# Patient Record
Sex: FEMALE | Race: WHITE | NOT HISPANIC OR LATINO | Employment: FULL TIME | URBAN - METROPOLITAN AREA
[De-identification: names, ages, dates, MRNs, and addresses within clinical notes are randomized per-mention and may not be internally consistent; named-entity substitution may affect disease eponyms.]

---

## 2020-01-28 ENCOUNTER — APPOINTMENT (OUTPATIENT)
Dept: GENERAL RADIOLOGY | Facility: CLINIC | Age: 35
DRG: 003 | End: 2020-01-28
Attending: STUDENT IN AN ORGANIZED HEALTH CARE EDUCATION/TRAINING PROGRAM
Payer: COMMERCIAL

## 2020-01-28 ENCOUNTER — HOSPITAL ENCOUNTER (INPATIENT)
Facility: CLINIC | Age: 35
LOS: 14 days | Discharge: HOME HEALTH | DRG: 003 | End: 2020-02-11
Attending: INTERNAL MEDICINE | Admitting: INTERNAL MEDICINE
Payer: COMMERCIAL

## 2020-01-28 DIAGNOSIS — D62 ANEMIA DUE TO BLOOD LOSS, ACUTE: ICD-10-CM

## 2020-01-28 DIAGNOSIS — T81.49XA INCISIONAL INFECTION: ICD-10-CM

## 2020-01-28 DIAGNOSIS — I50.41 ACUTE COMBINED SYSTOLIC AND DIASTOLIC HEART FAILURE (H): Primary | ICD-10-CM

## 2020-01-28 DIAGNOSIS — Z30.41 ENCOUNTER FOR SURVEILLANCE OF CONTRACEPTIVE PILLS: ICD-10-CM

## 2020-01-28 DIAGNOSIS — I50.41 ACUTE COMBINED SYSTOLIC AND DIASTOLIC HEART FAILURE (H): ICD-10-CM

## 2020-01-28 PROBLEM — I50.9 HEART FAILURE (H): Status: ACTIVE | Noted: 2020-01-28

## 2020-01-28 LAB
ALBUMIN SERPL-MCNC: 3.1 G/DL (ref 3.4–5)
ALP SERPL-CCNC: 58 U/L (ref 40–150)
ALT SERPL W P-5'-P-CCNC: 17 U/L (ref 0–50)
ANION GAP SERPL CALCULATED.3IONS-SCNC: 9 MMOL/L (ref 3–14)
AST SERPL W P-5'-P-CCNC: 38 U/L (ref 0–45)
BASE DEFICIT BLDV-SCNC: 4.1 MMOL/L
BILIRUB SERPL-MCNC: 0.3 MG/DL (ref 0.2–1.3)
BUN SERPL-MCNC: 34 MG/DL (ref 7–30)
CALCIUM SERPL-MCNC: 8.1 MG/DL (ref 8.5–10.1)
CHLORIDE SERPL-SCNC: 106 MMOL/L (ref 94–109)
CO2 SERPL-SCNC: 23 MMOL/L (ref 20–32)
CREAT SERPL-MCNC: 1.14 MG/DL (ref 0.52–1.04)
CRP SERPL-MCNC: 10 MG/L (ref 0–8)
ERYTHROCYTE [DISTWIDTH] IN BLOOD BY AUTOMATED COUNT: 14.6 % (ref 10–15)
ERYTHROCYTE [SEDIMENTATION RATE] IN BLOOD BY WESTERGREN METHOD: 7 MM/H (ref 0–20)
GFR SERPL CREATININE-BSD FRML MDRD: 63 ML/MIN/{1.73_M2}
GLUCOSE BLDC GLUCOMTR-MCNC: 128 MG/DL (ref 70–99)
GLUCOSE SERPL-MCNC: 129 MG/DL (ref 70–99)
HCO3 BLDV-SCNC: 21 MMOL/L (ref 21–28)
HCT VFR BLD AUTO: 42.8 % (ref 35–47)
HGB BLD-MCNC: 13.1 G/DL (ref 11.7–15.7)
INR PPP: 1.13 (ref 0.86–1.14)
LACTATE BLD-SCNC: 1.8 MMOL/L (ref 0.7–2)
MCH RBC QN AUTO: 27 PG (ref 26.5–33)
MCHC RBC AUTO-ENTMCNC: 30.6 G/DL (ref 31.5–36.5)
MCV RBC AUTO: 88 FL (ref 78–100)
O2/TOTAL GAS SETTING VFR VENT: NORMAL %
OXYHGB MFR BLDV: 64 %
PCO2 BLDV: 40 MM HG (ref 40–50)
PH BLDV: 7.34 PH (ref 7.32–7.43)
PLATELET # BLD AUTO: 91 10E9/L (ref 150–450)
PO2 BLDV: 40 MM HG (ref 25–47)
POTASSIUM SERPL-SCNC: 4.3 MMOL/L (ref 3.4–5.3)
PROT SERPL-MCNC: 6.2 G/DL (ref 6.8–8.8)
RBC # BLD AUTO: 4.85 10E12/L (ref 3.8–5.2)
SODIUM SERPL-SCNC: 138 MMOL/L (ref 133–144)
WBC # BLD AUTO: 8.9 10E9/L (ref 4–11)

## 2020-01-28 PROCEDURE — 25000125 ZZHC RX 250: Performed by: STUDENT IN AN ORGANIZED HEALTH CARE EDUCATION/TRAINING PROGRAM

## 2020-01-28 PROCEDURE — 80053 COMPREHEN METABOLIC PANEL: CPT | Performed by: STUDENT IN AN ORGANIZED HEALTH CARE EDUCATION/TRAINING PROGRAM

## 2020-01-28 PROCEDURE — 20000004 ZZH R&B ICU UMMC

## 2020-01-28 PROCEDURE — 40000986 XR CHEST PORT 1 VW

## 2020-01-28 PROCEDURE — 40000048 ZZH STATISTIC DAILY SWAN MONITORING

## 2020-01-28 PROCEDURE — 82805 BLOOD GASES W/O2 SATURATION: CPT | Performed by: STUDENT IN AN ORGANIZED HEALTH CARE EDUCATION/TRAINING PROGRAM

## 2020-01-28 PROCEDURE — 93010 ELECTROCARDIOGRAM REPORT: CPT | Performed by: INTERNAL MEDICINE

## 2020-01-28 PROCEDURE — 86901 BLOOD TYPING SEROLOGIC RH(D): CPT | Performed by: STUDENT IN AN ORGANIZED HEALTH CARE EDUCATION/TRAINING PROGRAM

## 2020-01-28 PROCEDURE — 40000275 ZZH STATISTIC RCP TIME EA 10 MIN

## 2020-01-28 PROCEDURE — 86923 COMPATIBILITY TEST ELECTRIC: CPT | Performed by: STUDENT IN AN ORGANIZED HEALTH CARE EDUCATION/TRAINING PROGRAM

## 2020-01-28 PROCEDURE — 87640 STAPH A DNA AMP PROBE: CPT | Performed by: STUDENT IN AN ORGANIZED HEALTH CARE EDUCATION/TRAINING PROGRAM

## 2020-01-28 PROCEDURE — 85652 RBC SED RATE AUTOMATED: CPT | Performed by: STUDENT IN AN ORGANIZED HEALTH CARE EDUCATION/TRAINING PROGRAM

## 2020-01-28 PROCEDURE — 40000196 ZZH STATISTIC RAPCV CVP MONITORING

## 2020-01-28 PROCEDURE — 86850 RBC ANTIBODY SCREEN: CPT | Performed by: STUDENT IN AN ORGANIZED HEALTH CARE EDUCATION/TRAINING PROGRAM

## 2020-01-28 PROCEDURE — 85027 COMPLETE CBC AUTOMATED: CPT | Performed by: STUDENT IN AN ORGANIZED HEALTH CARE EDUCATION/TRAINING PROGRAM

## 2020-01-28 PROCEDURE — 25000128 H RX IP 250 OP 636: Performed by: STUDENT IN AN ORGANIZED HEALTH CARE EDUCATION/TRAINING PROGRAM

## 2020-01-28 PROCEDURE — 85610 PROTHROMBIN TIME: CPT | Performed by: STUDENT IN AN ORGANIZED HEALTH CARE EDUCATION/TRAINING PROGRAM

## 2020-01-28 PROCEDURE — 83605 ASSAY OF LACTIC ACID: CPT | Performed by: STUDENT IN AN ORGANIZED HEALTH CARE EDUCATION/TRAINING PROGRAM

## 2020-01-28 PROCEDURE — 86140 C-REACTIVE PROTEIN: CPT | Performed by: STUDENT IN AN ORGANIZED HEALTH CARE EDUCATION/TRAINING PROGRAM

## 2020-01-28 PROCEDURE — 86900 BLOOD TYPING SEROLOGIC ABO: CPT | Performed by: STUDENT IN AN ORGANIZED HEALTH CARE EDUCATION/TRAINING PROGRAM

## 2020-01-28 PROCEDURE — 00000146 ZZHCL STATISTIC GLUCOSE BY METER IP

## 2020-01-28 PROCEDURE — 87641 MR-STAPH DNA AMP PROBE: CPT | Performed by: STUDENT IN AN ORGANIZED HEALTH CARE EDUCATION/TRAINING PROGRAM

## 2020-01-28 PROCEDURE — 40000076 ZZH STATISTIC IABP MONITORING

## 2020-01-28 RX ORDER — ACETAMINOPHEN 650 MG/1
650 SUPPOSITORY RECTAL EVERY 4 HOURS PRN
Status: DISCONTINUED | OUTPATIENT
Start: 2020-01-28 | End: 2020-02-11 | Stop reason: HOSPADM

## 2020-01-28 RX ORDER — POTASSIUM CL/LIDO/0.9 % NACL 10MEQ/0.1L
10 INTRAVENOUS SOLUTION, PIGGYBACK (ML) INTRAVENOUS
Status: DISCONTINUED | OUTPATIENT
Start: 2020-01-28 | End: 2020-02-11 | Stop reason: HOSPADM

## 2020-01-28 RX ORDER — DOBUTAMINE HYDROCHLORIDE 200 MG/100ML
5 INJECTION INTRAVENOUS CONTINUOUS
Status: DISCONTINUED | OUTPATIENT
Start: 2020-01-28 | End: 2020-01-29

## 2020-01-28 RX ORDER — MAGNESIUM SULFATE HEPTAHYDRATE 40 MG/ML
4 INJECTION, SOLUTION INTRAVENOUS EVERY 4 HOURS PRN
Status: DISCONTINUED | OUTPATIENT
Start: 2020-01-28 | End: 2020-01-29

## 2020-01-28 RX ORDER — OSELTAMIVIR PHOSPHATE 75 MG/1
75 CAPSULE ORAL 2 TIMES DAILY
Status: DISCONTINUED | OUTPATIENT
Start: 2020-01-29 | End: 2020-01-29

## 2020-01-28 RX ORDER — HEPARIN SODIUM 10000 [USP'U]/100ML
0-3500 INJECTION, SOLUTION INTRAVENOUS CONTINUOUS
Status: DISCONTINUED | OUTPATIENT
Start: 2020-01-29 | End: 2020-01-29

## 2020-01-28 RX ORDER — LIDOCAINE 40 MG/G
CREAM TOPICAL
Status: DISCONTINUED | OUTPATIENT
Start: 2020-01-28 | End: 2020-02-01

## 2020-01-28 RX ORDER — ACETAMINOPHEN 325 MG/1
650 TABLET ORAL EVERY 4 HOURS PRN
Status: DISCONTINUED | OUTPATIENT
Start: 2020-01-28 | End: 2020-02-11 | Stop reason: HOSPADM

## 2020-01-28 RX ORDER — POTASSIUM CHLORIDE 1.5 G/1.58G
20-40 POWDER, FOR SOLUTION ORAL
Status: DISCONTINUED | OUTPATIENT
Start: 2020-01-28 | End: 2020-02-11 | Stop reason: HOSPADM

## 2020-01-28 RX ORDER — POTASSIUM CHLORIDE 29.8 MG/ML
20 INJECTION INTRAVENOUS
Status: DISCONTINUED | OUTPATIENT
Start: 2020-01-28 | End: 2020-02-11 | Stop reason: HOSPADM

## 2020-01-28 RX ORDER — POTASSIUM CHLORIDE 750 MG/1
20-40 TABLET, EXTENDED RELEASE ORAL
Status: DISCONTINUED | OUTPATIENT
Start: 2020-01-28 | End: 2020-02-11 | Stop reason: HOSPADM

## 2020-01-28 RX ORDER — ALUMINA, MAGNESIA, AND SIMETHICONE 2400; 2400; 240 MG/30ML; MG/30ML; MG/30ML
30 SUSPENSION ORAL EVERY 4 HOURS PRN
Status: DISCONTINUED | OUTPATIENT
Start: 2020-01-28 | End: 2020-01-30

## 2020-01-28 RX ORDER — NOREPINEPHRINE BITARTRATE 0.06 MG/ML
0.03-0.4 INJECTION, SOLUTION INTRAVENOUS CONTINUOUS
Status: DISCONTINUED | OUTPATIENT
Start: 2020-01-28 | End: 2020-01-29

## 2020-01-28 RX ORDER — FUROSEMIDE 10 MG/ML
40 INJECTION INTRAMUSCULAR; INTRAVENOUS EVERY 12 HOURS
Status: DISCONTINUED | OUTPATIENT
Start: 2020-01-28 | End: 2020-01-28

## 2020-01-28 RX ORDER — POTASSIUM CHLORIDE 7.45 MG/ML
10 INJECTION INTRAVENOUS
Status: DISCONTINUED | OUTPATIENT
Start: 2020-01-28 | End: 2020-02-11 | Stop reason: HOSPADM

## 2020-01-28 RX ORDER — HEPARIN SODIUM 10000 [USP'U]/100ML
0-3500 INJECTION, SOLUTION INTRAVENOUS CONTINUOUS
Status: DISCONTINUED | OUTPATIENT
Start: 2020-01-28 | End: 2020-01-28

## 2020-01-28 RX ORDER — ONDANSETRON 2 MG/ML
4 INJECTION INTRAMUSCULAR; INTRAVENOUS EVERY 6 HOURS PRN
Status: DISCONTINUED | OUTPATIENT
Start: 2020-01-28 | End: 2020-02-11 | Stop reason: HOSPADM

## 2020-01-28 RX ADMIN — Medication 0.1 MCG/KG/MIN: at 23:20

## 2020-01-28 RX ADMIN — HEPARIN SODIUM 1010 UNITS/HR: 10000 INJECTION, SOLUTION INTRAVENOUS at 23:21

## 2020-01-28 RX ADMIN — DOBUTAMINE IN DEXTROSE 2.5 MCG/KG/MIN: 200 INJECTION, SOLUTION INTRAVENOUS at 23:20

## 2020-01-28 ASSESSMENT — MIFFLIN-ST. JEOR: SCORE: 1567.13

## 2020-01-28 ASSESSMENT — PAIN DESCRIPTION - DESCRIPTORS: DESCRIPTORS: BURNING

## 2020-01-28 NOTE — Clinical Note
Potential access sites were evaluated for patency using ultrasound.   The left femoral artery and left femoral vein were selected. Access was obtained under with Sonosite guidance using a18 guage needle with direct visualization of needle entry.

## 2020-01-29 ENCOUNTER — APPOINTMENT (OUTPATIENT)
Dept: GENERAL RADIOLOGY | Facility: CLINIC | Age: 35
DRG: 003 | End: 2020-01-29
Attending: STUDENT IN AN ORGANIZED HEALTH CARE EDUCATION/TRAINING PROGRAM
Payer: COMMERCIAL

## 2020-01-29 ENCOUNTER — ANESTHESIA EVENT (OUTPATIENT)
Dept: CARDIOLOGY | Facility: CLINIC | Age: 35
DRG: 003 | End: 2020-01-29
Payer: COMMERCIAL

## 2020-01-29 ENCOUNTER — APPOINTMENT (OUTPATIENT)
Dept: GENERAL RADIOLOGY | Facility: CLINIC | Age: 35
DRG: 003 | End: 2020-01-29
Attending: INTERNAL MEDICINE
Payer: COMMERCIAL

## 2020-01-29 ENCOUNTER — APPOINTMENT (OUTPATIENT)
Dept: ULTRASOUND IMAGING | Facility: CLINIC | Age: 35
DRG: 003 | End: 2020-01-29
Attending: STUDENT IN AN ORGANIZED HEALTH CARE EDUCATION/TRAINING PROGRAM
Payer: COMMERCIAL

## 2020-01-29 ENCOUNTER — APPOINTMENT (OUTPATIENT)
Dept: CARDIOLOGY | Facility: CLINIC | Age: 35
DRG: 003 | End: 2020-01-29
Attending: STUDENT IN AN ORGANIZED HEALTH CARE EDUCATION/TRAINING PROGRAM
Payer: COMMERCIAL

## 2020-01-29 ENCOUNTER — ALLIED HEALTH/NURSE VISIT (OUTPATIENT)
Dept: NEUROLOGY | Facility: CLINIC | Age: 35
DRG: 003 | End: 2020-01-29
Attending: PSYCHIATRY & NEUROLOGY
Payer: COMMERCIAL

## 2020-01-29 ENCOUNTER — ANESTHESIA (OUTPATIENT)
Dept: CARDIOLOGY | Facility: CLINIC | Age: 35
DRG: 003 | End: 2020-01-29
Payer: COMMERCIAL

## 2020-01-29 ENCOUNTER — APPOINTMENT (OUTPATIENT)
Dept: CARDIOLOGY | Facility: CLINIC | Age: 35
DRG: 003 | End: 2020-01-29
Attending: INTERNAL MEDICINE
Payer: COMMERCIAL

## 2020-01-29 DIAGNOSIS — I46.9 CARDIAC ARREST (H): Primary | ICD-10-CM

## 2020-01-29 PROBLEM — R57.0 CARDIOGENIC SHOCK (H): Status: ACTIVE | Noted: 2020-01-29

## 2020-01-29 PROBLEM — I50.41 ACUTE COMBINED SYSTOLIC AND DIASTOLIC HEART FAILURE (H): Status: ACTIVE | Noted: 2020-01-28

## 2020-01-29 LAB
ALBUMIN SERPL-MCNC: 2.6 G/DL (ref 3.4–5)
ALBUMIN SERPL-MCNC: 2.6 G/DL (ref 3.4–5)
ALBUMIN UR-MCNC: 10 MG/DL
ALP SERPL-CCNC: 48 U/L (ref 40–150)
ALP SERPL-CCNC: 50 U/L (ref 40–150)
ALT SERPL W P-5'-P-CCNC: 14 U/L (ref 0–50)
ALT SERPL W P-5'-P-CCNC: 16 U/L (ref 0–50)
AMPHETAMINES UR QL SCN: NEGATIVE
ANION GAP SERPL CALCULATED.3IONS-SCNC: 6 MMOL/L (ref 3–14)
ANION GAP SERPL CALCULATED.3IONS-SCNC: 7 MMOL/L (ref 3–14)
ANION GAP SERPL CALCULATED.3IONS-SCNC: 7 MMOL/L (ref 3–14)
ANISOCYTOSIS BLD QL SMEAR: SLIGHT
APPEARANCE UR: CLEAR
APTT PPP: 47 SEC (ref 22–37)
APTT PPP: >240 SEC (ref 22–37)
AST SERPL W P-5'-P-CCNC: 35 U/L (ref 0–45)
AST SERPL W P-5'-P-CCNC: 45 U/L (ref 0–45)
B-HCG SERPL-ACNC: <1 IU/L (ref 0–5)
BARBITURATES UR QL: NEGATIVE
BASE DEFICIT BLDA-SCNC: 0.6 MMOL/L
BASE DEFICIT BLDA-SCNC: 0.9 MMOL/L
BASE DEFICIT BLDA-SCNC: 2.6 MMOL/L
BASE DEFICIT BLDA-SCNC: 2.7 MMOL/L
BASE DEFICIT BLDV-SCNC: 0.7 MMOL/L
BASE DEFICIT BLDV-SCNC: 0.9 MMOL/L
BASE DEFICIT BLDV-SCNC: 1.3 MMOL/L
BASE DEFICIT BLDV-SCNC: 1.5 MMOL/L
BASE DEFICIT BLDV-SCNC: 1.6 MMOL/L
BASE DEFICIT BLDV-SCNC: 1.9 MMOL/L
BASE DEFICIT BLDV-SCNC: 2.4 MMOL/L
BASE EXCESS BLDV CALC-SCNC: 0.2 MMOL/L
BASOPHILS # BLD AUTO: 0 10E9/L (ref 0–0.2)
BASOPHILS NFR BLD AUTO: 0 %
BENZODIAZ UR QL: POSITIVE
BILIRUB SERPL-MCNC: 0.3 MG/DL (ref 0.2–1.3)
BILIRUB SERPL-MCNC: 0.3 MG/DL (ref 0.2–1.3)
BILIRUB UR QL STRIP: NEGATIVE
BLD PROD TYP BPU: NORMAL
BLD UNIT ID BPU: 0
BLD UNIT ID BPU: 0
BLOOD PRODUCT CODE: NORMAL
BLOOD PRODUCT CODE: NORMAL
BPU ID: NORMAL
BPU ID: NORMAL
BUN SERPL-MCNC: 26 MG/DL (ref 7–30)
BUN SERPL-MCNC: 30 MG/DL (ref 7–30)
BUN SERPL-MCNC: 32 MG/DL (ref 7–30)
BURR CELLS BLD QL SMEAR: SLIGHT
CA-I BLD-MCNC: 4.2 MG/DL (ref 4.4–5.2)
CA-I BLD-MCNC: 4.2 MG/DL (ref 4.4–5.2)
CA-I BLD-MCNC: NORMAL MG/DL (ref 4.4–5.2)
CALCIUM SERPL-MCNC: 7.2 MG/DL (ref 8.5–10.1)
CALCIUM SERPL-MCNC: 7.2 MG/DL (ref 8.5–10.1)
CALCIUM SERPL-MCNC: 8 MG/DL (ref 8.5–10.1)
CANNABINOIDS UR QL SCN: POSITIVE
CHLORIDE SERPL-SCNC: 104 MMOL/L (ref 94–109)
CHLORIDE SERPL-SCNC: 105 MMOL/L (ref 94–109)
CHLORIDE SERPL-SCNC: 108 MMOL/L (ref 94–109)
CO2 SERPL-SCNC: 23 MMOL/L (ref 20–32)
CO2 SERPL-SCNC: 24 MMOL/L (ref 20–32)
CO2 SERPL-SCNC: 24 MMOL/L (ref 20–32)
COCAINE UR QL: NEGATIVE
COLOR UR AUTO: YELLOW
CORTIS SERPL-MCNC: 12.9 UG/DL (ref 4–22)
CREAT SERPL-MCNC: 0.79 MG/DL (ref 0.52–1.04)
CREAT SERPL-MCNC: 0.82 MG/DL (ref 0.52–1.04)
CREAT SERPL-MCNC: 0.93 MG/DL (ref 0.52–1.04)
CRP SERPL-MCNC: 17 MG/L (ref 0–8)
D DIMER PPP FEU-MCNC: 1.3 UG/ML FEU (ref 0–0.5)
DIFFERENTIAL METHOD BLD: ABNORMAL
EOSINOPHIL # BLD AUTO: 0 10E9/L (ref 0–0.7)
EOSINOPHIL NFR BLD AUTO: 0 %
ERYTHROCYTE [DISTWIDTH] IN BLOOD BY AUTOMATED COUNT: 14.5 % (ref 10–15)
ERYTHROCYTE [DISTWIDTH] IN BLOOD BY AUTOMATED COUNT: 14.6 % (ref 10–15)
ERYTHROCYTE [DISTWIDTH] IN BLOOD BY AUTOMATED COUNT: 14.6 % (ref 10–15)
ERYTHROCYTE [SEDIMENTATION RATE] IN BLOOD BY WESTERGREN METHOD: 9 MM/H (ref 0–20)
ETHANOL UR QL SCN: NEGATIVE
FIBRINOGEN PPP-MCNC: 280 MG/DL (ref 200–420)
GFR SERPL CREATININE-BSD FRML MDRD: 81 ML/MIN/{1.73_M2}
GFR SERPL CREATININE-BSD FRML MDRD: >90 ML/MIN/{1.73_M2}
GFR SERPL CREATININE-BSD FRML MDRD: >90 ML/MIN/{1.73_M2}
GLUCOSE BLD-MCNC: 103 MG/DL (ref 70–99)
GLUCOSE BLD-MCNC: 118 MG/DL (ref 70–99)
GLUCOSE BLD-MCNC: NORMAL MG/DL (ref 70–99)
GLUCOSE BLDC GLUCOMTR-MCNC: 101 MG/DL (ref 70–99)
GLUCOSE BLDC GLUCOMTR-MCNC: 106 MG/DL (ref 70–99)
GLUCOSE BLDC GLUCOMTR-MCNC: 107 MG/DL (ref 70–99)
GLUCOSE BLDC GLUCOMTR-MCNC: 120 MG/DL (ref 70–99)
GLUCOSE BLDC GLUCOMTR-MCNC: 94 MG/DL (ref 70–99)
GLUCOSE SERPL-MCNC: 119 MG/DL (ref 70–99)
GLUCOSE SERPL-MCNC: 130 MG/DL (ref 70–99)
GLUCOSE SERPL-MCNC: 98 MG/DL (ref 70–99)
GLUCOSE UR STRIP-MCNC: NEGATIVE MG/DL
GRAM STN SPEC: NORMAL
GRAM STN SPEC: NORMAL
HBA1C MFR BLD: 5.1 % (ref 0–5.6)
HCG UR QL: NEGATIVE
HCO3 BLD-SCNC: 22 MMOL/L (ref 21–28)
HCO3 BLD-SCNC: 25 MMOL/L (ref 21–28)
HCO3 BLD-SCNC: 25 MMOL/L (ref 21–28)
HCO3 BLDA-SCNC: 23 MMOL/L (ref 21–28)
HCO3 BLDV-SCNC: 22 MMOL/L (ref 21–28)
HCO3 BLDV-SCNC: 22 MMOL/L (ref 21–28)
HCO3 BLDV-SCNC: 23 MMOL/L (ref 21–28)
HCO3 BLDV-SCNC: 24 MMOL/L (ref 21–28)
HCO3 BLDV-SCNC: 25 MMOL/L (ref 21–28)
HCO3 BLDV-SCNC: 25 MMOL/L (ref 21–28)
HCT VFR BLD AUTO: 31.4 % (ref 35–47)
HCT VFR BLD AUTO: 33.7 % (ref 35–47)
HCT VFR BLD AUTO: 40.8 % (ref 35–47)
HGB BLD-MCNC: 10.9 G/DL (ref 11.7–15.7)
HGB BLD-MCNC: 12.9 G/DL (ref 11.7–15.7)
HGB BLD-MCNC: 9.7 G/DL (ref 11.7–15.7)
HGB FREE PLAS-MCNC: <30 MG/DL
HGB UR QL STRIP: NEGATIVE
HIV 1+2 AB+HIV1 P24 AG SERPL QL IA: NONREACTIVE
INR PPP: 1.2 (ref 0.86–1.14)
INR PPP: 1.45 (ref 0.86–1.14)
INTERPRETATION ECG - MUSE: NORMAL
IRON SATN MFR SERPL: 14 % (ref 15–46)
IRON SERPL-MCNC: 39 UG/DL (ref 35–180)
KAPPA LC UR-MCNC: 1.37 MG/DL (ref 0.33–1.94)
KAPPA LC/LAMBDA SER: 1.05 {RATIO} (ref 0.26–1.65)
KCT BLD-ACNC: 126 SEC (ref 75–150)
KCT BLD-ACNC: 158 SEC (ref 75–150)
KCT BLD-ACNC: 166 SEC (ref 75–150)
KCT BLD-ACNC: 308 SEC (ref 75–150)
KCT BLD-ACNC: 308 SEC (ref 75–150)
KETONES UR STRIP-MCNC: NEGATIVE MG/DL
LACTATE BLD-SCNC: 1.5 MMOL/L (ref 0.7–2)
LACTATE BLD-SCNC: 1.6 MMOL/L (ref 0.7–2)
LACTATE BLD-SCNC: 1.9 MMOL/L (ref 0.7–2)
LACTATE BLD-SCNC: 2.1 MMOL/L (ref 0.7–2)
LACTATE BLD-SCNC: 2.3 MMOL/L (ref 0.7–2)
LACTATE BLD-SCNC: 2.4 MMOL/L (ref 0.7–2)
LACTATE BLD-SCNC: 2.5 MMOL/L (ref 0.7–2)
LACTATE BLD-SCNC: 2.5 MMOL/L (ref 0.7–2)
LACTATE BLD-SCNC: 3.1 MMOL/L (ref 0.7–2)
LACTATE BLD-SCNC: NORMAL MMOL/L (ref 0.7–2)
LAMBDA LC SERPL-MCNC: 1.31 MG/DL (ref 0.57–2.63)
LDH SERPL L TO P-CCNC: 280 U/L (ref 81–234)
LEUKOCYTE ESTERASE UR QL STRIP: NEGATIVE
LMWH PPP CHRO-ACNC: 0.28 IU/ML
LMWH PPP CHRO-ACNC: 0.38 IU/ML
LMWH PPP CHRO-ACNC: <0.1 IU/ML
LMWH PPP CHRO-ACNC: >2 IU/ML
LYMPHOCYTES # BLD AUTO: 1.1 10E9/L (ref 0.8–5.3)
LYMPHOCYTES NFR BLD AUTO: 10.7 %
MAGNESIUM SERPL-MCNC: 2.3 MG/DL (ref 1.6–2.3)
MAGNESIUM SERPL-MCNC: 2.3 MG/DL (ref 1.6–2.3)
MAGNESIUM SERPL-MCNC: 2.4 MG/DL (ref 1.6–2.3)
MCH RBC QN AUTO: 26.8 PG (ref 26.5–33)
MCH RBC QN AUTO: 27.1 PG (ref 26.5–33)
MCH RBC QN AUTO: 27.4 PG (ref 26.5–33)
MCHC RBC AUTO-ENTMCNC: 30.9 G/DL (ref 31.5–36.5)
MCHC RBC AUTO-ENTMCNC: 31.6 G/DL (ref 31.5–36.5)
MCHC RBC AUTO-ENTMCNC: 32.3 G/DL (ref 31.5–36.5)
MCV RBC AUTO: 85 FL (ref 78–100)
MCV RBC AUTO: 86 FL (ref 78–100)
MCV RBC AUTO: 87 FL (ref 78–100)
MONOCYTES # BLD AUTO: 0.4 10E9/L (ref 0–1.3)
MONOCYTES NFR BLD AUTO: 3.6 %
MRSA DNA SPEC QL NAA+PROBE: NEGATIVE
NEUTROPHILS # BLD AUTO: 8.8 10E9/L (ref 1.6–8.3)
NEUTROPHILS NFR BLD AUTO: 85.7 %
NITRATE UR QL: NEGATIVE
NUM BPU REQUESTED: 1
O2/TOTAL GAS SETTING VFR VENT: 21 %
O2/TOTAL GAS SETTING VFR VENT: 40 %
O2/TOTAL GAS SETTING VFR VENT: ABNORMAL %
O2/TOTAL GAS SETTING VFR VENT: NORMAL %
O2/TOTAL GAS SETTING VFR VENT: NORMAL %
OPIATES UR QL SCN: NEGATIVE
OXYHGB MFR BLD: 98 % (ref 92–100)
OXYHGB MFR BLD: 99 % (ref 92–100)
OXYHGB MFR BLD: 99 % (ref 92–100)
OXYHGB MFR BLDA: 99 % (ref 75–100)
OXYHGB MFR BLDV: 45 %
OXYHGB MFR BLDV: 49 %
OXYHGB MFR BLDV: 50 %
OXYHGB MFR BLDV: 50 %
OXYHGB MFR BLDV: 52 %
OXYHGB MFR BLDV: 72 %
PCO2 BLD: 39 MM HG (ref 35–45)
PCO2 BLD: 44 MM HG (ref 35–45)
PCO2 BLD: 46 MM HG (ref 35–45)
PCO2 BLDA: 42 MM HG (ref 35–45)
PCO2 BLDV: 34 MM HG (ref 40–50)
PCO2 BLDV: 36 MM HG (ref 40–50)
PCO2 BLDV: 37 MM HG (ref 40–50)
PCO2 BLDV: 38 MM HG (ref 40–50)
PCO2 BLDV: 42 MM HG (ref 40–50)
PCO2 BLDV: 43 MM HG (ref 40–50)
PCO2 BLDV: 44 MM HG (ref 40–50)
PCO2 BLDV: 47 MM HG (ref 40–50)
PH BLD: 7.35 PH (ref 7.35–7.45)
PH BLD: 7.36 PH (ref 7.35–7.45)
PH BLD: 7.37 PH (ref 7.35–7.45)
PH BLDA: 7.35 PH (ref 7.35–7.45)
PH BLDV: 7.33 PH (ref 7.32–7.43)
PH BLDV: 7.36 PH (ref 7.32–7.43)
PH BLDV: 7.39 PH (ref 7.32–7.43)
PH BLDV: 7.42 PH (ref 7.32–7.43)
PH BLDV: 7.42 PH (ref 7.32–7.43)
PH BLDV: 7.43 PH (ref 7.32–7.43)
PH UR STRIP: 6 PH (ref 5–7)
PLATELET # BLD AUTO: 118 10E9/L (ref 150–450)
PLATELET # BLD AUTO: 119 10E9/L (ref 150–450)
PLATELET # BLD AUTO: 74 10E9/L (ref 150–450)
PLATELET # BLD EST: ABNORMAL 10*3/UL
PO2 BLD: 217 MM HG (ref 80–105)
PO2 BLD: 226 MM HG (ref 80–105)
PO2 BLD: 299 MM HG (ref 80–105)
PO2 BLDA: 320 MM HG (ref 80–105)
PO2 BLDV: 29 MM HG (ref 25–47)
PO2 BLDV: 30 MM HG (ref 25–47)
PO2 BLDV: 32 MM HG (ref 25–47)
PO2 BLDV: 49 MM HG (ref 25–47)
POIKILOCYTOSIS BLD QL SMEAR: SLIGHT
POTASSIUM SERPL-SCNC: 4 MMOL/L (ref 3.4–5.3)
POTASSIUM SERPL-SCNC: 4.1 MMOL/L (ref 3.4–5.3)
POTASSIUM SERPL-SCNC: 4.4 MMOL/L (ref 3.4–5.3)
PROCALCITONIN SERPL-MCNC: <0.05 NG/ML
PROT SERPL-MCNC: 5.2 G/DL (ref 6.8–8.8)
PROT SERPL-MCNC: 5.2 G/DL (ref 6.8–8.8)
RBC # BLD AUTO: 3.62 10E12/L (ref 3.8–5.2)
RBC # BLD AUTO: 3.98 10E12/L (ref 3.8–5.2)
RBC # BLD AUTO: 4.76 10E12/L (ref 3.8–5.2)
RBC #/AREA URNS AUTO: 1 /HPF (ref 0–2)
SODIUM SERPL-SCNC: 134 MMOL/L (ref 133–144)
SODIUM SERPL-SCNC: 136 MMOL/L (ref 133–144)
SODIUM SERPL-SCNC: 139 MMOL/L (ref 133–144)
SOURCE: ABNORMAL
SP GR UR STRIP: 1.02 (ref 1–1.03)
SPECIMEN SOURCE: NORMAL
SPECIMEN SOURCE: NORMAL
TIBC SERPL-MCNC: 272 UG/DL (ref 240–430)
TRANS CELLS #/AREA URNS HPF: <1 /HPF (ref 0–1)
TRANSFERRIN SERPL-MCNC: 225 MG/DL (ref 210–360)
TRANSFUSION STATUS PATIENT QL: NORMAL
TROPONIN I SERPL-MCNC: 5.18 UG/L (ref 0–0.04)
TROPONIN I SERPL-MCNC: 5.54 UG/L (ref 0–0.04)
TROPONIN I SERPL-MCNC: 6.7 UG/L (ref 0–0.04)
TROPONIN I SERPL-MCNC: 6.87 UG/L (ref 0–0.04)
TSH SERPL DL<=0.005 MIU/L-ACNC: 2.11 MU/L (ref 0.4–4)
UROBILINOGEN UR STRIP-MCNC: NORMAL MG/DL (ref 0–2)
WBC # BLD AUTO: 10.3 10E9/L (ref 4–11)
WBC # BLD AUTO: 11.7 10E9/L (ref 4–11)
WBC # BLD AUTO: 15.1 10E9/L (ref 4–11)
WBC #/AREA URNS AUTO: 1 /HPF (ref 0–5)

## 2020-01-29 PROCEDURE — 85730 THROMBOPLASTIN TIME PARTIAL: CPT | Performed by: STUDENT IN AN ORGANIZED HEALTH CARE EDUCATION/TRAINING PROGRAM

## 2020-01-29 PROCEDURE — 84443 ASSAY THYROID STIM HORMONE: CPT | Performed by: STUDENT IN AN ORGANIZED HEALTH CARE EDUCATION/TRAINING PROGRAM

## 2020-01-29 PROCEDURE — 85347 COAGULATION TIME ACTIVATED: CPT

## 2020-01-29 PROCEDURE — 88342 IMHCHEM/IMCYTCHM 1ST ANTB: CPT | Performed by: INTERNAL MEDICINE

## 2020-01-29 PROCEDURE — 85384 FIBRINOGEN ACTIVITY: CPT | Performed by: STUDENT IN AN ORGANIZED HEALTH CARE EDUCATION/TRAINING PROGRAM

## 2020-01-29 PROCEDURE — 25000125 ZZHC RX 250: Performed by: NURSE ANESTHETIST, CERTIFIED REGISTERED

## 2020-01-29 PROCEDURE — 25000125 ZZHC RX 250: Performed by: INTERNAL MEDICINE

## 2020-01-29 PROCEDURE — 41000018 ZZH PER-PERFUSION 1ST 30 MIN

## 2020-01-29 PROCEDURE — 87389 HIV-1 AG W/HIV-1&-2 AB AG IA: CPT | Performed by: STUDENT IN AN ORGANIZED HEALTH CARE EDUCATION/TRAINING PROGRAM

## 2020-01-29 PROCEDURE — 84466 ASSAY OF TRANSFERRIN: CPT | Performed by: STUDENT IN AN ORGANIZED HEALTH CARE EDUCATION/TRAINING PROGRAM

## 2020-01-29 PROCEDURE — 95700 EEG CONT REC W/VID EEG TECH: CPT | Mod: ZF

## 2020-01-29 PROCEDURE — 93308 TTE F-UP OR LMTD: CPT

## 2020-01-29 PROCEDURE — 40000275 ZZH STATISTIC RCP TIME EA 10 MIN

## 2020-01-29 PROCEDURE — 27211402 ZZH SENSOR NIRS OXIMETER, ADULT

## 2020-01-29 PROCEDURE — 71045 X-RAY EXAM CHEST 1 VIEW: CPT

## 2020-01-29 PROCEDURE — 83540 ASSAY OF IRON: CPT | Performed by: STUDENT IN AN ORGANIZED HEALTH CARE EDUCATION/TRAINING PROGRAM

## 2020-01-29 PROCEDURE — 87040 BLOOD CULTURE FOR BACTERIA: CPT | Performed by: STUDENT IN AN ORGANIZED HEALTH CARE EDUCATION/TRAINING PROGRAM

## 2020-01-29 PROCEDURE — 25000125 ZZHC RX 250: Performed by: STUDENT IN AN ORGANIZED HEALTH CARE EDUCATION/TRAINING PROGRAM

## 2020-01-29 PROCEDURE — 33947 ECMO/ECLS INITIATION ARTERY: CPT | Mod: GC | Performed by: INTERNAL MEDICINE

## 2020-01-29 PROCEDURE — 40000986 XR ABDOMEN PORT 1 VW

## 2020-01-29 PROCEDURE — 80307 DRUG TEST PRSMV CHEM ANLYZR: CPT | Performed by: STUDENT IN AN ORGANIZED HEALTH CARE EDUCATION/TRAINING PROGRAM

## 2020-01-29 PROCEDURE — 81001 URINALYSIS AUTO W/SCOPE: CPT | Performed by: STUDENT IN AN ORGANIZED HEALTH CARE EDUCATION/TRAINING PROGRAM

## 2020-01-29 PROCEDURE — 5A1522G EXTRACORPOREAL OXYGENATION, MEMBRANE, PERIPHERAL VENO-ARTERIAL: ICD-10-PCS | Performed by: INTERNAL MEDICINE

## 2020-01-29 PROCEDURE — 40000048 ZZH STATISTIC DAILY SWAN MONITORING

## 2020-01-29 PROCEDURE — 40000014 ZZH STATISTIC ARTERIAL MONITORING DAILY

## 2020-01-29 PROCEDURE — 85520 HEPARIN ASSAY: CPT | Performed by: INTERNAL MEDICINE

## 2020-01-29 PROCEDURE — 82533 TOTAL CORTISOL: CPT | Performed by: STUDENT IN AN ORGANIZED HEALTH CARE EDUCATION/TRAINING PROGRAM

## 2020-01-29 PROCEDURE — 83883 ASSAY NEPHELOMETRY NOT SPEC: CPT | Performed by: INTERNAL MEDICINE

## 2020-01-29 PROCEDURE — 25800030 ZZH RX IP 258 OP 636: Performed by: INTERNAL MEDICINE

## 2020-01-29 PROCEDURE — 82330 ASSAY OF CALCIUM: CPT | Performed by: STUDENT IN AN ORGANIZED HEALTH CARE EDUCATION/TRAINING PROGRAM

## 2020-01-29 PROCEDURE — 86140 C-REACTIVE PROTEIN: CPT | Performed by: STUDENT IN AN ORGANIZED HEALTH CARE EDUCATION/TRAINING PROGRAM

## 2020-01-29 PROCEDURE — 84702 CHORIONIC GONADOTROPIN TEST: CPT | Performed by: STUDENT IN AN ORGANIZED HEALTH CARE EDUCATION/TRAINING PROGRAM

## 2020-01-29 PROCEDURE — 5A1935Z RESPIRATORY VENTILATION, LESS THAN 24 CONSECUTIVE HOURS: ICD-10-PCS | Performed by: INTERNAL MEDICINE

## 2020-01-29 PROCEDURE — 93308 TTE F-UP OR LMTD: CPT | Mod: 26 | Performed by: INTERNAL MEDICINE

## 2020-01-29 PROCEDURE — 80349 CANNABINOIDS NATURAL: CPT | Performed by: STUDENT IN AN ORGANIZED HEALTH CARE EDUCATION/TRAINING PROGRAM

## 2020-01-29 PROCEDURE — 27210794 ZZH OR GENERAL SUPPLY STERILE: Performed by: INTERNAL MEDICINE

## 2020-01-29 PROCEDURE — 25000128 H RX IP 250 OP 636: Performed by: INTERNAL MEDICINE

## 2020-01-29 PROCEDURE — 99152 MOD SED SAME PHYS/QHP 5/>YRS: CPT | Performed by: INTERNAL MEDICINE

## 2020-01-29 PROCEDURE — 25000128 H RX IP 250 OP 636: Performed by: STUDENT IN AN ORGANIZED HEALTH CARE EDUCATION/TRAINING PROGRAM

## 2020-01-29 PROCEDURE — 94002 VENT MGMT INPAT INIT DAY: CPT

## 2020-01-29 PROCEDURE — 83735 ASSAY OF MAGNESIUM: CPT | Performed by: STUDENT IN AN ORGANIZED HEALTH CARE EDUCATION/TRAINING PROGRAM

## 2020-01-29 PROCEDURE — 93321 DOPPLER ECHO F-UP/LMTD STD: CPT | Mod: 26 | Performed by: INTERNAL MEDICINE

## 2020-01-29 PROCEDURE — 25000132 ZZH RX MED GY IP 250 OP 250 PS 637: Performed by: STUDENT IN AN ORGANIZED HEALTH CARE EDUCATION/TRAINING PROGRAM

## 2020-01-29 PROCEDURE — 82784 ASSAY IGA/IGD/IGG/IGM EACH: CPT | Performed by: STUDENT IN AN ORGANIZED HEALTH CARE EDUCATION/TRAINING PROGRAM

## 2020-01-29 PROCEDURE — 82805 BLOOD GASES W/O2 SATURATION: CPT | Performed by: STUDENT IN AN ORGANIZED HEALTH CARE EDUCATION/TRAINING PROGRAM

## 2020-01-29 PROCEDURE — 84145 PROCALCITONIN (PCT): CPT | Performed by: STUDENT IN AN ORGANIZED HEALTH CARE EDUCATION/TRAINING PROGRAM

## 2020-01-29 PROCEDURE — 99291 CRITICAL CARE FIRST HOUR: CPT | Mod: 25 | Performed by: INTERNAL MEDICINE

## 2020-01-29 PROCEDURE — 40000196 ZZH STATISTIC RAPCV CVP MONITORING

## 2020-01-29 PROCEDURE — 83605 ASSAY OF LACTIC ACID: CPT | Performed by: STUDENT IN AN ORGANIZED HEALTH CARE EDUCATION/TRAINING PROGRAM

## 2020-01-29 PROCEDURE — 85520 HEPARIN ASSAY: CPT | Performed by: STUDENT IN AN ORGANIZED HEALTH CARE EDUCATION/TRAINING PROGRAM

## 2020-01-29 PROCEDURE — 93505 ENDOMYOCARDIAL BIOPSY: CPT | Performed by: INTERNAL MEDICINE

## 2020-01-29 PROCEDURE — 88307 TISSUE EXAM BY PATHOLOGIST: CPT | Performed by: INTERNAL MEDICINE

## 2020-01-29 PROCEDURE — 84484 ASSAY OF TROPONIN QUANT: CPT | Performed by: STUDENT IN AN ORGANIZED HEALTH CARE EDUCATION/TRAINING PROGRAM

## 2020-01-29 PROCEDURE — 81025 URINE PREGNANCY TEST: CPT | Performed by: INTERNAL MEDICINE

## 2020-01-29 PROCEDURE — 33952 ECMO/ECLS INSJ PRPH CANNULA: CPT | Mod: 59 | Performed by: INTERNAL MEDICINE

## 2020-01-29 PROCEDURE — 40000076 ZZH STATISTIC IABP MONITORING

## 2020-01-29 PROCEDURE — 83550 IRON BINDING TEST: CPT | Performed by: STUDENT IN AN ORGANIZED HEALTH CARE EDUCATION/TRAINING PROGRAM

## 2020-01-29 PROCEDURE — 36620 INSERTION CATHETER ARTERY: CPT | Performed by: INTERNAL MEDICINE

## 2020-01-29 PROCEDURE — 33947 ECMO/ECLS INITIATION ARTERY: CPT | Mod: 59 | Performed by: INTERNAL MEDICINE

## 2020-01-29 PROCEDURE — 85652 RBC SED RATE AUTOMATED: CPT | Performed by: STUDENT IN AN ORGANIZED HEALTH CARE EDUCATION/TRAINING PROGRAM

## 2020-01-29 PROCEDURE — 87205 SMEAR GRAM STAIN: CPT | Performed by: STUDENT IN AN ORGANIZED HEALTH CARE EDUCATION/TRAINING PROGRAM

## 2020-01-29 PROCEDURE — 80320 DRUG SCREEN QUANTALCOHOLS: CPT | Performed by: STUDENT IN AN ORGANIZED HEALTH CARE EDUCATION/TRAINING PROGRAM

## 2020-01-29 PROCEDURE — 85025 COMPLETE CBC W/AUTO DIFF WBC: CPT | Performed by: STUDENT IN AN ORGANIZED HEALTH CARE EDUCATION/TRAINING PROGRAM

## 2020-01-29 PROCEDURE — 93005 ELECTROCARDIOGRAM TRACING: CPT

## 2020-01-29 PROCEDURE — 20000004 ZZH R&B ICU UMMC

## 2020-01-29 PROCEDURE — 83615 LACTATE (LD) (LDH) ENZYME: CPT | Performed by: STUDENT IN AN ORGANIZED HEALTH CARE EDUCATION/TRAINING PROGRAM

## 2020-01-29 PROCEDURE — 93505 ENDOMYOCARDIAL BIOPSY: CPT | Mod: 26 | Performed by: INTERNAL MEDICINE

## 2020-01-29 PROCEDURE — 40000671 ZZH STATISTIC ANESTHESIA CASE

## 2020-01-29 PROCEDURE — 36415 COLL VENOUS BLD VENIPUNCTURE: CPT | Performed by: STUDENT IN AN ORGANIZED HEALTH CARE EDUCATION/TRAINING PROGRAM

## 2020-01-29 PROCEDURE — 95711 VEEG 2-12 HR UNMONITORED: CPT | Mod: ZF

## 2020-01-29 PROCEDURE — 00000146 ZZHCL STATISTIC GLUCOSE BY METER IP

## 2020-01-29 PROCEDURE — 85027 COMPLETE CBC AUTOMATED: CPT | Performed by: STUDENT IN AN ORGANIZED HEALTH CARE EDUCATION/TRAINING PROGRAM

## 2020-01-29 PROCEDURE — C1894 INTRO/SHEATH, NON-LASER: HCPCS | Performed by: INTERNAL MEDICINE

## 2020-01-29 PROCEDURE — 83051 HEMOGLOBIN PLASMA: CPT | Performed by: STUDENT IN AN ORGANIZED HEALTH CARE EDUCATION/TRAINING PROGRAM

## 2020-01-29 PROCEDURE — 40000986 XR CHEST PORT 1 VW

## 2020-01-29 PROCEDURE — 80048 BASIC METABOLIC PNL TOTAL CA: CPT | Performed by: STUDENT IN AN ORGANIZED HEALTH CARE EDUCATION/TRAINING PROGRAM

## 2020-01-29 PROCEDURE — 80053 COMPREHEN METABOLIC PANEL: CPT | Performed by: STUDENT IN AN ORGANIZED HEALTH CARE EDUCATION/TRAINING PROGRAM

## 2020-01-29 PROCEDURE — 88341 IMHCHEM/IMCYTCHM EA ADD ANTB: CPT | Performed by: INTERNAL MEDICINE

## 2020-01-29 PROCEDURE — 3E033XZ INTRODUCTION OF VASOPRESSOR INTO PERIPHERAL VEIN, PERCUTANEOUS APPROACH: ICD-10-PCS | Performed by: INTERNAL MEDICINE

## 2020-01-29 PROCEDURE — 85379 FIBRIN DEGRADATION QUANT: CPT | Performed by: STUDENT IN AN ORGANIZED HEALTH CARE EDUCATION/TRAINING PROGRAM

## 2020-01-29 PROCEDURE — 5A02210 ASSISTANCE WITH CARDIAC OUTPUT USING BALLOON PUMP, CONTINUOUS: ICD-10-PCS | Performed by: INTERNAL MEDICINE

## 2020-01-29 PROCEDURE — 25800030 ZZH RX IP 258 OP 636: Performed by: STUDENT IN AN ORGANIZED HEALTH CARE EDUCATION/TRAINING PROGRAM

## 2020-01-29 PROCEDURE — 83036 HEMOGLOBIN GLYCOSYLATED A1C: CPT | Performed by: STUDENT IN AN ORGANIZED HEALTH CARE EDUCATION/TRAINING PROGRAM

## 2020-01-29 PROCEDURE — P9037 PLATE PHERES LEUKOREDU IRRAD: HCPCS | Performed by: STUDENT IN AN ORGANIZED HEALTH CARE EDUCATION/TRAINING PROGRAM

## 2020-01-29 PROCEDURE — 93010 ELECTROCARDIOGRAM REPORT: CPT | Mod: 76 | Performed by: INTERNAL MEDICINE

## 2020-01-29 PROCEDURE — 84165 PROTEIN E-PHORESIS SERUM: CPT | Performed by: STUDENT IN AN ORGANIZED HEALTH CARE EDUCATION/TRAINING PROGRAM

## 2020-01-29 PROCEDURE — 86334 IMMUNOFIX E-PHORESIS SERUM: CPT | Performed by: STUDENT IN AN ORGANIZED HEALTH CARE EDUCATION/TRAINING PROGRAM

## 2020-01-29 PROCEDURE — 88313 SPECIAL STAINS GROUP 2: CPT | Performed by: INTERNAL MEDICINE

## 2020-01-29 PROCEDURE — 93325 DOPPLER ECHO COLOR FLOW MAPG: CPT | Mod: 26 | Performed by: INTERNAL MEDICINE

## 2020-01-29 PROCEDURE — 40000281 ZZH STATISTIC TRANSPORT TIME EA 15 MIN

## 2020-01-29 PROCEDURE — P9016 RBC LEUKOCYTES REDUCED: HCPCS | Performed by: STUDENT IN AN ORGANIZED HEALTH CARE EDUCATION/TRAINING PROGRAM

## 2020-01-29 PROCEDURE — 76932 ECHO GUIDE FOR HEART BIOPSY: CPT | Performed by: INTERNAL MEDICINE

## 2020-01-29 PROCEDURE — 93925 LOWER EXTREMITY STUDY: CPT

## 2020-01-29 PROCEDURE — 93325 DOPPLER ECHO COLOR FLOW MAPG: CPT

## 2020-01-29 PROCEDURE — 27211184 ZZH CARDIOHELP CIRCUIT

## 2020-01-29 PROCEDURE — 25000128 H RX IP 250 OP 636: Performed by: NURSE ANESTHETIST, CERTIFIED REGISTERED

## 2020-01-29 PROCEDURE — C9113 INJ PANTOPRAZOLE SODIUM, VIA: HCPCS | Performed by: STUDENT IN AN ORGANIZED HEALTH CARE EDUCATION/TRAINING PROGRAM

## 2020-01-29 PROCEDURE — 85610 PROTHROMBIN TIME: CPT | Performed by: STUDENT IN AN ORGANIZED HEALTH CARE EDUCATION/TRAINING PROGRAM

## 2020-01-29 PROCEDURE — 00000402 ZZHCL STATISTIC TOTAL PROTEIN: Performed by: STUDENT IN AN ORGANIZED HEALTH CARE EDUCATION/TRAINING PROGRAM

## 2020-01-29 PROCEDURE — 82947 ASSAY GLUCOSE BLOOD QUANT: CPT | Performed by: STUDENT IN AN ORGANIZED HEALTH CARE EDUCATION/TRAINING PROGRAM

## 2020-01-29 PROCEDURE — 99153 MOD SED SAME PHYS/QHP EA: CPT | Performed by: INTERNAL MEDICINE

## 2020-01-29 PROCEDURE — 80347 BENZODIAZEPINES 13 OR MORE: CPT | Performed by: STUDENT IN AN ORGANIZED HEALTH CARE EDUCATION/TRAINING PROGRAM

## 2020-01-29 PROCEDURE — C1769 GUIDE WIRE: HCPCS | Performed by: INTERNAL MEDICINE

## 2020-01-29 PROCEDURE — 33947 ECMO/ECLS INITIATION ARTERY: CPT

## 2020-01-29 PROCEDURE — 02BK3ZX EXCISION OF RIGHT VENTRICLE, PERCUTANEOUS APPROACH, DIAGNOSTIC: ICD-10-PCS | Performed by: INTERNAL MEDICINE

## 2020-01-29 PROCEDURE — 99292 CRITICAL CARE ADDL 30 MIN: CPT | Mod: 25 | Performed by: INTERNAL MEDICINE

## 2020-01-29 PROCEDURE — 87070 CULTURE OTHR SPECIMN AEROBIC: CPT | Performed by: STUDENT IN AN ORGANIZED HEALTH CARE EDUCATION/TRAINING PROGRAM

## 2020-01-29 PROCEDURE — 33952 ECMO/ECLS INSJ PRPH CANNULA: CPT | Performed by: INTERNAL MEDICINE

## 2020-01-29 RX ORDER — SODIUM CHLORIDE 9 MG/ML
INJECTION, SOLUTION INTRAVENOUS CONTINUOUS
Status: CANCELLED | OUTPATIENT
Start: 2020-01-29

## 2020-01-29 RX ORDER — DEXTROSE MONOHYDRATE 25 G/50ML
25-50 INJECTION, SOLUTION INTRAVENOUS
Status: DISCONTINUED | OUTPATIENT
Start: 2020-01-29 | End: 2020-02-11 | Stop reason: HOSPADM

## 2020-01-29 RX ORDER — HEPARIN SODIUM 10000 [USP'U]/100ML
10-50 INJECTION, SOLUTION INTRAVENOUS CONTINUOUS
Status: DISCONTINUED | OUTPATIENT
Start: 2020-01-29 | End: 2020-01-30 | Stop reason: DRUGHIGH

## 2020-01-29 RX ORDER — MIDAZOLAM (PF) 1 MG/ML IN 0.9 % SODIUM CHLORIDE INTRAVENOUS SOLUTION
1-8 CONTINUOUS
Status: DISCONTINUED | OUTPATIENT
Start: 2020-01-29 | End: 2020-01-31 | Stop reason: CLARIF

## 2020-01-29 RX ORDER — HYDROMORPHONE HCL/0.9% NACL/PF 0.2MG/0.2
0.2 SYRINGE (ML) INTRAVENOUS
Status: DISCONTINUED | OUTPATIENT
Start: 2020-01-29 | End: 2020-02-11 | Stop reason: HOSPADM

## 2020-01-29 RX ORDER — MAGNESIUM SULFATE HEPTAHYDRATE 40 MG/ML
4 INJECTION, SOLUTION INTRAVENOUS EVERY 4 HOURS PRN
Status: DISCONTINUED | OUTPATIENT
Start: 2020-01-29 | End: 2020-02-04

## 2020-01-29 RX ORDER — POTASSIUM CHLORIDE 750 MG/1
40 TABLET, EXTENDED RELEASE ORAL
Status: CANCELLED | OUTPATIENT
Start: 2020-01-29

## 2020-01-29 RX ORDER — DEXTROSE MONOHYDRATE 100 MG/ML
INJECTION, SOLUTION INTRAVENOUS CONTINUOUS PRN
Status: DISCONTINUED | OUTPATIENT
Start: 2020-01-29 | End: 2020-02-02 | Stop reason: CLARIF

## 2020-01-29 RX ORDER — NOREPINEPHRINE BITARTRATE 0.06 MG/ML
0.03-0.4 INJECTION, SOLUTION INTRAVENOUS CONTINUOUS
Status: DISCONTINUED | OUTPATIENT
Start: 2020-01-29 | End: 2020-02-02 | Stop reason: CLARIF

## 2020-01-29 RX ORDER — NALOXONE HYDROCHLORIDE 0.4 MG/ML
.1-.4 INJECTION, SOLUTION INTRAMUSCULAR; INTRAVENOUS; SUBCUTANEOUS
Status: DISCONTINUED | OUTPATIENT
Start: 2020-01-29 | End: 2020-02-04

## 2020-01-29 RX ORDER — POLYETHYLENE GLYCOL 3350 17 G/17G
17 POWDER, FOR SOLUTION ORAL DAILY
Status: DISCONTINUED | OUTPATIENT
Start: 2020-01-29 | End: 2020-01-30

## 2020-01-29 RX ORDER — NALOXONE HYDROCHLORIDE 0.4 MG/ML
.1-.4 INJECTION, SOLUTION INTRAMUSCULAR; INTRAVENOUS; SUBCUTANEOUS
Status: DISCONTINUED | OUTPATIENT
Start: 2020-01-29 | End: 2020-01-29

## 2020-01-29 RX ORDER — HEPARIN SODIUM (PORCINE) LOCK FLUSH IV SOLN 100 UNIT/ML 100 UNIT/ML
5-10 SOLUTION INTRAVENOUS EVERY 30 MIN PRN
Status: DISCONTINUED | OUTPATIENT
Start: 2020-01-29 | End: 2020-01-30 | Stop reason: DRUGHIGH

## 2020-01-29 RX ORDER — POTASSIUM CHLORIDE 29.8 MG/ML
20 INJECTION INTRAVENOUS
Status: DISCONTINUED | OUTPATIENT
Start: 2020-01-29 | End: 2020-02-03 | Stop reason: CLARIF

## 2020-01-29 RX ORDER — PROPOFOL 10 MG/ML
5-75 INJECTION, EMULSION INTRAVENOUS CONTINUOUS
Status: DISCONTINUED | OUTPATIENT
Start: 2020-01-29 | End: 2020-02-04

## 2020-01-29 RX ORDER — FENTANYL CITRATE 50 UG/ML
50 INJECTION, SOLUTION INTRAMUSCULAR; INTRAVENOUS EVERY 30 MIN PRN
Status: DISCONTINUED | OUTPATIENT
Start: 2020-01-29 | End: 2020-02-01

## 2020-01-29 RX ORDER — POTASSIUM CHLORIDE 750 MG/1
20 TABLET, EXTENDED RELEASE ORAL
Status: CANCELLED | OUTPATIENT
Start: 2020-01-29

## 2020-01-29 RX ORDER — PREDNISONE 20 MG/1
TABLET ORAL
Status: ON HOLD | COMMUNITY
End: 2020-02-11

## 2020-01-29 RX ORDER — SENNOSIDES 8.6 MG
8.6 TABLET ORAL 2 TIMES DAILY PRN
Status: DISCONTINUED | OUTPATIENT
Start: 2020-01-29 | End: 2020-02-11 | Stop reason: HOSPADM

## 2020-01-29 RX ORDER — COLCHICINE 0.6 MG/1
1.2 TABLET ORAL DAILY
Status: COMPLETED | OUTPATIENT
Start: 2020-01-29 | End: 2020-01-29

## 2020-01-29 RX ORDER — PIPERACILLIN SODIUM, TAZOBACTAM SODIUM 3; .375 G/15ML; G/15ML
3.38 INJECTION, POWDER, LYOPHILIZED, FOR SOLUTION INTRAVENOUS EVERY 6 HOURS
Status: DISCONTINUED | OUTPATIENT
Start: 2020-01-29 | End: 2020-01-31

## 2020-01-29 RX ORDER — NICOTINE POLACRILEX 4 MG
15-30 LOZENGE BUCCAL
Status: DISCONTINUED | OUTPATIENT
Start: 2020-01-29 | End: 2020-02-01

## 2020-01-29 RX ORDER — OSELTAMIVIR PHOSPHATE 6 MG/ML
75 FOR SUSPENSION ORAL 2 TIMES DAILY
Status: DISCONTINUED | OUTPATIENT
Start: 2020-01-30 | End: 2020-01-30

## 2020-01-29 RX ORDER — LIDOCAINE 40 MG/G
CREAM TOPICAL
Status: DISCONTINUED | OUTPATIENT
Start: 2020-01-29 | End: 2020-02-01

## 2020-01-29 RX ORDER — ALBUTEROL SULFATE 90 UG/1
2 AEROSOL, METERED RESPIRATORY (INHALATION) EVERY 4 HOURS PRN
COMMUNITY

## 2020-01-29 RX ORDER — HEPARIN SODIUM 1000 [USP'U]/ML
INJECTION, SOLUTION INTRAVENOUS; SUBCUTANEOUS
Status: DISCONTINUED | OUTPATIENT
Start: 2020-01-29 | End: 2020-01-29 | Stop reason: HOSPADM

## 2020-01-29 RX ORDER — FENTANYL CITRATE 50 UG/ML
INJECTION, SOLUTION INTRAMUSCULAR; INTRAVENOUS
Status: DISCONTINUED | OUTPATIENT
Start: 2020-01-29 | End: 2020-01-29 | Stop reason: HOSPADM

## 2020-01-29 RX ORDER — LIDOCAINE 40 MG/G
CREAM TOPICAL
Status: CANCELLED | OUTPATIENT
Start: 2020-01-29

## 2020-01-29 RX ORDER — COLCHICINE 0.6 MG/1
0.6 TABLET ORAL DAILY
Status: DISCONTINUED | OUTPATIENT
Start: 2020-01-29 | End: 2020-01-29

## 2020-01-29 RX ORDER — LIDOCAINE HYDROCHLORIDE 20 MG/ML
5 SOLUTION OROPHARYNGEAL ONCE
Status: DISCONTINUED | OUTPATIENT
Start: 2020-01-29 | End: 2020-01-30

## 2020-01-29 RX ORDER — ETOMIDATE 2 MG/ML
INJECTION INTRAVENOUS
Status: COMPLETED | OUTPATIENT
Start: 2020-01-29 | End: 2020-01-29

## 2020-01-29 RX ORDER — OSELTAMIVIR PHOSPHATE 6 MG/ML
75 FOR SUSPENSION ORAL 2 TIMES DAILY
Status: DISCONTINUED | OUTPATIENT
Start: 2020-01-30 | End: 2020-01-29

## 2020-01-29 RX ADMIN — OSELTAMIVIR PHOSPHATE 75 MG: 75 CAPSULE ORAL at 02:04

## 2020-01-29 RX ADMIN — Medication 0.2 MG: at 05:11

## 2020-01-29 RX ADMIN — HEPARIN SODIUM 3 ML/HR: 1000 INJECTION, SOLUTION INTRAVENOUS; SUBCUTANEOUS at 18:23

## 2020-01-29 RX ADMIN — SODIUM CHLORIDE, PRESERVATIVE FREE 520 UNITS: 5 INJECTION INTRAVENOUS at 23:42

## 2020-01-29 RX ADMIN — ONDANSETRON 4 MG: 2 INJECTION INTRAMUSCULAR; INTRAVENOUS at 00:09

## 2020-01-29 RX ADMIN — Medication 6 MG/HR: at 15:53

## 2020-01-29 RX ADMIN — ETOMIDATE 20 MG: 2 INJECTION INTRAVENOUS at 15:42

## 2020-01-29 RX ADMIN — Medication 60 MG: at 15:42

## 2020-01-29 RX ADMIN — SODIUM THIOSULFATE 0.25 MCG/KG/MIN: 250 INJECTION, SOLUTION INTRAVENOUS at 10:23

## 2020-01-29 RX ADMIN — SODIUM THIOSULFATE 0.25 MCG/KG/MIN: 250 INJECTION, SOLUTION INTRAVENOUS at 08:41

## 2020-01-29 RX ADMIN — COLCHICINE 1.2 MG: 0.6 TABLET, FILM COATED ORAL at 12:02

## 2020-01-29 RX ADMIN — PANTOPRAZOLE SODIUM 40 MG: 40 INJECTION, POWDER, FOR SOLUTION INTRAVENOUS at 18:23

## 2020-01-29 RX ADMIN — PROPOFOL 20 MCG/KG/MIN: 10 INJECTION, EMULSION INTRAVENOUS at 21:09

## 2020-01-29 RX ADMIN — ACETAMINOPHEN 650 MG: 325 TABLET, FILM COATED ORAL at 22:49

## 2020-01-29 RX ADMIN — PIPERACILLIN AND TAZOBACTAM 3.38 G: 3; .375 INJECTION, POWDER, FOR SOLUTION INTRAVENOUS at 18:23

## 2020-01-29 RX ADMIN — HEPARIN SODIUM 520 UNITS/HR: 10000 INJECTION, SOLUTION INTRAVENOUS at 20:58

## 2020-01-29 RX ADMIN — Medication 0.2 MG: at 01:10

## 2020-01-29 RX ADMIN — Medication 0.2 MG: at 08:24

## 2020-01-29 RX ADMIN — OSELTAMIVIR PHOSPHATE 75 MG: 75 CAPSULE ORAL at 10:39

## 2020-01-29 RX ADMIN — PIPERACILLIN AND TAZOBACTAM 3.38 G: 3; .375 INJECTION, POWDER, FOR SOLUTION INTRAVENOUS at 22:49

## 2020-01-29 RX ADMIN — VANCOMYCIN HYDROCHLORIDE 2000 MG: 10 INJECTION, POWDER, LYOPHILIZED, FOR SOLUTION INTRAVENOUS at 18:52

## 2020-01-29 RX ADMIN — Medication 50 MCG/HR: at 18:22

## 2020-01-29 RX ADMIN — HEPARIN SODIUM 1050 UNITS/HR: 10000 INJECTION, SOLUTION INTRAVENOUS at 00:09

## 2020-01-29 RX ADMIN — EPINEPHRINE 20 MCG: 1 INJECTION PARENTERAL at 15:41

## 2020-01-29 ASSESSMENT — ACTIVITIES OF DAILY LIVING (ADL)
TRANSFERRING: 0-->INDEPENDENT
ADLS_ACUITY_SCORE: 11
FALL_HISTORY_WITHIN_LAST_SIX_MONTHS: NO
RETIRED_EATING: 0-->INDEPENDENT
COGNITION: 0 - NO COGNITION ISSUES REPORTED
ADLS_ACUITY_SCORE: 11
SWALLOWING: 0-->SWALLOWS FOODS/LIQUIDS WITHOUT DIFFICULTY
PRIOR_FUNCTIONAL_LEVEL_COMMENT: INDEPENDENT
ADLS_ACUITY_SCORE: 12
TOILETING: 0-->INDEPENDENT
AMBULATION: 0-->INDEPENDENT
DRESS: 0-->INDEPENDENT
ADLS_ACUITY_SCORE: 11
BATHING: 0-->INDEPENDENT
ADLS_ACUITY_SCORE: 19
RETIRED_COMMUNICATION: 0-->UNDERSTANDS/COMMUNICATES WITHOUT DIFFICULTY

## 2020-01-29 ASSESSMENT — PAIN DESCRIPTION - DESCRIPTORS
DESCRIPTORS: ACHING
DESCRIPTORS: BURNING
DESCRIPTORS: PRESSURE;DISCOMFORT
DESCRIPTORS: ACHING

## 2020-01-29 NOTE — H&P
"  History and Physical    Cards 2     Date of Service: 20  Date of Admission: 20  Patient Name: Azra Ta  : 1985  MRN: [id number]       Chief complaint:   Cough, shortness of breath      History of Present Illness:   Azra Ta is a 34 year old female with no significant PMH who presents with cough and shortness of breath.     Patient initially presented to [facility] on  for chest pain and shortness of breath for the past 3 days. She initially went to urgent care on  and was prescribed prednisone and albuterol with no noted relief. While at [facility], she was noted to have new acute systolic HF with LVEF 25-30%. She underwent RHC on  and noted to have significant drop in BP during the procedure. She was placed on IABP and was started on levophed. Cardiac MRI done which showed anterolateral subepicardial enhancement concerning for possible myocarditis. RHC numbers showed CO 2.2-2.4, RA 18, PCWP 18, CI 1.26. She was also noted to be flu positive and was started on tamiflu. She was transferred to [facility] for advanced HF management.     On arrival, noted to have augmented BP at high 60s while on levophed 0.1 and dobutamine 5. HR noted to be in 130s so dobutamine was discontinued. She reported fatigue but denied any chest pain.       Review of Symptoms:     Comprehensive 10 point review of systems was negative unless otherwise noted in the HPI.     Past Medical History:     Asthma   Bipolar 1 disorder        Allergies:   NKA     Outpatient Medications:     No current facility-administered medications on file prior to encounter.   No current outpatient medications on file prior to encounter.       Family History:   Aunt has hypertensive heart disease     Social History:   Current every day smoker     Physical Exam:   Blood pressure (!) 67/57, pulse 122, temperature 98.2  F (36.8  C), temperature source Tympanic, resp. rate 17, height 1.6 m (5' 3\"), weight 88.8 kg " (195 lb 12.3 oz), SpO2 100 %.  No data recorded.    Gen: no acute distress  HEENT: no scleral icterus, pupils equal and reactive to light, moist mucous membranes, no nasal discharge.  NECK: no adenopathy, no asymmetry, masses, or scars, thyroid normal to palpation and no bruits, difficult to assess JVD  CARDIOVASCULAR: normal rate, regular rhythm. S1/S2 normal, no murmurs, rubs or gallops   RESPIRATORY: clear breath sounds   ABDOMEN: soft, non-tender abdomen without rebound or guarding, no hepatosplenomegaly, no palpable masses, bowel sounds present  EXTREMITIES: peripheral pulses normal, no peripheral edema, warm, capillary refill < 2 seconds, +IABP catheter   Skin: no ecchymoses, no rashes  NEURO: oriented to person, place, year, and situation; CN II-XII grossly intact; 5/5 strength throughout; sensation to light touch intact throughout; coordination intact; negative Romberg; gait normal  PSYCH: affect appropriate      Data:   CMP  Recent Labs   Lab 01/29/20 0338 01/28/20 2248    138   POTASSIUM 4.4 4.3   CHLORIDE 104 106   CO2 24 23   ANIONGAP 6 9   * 129*   BUN 32* 34*   CR 0.93 1.14*   GFRESTIMATED 81 63   GFRESTBLACK >90 73   YIN 8.0* 8.1*   MAG 2.3  --    PROTTOTAL  --  6.2*   ALBUMIN  --  3.1*   BILITOTAL  --  0.3   ALKPHOS  --  58   AST  --  38   ALT  --  17     CBC  Recent Labs   Lab 01/29/20  0338 01/28/20  2248   WBC 15.1* 8.9   RBC 4.76 4.85   HGB 12.9 13.1   HCT 40.8 42.8   MCV 86 88   MCH 27.1 27.0   MCHC 31.6 30.6*   RDW 14.5 14.6   * 91*     INR  Recent Labs   Lab 01/28/20 2248   INR 1.13     Arterial Blood Gas  Recent Labs   Lab 01/29/20  0439 01/29/20  0208 01/28/20  2248   O2PER 4L 21 6L        EKG:  Low voltage, sinus tachycardia, anteroseptal infarct age undetermined      Imaging:    CXR pending   TTE 1/27: severely reduced LVEF 25-30%, global hypokinesis, no significant valvular disease  MRI 1/28: severely reduced LV systolic function with severe global hypokinesis with  evidence of anterolateral subepicardial enhancement which may be seen inmyocarditis. Gadolinium enhancement was noted in the inferoseptum as well as basal segements which may represent infiltrative CM vs artifact. LVEF 29%. Moderate circumferential pericardial effusion.      Assessment/Plan:   Azra Ta is a 34 year old female with no significant PMH  who presents with cough and shortness of breath.     #Cardiogenic shock   #Acute systolic heart failure   #Possible viral myocarditis   Patient has acute systolic heart failure likely from viral myocarditis in the setting of being influenza positive. Cardiac MRI also showed findings consistent with myocarditis. Her initial RHC numbers CO 2.2, CI 1.3 and low MAP were indicative of cardiogenic shock. She is currently on IABP at 1:1 and levophed.   - Continue heparin gtt while on IABP    - Wean levophed to keep augmented MAP >65, once off levophed will consider initiating nitroprusside   - Lactic acid Q4, Hemodynamic assessment Q4   - Strict I and O     #Moderate Pericardial Effusion  #Possible myopericarditis   Noted on MRI done on 1/28 but not seen on TTE. She was also endorsing diffuse chest pain but no audible rub on exam. On bedside TTE, pericardial effusion appeared small-moderate with no evidence of chamber collapse.   - Formal TTE in AM to reassess   - Check ESR, CRP     #Influenza positive   - Continue tamiflu to complete 7 days. Started on 1/27    Fluids:   Electrolytes: K>4, Mg>2  Nutrition: Cardiac, 2L fluid restriction  Analgesia: None  Sedation: None  DVT ppx: heparin gtt   Stress Ulcer ppx:  Protonix  Glycemic Control: SSI  Catheters: None  Lines: None  Consults: none    To be staffed in AM     Almaz Sauceda   Cardiology fellow     I have reviewed today's vital signs, notes, medications, labs and imaging.  I have also seen and examined the patient and agree with the findings and plan as outlined above.  Pt accepted on transfer from OSH.  Pt is 33 yo  female with 15 pack tobacco use hx (1ppd) and cannabias use (last used 4 days ago) presented with flu like symptoms X3 wks and progressive SOB and unresponsive to levophed/Dbx therapy and placed IABP with CO 2.0 and PCW 15 with lactic acid 2.5.  On admission pt with MAP 75 with IABP 1:1 with HR 140s on levophed and Dbx.  Lungs clear and tachy S1 and S2 with summation gallop. Benign abd. Labs with Cr 0.9, lactic acid 2.3 and WBC 15.1.  Assessment: Pt with probable myocarditis with ventricular edema but will need bx.  Plan to wean off pressors and give trial of nipride.  If not successful in improving CO and decreased cardiogenic shock will proceed to VA-ECMO.  Discussed plan with pt and sister.  Pt is full code.  Total critical care time 80 min.     Abilio Mulligan MD, PhD  Professor, Heart Failure and Cardiac Transplantation  [facility]

## 2020-01-29 NOTE — PROGRESS NOTES
[facility] Cardiology Progress Note           Assessment and Plan:     Azra Ta is a 34 year old female with no significant PMH  who presents with cardiogenic shock in the setting of fulminant viral myocarditis     #Cardiogenic shock   #Acute systolic heart failure   #Possible viral myopericarditis  Presenting with SOB and chest pain for one week on 1/27. TTE on 1/27 revealed an EF:25-30%, subsequent RHC revealed mild Rv overload with significant low flow state. cMRI revealed moderate LVH with LGE in the basal and inferolateral regions in the setting of viral myocarditis vs amyloid. Repeat TTE here did show increased LV thickness, initially suspicious for amyloid. However, based on the evolution of her LVH over serial echoes, it's more likely that this is a viral myopericarditis that was complicated by myocardial edema. Despite aggressive treatment with nipride, patient's most recent CO/CI was 1.6-1.7, which is concerning for a restrictive process. Additionally, she continues to remain tachycardic which is significant for impending cardiovascular compromise. Based on her overall pump failure and restrictive physiology she would benefit from placement of VA ECMO as a bridge to sustained mechanical support vs heart transplant.  - Coordinate VA ECMO   - Volume: CVP:6, hold diuritic  - Contractile: IABP in place  - Afterload: S/p nipride   - Continue heparin gtt while on IABP    - Lactic acid Q4, Hemodynamic assessment Q4   - Strict I and O        #Influenza positive   - Continue tamiflu to complete 7 days. Started on 1/27     Fluids:   Electrolytes: K>4, Mg>2  Nutrition: Cardiac, 2L fluid restriction  Analgesia: None  Sedation: None  DVT ppx: heparin gtt   Stress Ulcer ppx:  Protonix  Glycemic Control: SSI  Catheters: None  Lines: None  Consults: none     Staffed with Dr. Isaak Yoo MD  Cardiology Fellow  PGY4        Subjective:     Azra Ta is a 34 year old female with no significant  PMH  who presents with myocarditis c/b cardiogenic shock s/p IABP    S/IE  - Weaned off dobutamine overnight after becoming tachycardia  - Endorses SOB and chest pain     Patient denies current chest pain, dyspnea on exertion, orthopnea, PND, lightheadedness, or palpitations.           Review of Systems:   A comprehensive review of systems was performed and found to be negative except as described in this note          Medications:     Current Facility-Administered Medications   Medication     acetaminophen (TYLENOL) Suppository 650 mg     acetaminophen (TYLENOL) tablet 650 mg     alum & mag hydroxide-simethicone (MYLANTA ES/MAALOX  ES) suspension 30 mL     heparin  drip 25,000 units in 0.45% NaCl 250 mL  ANTICOAGULANT  (see additional administration details for dose)     heparin bolus from infusion pump     HOLD: nitroGLYcerin IF     HYDROmorphone (DILAUDID) injection 0.2 mg     lidocaine (LMX4) cream     lidocaine 1 % 0.1-1 mL     magnesium sulfate 2 g in NS intermittent infusion (PharMEDium or [facility] Cmpd)     magnesium sulfate 4 g in 100 mL sterile water (premade)     medication instruction     naloxone (NARCAN) injection 0.1-0.4 mg     nitroPRUsside (NIPRIDE) 50 mg, sodium thiosulfate 500 mg in D5W 125 mL IV infusion (conc: 0.4mg/mL)     ondansetron (ZOFRAN) injection 4 mg     oseltamivir (TAMIFLU) capsule 75 mg     Patient is already receiving anticoagulation with heparin, enoxaparin (LOVENOX), warfarin (COUMADIN)  or other anticoagulant medication     potassium chloride (KLOR-CON) Packet 20-40 mEq     potassium chloride 10 mEq in 100 mL intermittent infusion with 10 mg lidocaine     potassium chloride 10 mEq in 100 mL sterile water intermittent infusion (premix)     potassium chloride 20 mEq in 50 mL intermittent infusion     potassium chloride ER (K-DUR/KLOR-CON M) CR tablet 20-40 mEq     Reason ACE/ARB/ARNI order not selected     Reason beta blocker not prescribed     sodium chloride (PF) 0.9% PF flush 3 mL      sodium chloride (PF) 0.9% PF flush 3 mL               Objective:   Temp: 98.5  F (36.9  C) Temp src: Axillary BP: 94/50 Pulse: 122 Heart Rate: 140 Resp: 22 SpO2: 100 % O2 Device: Nasal cannula Oxygen Delivery: 4 LPM         Physical Exam:   Vitals were reviewed  Temp: 98.5  F (36.9  C) Temp src: Axillary BP: (!) 83/59 Pulse: 122 Heart Rate: 143 Resp: 22 SpO2: 100 % O2 Device: Nasal cannula Oxygen Delivery: 4 LPM  Constitutional:   awake, alert, cooperative, no apparent distress, and appears stated age     Eyes:   Lids and lashes normal, pupils equal, round and reactive to light, extra ocular muscles intact, sclera clear, conjunctiva normal     Lungs:   No increased work of breathing, good air exchange, clear to auscultation bilaterally, no crackles or wheezing     Cardiovascular:   Normal apical impulse, regular rate and rhythm, normal S1 and S2, no S3 or S4, and no murmur noted     Chest / Breast:   Breasts symmetrical, skin without lesion(s), no nipple retraction or dimpling, no nipple discharge, no masses palpated, no axillary or supraclavicular adenopathy     Abdomen:   No scars, normal bowel sounds, soft, non-distended, non-tender, no masses palpated, no hepatosplenomegally     Skin:   no bruising or bleeding             Data:     Lab Results   Component Value Date    WBC 15.1 (H) 01/29/2020    HGB 12.9 01/29/2020    HCT 40.8 01/29/2020     (L) 01/29/2020     01/29/2020    POTASSIUM 4.4 01/29/2020    CHLORIDE 104 01/29/2020    CO2 24 01/29/2020    BUN 32 (H) 01/29/2020    CR 0.93 01/29/2020     (H) 01/29/2020    SED 7 01/28/2020    TROPI 5.541 (HH) 01/29/2020    AST 38 01/28/2020    ALT 17 01/28/2020    ALKPHOS 58 01/28/2020    BILITOTAL 0.3 01/28/2020    INR 1.13 01/28/2020     All laboratory data reviewed     I have reviewed today's vital signs, notes, medications, labs and imaging.  I have also seen and examined the patient and agree with the findings and plan as outlined above.  Pt  accepted on transfer with cardiogenic shock unresponsive to levophed, Dbx and nipride therapy with HR 130s to 150s and narrow pulse pressure 10 mmHg on IABP with lactic acid of 2.5 and proceeded to VA ECMO.  Currently intubated and sedated.  HR 120s afebrile.  Lungs clear and tachy S1 and S2 with summation gallop.  SG catheter and IABP in right FA/FV.  Assessment: pt with cardiogenic shock now with VA-ECMO with TTE revealing ventricular edema (probable) awaiting Bx results.  Pt seen X5 for total critical care time 90 min.     Abilio Mulligan MD, PhD  Professor, Heart Failure and Cardiac Transplantation  [facility]

## 2020-01-29 NOTE — PHARMACY-ADMISSION MEDICATION HISTORY
Admission medication history interview status for the 1/28/2020 admission is complete. See Epic admission navigator for allergy information, pharmacy, prior to admission medications and immunization status.     Medication history interview sources:  Patient, care everywhere    Changes made to PTA medication list (reason)  Added: prednisone and albuterol    Additional medication history information (including reliability of information, actions taken by pharmacist):None      Prior to Admission medications    Medication Sig Last Dose Taking? Auth Provider   predniSONE (DELTASONE) 20 MG tablet Taper: Starting 01/23/2020 take 60 mg by mouth daily for 3 days followed by 40 mg daily for 3 days and then 20 mg daily for 3 days 1/28/2020 at Unknown time Yes Unknown, Entered By History   albuterol (PROAIR HFA/PROVENTIL HFA/VENTOLIN HFA) 108 (90 Base) MCG/ACT inhaler Inhale 2 puffs into the lungs every 4 hours as needed Unknown at Unknown time  Unknown, Entered By History         Medication history completed by:   Danna Carvajal, PharmD  CVICU and Advanced Heart Failure Pharmacist  Pager [phone]

## 2020-01-29 NOTE — PROGRESS NOTES
Pt arrived from the cath lab intubated with a 7.0 ETT secured 24cm@lip. Pt on IABP, and ECMO. Placed on the following vent settings:    Ventilation Mode: CMV/AC  (Continuous Mandatory Ventilation/ Assist Control)  Rate Set (breaths/minute): 16 breaths/min  Tidal Volume Set (mL): 400 mL  PEEP (cm H2O): 5 cmH2O  Oxygen Concentration (%): 50 %  Resp: 15    Erin Durbin, RT  1/29/2020

## 2020-01-29 NOTE — CONSULTS
Social Work Brief Note:  SW consult received d/t request for advanced directives.  SW notified that pt is being intubated/ starting ECMO and is requesting to complete a HCD identifying her sister (Que) as her healthcare agent.  SW met with pt/ Que and provided a HCD and educated them on how to fill it out.  SW also coordinated a notary to notarize pt's HCD.  SW will f/u and complete SW assessment as time allows.    IGOR Carter, [facility]  ICU   [facility]  Phone:  [phone]  Pager:  [phone]

## 2020-01-29 NOTE — PLAN OF CARE
Admitted/transferred from:[facility] ICU  Reason for admission/transfer: Advanced HF therapies  2 RN skin assessment: completed by Eliza CARMONA RN/Bruce LOERA RN  Result of skin assessment and interventions/actions: Skin Intact. Protective mepilex to coccyx  Height, weight, drug calc weight: Done  Patient belongings (see Flowsheet)  MDRO education added to care plan N/A  ?

## 2020-01-29 NOTE — PLAN OF CARE
Major shift events:  At start of shift, levo turned off, nipride added.  Pt HR increased, BP decreased, overall poor tolerance.  Using BP from IABP. Frequent reassessment by Cards 2 fellow Rodlofo.  Pt with increasing lactic acids. Pt c/o small amount of chest pain and pressure, worsened when head of bed flat. No changes to IABP settings.  Remains on 4L NC. Dry, infrequent, non-productive cough. Per pt, has not had very much to eat over the course of the last several days r/t illness.  Denies nausea presently. Valerio cath placed.  Bilateral upper extremities cool, weak pulses, denies numbness/tingling.  Good pulse in bilateral LE. Pt overall appears tired, presented to ER at OSH approx 2100 yesterday.     Pt with worsening heart failure while on IABP, to cath lab at 1420, sister Alfredo at bedside and supportive. Heparin off at 1300, nipride off at 1345 after signs of intolerance. Health care directive paperwork signed and notarized for Alfredo to be her decision maker. Pt to have R heart cath, biopsy and ECMO started.     Pt to return to  after cath lab. See flowsheets for details. Continue plan of care.

## 2020-01-29 NOTE — PROGRESS NOTES
CLINICAL NUTRITION SERVICES - BRIEF NOTE    Received provider consult for nutrition education with comments dietitian to instruct patient on 2 gm sodium diet. Pt currently NPO post-procedure and not appropriate for education today. Will provide as appropriate. RD will follow per LOS protocol or if re-consulted.     Ludmila Sanchez RD, LD  [phone]  Pgr: [phone]

## 2020-01-29 NOTE — ANESTHESIA PROCEDURE NOTES
ANESTHESIOLOGY RESIDENT/CRNA INTUBATION NOTE  Indication for intubation: airway protection.  Provider Ordering Intubation: Richard Montana MD  History regarding the most recent potassium obtained: Yes  History regarding renal failure obtained: Yes  History of presence or absence of CVA/stroke was obtained: Yes  History of presence or absence of NM disorder obtained: Yes  Post Intubation:  No apparent complications, Sedation to be ordered by primary/ICU team, Primary/ICU team to review CXR, Vent settings by primary/ICU team, ETT secured and Report given to primary nurse and/or team  Anesthesia Out of OR Intubation     Patient:Azra Ta ([id number])  Site: [facility]  The Patient received 20 mg of etomidate for induction and 60 mg of succinylcholine for neuromuscular blockade.  The patient will be paralyzed for approximately 45min-1hour.  The primary team has ordered a versed infusion for sedation during this period of paralysis and will be started by the cath lab RN.    JATIN Weber CRNA 1/29/2020 4:19 PM    Medications Administered  Etomidate (AMIDATE) injection, 20 mg  succinylcholine (ANECTINE) 20 mg/mL injection, 60 mg    Medication administration at: 1/29/2020 3:42 PM

## 2020-01-29 NOTE — PROGRESS NOTES
ECLS Cannulation Note:    Date on: 1/29/2020  Time on: 1550  Surgeon: Rubén    Arterial Cannula: 17 Fr. In the Left Femoral Artery      Venous Cannula: 25 Fr. In the Left Femoral Vein      ECMO components include [employer]Help Oxy Lot # [id number]  Circuit Lot Number: [id number]  Console Serial Number:[id number]    Cannulation was performed in the Cath Lab, placement was verified by fluoroscopy, cannulas are in good position.  ISTAT and blood cooler are at pending.     Terrence Villa, RT, RRT  1/29/2020 5:44 PM

## 2020-01-29 NOTE — PROGRESS NOTES
Pt arrived to unit 4E from OSH on 6L nasal cannula and IABP (Fiberoptic).     IABP was switched from OSH pump to [facility]'s IABP.     IABP setting  1:1  EKG trigger  100% augmented    RT will continue to follow    Ana Rosa Edgard RT

## 2020-01-29 NOTE — PROGRESS NOTES
ECMO Shift Summary:       ECMO Equipment:  Console serial number: [id number]  Circuit Lot number: [id number]  Oxygenator Lot number: [id number]        Patient remains on VA ECMO, all equipment is functioning and alarms are appropriately set. RPM's 3400 with flow range 4.3 L/min. Sweep gas is at 2 LPM and FiO2 70%. Circuit remains free of air, clot and fibrin. Cannulas are secure with no bleeding from site. Extremities are warm.    Significant Shift Events: none    Vent settings:  Ventilation Mode: CMV/AC  (Continuous Mandatory Ventilation/ Assist Control)  Rate Set (breaths/minute): 16 breaths/min  Tidal Volume Set (mL): 400 mL  PEEP (cm H2O): 5 cmH2O  Oxygen Concentration (%): 50 %  Resp: 15  .    Heparin is off, ACT range 300s.    Urine output is per RN charting, blood loss was minimal. Product given included none.      Intake/Output Summary (Last 24 hours) at 2020 1753  Last data filed at 2020 1700  Gross per 24 hour   Intake 1092.75 ml   Output 600 ml   Net 492.75 ml       ECHO:  No results found for this or any previous visit.No results found for this or any previous visit.    CXR:  Recent Results (from the past 24 hour(s))   XR Chest Port 1 View    Narrative    XR CHEST PORT 1 VW  2020 10:56 PM      HISTORY: s/p IABP,PA catheter placement    COMPARISON: None    FINDINGS: IABP marker is 1.1 cm above the elena. No pleural effusion  or pneumothorax. No focal airspace opacity.      Impression    IMPRESSION: IABP marker is 1.1 cm above the elena.     I have personally reviewed the examination and initial interpretation  and I agree with the findings.    GET CSATRO MD   Echo Limited    Narrative    [id number]  [id number]  [id number]  [id number]^AGMARIAHAG^NICOLE^[facility],[place]  Echocardiography Laboratory  [address] [place] [id number]     Name: LUZ ELENA REDMOND  MRN: [id number]  : 1985  Study Date: 2020 08:29 AM  Age: 34  yrs  Gender: Female  Patient Location: [facility]  Reason For Study: CHF  Ordering Physician: NICOLE GOODEN  Performed By: Neli Edward RDCS     BSA: 1.9 m2  Height: 63 in  Weight: 195 lb  HR: 126  BP: 95/59 mmHg  _____________________________________________________________________________  __        Procedure  Limited Portable Echo Adult.  _____________________________________________________________________________  __        Interpretation Summary  Left ventricular size is normal.  Severe concentric wall thickening consistent with left ventricular hypertrophy  is present.  The Ejection Fraction is estimated at 25-30%.  Small RV cavity size with mild to moderately reduced RV function.  The inferior vena cava is normal.  Small circumferential pericardial effusion is present without any hemodynamic  significance.  Previous study not available for comparison.  _____________________________________________________________________________  __        Left Ventricle  Left ventricular size is normal. Severe concentric wall thickening consistent  with left ventricular hypertrophy is present. Left ventricular diastolic  function is not assessable. The Ejection Fraction is estimated at 25-30%.  Severe diffuse hypokinesis is present.     Right Ventricle  Small RV cavity size with mild to moderately reduced RV function.     Atria  Both atria appear normal.     Mitral Valve  The mitral valve is normal.        Aortic Valve  Aortic valve is normal in structure and function.     Tricuspid Valve  The tricuspid valve is normal.     Pulmonic Valve  The pulmonic valve cannot be assessed.     Vessels  The aorta root cannot be assessed. The inferior vena cava is normal.     Pericardium  Small circumferential pericardial effusion is present without any hemodynamic  significance.        Compared to Previous Study  Previous study not available for  comparison.  _____________________________________________________________________________  __  MMode/2D Measurements & Calculations     IVSd: 1.9 cm  LVIDd: 3.5 cm  LVIDs: 2.9 cm  LVPWd: 1.6 cm  FS: 16.2 %  LV mass(C)d: 246.8 grams  LV mass(C)dI: 129.0 grams/m2  LVOT diam: 1.9 cm  LVOT area: 3.0 cm2  RWT: 0.89           _____________________________________________________________________________  __           Report approved by: Kristin Lee 2020 09:31 AM      Echocardiogram Limited    Narrative    [id number]  [id number]  [id number]  [id number]^DELON^LADI^A           [facility],[place]  Echocardiography Laboratory  [address] [place] [id number]     Name: LUZ ELENA REDMOND  MRN: [id number]  : 1985  Study Date: 2020 03:05 PM  Age: 34 yrs  Gender: Female  [facility]  Reason For Study: Endomyocardial Biopsy  Ordering Physician: LADI JERNIGAN  Performed By: Dirk Ingram RDCS     _____________________________________________________________________________  __        Procedure  Limited Portable Echo Adult.  _____________________________________________________________________________  __        Interpretation Summary  TTE guided RV endomyocardial biopsy. No immediate complications noted.  _____________________________________________________________________________  __        Right Ventricle  TTE guided RV endomyocardial biopsy. No immediate complications noted.  _____________________________________________________________________________  __                          Report approved by: Kristin Lee 2020 03:39 PM              _____________________________________________________________________________  __      XR Chest Port 1 View    Narrative    XR CHEST PORT 1 VW  2020 5:39 PM      HISTORY: ET placement    COMPARISON: Same day    FINDINGS: Single AP chest radiograph. Endotracheal tube tip as the  projects approximately 1.8 cm above  the elena. Enteric tube extends  beyond the field-of-view. Right central line tip projects at the  innominate confluence. Inferior approach ECMO cannula. Intra-aortic  balloon pump projects marker approximately at the level of the elena.  Pulmonary arterial catheter projects at the proximal right main  pulmonary artery.    Trachea is midline, heart size is stable. No focal pulmonary opacity,  pleural effusion, or pneumothorax. No acute osseous or abdominal  abnormality.      Impression    IMPRESSION:  1. Endotracheal tube tip projects approximately 1.8 cm above the  elena.  2. IABP projects marker approximately at the level of the elena.       Labs:  Recent Labs   Lab 01/29/20  1720 01/29/20  1234 01/29/20  1139 01/29/20  1108   PH 7.37  --   --   --    PCO2 39  --   --   --    PO2 299*  --   --   --    HCO3 22  --   --   --    O2PER 40 4L 4L 4L       Lab Results   Component Value Date    HGB 10.9 (L) 01/29/2020    PHGB <30 01/29/2020    PLT 74 (L) 01/29/2020    INR 1.13 01/28/2020    AXA 0.28 01/29/2020         Plan is to remain stable on VA ECMO.      Terrence Villa, RT  1/29/2020 5:53 PM

## 2020-01-29 NOTE — PLAN OF CARE
Major Shift Events:  Pt transferred to unit from [facility] for advanced HF interventions. Team attempted to place  Art line upon arrival and was unable. Pt refused Valerio after writer made 2nd attempt. Purewick placed and Pt. refused it.  it. Pt voided per bedpan and 2 assist. Dobutamine discontinue'd d/t tachycardia. Hemodynamics poor. Team aware and requested  Writer attempt to wean levophed. Bedside US of heart per team showed improved kenetics and per CXR pericardial effusions small and stable.   Plan: Update team with changes/concerns. 2D ECHO today.  For vital signs and complete assessments, please see documentation flowsheets.

## 2020-01-30 ENCOUNTER — APPOINTMENT (OUTPATIENT)
Dept: GENERAL RADIOLOGY | Facility: CLINIC | Age: 35
DRG: 003 | End: 2020-01-30
Attending: INTERNAL MEDICINE
Payer: COMMERCIAL

## 2020-01-30 ENCOUNTER — APPOINTMENT (OUTPATIENT)
Dept: CARDIOLOGY | Facility: CLINIC | Age: 35
DRG: 003 | End: 2020-01-30
Attending: STUDENT IN AN ORGANIZED HEALTH CARE EDUCATION/TRAINING PROGRAM
Payer: COMMERCIAL

## 2020-01-30 ENCOUNTER — APPOINTMENT (OUTPATIENT)
Dept: GENERAL RADIOLOGY | Facility: CLINIC | Age: 35
DRG: 003 | End: 2020-01-30
Attending: STUDENT IN AN ORGANIZED HEALTH CARE EDUCATION/TRAINING PROGRAM
Payer: COMMERCIAL

## 2020-01-30 ENCOUNTER — ALLIED HEALTH/NURSE VISIT (OUTPATIENT)
Dept: NEUROLOGY | Facility: CLINIC | Age: 35
DRG: 003 | End: 2020-01-30
Attending: PSYCHIATRY & NEUROLOGY
Payer: COMMERCIAL

## 2020-01-30 DIAGNOSIS — R57.0 CARDIOGENIC SHOCK (H): Primary | ICD-10-CM

## 2020-01-30 LAB
ABO + RH BLD: NORMAL
ABO + RH BLD: NORMAL
ALBUMIN SERPL ELPH-MCNC: 3.1 G/DL (ref 3.7–5.1)
ALBUMIN SERPL-MCNC: 2.6 G/DL (ref 3.4–5)
ALBUMIN SERPL-MCNC: 2.7 G/DL (ref 3.4–5)
ALBUMIN SERPL-MCNC: 3 G/DL (ref 3.4–5)
ALBUMIN SERPL-MCNC: 3.2 G/DL (ref 3.4–5)
ALP SERPL-CCNC: 37 U/L (ref 40–150)
ALP SERPL-CCNC: 39 U/L (ref 40–150)
ALP SERPL-CCNC: 44 U/L (ref 40–150)
ALP SERPL-CCNC: 47 U/L (ref 40–150)
ALPHA1 GLOB SERPL ELPH-MCNC: 0.4 G/DL (ref 0.2–0.4)
ALPHA2 GLOB SERPL ELPH-MCNC: 0.8 G/DL (ref 0.5–0.9)
ALT SERPL W P-5'-P-CCNC: 15 U/L (ref 0–50)
ALT SERPL W P-5'-P-CCNC: 16 U/L (ref 0–50)
ALT SERPL W P-5'-P-CCNC: 16 U/L (ref 0–50)
ALT SERPL W P-5'-P-CCNC: 17 U/L (ref 0–50)
ANGLE RATE OF CLOT STRENGTH: 59.5 DEGREES (ref 53–72)
ANION GAP SERPL CALCULATED.3IONS-SCNC: 3 MMOL/L (ref 3–14)
ANION GAP SERPL CALCULATED.3IONS-SCNC: 4 MMOL/L (ref 3–14)
ANION GAP SERPL CALCULATED.3IONS-SCNC: 6 MMOL/L (ref 3–14)
ANION GAP SERPL CALCULATED.3IONS-SCNC: 6 MMOL/L (ref 3–14)
APTT PPP: 108 SEC (ref 22–37)
APTT PPP: 112 SEC (ref 22–37)
APTT PPP: 41 SEC (ref 22–37)
APTT PPP: 88 SEC (ref 22–37)
AST SERPL W P-5'-P-CCNC: 30 U/L (ref 0–45)
AST SERPL W P-5'-P-CCNC: 34 U/L (ref 0–45)
AST SERPL W P-5'-P-CCNC: 41 U/L (ref 0–45)
AST SERPL W P-5'-P-CCNC: 43 U/L (ref 0–45)
AT III ACT/NOR PPP CHRO: 83 % (ref 85–135)
B-GLOBULIN SERPL ELPH-MCNC: 0.5 G/DL (ref 0.6–1)
BASE DEFICIT BLDA-SCNC: 0.3 MMOL/L
BASE DEFICIT BLDA-SCNC: 0.4 MMOL/L
BASE DEFICIT BLDA-SCNC: 0.5 MMOL/L
BASE DEFICIT BLDA-SCNC: 0.5 MMOL/L
BASE DEFICIT BLDA-SCNC: 1 MMOL/L
BASE DEFICIT BLDA-SCNC: 1.2 MMOL/L
BASE EXCESS BLDA CALC-SCNC: 0 MMOL/L
BASE EXCESS BLDA CALC-SCNC: 0.2 MMOL/L
BASE EXCESS BLDA CALC-SCNC: 0.2 MMOL/L
BASE EXCESS BLDA CALC-SCNC: 0.4 MMOL/L
BASE EXCESS BLDA CALC-SCNC: 0.5 MMOL/L
BASE EXCESS BLDA CALC-SCNC: 0.5 MMOL/L
BASE EXCESS BLDA CALC-SCNC: 0.6 MMOL/L
BASE EXCESS BLDA CALC-SCNC: 0.6 MMOL/L
BASE EXCESS BLDA CALC-SCNC: 0.8 MMOL/L
BASE EXCESS BLDV CALC-SCNC: 0.2 MMOL/L
BASE EXCESS BLDV CALC-SCNC: 1.1 MMOL/L
BILIRUB SERPL-MCNC: 0.5 MG/DL (ref 0.2–1.3)
BILIRUB SERPL-MCNC: 0.7 MG/DL (ref 0.2–1.3)
BILIRUB SERPL-MCNC: 0.9 MG/DL (ref 0.2–1.3)
BILIRUB SERPL-MCNC: 1.2 MG/DL (ref 0.2–1.3)
BLD GP AB SCN SERPL QL: NORMAL
BLD PROD TYP BPU: NORMAL
BLD UNIT ID BPU: 0
BLOOD BANK CMNT PATIENT-IMP: NORMAL
BLOOD PRODUCT CODE: NORMAL
BPU ID: NORMAL
BUN SERPL-MCNC: 25 MG/DL (ref 7–30)
BUN SERPL-MCNC: 26 MG/DL (ref 7–30)
BUN SERPL-MCNC: 26 MG/DL (ref 7–30)
BUN SERPL-MCNC: 27 MG/DL (ref 7–30)
CA-I BLD-MCNC: 4.3 MG/DL (ref 4.4–5.2)
CA-I BLD-MCNC: 4.3 MG/DL (ref 4.4–5.2)
CA-I BLD-MCNC: 4.4 MG/DL (ref 4.4–5.2)
CA-I BLD-MCNC: 4.4 MG/DL (ref 4.4–5.2)
CALCIUM SERPL-MCNC: 7.1 MG/DL (ref 8.5–10.1)
CALCIUM SERPL-MCNC: 7.3 MG/DL (ref 8.5–10.1)
CALCIUM SERPL-MCNC: 7.6 MG/DL (ref 8.5–10.1)
CALCIUM SERPL-MCNC: 7.8 MG/DL (ref 8.5–10.1)
CHLORIDE SERPL-SCNC: 108 MMOL/L (ref 94–109)
CHLORIDE SERPL-SCNC: 109 MMOL/L (ref 94–109)
CI HYPOCOAGULATION INDEX: 1.5 RATIO (ref 0–3)
CO2 SERPL-SCNC: 24 MMOL/L (ref 20–32)
CO2 SERPL-SCNC: 25 MMOL/L (ref 20–32)
CO2 SERPL-SCNC: 27 MMOL/L (ref 20–32)
CO2 SERPL-SCNC: 28 MMOL/L (ref 20–32)
COPATH REPORT: NORMAL
CREAT SERPL-MCNC: 0.8 MG/DL (ref 0.52–1.04)
CREAT SERPL-MCNC: 0.81 MG/DL (ref 0.52–1.04)
CREAT SERPL-MCNC: 0.86 MG/DL (ref 0.52–1.04)
CREAT SERPL-MCNC: 0.87 MG/DL (ref 0.52–1.04)
CRP SERPL-MCNC: 30 MG/L (ref 0–8)
D DIMER PPP FEU-MCNC: 0.8 UG/ML FEU (ref 0–0.5)
D DIMER PPP FEU-MCNC: 1.4 UG/ML FEU (ref 0–0.5)
ERYTHROCYTE [DISTWIDTH] IN BLOOD BY AUTOMATED COUNT: 14.6 % (ref 10–15)
ERYTHROCYTE [DISTWIDTH] IN BLOOD BY AUTOMATED COUNT: 14.6 % (ref 10–15)
ERYTHROCYTE [DISTWIDTH] IN BLOOD BY AUTOMATED COUNT: 14.7 % (ref 10–15)
ERYTHROCYTE [DISTWIDTH] IN BLOOD BY AUTOMATED COUNT: 14.8 % (ref 10–15)
ERYTHROCYTE [SEDIMENTATION RATE] IN BLOOD BY WESTERGREN METHOD: 12 MM/H (ref 0–20)
FIBRINOGEN PPP-MCNC: 314 MG/DL (ref 200–420)
FIBRINOGEN PPP-MCNC: 314 MG/DL (ref 200–420)
G ACTUAL CLOT STRENGTH: 7.1 KD/SC (ref 4.5–11)
GAMMA GLOB SERPL ELPH-MCNC: 0.7 G/DL (ref 0.7–1.6)
GFR SERPL CREATININE-BSD FRML MDRD: 88 ML/MIN/{1.73_M2}
GFR SERPL CREATININE-BSD FRML MDRD: 90 ML/MIN/{1.73_M2}
GFR SERPL CREATININE-BSD FRML MDRD: >90 ML/MIN/{1.73_M2}
GFR SERPL CREATININE-BSD FRML MDRD: >90 ML/MIN/{1.73_M2}
GLUCOSE BLD-MCNC: 119 MG/DL (ref 70–99)
GLUCOSE BLD-MCNC: 121 MG/DL (ref 70–99)
GLUCOSE BLD-MCNC: 125 MG/DL (ref 70–99)
GLUCOSE BLD-MCNC: 98 MG/DL (ref 70–99)
GLUCOSE BLDC GLUCOMTR-MCNC: 105 MG/DL (ref 70–99)
GLUCOSE BLDC GLUCOMTR-MCNC: 110 MG/DL (ref 70–99)
GLUCOSE BLDC GLUCOMTR-MCNC: 113 MG/DL (ref 70–99)
GLUCOSE BLDC GLUCOMTR-MCNC: 114 MG/DL (ref 70–99)
GLUCOSE BLDC GLUCOMTR-MCNC: 115 MG/DL (ref 70–99)
GLUCOSE BLDC GLUCOMTR-MCNC: 118 MG/DL (ref 70–99)
GLUCOSE BLDC GLUCOMTR-MCNC: 124 MG/DL (ref 70–99)
GLUCOSE BLDC GLUCOMTR-MCNC: 142 MG/DL (ref 70–99)
GLUCOSE BLDC GLUCOMTR-MCNC: 89 MG/DL (ref 70–99)
GLUCOSE BLDC GLUCOMTR-MCNC: 94 MG/DL (ref 70–99)
GLUCOSE BLDC GLUCOMTR-MCNC: 96 MG/DL (ref 70–99)
GLUCOSE BLDC GLUCOMTR-MCNC: 97 MG/DL (ref 70–99)
GLUCOSE SERPL-MCNC: 101 MG/DL (ref 70–99)
GLUCOSE SERPL-MCNC: 118 MG/DL (ref 70–99)
GLUCOSE SERPL-MCNC: 118 MG/DL (ref 70–99)
GLUCOSE SERPL-MCNC: 121 MG/DL (ref 70–99)
HCO3 BLD-SCNC: 24 MMOL/L (ref 21–28)
HCO3 BLD-SCNC: 24 MMOL/L (ref 21–28)
HCO3 BLD-SCNC: 25 MMOL/L (ref 21–28)
HCO3 BLD-SCNC: 26 MMOL/L (ref 21–28)
HCO3 BLDA-SCNC: 24 MMOL/L (ref 21–28)
HCO3 BLDA-SCNC: 25 MMOL/L (ref 21–28)
HCO3 BLDA-SCNC: 26 MMOL/L (ref 21–28)
HCO3 BLDV-SCNC: 27 MMOL/L (ref 21–28)
HCO3 BLDV-SCNC: 27 MMOL/L (ref 21–28)
HCT VFR BLD AUTO: 26.6 % (ref 35–47)
HCT VFR BLD AUTO: 27.7 % (ref 35–47)
HCT VFR BLD AUTO: 28.1 % (ref 35–47)
HCT VFR BLD AUTO: 29.5 % (ref 35–47)
HGB BLD-MCNC: 8.4 G/DL (ref 11.7–15.7)
HGB BLD-MCNC: 8.9 G/DL (ref 11.7–15.7)
HGB BLD-MCNC: 9 G/DL (ref 11.7–15.7)
HGB BLD-MCNC: 9.2 G/DL (ref 11.7–15.7)
HGB FREE PLAS-MCNC: 110 MG/DL
IGA SERPL-MCNC: 37 MG/DL (ref 84–499)
IGG SERPL-MCNC: 750 MG/DL (ref 610–1616)
IGM SERPL-MCNC: 122 MG/DL (ref 35–242)
INR PPP: 1.21 (ref 0.86–1.14)
INR PPP: 1.23 (ref 0.86–1.14)
INR PPP: 1.23 (ref 0.86–1.14)
INR PPP: 1.24 (ref 0.86–1.14)
INTERPRETATION ECG - MUSE: NORMAL
INTERPRETATION ECG - MUSE: NORMAL
K TIME TO SPEC CLOT STRENGTH: 2 MINUTE (ref 1–3)
KCT BLD-ACNC: 142 SEC (ref 75–150)
KCT BLD-ACNC: 155 SEC (ref 75–150)
KCT BLD-ACNC: 171 SEC (ref 75–150)
KCT BLD-ACNC: 175 SEC (ref 75–150)
LACTATE BLD-SCNC: 1 MMOL/L (ref 0.7–2)
LACTATE BLD-SCNC: 1 MMOL/L (ref 0.7–2)
LACTATE BLD-SCNC: 1.1 MMOL/L (ref 0.7–2)
LACTATE BLD-SCNC: 1.2 MMOL/L (ref 0.7–2)
LACTATE BLD-SCNC: 1.4 MMOL/L (ref 0.7–2)
LDH SERPL L TO P-CCNC: 355 U/L (ref 81–234)
LMWH PPP CHRO-ACNC: 0.25 IU/ML
LMWH PPP CHRO-ACNC: 0.35 IU/ML
LMWH PPP CHRO-ACNC: 0.43 IU/ML
LMWH PPP CHRO-ACNC: <0.1 IU/ML
LY30 LYSIS AT 30 MINUTES: 0.4 % (ref 0–8)
LY60 LYSIS AT 60 MINUTES: 2.9 % (ref 0–15)
M PROTEIN SERPL ELPH-MCNC: 0 G/DL
MA MAXIMUM CLOT STRENGTH: 58.5 MM (ref 50–70)
MAGNESIUM SERPL-MCNC: 2.4 MG/DL (ref 1.6–2.3)
MAGNESIUM SERPL-MCNC: 2.5 MG/DL (ref 1.6–2.3)
MCH RBC QN AUTO: 27.5 PG (ref 26.5–33)
MCH RBC QN AUTO: 27.9 PG (ref 26.5–33)
MCH RBC QN AUTO: 28.2 PG (ref 26.5–33)
MCH RBC QN AUTO: 28.3 PG (ref 26.5–33)
MCHC RBC AUTO-ENTMCNC: 31.2 G/DL (ref 31.5–36.5)
MCHC RBC AUTO-ENTMCNC: 31.6 G/DL (ref 31.5–36.5)
MCHC RBC AUTO-ENTMCNC: 32 G/DL (ref 31.5–36.5)
MCHC RBC AUTO-ENTMCNC: 32.1 G/DL (ref 31.5–36.5)
MCV RBC AUTO: 88 FL (ref 78–100)
NUM BPU REQUESTED: 1
NUM BPU REQUESTED: 1
NUM BPU REQUESTED: 6
O2/TOTAL GAS SETTING VFR VENT: 40 %
OXYHGB MFR BLD: 89 % (ref 92–100)
OXYHGB MFR BLD: 97 % (ref 92–100)
OXYHGB MFR BLD: 97 % (ref 92–100)
OXYHGB MFR BLD: 98 % (ref 92–100)
OXYHGB MFR BLD: 99 % (ref 92–100)
OXYHGB MFR BLD: 99 % (ref 92–100)
OXYHGB MFR BLDA: 98 % (ref 75–100)
OXYHGB MFR BLDA: 98 % (ref 75–100)
OXYHGB MFR BLDA: 99 % (ref 75–100)
OXYHGB MFR BLDV: 68 %
OXYHGB MFR BLDV: 72 %
PCO2 BLD: 38 MM HG (ref 35–45)
PCO2 BLD: 38 MM HG (ref 35–45)
PCO2 BLD: 39 MM HG (ref 35–45)
PCO2 BLD: 40 MM HG (ref 35–45)
PCO2 BLD: 40 MM HG (ref 35–45)
PCO2 BLD: 43 MM HG (ref 35–45)
PCO2 BLD: 45 MM HG (ref 35–45)
PCO2 BLD: 45 MM HG (ref 35–45)
PCO2 BLD: 46 MM HG (ref 35–45)
PCO2 BLD: 52 MM HG (ref 35–45)
PCO2 BLDA: 37 MM HG (ref 35–45)
PCO2 BLDA: 38 MM HG (ref 35–45)
PCO2 BLDA: 50 MM HG (ref 35–45)
PCO2 BLDV: 45 MM HG (ref 40–50)
PCO2 BLDV: 55 MM HG (ref 40–50)
PH BLD: 7.31 PH (ref 7.35–7.45)
PH BLD: 7.35 PH (ref 7.35–7.45)
PH BLD: 7.36 PH (ref 7.35–7.45)
PH BLD: 7.37 PH (ref 7.35–7.45)
PH BLD: 7.38 PH (ref 7.35–7.45)
PH BLD: 7.39 PH (ref 7.35–7.45)
PH BLD: 7.41 PH (ref 7.35–7.45)
PH BLD: 7.41 PH (ref 7.35–7.45)
PH BLD: 7.42 PH (ref 7.35–7.45)
PH BLD: 7.42 PH (ref 7.35–7.45)
PH BLDA: 7.32 PH (ref 7.35–7.45)
PH BLDA: 7.41 PH (ref 7.35–7.45)
PH BLDA: 7.42 PH (ref 7.35–7.45)
PH BLDV: 7.3 PH (ref 7.32–7.43)
PH BLDV: 7.38 PH (ref 7.32–7.43)
PHOSPHATE SERPL-MCNC: 2.6 MG/DL (ref 2.5–4.5)
PHOSPHATE SERPL-MCNC: 3.6 MG/DL (ref 2.5–4.5)
PLATELET # BLD AUTO: 103 10E9/L (ref 150–450)
PLATELET # BLD AUTO: 82 10E9/L (ref 150–450)
PLATELET # BLD AUTO: 85 10E9/L (ref 150–450)
PLATELET # BLD AUTO: 85 10E9/L (ref 150–450)
PLATELET INHIBITION WITH AA: NORMAL %
PLATELET INHIBITION WITH ADP: NORMAL %
PO2 BLD: 115 MM HG (ref 80–105)
PO2 BLD: 118 MM HG (ref 80–105)
PO2 BLD: 151 MM HG (ref 80–105)
PO2 BLD: 167 MM HG (ref 80–105)
PO2 BLD: 180 MM HG (ref 80–105)
PO2 BLD: 192 MM HG (ref 80–105)
PO2 BLD: 203 MM HG (ref 80–105)
PO2 BLD: 209 MM HG (ref 80–105)
PO2 BLD: 225 MM HG (ref 80–105)
PO2 BLD: 235 MM HG (ref 80–105)
PO2 BLD: 382 MM HG (ref 80–105)
PO2 BLD: 65 MM HG (ref 80–105)
PO2 BLDA: 170 MM HG (ref 80–105)
PO2 BLDA: 218 MM HG (ref 80–105)
PO2 BLDA: 392 MM HG (ref 80–105)
PO2 BLDV: 39 MM HG (ref 25–47)
PO2 BLDV: 47 MM HG (ref 25–47)
POTASSIUM SERPL-SCNC: 3.9 MMOL/L (ref 3.4–5.3)
POTASSIUM SERPL-SCNC: 3.9 MMOL/L (ref 3.4–5.3)
POTASSIUM SERPL-SCNC: 4 MMOL/L (ref 3.4–5.3)
POTASSIUM SERPL-SCNC: 4.1 MMOL/L (ref 3.4–5.3)
PROT PATTERN SERPL ELPH-IMP: ABNORMAL
PROT PATTERN SERPL IFE-IMP: ABNORMAL
PROT SERPL-MCNC: 5 G/DL (ref 6.8–8.8)
PROT SERPL-MCNC: 5 G/DL (ref 6.8–8.8)
PROT SERPL-MCNC: 5.1 G/DL (ref 6.8–8.8)
PROT SERPL-MCNC: 5.5 G/DL (ref 6.8–8.8)
R TIME UNTIL CLOT FORMS: 7.1 MINUTE (ref 5–10)
RADIOLOGIST FLAGS: ABNORMAL
RBC # BLD AUTO: 3.01 10E12/L (ref 3.8–5.2)
RBC # BLD AUTO: 3.14 10E12/L (ref 3.8–5.2)
RBC # BLD AUTO: 3.19 10E12/L (ref 3.8–5.2)
RBC # BLD AUTO: 3.35 10E12/L (ref 3.8–5.2)
SODIUM SERPL-SCNC: 139 MMOL/L (ref 133–144)
SODIUM SERPL-SCNC: 140 MMOL/L (ref 133–144)
SPECIMEN EXP DATE BLD: NORMAL
TRANSFUSION STATUS PATIENT QL: NORMAL
TROPONIN I SERPL-MCNC: 4.07 UG/L (ref 0–0.04)
TROPONIN I SERPL-MCNC: 5.29 UG/L (ref 0–0.04)
TROPONIN I SERPL-MCNC: 7.37 UG/L (ref 0–0.04)
WBC # BLD AUTO: 6.4 10E9/L (ref 4–11)
WBC # BLD AUTO: 7.9 10E9/L (ref 4–11)
WBC # BLD AUTO: 9.3 10E9/L (ref 4–11)
WBC # BLD AUTO: 9.5 10E9/L (ref 4–11)

## 2020-01-30 PROCEDURE — 40000014 ZZH STATISTIC ARTERIAL MONITORING DAILY

## 2020-01-30 PROCEDURE — 25000128 H RX IP 250 OP 636: Performed by: STUDENT IN AN ORGANIZED HEALTH CARE EDUCATION/TRAINING PROGRAM

## 2020-01-30 PROCEDURE — 85396 CLOTTING ASSAY WHOLE BLOOD: CPT | Performed by: STUDENT IN AN ORGANIZED HEALTH CARE EDUCATION/TRAINING PROGRAM

## 2020-01-30 PROCEDURE — 25800030 ZZH RX IP 258 OP 636: Performed by: STUDENT IN AN ORGANIZED HEALTH CARE EDUCATION/TRAINING PROGRAM

## 2020-01-30 PROCEDURE — 27211383 ZZ H DEVICE 5FR SECURACATH

## 2020-01-30 PROCEDURE — 83051 HEMOGLOBIN PLASMA: CPT | Performed by: STUDENT IN AN ORGANIZED HEALTH CARE EDUCATION/TRAINING PROGRAM

## 2020-01-30 PROCEDURE — 94003 VENT MGMT INPAT SUBQ DAY: CPT

## 2020-01-30 PROCEDURE — 93325 DOPPLER ECHO COLOR FLOW MAPG: CPT | Mod: 26 | Performed by: INTERNAL MEDICINE

## 2020-01-30 PROCEDURE — 83735 ASSAY OF MAGNESIUM: CPT | Performed by: STUDENT IN AN ORGANIZED HEALTH CARE EDUCATION/TRAINING PROGRAM

## 2020-01-30 PROCEDURE — 93308 TTE F-UP OR LMTD: CPT | Mod: 26 | Performed by: INTERNAL MEDICINE

## 2020-01-30 PROCEDURE — 27210437 ZZH NUTRITION PRODUCT SEMIELEM INTERMED LITER

## 2020-01-30 PROCEDURE — 20000004 ZZH R&B ICU UMMC

## 2020-01-30 PROCEDURE — 25000128 H RX IP 250 OP 636: Performed by: INTERNAL MEDICINE

## 2020-01-30 PROCEDURE — 25000132 ZZH RX MED GY IP 250 OP 250 PS 637: Performed by: INTERNAL MEDICINE

## 2020-01-30 PROCEDURE — 25000132 ZZH RX MED GY IP 250 OP 250 PS 637: Performed by: STUDENT IN AN ORGANIZED HEALTH CARE EDUCATION/TRAINING PROGRAM

## 2020-01-30 PROCEDURE — 27211391 ZZ H KIT, 6 FR TL BIOFLO OPEN ENDED PICC

## 2020-01-30 PROCEDURE — 00000146 ZZHCL STATISTIC GLUCOSE BY METER IP

## 2020-01-30 PROCEDURE — 33949 ECMO/ECLS DAILY MGMT ARTERY: CPT

## 2020-01-30 PROCEDURE — 87040 BLOOD CULTURE FOR BACTERIA: CPT | Performed by: STUDENT IN AN ORGANIZED HEALTH CARE EDUCATION/TRAINING PROGRAM

## 2020-01-30 PROCEDURE — P9059 PLASMA, FRZ BETWEEN 8-24HOUR: HCPCS | Performed by: STUDENT IN AN ORGANIZED HEALTH CARE EDUCATION/TRAINING PROGRAM

## 2020-01-30 PROCEDURE — 85520 HEPARIN ASSAY: CPT | Performed by: STUDENT IN AN ORGANIZED HEALTH CARE EDUCATION/TRAINING PROGRAM

## 2020-01-30 PROCEDURE — 93308 TTE F-UP OR LMTD: CPT

## 2020-01-30 PROCEDURE — 82330 ASSAY OF CALCIUM: CPT | Performed by: STUDENT IN AN ORGANIZED HEALTH CARE EDUCATION/TRAINING PROGRAM

## 2020-01-30 PROCEDURE — 95714 VEEG EA 12-26 HR UNMNTR: CPT | Mod: ZF

## 2020-01-30 PROCEDURE — 33949 ECMO/ECLS DAILY MGMT ARTERY: CPT | Mod: GC | Performed by: INTERNAL MEDICINE

## 2020-01-30 PROCEDURE — P9037 PLATE PHERES LEUKOREDU IRRAD: HCPCS | Performed by: STUDENT IN AN ORGANIZED HEALTH CARE EDUCATION/TRAINING PROGRAM

## 2020-01-30 PROCEDURE — 40000986 XR CHEST PORT 1 VW

## 2020-01-30 PROCEDURE — 85730 THROMBOPLASTIN TIME PARTIAL: CPT | Performed by: STUDENT IN AN ORGANIZED HEALTH CARE EDUCATION/TRAINING PROGRAM

## 2020-01-30 PROCEDURE — 82805 BLOOD GASES W/O2 SATURATION: CPT | Performed by: INTERNAL MEDICINE

## 2020-01-30 PROCEDURE — 83615 LACTATE (LD) (LDH) ENZYME: CPT | Performed by: STUDENT IN AN ORGANIZED HEALTH CARE EDUCATION/TRAINING PROGRAM

## 2020-01-30 PROCEDURE — 25000128 H RX IP 250 OP 636

## 2020-01-30 PROCEDURE — 27211414 ZZ H ADHESIVE SKIN CLOSURE, DERMABOND

## 2020-01-30 PROCEDURE — 85300 ANTITHROMBIN III ACTIVITY: CPT | Performed by: STUDENT IN AN ORGANIZED HEALTH CARE EDUCATION/TRAINING PROGRAM

## 2020-01-30 PROCEDURE — 87070 CULTURE OTHR SPECIMN AEROBIC: CPT | Performed by: STUDENT IN AN ORGANIZED HEALTH CARE EDUCATION/TRAINING PROGRAM

## 2020-01-30 PROCEDURE — 36569 INSJ PICC 5 YR+ W/O IMAGING: CPT

## 2020-01-30 PROCEDURE — 40000344 ZZHCL STATISTIC THAWING COMPONENT: Performed by: STUDENT IN AN ORGANIZED HEALTH CARE EDUCATION/TRAINING PROGRAM

## 2020-01-30 PROCEDURE — 83605 ASSAY OF LACTIC ACID: CPT | Performed by: STUDENT IN AN ORGANIZED HEALTH CARE EDUCATION/TRAINING PROGRAM

## 2020-01-30 PROCEDURE — 25000125 ZZHC RX 250: Performed by: STUDENT IN AN ORGANIZED HEALTH CARE EDUCATION/TRAINING PROGRAM

## 2020-01-30 PROCEDURE — 71045 X-RAY EXAM CHEST 1 VIEW: CPT

## 2020-01-30 PROCEDURE — 93010 ELECTROCARDIOGRAM REPORT: CPT | Mod: 59 | Performed by: INTERNAL MEDICINE

## 2020-01-30 PROCEDURE — 85384 FIBRINOGEN ACTIVITY: CPT | Performed by: STUDENT IN AN ORGANIZED HEALTH CARE EDUCATION/TRAINING PROGRAM

## 2020-01-30 PROCEDURE — 40000196 ZZH STATISTIC RAPCV CVP MONITORING

## 2020-01-30 PROCEDURE — 40000076 ZZH STATISTIC IABP MONITORING

## 2020-01-30 PROCEDURE — 27210577 ZZ H INTRODUCER MICRO SET

## 2020-01-30 PROCEDURE — 85347 COAGULATION TIME ACTIVATED: CPT

## 2020-01-30 PROCEDURE — 36415 COLL VENOUS BLD VENIPUNCTURE: CPT | Performed by: STUDENT IN AN ORGANIZED HEALTH CARE EDUCATION/TRAINING PROGRAM

## 2020-01-30 PROCEDURE — 27211417 ZZ H KIT, VPS RHYTHM STYLET

## 2020-01-30 PROCEDURE — P9016 RBC LEUKOCYTES REDUCED: HCPCS | Performed by: STUDENT IN AN ORGANIZED HEALTH CARE EDUCATION/TRAINING PROGRAM

## 2020-01-30 PROCEDURE — 87186 SC STD MICRODIL/AGAR DIL: CPT | Performed by: STUDENT IN AN ORGANIZED HEALTH CARE EDUCATION/TRAINING PROGRAM

## 2020-01-30 PROCEDURE — 85379 FIBRIN DEGRADATION QUANT: CPT | Performed by: STUDENT IN AN ORGANIZED HEALTH CARE EDUCATION/TRAINING PROGRAM

## 2020-01-30 PROCEDURE — 93321 DOPPLER ECHO F-UP/LMTD STD: CPT | Mod: 26 | Performed by: INTERNAL MEDICINE

## 2020-01-30 PROCEDURE — C9113 INJ PANTOPRAZOLE SODIUM, VIA: HCPCS | Performed by: STUDENT IN AN ORGANIZED HEALTH CARE EDUCATION/TRAINING PROGRAM

## 2020-01-30 PROCEDURE — 82947 ASSAY GLUCOSE BLOOD QUANT: CPT | Performed by: STUDENT IN AN ORGANIZED HEALTH CARE EDUCATION/TRAINING PROGRAM

## 2020-01-30 PROCEDURE — 40000275 ZZH STATISTIC RCP TIME EA 10 MIN

## 2020-01-30 PROCEDURE — 85027 COMPLETE CBC AUTOMATED: CPT | Performed by: STUDENT IN AN ORGANIZED HEALTH CARE EDUCATION/TRAINING PROGRAM

## 2020-01-30 PROCEDURE — 84100 ASSAY OF PHOSPHORUS: CPT | Performed by: STUDENT IN AN ORGANIZED HEALTH CARE EDUCATION/TRAINING PROGRAM

## 2020-01-30 PROCEDURE — 85652 RBC SED RATE AUTOMATED: CPT | Performed by: STUDENT IN AN ORGANIZED HEALTH CARE EDUCATION/TRAINING PROGRAM

## 2020-01-30 PROCEDURE — 85610 PROTHROMBIN TIME: CPT | Performed by: STUDENT IN AN ORGANIZED HEALTH CARE EDUCATION/TRAINING PROGRAM

## 2020-01-30 PROCEDURE — 44500 INTRO GASTROINTESTINAL TUBE: CPT

## 2020-01-30 PROCEDURE — 82805 BLOOD GASES W/O2 SATURATION: CPT | Performed by: STUDENT IN AN ORGANIZED HEALTH CARE EDUCATION/TRAINING PROGRAM

## 2020-01-30 PROCEDURE — P9041 ALBUMIN (HUMAN),5%, 50ML: HCPCS

## 2020-01-30 PROCEDURE — 87077 CULTURE AEROBIC IDENTIFY: CPT | Performed by: STUDENT IN AN ORGANIZED HEALTH CARE EDUCATION/TRAINING PROGRAM

## 2020-01-30 PROCEDURE — 84484 ASSAY OF TROPONIN QUANT: CPT | Performed by: STUDENT IN AN ORGANIZED HEALTH CARE EDUCATION/TRAINING PROGRAM

## 2020-01-30 PROCEDURE — 25800030 ZZH RX IP 258 OP 636: Performed by: INTERNAL MEDICINE

## 2020-01-30 PROCEDURE — 86140 C-REACTIVE PROTEIN: CPT | Performed by: STUDENT IN AN ORGANIZED HEALTH CARE EDUCATION/TRAINING PROGRAM

## 2020-01-30 PROCEDURE — 99291 CRITICAL CARE FIRST HOUR: CPT | Mod: 25 | Performed by: INTERNAL MEDICINE

## 2020-01-30 PROCEDURE — 80053 COMPREHEN METABOLIC PANEL: CPT | Performed by: STUDENT IN AN ORGANIZED HEALTH CARE EDUCATION/TRAINING PROGRAM

## 2020-01-30 RX ORDER — DEXTROSE MONOHYDRATE 100 MG/ML
INJECTION, SOLUTION INTRAVENOUS CONTINUOUS PRN
Status: DISCONTINUED | OUTPATIENT
Start: 2020-01-30 | End: 2020-02-11 | Stop reason: HOSPADM

## 2020-01-30 RX ORDER — DEXMEDETOMIDINE HYDROCHLORIDE 4 UG/ML
.3-1.2 INJECTION, SOLUTION INTRAVENOUS CONTINUOUS
Status: DISCONTINUED | OUTPATIENT
Start: 2020-01-30 | End: 2020-02-05

## 2020-01-30 RX ORDER — POLYETHYLENE GLYCOL 3350 17 G/17G
17 POWDER, FOR SOLUTION ORAL DAILY
Status: DISCONTINUED | OUTPATIENT
Start: 2020-01-31 | End: 2020-02-11 | Stop reason: HOSPADM

## 2020-01-30 RX ORDER — ALBUMIN, HUMAN INJ 5% 5 %
SOLUTION INTRAVENOUS
Status: COMPLETED
Start: 2020-01-30 | End: 2020-01-30

## 2020-01-30 RX ORDER — HEPARIN SODIUM 10000 [USP'U]/100ML
10-50 INJECTION, SOLUTION INTRAVENOUS CONTINUOUS
Status: DISCONTINUED | OUTPATIENT
Start: 2020-01-30 | End: 2020-02-02

## 2020-01-30 RX ORDER — METOCLOPRAMIDE HYDROCHLORIDE 5 MG/ML
10 INJECTION INTRAMUSCULAR; INTRAVENOUS EVERY 4 HOURS PRN
Status: DISCONTINUED | OUTPATIENT
Start: 2020-01-30 | End: 2020-02-05

## 2020-01-30 RX ORDER — LIDOCAINE 40 MG/G
CREAM TOPICAL
Status: DISCONTINUED | OUTPATIENT
Start: 2020-01-30 | End: 2020-02-11 | Stop reason: HOSPADM

## 2020-01-30 RX ORDER — ALUMINA, MAGNESIA, AND SIMETHICONE 2400; 2400; 240 MG/30ML; MG/30ML; MG/30ML
30 SUSPENSION ORAL EVERY 4 HOURS PRN
Status: DISCONTINUED | OUTPATIENT
Start: 2020-01-30 | End: 2020-02-11 | Stop reason: HOSPADM

## 2020-01-30 RX ORDER — HEPARIN SODIUM,PORCINE 10 UNIT/ML
2-5 VIAL (ML) INTRAVENOUS
Status: DISCONTINUED | OUTPATIENT
Start: 2020-01-30 | End: 2020-02-03

## 2020-01-30 RX ORDER — OSELTAMIVIR PHOSPHATE 6 MG/ML
75 FOR SUSPENSION ORAL 2 TIMES DAILY
Status: COMPLETED | OUTPATIENT
Start: 2020-01-30 | End: 2020-02-02

## 2020-01-30 RX ADMIN — OSELTAMIVIR PHOSPHATE 75 MG: 6 POWDER, FOR SUSPENSION ORAL at 08:55

## 2020-01-30 RX ADMIN — POLYETHYLENE GLYCOL 3350 17 G: 17 POWDER, FOR SOLUTION ORAL at 08:55

## 2020-01-30 RX ADMIN — PROPOFOL 20 MCG/KG/MIN: 10 INJECTION, EMULSION INTRAVENOUS at 14:06

## 2020-01-30 RX ADMIN — PIPERACILLIN AND TAZOBACTAM 3.38 G: 3; .375 INJECTION, POWDER, FOR SOLUTION INTRAVENOUS at 17:33

## 2020-01-30 RX ADMIN — PIPERACILLIN AND TAZOBACTAM 3.38 G: 3; .375 INJECTION, POWDER, FOR SOLUTION INTRAVENOUS at 04:25

## 2020-01-30 RX ADMIN — OSELTAMIVIR PHOSPHATE 75 MG: 6 POWDER, FOR SUSPENSION ORAL at 21:53

## 2020-01-30 RX ADMIN — VASOPRESSIN 4 UNITS/HR: 20 INJECTION INTRAVENOUS at 13:17

## 2020-01-30 RX ADMIN — SODIUM CHLORIDE, PRESERVATIVE FREE 520 UNITS: 5 INJECTION INTRAVENOUS at 02:32

## 2020-01-30 RX ADMIN — PIPERACILLIN AND TAZOBACTAM 3.38 G: 3; .375 INJECTION, POWDER, FOR SOLUTION INTRAVENOUS at 23:11

## 2020-01-30 RX ADMIN — VANCOMYCIN HYDROCHLORIDE 2000 MG: 10 INJECTION, POWDER, LYOPHILIZED, FOR SOLUTION INTRAVENOUS at 18:15

## 2020-01-30 RX ADMIN — Medication 100 MCG/HR: at 07:35

## 2020-01-30 RX ADMIN — VASOPRESSIN 2.4 UNITS/HR: 20 INJECTION INTRAVENOUS at 07:35

## 2020-01-30 RX ADMIN — MULTIVITAMIN 15 ML: LIQUID ORAL at 17:33

## 2020-01-30 RX ADMIN — PROPOFOL 20 MCG/KG/MIN: 10 INJECTION, EMULSION INTRAVENOUS at 03:43

## 2020-01-30 RX ADMIN — Medication: at 00:51

## 2020-01-30 RX ADMIN — PIPERACILLIN AND TAZOBACTAM 3.38 G: 3; .375 INJECTION, POWDER, FOR SOLUTION INTRAVENOUS at 11:24

## 2020-01-30 RX ADMIN — PROPOFOL 20 MCG/KG/MIN: 10 INJECTION, EMULSION INTRAVENOUS at 07:36

## 2020-01-30 RX ADMIN — HEPARIN SODIUM 1400 UNITS/HR: 10000 INJECTION, SOLUTION INTRAVENOUS at 19:51

## 2020-01-30 RX ADMIN — ALBUMIN HUMAN 25 G: 0.05 INJECTION, SOLUTION INTRAVENOUS at 17:33

## 2020-01-30 RX ADMIN — POTASSIUM CHLORIDE 20 MEQ: 1.5 POWDER, FOR SOLUTION ORAL at 18:47

## 2020-01-30 RX ADMIN — DEXMEDETOMIDINE 0.4 MCG/KG/HR: 100 INJECTION, SOLUTION, CONCENTRATE INTRAVENOUS at 03:35

## 2020-01-30 RX ADMIN — ALBUMIN HUMAN 12.5 G: 0.05 INJECTION, SOLUTION INTRAVENOUS at 07:37

## 2020-01-30 RX ADMIN — OSELTAMIVIR PHOSPHATE 75 MG: 6 POWDER, FOR SUSPENSION ORAL at 03:31

## 2020-01-30 RX ADMIN — HYALURONIDASE, OVINE 1 ML: 200 INJECTION, SOLUTION SUBCUTANEOUS at 17:54

## 2020-01-30 RX ADMIN — VASOPRESSIN 4 UNITS/HR: 20 INJECTION INTRAVENOUS at 22:05

## 2020-01-30 RX ADMIN — SODIUM CHLORIDE, PRESERVATIVE FREE 520 UNITS: 5 INJECTION INTRAVENOUS at 01:07

## 2020-01-30 RX ADMIN — PROPOFOL 20 MCG/KG/MIN: 10 INJECTION, EMULSION INTRAVENOUS at 20:56

## 2020-01-30 RX ADMIN — DEXMEDETOMIDINE 0.4 MCG/KG/HR: 100 INJECTION, SOLUTION, CONCENTRATE INTRAVENOUS at 14:06

## 2020-01-30 RX ADMIN — SODIUM PHOSPHATE, MONOBASIC, MONOHYDRATE AND SODIUM PHOSPHATE, DIBASIC, ANHYDROUS 10 MMOL: 276; 142 INJECTION, SOLUTION INTRAVENOUS at 15:42

## 2020-01-30 RX ADMIN — CALCIUM GLUCONATE 2 G: 98 INJECTION, SOLUTION INTRAVENOUS at 12:04

## 2020-01-30 RX ADMIN — Medication 100 MCG/HR: at 19:02

## 2020-01-30 RX ADMIN — PANTOPRAZOLE SODIUM 40 MG: 40 INJECTION, POWDER, FOR SOLUTION INTRAVENOUS at 08:56

## 2020-01-30 ASSESSMENT — ACTIVITIES OF DAILY LIVING (ADL)
ADLS_ACUITY_SCORE: 12

## 2020-01-30 ASSESSMENT — MIFFLIN-ST. JEOR
SCORE: 1581.13
SCORE: 1581.13

## 2020-01-30 NOTE — PROCEDURES
Small Bowel Feeding Tube Placement Assessment  Reason for Feeding Tube Placement: Team request for post pyloric FT   Cortrak Start Time: 1410   Cortrak End Time: 1430  Medicine Delivered During Procedure: Lubricating gel   Placement Successful: Presume gastric (pending AXR verification)     Procedure Complications: Unable to advance to small bowel, repeatedly coiling within stomach   Final Placement Tyrell at exit of nare: 88 cm  Face to Face time with patient: 20 min       Bridle Placement:   Reason for bridle placement: Securement of FT    Medicine delivered during procedure: lubricating jelly    Procedure: Successful  Location of top of clip on FT: @ 89 cm marker   Condition of nose/skin at time of bridle placement: Unremarkable   Face to Face time with patient: <5 minutes.    Ludmila Sanchez RD, LD  [id number]  Pgr: [phone]

## 2020-01-30 NOTE — PLAN OF CARE
Major Shift Events: cannulated for ECMO, settled to unit  Plan: continue to monitor  For vital signs and complete assessments, please see documentation flowsheets.

## 2020-01-30 NOTE — PROCEDURES
[facility], [place]    Triple Lumen PICC Placement  Date/Time: 1/30/2020 4:04 PM  Performed by: Lauren Payan RN  Authorized by: Bolivar Newsome MD   Indications: vascular access    UNIVERSAL PROTOCOL   Site Marked: Yes  Prior Images Obtained and Reviewed:  Yes  Required items: Required blood products, implants, devices and special equipment available    Patient identity confirmed:  Arm band, provided demographic data, hospital-assigned identification number and anonymous protocol, patient vented/unresponsive  NA - No sedation, light sedation, or local anesthesia  Confirmation Checklist:  Patient's identity using two indicators, relevant allergies, procedure was appropriate and matched the consent or emergent situation and correct equipment/implants were available  Time out: Immediately prior to the procedure a time out was called    Universal Protocol: the Joint Commission Universal Protocol was followed    Preparation: Patient was prepped and draped in usual sterile fashion           ANESTHESIA    Anesthesia: Local infiltration  Local Anesthetic:  Lidocaine 1% without epinephrine  Anesthetic Total (mL):  5      SEDATION    Patient Sedated: Yes (not for PICC purposes)    Sedation:  See MAR for details  Vital signs: Vital signs monitored during sedation        Preparation: skin prepped with ChloraPrep  Skin prep agent: skin prep agent completely dried prior to procedure  Sterile barriers: maximum sterile barriers were used: cap, mask, sterile gown, sterile gloves, and large sterile sheet  Hand hygiene: hand hygiene performed prior to central venous catheter insertion  Type of line used: PICC and Power PICC  Catheter type: triple lumen  Lumen type: non-valved  Catheter size: 6 Fr  Brand: other (see comment) (Voxy)  Lot number: [id number]  Placement method: venipuncture, MST, ultrasound and tip confirmation system  Number of attempts: 1  Successful placement:  yes  Orientation: right  Location: basilic vein (vd 0.53 cm)  Arm circumference: adults 10 cm  Extremity circumference: 32  Visible catheter length: 8  Total catheter length: 41  Dressing and securement: chlorhexidine disc applied, dressing applied, glue, line secured, securement device, site cleaned and sterile dressing applied  Post procedure assessment: blood return through all ports and placement verified by x-ray  PROCEDURE   Patient Tolerance:  Patient tolerated the procedure well with no immediate complications  Describe Procedure: PICC ok to use, tip in high SVC

## 2020-01-30 NOTE — PROGRESS NOTES
Admitted/transferred from: Cath Lab  Reason for admission/transfer: ECMO cannulation  Patient status upon admission/transfer: stable  Interventions: ECMO  Plan: monitor  2 RN skin assessment: completed by Alex Chaparro and Vin Jeronimo  Result of skin assessment and interventions/actions: WDL  Height, weight, drug calc weight: done  Patient belongings: NA  MDRO education (if applicable): NA

## 2020-01-30 NOTE — PROGRESS NOTES
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 Currently on VA ECMO    Neuro     Sedation  - Currently on fent, precidex and propofol  - Will maintain sedation presently    Cardiology:     #Cardiogenic shock on VA ECMO   #Acute systolic heart failure   #Possible viral myopericarditis  Presenting with SOB and chest pain for one week on 1/27. TTE on 1/27 revealed an EF:25-30%, subsequent RHC revealed mild Rv overload with significant low flow state. cMRI revealed moderate LVH with LGE in the basal and inferolateral regions in the setting of viral myocarditis vs amyloid. Repeat TTE here did show increased LV thickness, initially suspicious for amyloid. However, based on the evolution of her LVH over serial echoes, it's more likely that this is a viral myopericarditis that was complicated by myocardial edema. Despite aggressive treatment with nipride, patient's most recent CO/CI was 1.6-1.7, which is concerning for a restrictive process. As a result, patient was placed on VA ECMO  - Maintain MAPS@65mmHg with NE and vasopressin, would ideally add phenyephrine if pressor requirements going up  - Can consider nipride if MAPs>80, would hold for now due to hypotension  - VA ECMO flows  - IABP 1:1  - Will follow up biopsy and consider steroids if concern for GCM   - Act goal @160-180 due to oozing from distal reperfusion site.  - No indication for DAPT due to low clinical suspicion for ischemia  - Hold BB and ACEI due to cardiogenic shock    #Pericardial effusion  Effusion yesterday was 1.5cm, now increasing to 2.5cm. Difficult to determine if tamponade.  - If worsening hemodynamics, will undergo pericardiocenteisis       ANI:    Risk for shock liver  - LFTs normal  - Will continue to trend    Tube feeds:  Consult for [place] tube placed  - Nutrition to  initiate tube feeds   - On IV protonix     ID      #Risk for aspiration  - On IV Vanco(1/29-)   - On IV Zosyn(1/20-)  - Will continue for five days  - On Tamiflu 1/28-)    Heme Onc:  - Supplement when HGB<8, Plt<100  - Trend WBC    Nephro    Risk for ATN  - Cr: WNL  - UOP is reasonable, will CTM    Endocrine  Insulin ggt           Staffed with Dr. Isaak Yoo MD  Cardiology Fellow  [id number]        Subjective:     Azra Ta is a 34 year old female with no significant PMH  who presents with myocarditis c/b cardiogenic shock s/p IABP    S/IE  - S/p VA ECMO placement  - Escalating pressor requirements overnight.  - Increase bleeding from distal reperfusion site, now improving this morning  - TTE revealed mildly increase pericardial effusion              Review of Systems:   A comprehensive review of systems was performed and found to be negative except as described in this note          Medications:     Current Facility-Administered Medications   Medication     acetaminophen (TYLENOL) Suppository 650 mg     acetaminophen (TYLENOL) tablet 650 mg     alum & mag hydroxide-simethicone (MYLANTA ES/MAALOX  ES) suspension 30 mL     artificial tears ophthalmic ointment     calcium gluconate in D5W 100 mL intermittent infusion 2 g     dexmedetomidine (PRECEDEX) 400 mcg in 0.9% sodium chloride 100 mL     dextrose 10% infusion     glucose gel 15-30 g    Or     dextrose 50 % injection 25-50 mL    Or     glucagon injection 1 mg     EPINEPHrine (ADRENALIN) 5 mg in sodium chloride 0.9 % 250 mL infusion     fentaNYL (PF) (SUBLIMAZE) injection 50 mcg     fentaNYL (SUBLIMAZE) infusion     heparin 2 unit/mL in 0.9% NaCl (for REPERFUSION CATHETER)     heparin 25,000 units in 0.45% NaCl 250 mL ANTICOAGULANT  infusion     heparin lock flush 10 UNIT/ML injection 2-5 mL     HOLD: nitroGLYcerin IF     HYDROmorphone (DILAUDID) injection 0.2 mg     insulin 1 unit/mL in saline (NovoLIN, HumuLIN Regular) drip - ADULT IV Infusion      lidocaine (LMX4) cream     lidocaine (LMX4) cream     lidocaine (LMX4) cream     lidocaine 1 % 0.1-1 mL     lidocaine 1 % 0.1-1 mL     lidocaine 1 % 0.1-1 mL     magnesium sulfate 2 g in NS intermittent infusion (PharMEDium or FV Cmpd)     magnesium sulfate 4 g in 100 mL sterile water (premade)     medication instruction     midazolam (VERSED) drip - ADULT 100 mg/100 mL in NS PRE-MIX     naloxone (NARCAN) injection 0.1-0.4 mg     nitroPRUsside (NIPRIDE) 50 mg, sodium thiosulfate 500 mg in D5W 125 mL IV infusion (conc: 0.4mg/mL)     norepinephrine (LEVOPHED) 16 mg in  mL infusion     ondansetron (ZOFRAN) injection 4 mg     oseltamivir (TAMIFLU) suspension 75 mg     pantoprazole (PROTONIX) 40 mg IV push injection     Patient is already receiving anticoagulation with heparin, enoxaparin (LOVENOX), warfarin (COUMADIN)  or other anticoagulant medication     piperacillin-tazobactam (ZOSYN) 3.375 g vial to attach to  mL bag     polyethylene glycol (MIRALAX/GLYCOLAX) Packet 17 g     potassium chloride (KLOR-CON) Packet 20-40 mEq     potassium chloride 10 mEq in 100 mL intermittent infusion with 10 mg lidocaine     potassium chloride 10 mEq in 100 mL sterile water intermittent infusion (premix)     potassium chloride 20 mEq in 50 mL intermittent infusion     potassium chloride 20 mEq in 50 mL intermittent infusion     potassium chloride ER (K-DUR/KLOR-CON M) CR tablet 20-40 mEq     propofol (DIPRIVAN) infusion     Reason ACE/ARB/ARNI order not selected     Reason beta blocker not prescribed     sennosides (SENOKOT) tablet 8.6 mg     sodium chloride (PF) 0.9% PF flush 3 mL     sodium chloride (PF) 0.9% PF flush 3 mL     sodium chloride (PF) 0.9% PF flush 3 mL     sodium chloride (PF) 0.9% PF flush 3 mL     sodium chloride (PF) 0.9% PF flush 5-50 mL     sodium phosphate 10 mmol in D5W intermittent infusion     sodium phosphate 15 mmol in D5W intermittent infusion     sodium phosphate 20 mmol in D5W intermittent  infusion     sodium phosphate 25 mmol in D5W intermittent infusion     vancomycin (VANCOCIN) 2,000 mg in sodium chloride 0.9 % 500 mL intermittent infusion     vasopressin (VASOSTRICT) 40 Units in D5W 40 mL infusion               Objective:   Temp: 98.4  F (36.9  C) Temp src: Axillary BP: (!) 81/60   Heart Rate: 102 Resp: 18 SpO2: 92 % O2 Device: Mechanical Ventilator Oxygen Delivery: 4 LPM         Physical Exam:   Vitals were reviewed  Temp: 98.4  F (36.9  C) Temp src: Axillary BP: (!) 81/60   Heart Rate: 102 Resp: 18 SpO2: 92 % O2 Device: Mechanical Ventilator Oxygen Delivery: 4 LPM  Constitutional:   Intubated and sedated      Eyes:   Lids and lashes normal, pupils equal, round and reactive to light, extra ocular muscles intact, sclera clear, conjunctiva normal     Lungs:   Mechanical breath sounds      Cardiovascular:   Tachycardia      Chest / Breast:   Breasts symmetrical, skin without lesion(s), no nipple retraction or dimpling, no nipple discharge, no masses palpated, no axillary or supraclavicular adenopathy     Abdomen:   No scars, normal bowel sounds, soft, non-distended, non-tender, no masses palpated, no hepatosplenomegally     Skin:   no bruising or bleeding. Cannula site clean and intact              Data:     Lab Results   Component Value Date    WBC 9.3 01/30/2020    HGB 8.9 (L) 01/30/2020    HCT 27.7 (L) 01/30/2020    PLT 85 (L) 01/30/2020     01/30/2020    POTASSIUM 4.1 01/30/2020    CHLORIDE 109 01/30/2020    CO2 27 01/30/2020    BUN 27 01/30/2020    CR 0.81 01/30/2020     (H) 01/30/2020     (H) 01/30/2020    SED 12 01/30/2020    DD 1.4 (H) 01/30/2020    TROPI 5.294 (HH) 01/30/2020    AST 41 01/30/2020    ALT 16 01/30/2020    ALKPHOS 44 01/30/2020    BILITOTAL 0.7 01/30/2020    INR 1.23 (H) 01/30/2020     I have reviewed today's vital signs, notes, medications, labs and imaging.  I have also seen and examined the patient and agree with the findings and plan as outlined above.   Sister at bedside.  Pt sedated, intubated and on VA-ECMO with flow 4LPM with  and MAP 70s on low dose HE 0.03 and Vasopressin with 40% FIO2. 1L UO. Lungs clear and S1 and S2 with S3.  Labs with alctic acid 1.1, Cr 0.86, WBC 9.5 and TnI 7.3 with CRP 30.  Assessment: Pt with cardiogenic shock supported by ECMO with repeat TTE revealing most likely myocarditis with pericardial effusion awaiting bx results.  Plan is to continue anticoag, place FT and begin TF and repeat TTE tomorrow.  Pt will need support and consider turn down this weekend.  Pt seen X4 for total critical care time 50 min.     Abilio Mulligan MD, PhD  Professor, Heart Failure and Cardiac Transplantation  [facility]

## 2020-01-30 NOTE — PROGRESS NOTES
"CLINICAL NUTRITION SERVICES - ASSESSMENT NOTE     Nutrition Prescription    Malnutrition Status:    Unable to determine due to lack of nutrition and weight history     Interventions by Registered Dietitian (RD):  - Initiate Impact Peptide @ 10 ml/hr via NGT. Adv by 10 ml q8h to goal, @ 50 ml/hr   - FWF of 30 ml q4h for tube patency   - Certavite daily     Impact Peptide @ 50 ml/hr to provide 1800 kcals (30 kcal/kg/day), 113 g PRO (1.9 gm/kg/day), 924 ml free H2O, 77 g Fat (50% from MCTs), 168 g CHO and no Fiber daily.    Future Recommendations:  If renal formula indicated, recommend Nepro @ 40 ml/hr + Prosource (1 pkt TID) to provide 1848 kcals (30 kcal/kg/day), 111 g PRO (1.8 g/kg/day), 701 ml free H2O, 155 g CHO and 12 g Fiber daily.       REASON FOR ASSESSMENT  Azra Ta is a 34 year old female assessed for Provider Order - RD to Assess and Order TF per Medical Nutrition Therapy Protocol    NUTRITION HISTORY  Unable to assess, pt intubated/sedated.     CURRENT NUTRITION ORDERS  Diet: NPO >24 hours    LABS  Labs reviewed    MEDICATIONS  Medications reviewed  Norepinephrine  Propofol   Miralax     ANTHROPOMETRICS  Height: 160 cm (5' 3\")  Most Recent Weight: 90.2 kg (198 lb 13.7 oz)    IBW: 52.3 kg  BMI: Obesity Grade II BMI 35-39.9  Weight History: Admitted at 195 lbs (88.8 kg) on 1/28.   Wt Readings from Last 10 Encounters:   01/30/20 90.2 kg (198 lb 13.7 oz)     Dosing Weight: 61 kg (adjusted, based on IBW of 52.3 kg and admit weight of 88.8 kg on 1/28)     ASSESSED NUTRITION NEEDS  Estimated Energy Needs: 1500 - 1800 kcals/day (25 - 30 kcals/kg)  Justification: Maintenance  Estimated Protein Needs: 90 - 120 grams protein/day (1.5 - 2 grams of pro/kg)  Justification: Increased needs  Estimated Fluid Needs: 1500 - 1800 mL/day (1 mL/kcal)   Justification: Maintenance    PHYSICAL FINDINGS  See malnutrition section below.    MALNUTRITION  % Intake: Unable to assess  % Weight Loss: Unable to assess  Subcutaneous " Fat Loss: None observed  Muscle Loss: None observed  Fluid Accumulation/Edema: Mild  Malnutrition Diagnosis: Unable to determine due to lack of nutrition and weight history     NUTRITION DIAGNOSIS  Inadequate protein-energy intake related to NPO 2/2 intubation without enteral nutrition support as evidenced by no significant nutrition intake for at least the past 24hrs      INTERVENTIONS  Implementation  Collaboration with other providers - Nutrition plan discussed with team.    Enteral Nutrition - Initiate     Goals  Total avg nutritional intake to meet a minimum of 25 kcal/kg and 1.5 g PRO/kg daily (per dosing wt 61 kg).     Monitoring/Evaluation  Progress toward goals will be monitored and evaluated per protocol.    Ludmila Sanchez, LIBERTAD, LD  [id number]  Pgr: [phone]

## 2020-01-30 NOTE — PLAN OF CARE
Major Shift Events:  1 unit PLT given at beginning of shift to keep PLT in goal range. Propofol, and Precedex started and Fentanyl increased to maintain appropriate RASS goal. Versed titrated to off. IABP failed fiber optic callibration and would not display pressures. Balloon tech called to bedside. Fiber optic cable swapped out to  pressure system. Display resumed and alarm cleared. Support ratio changed to 1:2 d/t Pt tachy into the 140's. BP and augmentation improved. Sweep increased to 3 to lower PaCo2 and  Improve Ph with good results. BP's started drifting lower towards end of shift. ECMO flow lowered to 4.2. At 0600 while preparing to turn Pt. Writer found a large amount of blood around L. Leg. ECMO specialist called to bedside. HGB was stable and flows were in range. After inspection, bleed appeared to come from around reperfusion cath. Ioban dressing reinforced. While turning Pt to L to change linens flows dropped to 2 and both IABP and art line MAPS dropped into the 40's. Levo restarted and titrated up to 1.5. Vaso started and titrated up to 2.4. 1 unit PRBC, 1 unit FFP and 250mL albumin given. HR improved decreasing from 120's to 90's. MAPS improved. Team updated and at bedside throughout night.   Plan: Continue plan of care. UPdate team with changes/concerns.  For vital signs and complete assessments, please see documentation flowsheets.

## 2020-01-30 NOTE — PLAN OF CARE
Major Shift Events:  stable on ECMO, PPFT placed, PICC placed, 1 unit PRBC, 1 unit platelet, 500 albumin  Plan: Continue to monitor  For vital signs and complete assessments, please see documentation flowsheets.

## 2020-01-30 NOTE — PLAN OF CARE
OT 4E: Cancel - OT consult received. Pt not medically appropriate to initiate therapies, pt intubated/sedated on [facility] ECMO. Pt with no immediate ROM concerns given pt's age and PLOF. Will continue to follow peripherally and initiate when appropriate.

## 2020-01-30 NOTE — PROGRESS NOTES
ECMO Shift Summary:      ECMO Equipment:  Console serial number:  [id number]  Circuit Lot number:  [id number]  Oxygenator Lot number:  [id number]        Patient remains on [facility] ECMO, all equipment is functioning and alarms are appropriately set. RPM's 3200 with flow range 4.21-4.67 L/min. Sweep gas is at 3 LPM and FiO2 70%. Circuit remains free of air, clot and fibrin. Cannulas are secure with bleeding from site. Extremities are warm. Suctioned ETT for small amount of white secretions.    Significant Shift Events: Increased sweep to keep blood gases in range.  Increased heparin multiple times due to low ACTs.  At 6am, when RN was getting ready to reposition patient, she noticed a large amount of blood from under her left leg.  RN called ECMO specialist into room.  Flows remained stable and MAPS were high 50s.  When team turned patient to her right side, her ECMO flows dropped to 2L and MAPS dropped to the 40s.  Turned heparin off and administered 1 unit of RBCs via the circuit.  Waiting on FFP and Albumin to arrived.  Turned patient onto her back and MAPS slowly came up.  Flows also came up to ~3.5L.  The blood loss looks like it is coming from reperfusion catheter site.      Vent settings:  Ventilation Mode: CMV/AC  (Continuous Mandatory Ventilation/ Assist Control)  FiO2 (%): 40 %  Rate Set (breaths/minute): 16 breaths/min  Tidal Volume Set (mL): 400 mL  PEEP (cm H2O): 5 cmH2O  Oxygen Concentration (%): 40 %  Resp: 21  .    Heparin is running is 1320 u/kg/hr, ACT range 126-175.    Urine output is adequate, blood loss was large from 6am-7am. Product given included 1 unit of RBCs.      Intake/Output Summary (Last 24 hours) at 1/30/2020 0653  Last data filed at 1/30/2020 0600  Gross per 24 hour   Intake 1812.05 ml   Output 1150 ml   Net 662.05 ml       ECHO:  No results found for this or any previous visit.No results found for this or any previous visit.    CXR:  Recent Results (from the past 24 hour(s))   Echo Limited     [facility]    [id number]  [id number]  [id number]  [id number]^BERKLEY^NICOLE^UYEN VINCENT           [facility],[place]  Echocardiography Laboratory  [address] [place] [id number]     Name: LUZ ELENA REDMOND  MRN: [id number]  : 1985  Study Date: 2020 08:29 AM  Age: 34 yrs  Gender: Female  Patient Location: [facility]  Reason For Study: CHF  Ordering Physician: NICOLE GOODEN  Performed By: Neli Edward RDCS     BSA: 1.9 m2  Height: 63 in  Weight: 195 lb  HR: 126  BP: 95/59 mmHg  _____________________________________________________________________________  __        Procedure  Limited Portable Echo Adult.  _____________________________________________________________________________  __        Interpretation Summary  Left ventricular size is normal.  Severe concentric wall thickening consistent with left ventricular hypertrophy  is present.  The Ejection Fraction is estimated at 25-30%.  Small RV cavity size with mild to moderately reduced RV function.  The inferior vena cava is normal.  Small circumferential pericardial effusion is present without any hemodynamic  significance.  Previous study not available for comparison.  _____________________________________________________________________________  __        Left Ventricle  Left ventricular size is normal. Severe concentric wall thickening consistent  with left ventricular hypertrophy is present. Left ventricular diastolic  function is not assessable. The Ejection Fraction is estimated at 25-30%.  Severe diffuse hypokinesis is present.     Right Ventricle  Small RV cavity size with mild to moderately reduced RV function.     Atria  Both atria appear normal.     Mitral Valve  The mitral valve is normal.        Aortic Valve  Aortic valve is normal in structure and function.     Tricuspid Valve  The tricuspid valve is normal.     Pulmonic Valve  The pulmonic valve cannot be assessed.     Vessels  The aorta root cannot be  assessed. The inferior vena cava is normal.     Pericardium  Small circumferential pericardial effusion is present without any hemodynamic  significance.        Compared to Previous Study  Previous study not available for comparison.  _____________________________________________________________________________  __  MMode/2D Measurements & Calculations     IVSd: 1.9 cm  LVIDd: 3.5 cm  LVIDs: 2.9 cm  LVPWd: 1.6 cm  FS: 16.2 %  LV mass(C)d: 246.8 grams  LV mass(C)dI: 129.0 grams/m2  LVOT diam: 1.9 cm  LVOT area: 3.0 cm2  RWT: 0.89           _____________________________________________________________________________  __           Report approved by: Kristin Lee 2020 09:31 AM      Echocardiogram Limited    Narrative    [id number]  [id number]  [id number]  [id number]^DELON^LADI^A           [facility],[place]  Echocardiography Laboratory  [address] [place] [id number]     Name: LUZ ELENA REDMOND  MRN: [id number]  : 1985  Study Date: 2020 03:05 PM  Age: 34 yrs  Gender: Female  Patient Location: [facility]  Reason For Study: Endomyocardial Biopsy  Ordering Physician: LADI JERNIGAN  Performed By: Dirk Ingram RDCS     _____________________________________________________________________________  __        Procedure  Limited Portable Echo Adult.  _____________________________________________________________________________  __        Interpretation Summary  TTE guided RV endomyocardial biopsy. No immediate complications noted.  _____________________________________________________________________________  __        Right Ventricle  TTE guided RV endomyocardial biopsy. No immediate complications noted.  _____________________________________________________________________________  __                          Report approved by: Kristin Lee 2020 03:39 PM              _____________________________________________________________________________  __      XR Chest  Port 1 View    Narrative    XR CHEST PORT 1   1/29/2020 5:39 PM      HISTORY: ET placement    COMPARISON: Same day    FINDINGS: Single AP chest radiograph. Endotracheal tube tip as the  projects approximately 1.8 cm above the elena. Enteric tube extends  beyond the field-of-view. Right central line tip projects at the  innominate confluence. Inferior approach ECMO cannula with the upper  tip projecting in the mid SVC. Intra-aortic balloon pump marker  projects approximately at the level of the elena. Pulmonary arterial  catheter projects at the proximal right main pulmonary artery.    Trachea is midline, heart size is stable. No focal pulmonary opacity,  pleural effusion, or pneumothorax. No acute osseous or abdominal  abnormality.      Impression    IMPRESSION:  1. Endotracheal tube tip projects approximately 1.8 cm above the  elena.  2. IABP marker  projects approximately at the level of the elena.    I have personally reviewed the examination and initial interpretation  and I agree with the findings.    CAMILLE KOHLER MD   XR Abdomen Port 1 View    Narrative    EXAMINATION:  XR ABDOMEN PORT 1  1/29/2020 5:40 PM     INDICATION: gastric tube placement    COMPARISON: None.    FINDINGS: Single AP supine view of the abdomen.     Endotracheal tube projects approximately 2 cm above the elena.  Presumed central venous catheter with tip projecting over the low SVC.  ECMO cannula tip projects over the low SVC. Presumed right lower  extremity pulmonary arterial catheter projecting over the right main  pulmonary artery. Enteric tube with tip and side port projecting over  the stomach. The small intestine and colon are nondistended. No  evidence of pneumatosis or portal venous gas. Limited evaluation of  intra-abdominal free air due to supine position. The lung bases are  unremarkable.      Impression    IMPRESSION:    1. Enteric tube with tip and side-port projecting over the stomach.  2. Mild interstitial  edema.    I have personally reviewed the examination and initial interpretation  and I agree with the findings.    CAMILLE KOHLER MD   XR Chest Port 1 View    Narrative    Exam: XR CHEST PORT 1 VW, 1/29/2020 7:41 PM    Indication: [place] repositioned    Comparison: Same day    Findings:   Endotracheal tube tip projects at the lower thoracic trachea.  Pulmonary arterial catheter tip projects over a segmental right  pulmonary artery.     Stable IABP, ECMO cannula, and right jugular central line. Trachea is  midline, normal heart size. No focal pulmonary opacity, pleural  effusion, pneumothorax. No acute osseous or upper abdominal  abnormality.      Impression    IMPRESSION:    1. Pulmonary arterial catheter tip projects over a segmental right  pulmonary artery.   2. Slight interval increase in pulmonary edema.    I have personally reviewed the examination and initial interpretation  and I agree with the findings.    CAMILLE KOHLER MD   US Lower Extremity Arterial Duplex Bilateral    Narrative    Exam: Duplex ultrasound of bilateral lower extremity arteries dated  1/29/2020 7:49 PM     Clinical information: Baseline to assess blood flow to Lower  Extremities ([facility] ECMO)     Comparison: None    Technique: Includes Gray Scale images, color Doppler, spectral Doppler  waveforms and velocities with appropriate angles of 60 degrees or  less.    Findings:     Right lower extremity:     Mid SFA: 83 cm/sec.  Distal SFA: 77 cm/sec.  Popliteal artery: 62 cm/sec.  PTA ankle: 43 cm/sec.  ROWDY ankle: 13 cm/sec.    Waveforms: Biphasic and monophasic waveforms consistent with ECMO.    Left lower extremity:    Mid SFA: 25 cm/sec.  Distal SFA: 24 cm/sec.  Popliteal artery: 21 cm/sec.  PTA ankle: 13 cm/sec.  ROWDY ankle: 21 cm/sec.    Waveforms: Monophasic waveforms consistent with ECMO.      Impression    Impression: Patent bilateral lower extremity arteries with waveforms  consistent with ECMO.    Guidelines:  [facility]  duplex criteria for lower limb arterial  occlusive disease  -Percent stenosis- Normal (1-19%): Peak systolic velocity (cm/s):  <150, End-diastolic velocity (cm/s): <40, Velocity ration (Vr): <1.5,  Distal arterial waveform: Triphasic  -Percent stenosis- 20-49%: Peak systolic velocity (cm/s): 150-200,  End-diastolic velocity (cm/s): <40, Velocity ration (Vr): 1.5-2.0,  Distal arterial waveform: Triphasic  -Percent stenosis- 50-75%: Peak systolic velocity (cm/s): 200-300,  End-diastolic velocity (cm/s): <90, Velocity ration (Vr): 2.0-3.9,  Distal arterial waveform: Poststenotic turbulence distal to stenosis,  monophasic distal waveform  -Percent stenosis- >75%: Peak systolic velocity (cm/s): >300,  End-diastolic velocity (cm/s): <90, Velocity ration (Vr): >4.0, Distal  arterial waveform: Dampened distal waveform and low PSV/EDV* in the  stenosis  -Percent stenosis- Occlusion: Absent flow by color Doppler/pulsed  Doppler spectral analysis; length of occlusion estimated from distance  between exit and reentry collateral arteries  *PSV = peak systolic velocity, EDV = end-diastolic velocity  http://link.vent[employer].com/chapter/10.1007/[email or web address]_23/fulltext  html    I have personally reviewed the examination and initial interpretation  and I agree with the findings.    CAMILLE KOHLER MD   XR Chest Port 1 View    Narrative    Exam: TEMPORARY, 1/29/2020 6:33 PM    Indication: [place] placement    Comparison: Same day    Findings:   Single AP chest radiograph. Endotracheal tube tip projects at the  lower thoracic trachea. Pulmonary arterial catheter tip projects at  the proximal right main pulmonary artery. Stable IABP, ECMO cannula,  and right jugular central line. Trachea is midline, normal heart size.  No focal pulmonary opacity, pleural effusion, pneumothorax. No acute  osseous or upper abdominal abnormality.      Impression    IMPRESSION:  1. Inferior approach pulmonary arterial catheter tip projects at  the  proximal right main pulmonary artery.  2. Otherwise stable chest.    I have personally reviewed the examination and initial interpretation  and I agree with the findings.    CAMILLE KOHLER MD       Labs:  Recent Labs   Lab 01/30/20  0553 01/30/20  0352 01/30/20  0154 01/30/20  0023   PH 7.37 7.31* 7.39 7.41   PCO2 45 52* 43 40   PO2 151* 167* 180* 192*   HCO3 26 26 26 25   O2PER 40.0 40.0 40.0 40.0       Lab Results   Component Value Date    HGB 9.2 (L) 01/30/2020    PHGB 110 (H) 01/30/2020     (L) 01/30/2020    FIBR 314 01/30/2020    INR 1.23 (H) 01/30/2020     (HH) 01/30/2020    DD 1.4 (H) 01/30/2020    AXA 0.43 01/30/2020         Plan is remain on [facility] ECMO.      Daja Rowan, RRT-NPS  1/30/2020 6:53 AM

## 2020-01-30 NOTE — PROGRESS NOTES
ECMO Shift Summary:      ECMO Equipment:  Console serial number:[id number]  Circuit Lot number:[id number]  Oxygenator Lot number:[id number]        Patient remains on VA ECMO, all equipment is functioning and alarms are appropriately set. RPM's 3525 with flow range 4.3-4.8 L/min. Sweep gas is at 3 LPM and FiO2 70%. Circuit remains free of air, clot and fibrin. Cannulas are secure with no bleeding from site. Extremities are warm. Suctioned ETT for small amount of secretions.    Significant Shift Events: changed mattress without incident    Vent settings:  Ventilation Mode: CMV/AC  (Continuous Mandatory Ventilation/ Assist Control)  FiO2 (%): 40 %  Rate Set (breaths/minute): 16 breaths/min  Tidal Volume Set (mL): 400 mL  PEEP (cm H2O): 5 cmH2O  Oxygen Concentration (%): 40 %  Resp: 20  .    Heparin is running at 120 u/hr, ACT range 142-118.    Urine output is as charted per RN, blood loss was oozing from DRP site. Product given included 1 unit(s) PRBC and 1 unit(s) PLTs.      Intake/Output Summary (Last 24 hours) at 2020 1427  Last data filed at 2020 1400  Gross per 24 hour   Intake 4386.07 ml   Output 1384 ml   Net 3002.07 ml       ECHO:  No results found for this or any previous visit.No results found for this or any previous visit.    CXR:  Recent Results (from the past 24 hour(s))   Echocardiogram Limited    Narrative    [id number]  [id number]  [id number]  [id number]^DELON^LADI^A           [facility],[place]  Echocardiography Laboratory  [address] [place] [id number]     Name: LUZ ELENA REDMOND  MRN: [id number]  : 1985  Study Date: 2020 03:05 PM  Age: 34 yrs  Gender: Female  Patient Location: [facility]  Reason For Study: Endomyocardial Biopsy  Ordering Physician: LADI JERNIGAN  Performed By: Dirk Ingram RDCS     _____________________________________________________________________________  __        Procedure  Limited Portable Echo  Adult.  _____________________________________________________________________________  __        Interpretation Summary  TTE guided RV endomyocardial biopsy. No immediate complications noted.  _____________________________________________________________________________  __        Right Ventricle  TTE guided RV endomyocardial biopsy. No immediate complications noted.  _____________________________________________________________________________  __                          Report approved by: Kristin Lee 01/29/2020 03:39 PM              _____________________________________________________________________________  __      XR Chest Port 1 View    Narrative    XR CHEST PORT 1 VW  1/29/2020 5:39 PM      HISTORY: ET placement    COMPARISON: Same day    FINDINGS: Single AP chest radiograph. Endotracheal tube tip as the  projects approximately 1.8 cm above the elena. Enteric tube extends  beyond the field-of-view. Right central line tip projects at the  innominate confluence. Inferior approach ECMO cannula with the upper  tip projecting in the mid SVC. Intra-aortic balloon pump marker  projects approximately at the level of the elena. Pulmonary arterial  catheter projects at the proximal right main pulmonary artery.    Trachea is midline, heart size is stable. No focal pulmonary opacity,  pleural effusion, or pneumothorax. No acute osseous or abdominal  abnormality.      Impression    IMPRESSION:  1. Endotracheal tube tip projects approximately 1.8 cm above the  elena.  2. IABP marker  projects approximately at the level of the elena.    I have personally reviewed the examination and initial interpretation  and I agree with the findings.    CAMILLE KOHLER MD   XR Abdomen Port 1 View    Narrative    EXAMINATION:  XR ABDOMEN PORT 1 VW 1/29/2020 5:40 PM     INDICATION: gastric tube placement    COMPARISON: None.    FINDINGS: Single AP supine view of the abdomen.     Endotracheal tube projects approximately 2 cm  above the elena.  Presumed central venous catheter with tip projecting over the low SVC.  ECMO cannula tip projects over the low SVC. Presumed right lower  extremity pulmonary arterial catheter projecting over the right main  pulmonary artery. Enteric tube with tip and side port projecting over  the stomach. The small intestine and colon are nondistended. No  evidence of pneumatosis or portal venous gas. Limited evaluation of  intra-abdominal free air due to supine position. The lung bases are  unremarkable.      Impression    IMPRESSION:    1. Enteric tube with tip and side-port projecting over the stomach.  2. Mild interstitial edema.    I have personally reviewed the examination and initial interpretation  and I agree with the findings.    CAMILLE KOHLER MD   XR Chest Port 1 View    Narrative    Exam: XR CHEST PORT 1 VW, 1/29/2020 7:41 PM    Indication: [place] repositioned    Comparison: Same day    Findings:   Endotracheal tube tip projects at the lower thoracic trachea.  Pulmonary arterial catheter tip projects over a segmental right  pulmonary artery.     Stable IABP, ECMO cannula, and right jugular central line. Trachea is  midline, normal heart size. No focal pulmonary opacity, pleural  effusion, pneumothorax. No acute osseous or upper abdominal  abnormality.      Impression    IMPRESSION:    1. Pulmonary arterial catheter tip projects over a segmental right  pulmonary artery.   2. Slight interval increase in pulmonary edema.    I have personally reviewed the examination and initial interpretation  and I agree with the findings.    CAMILLE KOHLER MD   US Lower Extremity Arterial Duplex Bilateral    Narrative    Exam: Duplex ultrasound of bilateral lower extremity arteries dated  1/29/2020 7:49 PM     Clinical information: Baseline to assess blood flow to Lower  Extremities (VA ECMO)     Comparison: None    Technique: Includes Gray Scale images, color Doppler, spectral Doppler  waveforms and velocities with  appropriate angles of 60 degrees or  less.    Findings:     Right lower extremity:     Mid SFA: 83 cm/sec.  Distal SFA: 77 cm/sec.  Popliteal artery: 62 cm/sec.  PTA ankle: 43 cm/sec.  ROWDY ankle: 13 cm/sec.    Waveforms: Biphasic and monophasic waveforms consistent with ECMO.    Left lower extremity:    Mid SFA: 25 cm/sec.  Distal SFA: 24 cm/sec.  Popliteal artery: 21 cm/sec.  PTA ankle: 13 cm/sec.  ROWDY ankle: 21 cm/sec.    Waveforms: Monophasic waveforms consistent with ECMO.      Impression    Impression: Patent bilateral lower extremity arteries with waveforms  consistent with ECMO.    Guidelines:  [facility] duplex criteria for lower limb arterial  occlusive disease  -Percent stenosis- Normal (1-19%): Peak systolic velocity (cm/s):  <150, End-diastolic velocity (cm/s): <40, Velocity ration (Vr): <1.5,  Distal arterial waveform: Triphasic  -Percent stenosis- 20-49%: Peak systolic velocity (cm/s): 150-200,  End-diastolic velocity (cm/s): <40, Velocity ration (Vr): 1.5-2.0,  Distal arterial waveform: Triphasic  -Percent stenosis- 50-75%: Peak systolic velocity (cm/s): 200-300,  End-diastolic velocity (cm/s): <90, Velocity ration (Vr): 2.0-3.9,  Distal arterial waveform: Poststenotic turbulence distal to stenosis,  monophasic distal waveform  -Percent stenosis- >75%: Peak systolic velocity (cm/s): >300,  End-diastolic velocity (cm/s): <90, Velocity ration (Vr): >4.0, Distal  arterial waveform: Dampened distal waveform and low PSV/EDV* in the  stenosis  -Percent stenosis- Occlusion: Absent flow by color Doppler/pulsed  Doppler spectral analysis; length of occlusion estimated from distance  between exit and reentry collateral arteries  *PSV = peak systolic velocity, EDV = end-diastolic velocity  http://link.ventura.com/chapter/10.1007/[email or web address]_23/fulltext  html    I have personally reviewed the examination and initial interpretation  and I agree with the findings.    CAMILLE KOHLER MD   XR  Chest Port 1 View    Narrative    Exam: TEMPORARY, 2020 6:33 PM    Indication: [place] placement    Comparison: Same day    Findings:   Single AP chest radiograph. Endotracheal tube tip projects at the  lower thoracic trachea. Pulmonary arterial catheter tip projects at  the proximal right main pulmonary artery. Stable IABP, ECMO cannula,  and right jugular central line. Trachea is midline, normal heart size.  No focal pulmonary opacity, pleural effusion, pneumothorax. No acute  osseous or upper abdominal abnormality.      Impression    IMPRESSION:  1. Inferior approach pulmonary arterial catheter tip projects at the  proximal right main pulmonary artery.  2. Otherwise stable chest.    I have personally reviewed the examination and initial interpretation  and I agree with the findings.    CAMILLE KOHLER MD   XR Chest Port 1 View    Narrative    Exam: XR CHEST PORT 1 VW, 2020 1:10 AM    Indication: ETT placement, ECMO cannulation    Comparison: 2020    Findings:   Endotracheal tube tip projects at the mid thoracic trachea. Pulmonary  arterial catheter tip projects over a segmental right pulmonary  artery.     Stable IABP, ECMO cannula, and right jugular central line. Trachea is  midline, normal heart size. No focal pulmonary opacity, pleural  effusion, pneumothorax. No acute osseous or upper abdominal  abnormality.      Impression    IMPRESSION:  1. Endotracheal tube over the midthoracic trachea. ECMO cannula  projecting at the mid SVC.   2. Mild interstitial pulmonary edema.    I have personally reviewed the examination and initial interpretation  and I agree with the findings.    MISSY SYLVESTER MD   Echo Limited    Narrative    [id number]  [id number]  [id number]  [id number]^HUERTA^TONI           [facility]  Echocardiography Laboratory  [address] [place] [id number]     Name: LUZ ELENA REDMOND  MRN: [id number]  : 1985  Study Date: 2020 08:53  AM  Age: 34 yrs  Gender: Female  Patient [facility]: [facility]  Reason For Study: Shock  Ordering Physician: TONI HUERTA  Performed By: Neli Edward RDCS     BSA: 1.9 m2  Height: 63 in  Weight: 195 lb  HR: 103  BP: 87/61 mmHg  _____________________________________________________________________________  __        Procedure  Limited Portable Echo Adult.  _____________________________________________________________________________  __        Interpretation Summary  Patient is on VA ECMO support with 5LPM flow.     There is moderate to severe LV wall thickening, today it is measured at 1.7cm.  Imaging suggest myocardial edema. LV cavity is normal size. The ejection  fraction is at around 5%. There is severe diffuse hypokinesis.  RV wall is thickened and cavity is very small. This is likely due to a  combination of VA ECMO support and pericardial effusion. RV function is  severely reduced.  Aortic valve is normal in structure and function. Aortic valve opens  intermittently  IVC is normal is size (1.73cm). ECMO cannula noted. Descending thoracic aorta  appears normal, IABP is in place.  There is a moderate sized pericardial effusion, predominantly localized  posterior to the RV and LV. There is some fibrinous component to the effusion.     Compared to prior study patient is now on VA ECMO. Myocardial edema remains  and wall thickness has not changed significantly. BiV function is severely  reduced. Pericardial effusion has increased in size. Difficult to assess  hemodynamic consequences on VA ECMO (RV is small, IVC is normal in size)  _____________________________________________________________________________  __        Left Ventricle  There is moderate to severe LV wall thickening, today it is measured at 1.7cm.  Imaging suggest myocardial edema. LV cavity is normal size. The ejection  fraction is at around 5%. There is severe diffuse hypokinesis. Left  ventricular diastolic function is not assessable.     Right  Ventricle  RV wall is thickened and cavity is very small. This is likely due to a  combination of VA ECMO support and pericardial effusion. RV function is  severely reduced.     Atria  The atria cannot be assessed.     Mitral Valve  The mitral valve is normal. Trace mitral insufficiency is present.        Aortic Valve  Aortic valve is normal in structure and function. Aortic valve opens  intermittently. The aortic valve is tricuspid.     Tricuspid Valve  The tricuspid valve is normal. The peak velocity of the tricuspid regurgitant  jet is not obtainable.     Pulmonic Valve  The pulmonic valve is normal.     Vessels  IVC is normal is size (1.73cm). ECMO cannula noted. Descending thoracic aorta  appears normal, IABP is in place.     Pericardium  There is a moderate sized pericardial effusion, predominantly localized  posterior to the RV and LV. There is some fibrinous component to the effusion.        Compared to Previous Study  Compared to prior study patient is now on VA ECMO. Myocardial edema remains  and wall thickness has not changed significantly. BiV function is severely  reduced. Pericardial effusion has increased in size. Difficult to assess  hemodynamic consequences on VA ECMO (RV is small, IVC is normal in size).  _____________________________________________________________________________  __                          Report approved by: Kristin BELTRAN 01/30/2020 09:51 AM              _____________________________________________________________________________  __          Labs:  Recent Labs   Lab 01/30/20  1358 01/30/20  1208 01/30/20  0952 01/30/20  0801   PH 7.36 7.35 7.36 7.36   PCO2 46* 46* 46* 45   PO2 118* 115* 65* 382*   HCO3 26 26 26 25   O2PER 40 40 40 40       Lab Results   Component Value Date    HGB 8.9 (L) 01/30/2020    PHGB 110 (H) 01/30/2020    PLT 85 (L) 01/30/2020    FIBR 314 01/30/2020    INR 1.23 (H) 01/30/2020    PTT 41 (H) 01/30/2020    DD 1.4 (H) 01/30/2020    AXA <0.10  01/30/2020    ANT 83 (L) 01/30/2020         Plan is continue to support and wean as able.      Tanisha Vincent, RT  1/30/2020 2:27 PM

## 2020-01-30 NOTE — PROGRESS NOTES
Continuous EEG Monitoring  CPT Code: 90094  [facility]/DELMI  MD: Jean Carlos    Date Continuous EEG monitoring initiated 1/29/20  Hair braided:yes  Leads O1 and O2 changed:n/a  Optifoam around O1 and O2: yes  Skin breakdown/concerns:n/a  Asked about continuing EEG monitoring past Day 5(Day 3):n/a  Due for hygiene break (day 5): n/a  Removed electrodes: n/a

## 2020-01-30 NOTE — PROCEDURES
Procedure Date: 01/29/2020      EEG #-1       DATE OF RECORDING/SERVICE DATE:  Day 1 of 01/29/2020       SOURCE FILE DURATION:  3 hours and 25 minutes.      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  She is currently being treated with ECMO.  EEG is requested to evaluate for seizures.      MEDICATIONS:  Fentanyl  mcg per hour, Versed 1-8 mg per hour, Propofol 20-30 mcg.     TECHNICAL SUMMARY:  EEG is recorded from scalp electrodes placed according to the 10-20 international system.  Additional electrodes were utilized for referencing, artifact detection, and recording from other cerebral regions.  Video was continuously recorded.  Video was reviewed for clinical correlation and to assist with EEG interpretation.      FINDINGS:  This video EEG is abnormal due to presence of diffuse generalized delta-theta slowing superimposed with faster frequencies in alpha range concentrated in the midline and frontocentral derivations.  There are periods of intermittent attenuation lasting up to 2 seconds.  The delta activity is up to 80 microvolts in amplitude and 2-4 Hz frequency.  There is no well-formed parietooccipital rhythm or sleep architecture seen.  The background activity was nonreactive, and the patient remained unresponsive throughout the recording. No epileptiform discharges were seen.      ICTAL ABNORMALITIES:  No electrographic seizures seen.      IMPRESSION:  Video EEG day #1 is abnormal due to presence of diffuse generalized delta-theta slowing with periods of intermittent attenuation lasting up to 2 seconds.  These findings are consistent with severe diffuse encephalopathy.  EEG is discontinuous and nonreactive.  This may in part be related to sedative drips employed.  No epileptiform discharges or electrographic seizures were recorded.           This report will be reviewed and signed by Debo  MD Daija   As dictated by HUMBERTO VALDEZ MD      I personally reviewed the video EEG and agree with the reported findings.     ZENA HERNÁNDEZ MD                  D: 2020   T: 2020   MT: EFREM      Name:     LUZ ELENA REDMOND   MRN:      [id number]        Account:        [id number]   :      1985           Procedure Date: 2020      Document: [id number]

## 2020-01-31 ENCOUNTER — APPOINTMENT (OUTPATIENT)
Dept: CARDIOLOGY | Facility: CLINIC | Age: 35
DRG: 003 | End: 2020-01-31
Attending: INTERNAL MEDICINE
Payer: COMMERCIAL

## 2020-01-31 ENCOUNTER — APPOINTMENT (OUTPATIENT)
Dept: GENERAL RADIOLOGY | Facility: CLINIC | Age: 35
DRG: 003 | End: 2020-01-31
Attending: INTERNAL MEDICINE
Payer: COMMERCIAL

## 2020-01-31 ENCOUNTER — APPOINTMENT (OUTPATIENT)
Dept: GENERAL RADIOLOGY | Facility: CLINIC | Age: 35
DRG: 003 | End: 2020-01-31
Attending: STUDENT IN AN ORGANIZED HEALTH CARE EDUCATION/TRAINING PROGRAM
Payer: COMMERCIAL

## 2020-01-31 ENCOUNTER — ALLIED HEALTH/NURSE VISIT (OUTPATIENT)
Dept: NEUROLOGY | Facility: CLINIC | Age: 35
DRG: 003 | End: 2020-01-31
Attending: PSYCHIATRY & NEUROLOGY
Payer: COMMERCIAL

## 2020-01-31 DIAGNOSIS — R57.0 CARDIOGENIC SHOCK (H): Primary | ICD-10-CM

## 2020-01-31 LAB
ALBUMIN SERPL-MCNC: 2.7 G/DL (ref 3.4–5)
ALBUMIN SERPL-MCNC: 2.9 G/DL (ref 3.4–5)
ALP SERPL-CCNC: 40 U/L (ref 40–150)
ALP SERPL-CCNC: 41 U/L (ref 40–150)
ALP SERPL-CCNC: 41 U/L (ref 40–150)
ALP SERPL-CCNC: 43 U/L (ref 40–150)
ALT SERPL W P-5'-P-CCNC: 16 U/L (ref 0–50)
ALT SERPL W P-5'-P-CCNC: 16 U/L (ref 0–50)
ALT SERPL W P-5'-P-CCNC: 18 U/L (ref 0–50)
ALT SERPL W P-5'-P-CCNC: 19 U/L (ref 0–50)
ANGLE RATE OF CLOT STRENGTH: 60.8 DEGREES (ref 53–72)
ANION GAP SERPL CALCULATED.3IONS-SCNC: 2 MMOL/L (ref 3–14)
ANION GAP SERPL CALCULATED.3IONS-SCNC: 4 MMOL/L (ref 3–14)
ANION GAP SERPL CALCULATED.3IONS-SCNC: 4 MMOL/L (ref 3–14)
ANION GAP SERPL CALCULATED.3IONS-SCNC: 5 MMOL/L (ref 3–14)
APTT PPP: 114 SEC (ref 22–37)
APTT PPP: 71 SEC (ref 22–37)
APTT PPP: 78 SEC (ref 22–37)
APTT PPP: 90 SEC (ref 22–37)
AST SERPL W P-5'-P-CCNC: 30 U/L (ref 0–45)
AST SERPL W P-5'-P-CCNC: 30 U/L (ref 0–45)
AST SERPL W P-5'-P-CCNC: 31 U/L (ref 0–45)
AST SERPL W P-5'-P-CCNC: 33 U/L (ref 0–45)
AT III ACT/NOR PPP CHRO: 55 % (ref 85–135)
BACTERIA SPEC CULT: NORMAL
BASE DEFICIT BLDA-SCNC: 0.4 MMOL/L
BASE DEFICIT BLDA-SCNC: 0.7 MMOL/L
BASE DEFICIT BLDA-SCNC: 0.8 MMOL/L
BASE DEFICIT BLDA-SCNC: 0.9 MMOL/L
BASE DEFICIT BLDA-SCNC: 1 MMOL/L
BASE DEFICIT BLDA-SCNC: 1.1 MMOL/L
BASE DEFICIT BLDA-SCNC: 1.1 MMOL/L
BASE DEFICIT BLDA-SCNC: 2 MMOL/L
BASE DEFICIT BLDV-SCNC: 0.3 MMOL/L
BASE DEFICIT BLDV-SCNC: 0.3 MMOL/L
BASE EXCESS BLDA CALC-SCNC: 0.4 MMOL/L
BASE EXCESS BLDA CALC-SCNC: 0.4 MMOL/L
BASE EXCESS BLDA CALC-SCNC: 0.5 MMOL/L
BASE EXCESS BLDA CALC-SCNC: 1.4 MMOL/L
BASE EXCESS BLDA CALC-SCNC: 1.4 MMOL/L
BASE EXCESS BLDV CALC-SCNC: 1.4 MMOL/L
BILIRUB SERPL-MCNC: 1.4 MG/DL (ref 0.2–1.3)
BILIRUB SERPL-MCNC: 1.6 MG/DL (ref 0.2–1.3)
BLD PROD TYP BPU: NORMAL
BLD UNIT ID BPU: 0
BLOOD PRODUCT CODE: NORMAL
BPU ID: NORMAL
BUN SERPL-MCNC: 28 MG/DL (ref 7–30)
BUN SERPL-MCNC: 28 MG/DL (ref 7–30)
BUN SERPL-MCNC: 29 MG/DL (ref 7–30)
BUN SERPL-MCNC: 31 MG/DL (ref 7–30)
CA-I BLD-MCNC: 4.2 MG/DL (ref 4.4–5.2)
CA-I BLD-MCNC: 4.2 MG/DL (ref 4.4–5.2)
CA-I BLD-MCNC: 4.4 MG/DL (ref 4.4–5.2)
CA-I BLD-MCNC: 4.7 MG/DL (ref 4.4–5.2)
CALCIUM SERPL-MCNC: 7.1 MG/DL (ref 8.5–10.1)
CALCIUM SERPL-MCNC: 7.8 MG/DL (ref 8.5–10.1)
CALCIUM SERPL-MCNC: 7.8 MG/DL (ref 8.5–10.1)
CALCIUM SERPL-MCNC: 8 MG/DL (ref 8.5–10.1)
CHLORIDE SERPL-SCNC: 108 MMOL/L (ref 94–109)
CHLORIDE SERPL-SCNC: 109 MMOL/L (ref 94–109)
CHLORIDE SERPL-SCNC: 110 MMOL/L (ref 94–109)
CHLORIDE SERPL-SCNC: 112 MMOL/L (ref 94–109)
CI HYPOCOAGULATION INDEX: 1.6 RATIO (ref 0–3)
CK SERPL-CCNC: 219 U/L (ref 30–225)
CO2 SERPL-SCNC: 24 MMOL/L (ref 20–32)
CO2 SERPL-SCNC: 26 MMOL/L (ref 20–32)
CO2 SERPL-SCNC: 26 MMOL/L (ref 20–32)
CO2 SERPL-SCNC: 28 MMOL/L (ref 20–32)
CREAT SERPL-MCNC: 0.84 MG/DL (ref 0.52–1.04)
CREAT SERPL-MCNC: 0.9 MG/DL (ref 0.52–1.04)
CREAT SERPL-MCNC: 0.95 MG/DL (ref 0.52–1.04)
CREAT SERPL-MCNC: 0.97 MG/DL (ref 0.52–1.04)
CRP SERPL-MCNC: 99 MG/L (ref 0–8)
D DIMER PPP FEU-MCNC: 0.5 UG/ML FEU (ref 0–0.5)
D DIMER PPP FEU-MCNC: 0.7 UG/ML FEU (ref 0–0.5)
ERYTHROCYTE [DISTWIDTH] IN BLOOD BY AUTOMATED COUNT: 14.7 % (ref 10–15)
ERYTHROCYTE [DISTWIDTH] IN BLOOD BY AUTOMATED COUNT: 15.3 % (ref 10–15)
ERYTHROCYTE [DISTWIDTH] IN BLOOD BY AUTOMATED COUNT: 15.4 % (ref 10–15)
ERYTHROCYTE [DISTWIDTH] IN BLOOD BY AUTOMATED COUNT: 15.5 % (ref 10–15)
ERYTHROCYTE [SEDIMENTATION RATE] IN BLOOD BY WESTERGREN METHOD: 15 MM/H (ref 0–20)
FIBRINOGEN PPP-MCNC: 372 MG/DL (ref 200–420)
FIBRINOGEN PPP-MCNC: 559 MG/DL (ref 200–420)
G ACTUAL CLOT STRENGTH: 8.1 KD/SC (ref 4.5–11)
GFR SERPL CREATININE-BSD FRML MDRD: 77 ML/MIN/{1.73_M2}
GFR SERPL CREATININE-BSD FRML MDRD: 79 ML/MIN/{1.73_M2}
GFR SERPL CREATININE-BSD FRML MDRD: 84 ML/MIN/{1.73_M2}
GFR SERPL CREATININE-BSD FRML MDRD: >90 ML/MIN/{1.73_M2}
GLUCOSE BLD-MCNC: 125 MG/DL (ref 70–99)
GLUCOSE BLD-MCNC: 129 MG/DL (ref 70–99)
GLUCOSE BLD-MCNC: 132 MG/DL (ref 70–99)
GLUCOSE BLD-MCNC: 132 MG/DL (ref 70–99)
GLUCOSE BLDC GLUCOMTR-MCNC: 115 MG/DL (ref 70–99)
GLUCOSE BLDC GLUCOMTR-MCNC: 119 MG/DL (ref 70–99)
GLUCOSE BLDC GLUCOMTR-MCNC: 120 MG/DL (ref 70–99)
GLUCOSE BLDC GLUCOMTR-MCNC: 130 MG/DL (ref 70–99)
GLUCOSE BLDC GLUCOMTR-MCNC: 139 MG/DL (ref 70–99)
GLUCOSE SERPL-MCNC: 129 MG/DL (ref 70–99)
GLUCOSE SERPL-MCNC: 132 MG/DL (ref 70–99)
GLUCOSE SERPL-MCNC: 136 MG/DL (ref 70–99)
GLUCOSE SERPL-MCNC: 140 MG/DL (ref 70–99)
HCO3 BLD-SCNC: 23 MMOL/L (ref 21–28)
HCO3 BLD-SCNC: 24 MMOL/L (ref 21–28)
HCO3 BLD-SCNC: 25 MMOL/L (ref 21–28)
HCO3 BLDA-SCNC: 24 MMOL/L (ref 21–28)
HCO3 BLDA-SCNC: 24 MMOL/L (ref 21–28)
HCO3 BLDV-SCNC: 25 MMOL/L (ref 21–28)
HCO3 BLDV-SCNC: 26 MMOL/L (ref 21–28)
HCO3 BLDV-SCNC: 27 MMOL/L (ref 21–28)
HCT VFR BLD AUTO: 27.7 % (ref 35–47)
HCT VFR BLD AUTO: 28.7 % (ref 35–47)
HCT VFR BLD AUTO: 29.1 % (ref 35–47)
HCT VFR BLD AUTO: 29.6 % (ref 35–47)
HGB BLD-MCNC: 9 G/DL (ref 11.7–15.7)
HGB BLD-MCNC: 9.3 G/DL (ref 11.7–15.7)
HGB BLD-MCNC: 9.7 G/DL (ref 11.7–15.7)
HGB BLD-MCNC: 9.8 G/DL (ref 11.7–15.7)
HGB FREE PLAS-MCNC: 90 MG/DL
INR PPP: 1.07 (ref 0.86–1.14)
INR PPP: 1.13 (ref 0.86–1.14)
INR PPP: 1.13 (ref 0.86–1.14)
INR PPP: 1.19 (ref 0.86–1.14)
INTERPRETATION ECG - MUSE: NORMAL
INTERPRETATION ECG - MUSE: NORMAL
K TIME TO SPEC CLOT STRENGTH: 2.1 MINUTE (ref 1–3)
KCT BLD-ACNC: 118 SEC (ref 75–150)
KCT BLD-ACNC: 134 SEC (ref 75–150)
KCT BLD-ACNC: 135 SEC (ref 75–150)
KCT BLD-ACNC: 138 SEC (ref 75–150)
KCT BLD-ACNC: 142 SEC (ref 75–150)
KCT BLD-ACNC: 146 SEC (ref 75–150)
KCT BLD-ACNC: 154 SEC (ref 75–150)
KCT BLD-ACNC: 155 SEC (ref 75–150)
KCT BLD-ACNC: 158 SEC (ref 75–150)
KCT BLD-ACNC: 158 SEC (ref 75–150)
KCT BLD-ACNC: 167 SEC (ref 75–150)
KCT BLD-ACNC: 167 SEC (ref 75–150)
KCT BLD-ACNC: 171 SEC (ref 75–150)
KCT BLD-ACNC: 175 SEC (ref 75–150)
KCT BLD-ACNC: 179 SEC (ref 75–150)
KCT BLD-ACNC: 179 SEC (ref 75–150)
KCT BLD-ACNC: 183 SEC (ref 75–150)
KCT BLD-ACNC: 183 SEC (ref 75–150)
KCT BLD-ACNC: 219 SEC (ref 75–150)
LACTATE BLD-SCNC: 0.9 MMOL/L (ref 0.7–2)
LACTATE BLD-SCNC: 1 MMOL/L (ref 0.7–2)
LACTATE BLD-SCNC: 1 MMOL/L (ref 0.7–2)
LACTATE BLD-SCNC: 1.1 MMOL/L (ref 0.7–2)
LACTATE BLD-SCNC: 1.2 MMOL/L (ref 0.7–2)
LACTATE BLD-SCNC: 1.3 MMOL/L (ref 0.7–2)
LACTATE BLD-SCNC: 1.4 MMOL/L (ref 0.7–2)
LDH SERPL L TO P-CCNC: 354 U/L (ref 81–234)
LMWH PPP CHRO-ACNC: 0.22 IU/ML
LMWH PPP CHRO-ACNC: 0.28 IU/ML
LMWH PPP CHRO-ACNC: 0.34 IU/ML
LMWH PPP CHRO-ACNC: 0.47 IU/ML
LY30 LYSIS AT 30 MINUTES: 0 % (ref 0–8)
LY60 LYSIS AT 60 MINUTES: 1.5 % (ref 0–15)
MA MAXIMUM CLOT STRENGTH: 61.8 MM (ref 50–70)
MAGNESIUM SERPL-MCNC: 2.4 MG/DL (ref 1.6–2.3)
MAGNESIUM SERPL-MCNC: 2.4 MG/DL (ref 1.6–2.3)
MAGNESIUM SERPL-MCNC: 2.6 MG/DL (ref 1.6–2.3)
MAGNESIUM SERPL-MCNC: 2.7 MG/DL (ref 1.6–2.3)
MCH RBC QN AUTO: 28.2 PG (ref 26.5–33)
MCH RBC QN AUTO: 28.4 PG (ref 26.5–33)
MCH RBC QN AUTO: 28.7 PG (ref 26.5–33)
MCH RBC QN AUTO: 29 PG (ref 26.5–33)
MCHC RBC AUTO-ENTMCNC: 32.4 G/DL (ref 31.5–36.5)
MCHC RBC AUTO-ENTMCNC: 32.5 G/DL (ref 31.5–36.5)
MCHC RBC AUTO-ENTMCNC: 33.1 G/DL (ref 31.5–36.5)
MCHC RBC AUTO-ENTMCNC: 33.3 G/DL (ref 31.5–36.5)
MCV RBC AUTO: 87 FL (ref 78–100)
O2/TOTAL GAS SETTING VFR VENT: 40 %
O2/TOTAL GAS SETTING VFR VENT: 60 %
O2/TOTAL GAS SETTING VFR VENT: 60 %
OXYHGB MFR BLD: 91 % (ref 92–100)
OXYHGB MFR BLD: 92 % (ref 92–100)
OXYHGB MFR BLD: 94 % (ref 92–100)
OXYHGB MFR BLD: 94 % (ref 92–100)
OXYHGB MFR BLD: 95 % (ref 92–100)
OXYHGB MFR BLD: 96 % (ref 92–100)
OXYHGB MFR BLD: 98 % (ref 92–100)
OXYHGB MFR BLDA: 94 % (ref 75–100)
OXYHGB MFR BLDA: 99 % (ref 75–100)
OXYHGB MFR BLDV: 64 %
OXYHGB MFR BLDV: 70 %
OXYHGB MFR BLDV: 71 %
PCO2 BLD: 31 MM HG (ref 35–45)
PCO2 BLD: 33 MM HG (ref 35–45)
PCO2 BLD: 36 MM HG (ref 35–45)
PCO2 BLD: 37 MM HG (ref 35–45)
PCO2 BLD: 38 MM HG (ref 35–45)
PCO2 BLD: 39 MM HG (ref 35–45)
PCO2 BLD: 40 MM HG (ref 35–45)
PCO2 BLD: 40 MM HG (ref 35–45)
PCO2 BLD: 41 MM HG (ref 35–45)
PCO2 BLD: 41 MM HG (ref 35–45)
PCO2 BLD: 42 MM HG (ref 35–45)
PCO2 BLDA: 40 MM HG (ref 35–45)
PCO2 BLDA: 46 MM HG (ref 35–45)
PCO2 BLDV: 44 MM HG (ref 40–50)
PCO2 BLDV: 47 MM HG (ref 40–50)
PCO2 BLDV: 50 MM HG (ref 40–50)
PH BLD: 7.38 PH (ref 7.35–7.45)
PH BLD: 7.38 PH (ref 7.35–7.45)
PH BLD: 7.39 PH (ref 7.35–7.45)
PH BLD: 7.4 PH (ref 7.35–7.45)
PH BLD: 7.41 PH (ref 7.35–7.45)
PH BLD: 7.43 PH (ref 7.35–7.45)
PH BLD: 7.45 PH (ref 7.35–7.45)
PH BLD: 7.46 PH (ref 7.35–7.45)
PH BLD: 7.5 PH (ref 7.35–7.45)
PH BLDA: 7.33 PH (ref 7.35–7.45)
PH BLDA: 7.39 PH (ref 7.35–7.45)
PH BLDV: 7.34 PH (ref 7.32–7.43)
PH BLDV: 7.35 PH (ref 7.32–7.43)
PH BLDV: 7.37 PH (ref 7.32–7.43)
PHOSPHATE SERPL-MCNC: 3.8 MG/DL (ref 2.5–4.5)
PLATELET # BLD AUTO: 76 10E9/L (ref 150–450)
PLATELET # BLD AUTO: 76 10E9/L (ref 150–450)
PLATELET # BLD AUTO: 77 10E9/L (ref 150–450)
PLATELET # BLD AUTO: 84 10E9/L (ref 150–450)
PLATELET INHIBITION WITH AA: 41 %
PLATELET INHIBITION WITH ADP: 36 %
PO2 BLD: 123 MM HG (ref 80–105)
PO2 BLD: 178 MM HG (ref 80–105)
PO2 BLD: 186 MM HG (ref 80–105)
PO2 BLD: 188 MM HG (ref 80–105)
PO2 BLD: 193 MM HG (ref 80–105)
PO2 BLD: 60 MM HG (ref 80–105)
PO2 BLD: 68 MM HG (ref 80–105)
PO2 BLD: 73 MM HG (ref 80–105)
PO2 BLD: 75 MM HG (ref 80–105)
PO2 BLD: 87 MM HG (ref 80–105)
PO2 BLD: 92 MM HG (ref 80–105)
PO2 BLDA: 192 MM HG (ref 80–105)
PO2 BLDA: 81 MM HG (ref 80–105)
PO2 BLDV: 38 MM HG (ref 25–47)
PO2 BLDV: 39 MM HG (ref 25–47)
PO2 BLDV: 40 MM HG (ref 25–47)
POTASSIUM SERPL-SCNC: 3.7 MMOL/L (ref 3.4–5.3)
POTASSIUM SERPL-SCNC: 3.8 MMOL/L (ref 3.4–5.3)
POTASSIUM SERPL-SCNC: 3.8 MMOL/L (ref 3.4–5.3)
POTASSIUM SERPL-SCNC: 3.9 MMOL/L (ref 3.4–5.3)
PROCALCITONIN SERPL-MCNC: 0.11 NG/ML
PROT SERPL-MCNC: 5.2 G/DL (ref 6.8–8.8)
PROT SERPL-MCNC: 5.4 G/DL (ref 6.8–8.8)
PROT SERPL-MCNC: 5.4 G/DL (ref 6.8–8.8)
PROT SERPL-MCNC: 5.5 G/DL (ref 6.8–8.8)
R TIME UNTIL CLOT FORMS: 7.9 MINUTE (ref 5–10)
RADIOLOGIST FLAGS: ABNORMAL
RBC # BLD AUTO: 3.17 10E12/L (ref 3.8–5.2)
RBC # BLD AUTO: 3.3 10E12/L (ref 3.8–5.2)
RBC # BLD AUTO: 3.34 10E12/L (ref 3.8–5.2)
RBC # BLD AUTO: 3.41 10E12/L (ref 3.8–5.2)
SODIUM SERPL-SCNC: 139 MMOL/L (ref 133–144)
SODIUM SERPL-SCNC: 141 MMOL/L (ref 133–144)
SPECIMEN SOURCE: NORMAL
TRANSFUSION STATUS PATIENT QL: NORMAL
WBC # BLD AUTO: 6.8 10E9/L (ref 4–11)
WBC # BLD AUTO: 8 10E9/L (ref 4–11)
WBC # BLD AUTO: 8.2 10E9/L (ref 4–11)
WBC # BLD AUTO: 8.4 10E9/L (ref 4–11)

## 2020-01-31 PROCEDURE — 86900 BLOOD TYPING SEROLOGIC ABO: CPT | Performed by: STUDENT IN AN ORGANIZED HEALTH CARE EDUCATION/TRAINING PROGRAM

## 2020-01-31 PROCEDURE — 82805 BLOOD GASES W/O2 SATURATION: CPT | Performed by: INTERNAL MEDICINE

## 2020-01-31 PROCEDURE — 27211391 ZZ H KIT, 6 FR TL BIOFLO OPEN ENDED PICC

## 2020-01-31 PROCEDURE — 83605 ASSAY OF LACTIC ACID: CPT | Performed by: INTERNAL MEDICINE

## 2020-01-31 PROCEDURE — 85396 CLOTTING ASSAY WHOLE BLOOD: CPT | Performed by: STUDENT IN AN ORGANIZED HEALTH CARE EDUCATION/TRAINING PROGRAM

## 2020-01-31 PROCEDURE — 95711 VEEG 2-12 HR UNMONITORED: CPT | Mod: ZF

## 2020-01-31 PROCEDURE — 25000132 ZZH RX MED GY IP 250 OP 250 PS 637: Performed by: STUDENT IN AN ORGANIZED HEALTH CARE EDUCATION/TRAINING PROGRAM

## 2020-01-31 PROCEDURE — 85384 FIBRINOGEN ACTIVITY: CPT | Performed by: INTERNAL MEDICINE

## 2020-01-31 PROCEDURE — 27211414 ZZ H ADHESIVE SKIN CLOSURE, DERMABOND

## 2020-01-31 PROCEDURE — 25000125 ZZHC RX 250: Performed by: STUDENT IN AN ORGANIZED HEALTH CARE EDUCATION/TRAINING PROGRAM

## 2020-01-31 PROCEDURE — 83735 ASSAY OF MAGNESIUM: CPT | Performed by: INTERNAL MEDICINE

## 2020-01-31 PROCEDURE — 40000014 ZZH STATISTIC ARTERIAL MONITORING DAILY

## 2020-01-31 PROCEDURE — 27211383 ZZ H DEVICE 5FR SECURACATH

## 2020-01-31 PROCEDURE — 25800030 ZZH RX IP 258 OP 636: Performed by: STUDENT IN AN ORGANIZED HEALTH CARE EDUCATION/TRAINING PROGRAM

## 2020-01-31 PROCEDURE — 93010 ELECTROCARDIOGRAM REPORT: CPT | Performed by: INTERNAL MEDICINE

## 2020-01-31 PROCEDURE — 86923 COMPATIBILITY TEST ELECTRIC: CPT | Performed by: STUDENT IN AN ORGANIZED HEALTH CARE EDUCATION/TRAINING PROGRAM

## 2020-01-31 PROCEDURE — 25000128 H RX IP 250 OP 636: Performed by: STUDENT IN AN ORGANIZED HEALTH CARE EDUCATION/TRAINING PROGRAM

## 2020-01-31 PROCEDURE — 86850 RBC ANTIBODY SCREEN: CPT | Performed by: STUDENT IN AN ORGANIZED HEALTH CARE EDUCATION/TRAINING PROGRAM

## 2020-01-31 PROCEDURE — P9016 RBC LEUKOCYTES REDUCED: HCPCS | Performed by: STUDENT IN AN ORGANIZED HEALTH CARE EDUCATION/TRAINING PROGRAM

## 2020-01-31 PROCEDURE — 71045 X-RAY EXAM CHEST 1 VIEW: CPT | Mod: 77

## 2020-01-31 PROCEDURE — 85384 FIBRINOGEN ACTIVITY: CPT | Performed by: STUDENT IN AN ORGANIZED HEALTH CARE EDUCATION/TRAINING PROGRAM

## 2020-01-31 PROCEDURE — 93325 DOPPLER ECHO COLOR FLOW MAPG: CPT | Mod: 26 | Performed by: INTERNAL MEDICINE

## 2020-01-31 PROCEDURE — 20000004 ZZH R&B ICU UMMC

## 2020-01-31 PROCEDURE — 40000076 ZZH STATISTIC IABP MONITORING

## 2020-01-31 PROCEDURE — 83735 ASSAY OF MAGNESIUM: CPT | Performed by: STUDENT IN AN ORGANIZED HEALTH CARE EDUCATION/TRAINING PROGRAM

## 2020-01-31 PROCEDURE — 40000048 ZZH STATISTIC DAILY SWAN MONITORING

## 2020-01-31 PROCEDURE — 85520 HEPARIN ASSAY: CPT | Performed by: INTERNAL MEDICINE

## 2020-01-31 PROCEDURE — 25000132 ZZH RX MED GY IP 250 OP 250 PS 637: Performed by: INTERNAL MEDICINE

## 2020-01-31 PROCEDURE — 85300 ANTITHROMBIN III ACTIVITY: CPT | Performed by: STUDENT IN AN ORGANIZED HEALTH CARE EDUCATION/TRAINING PROGRAM

## 2020-01-31 PROCEDURE — 84100 ASSAY OF PHOSPHORUS: CPT | Performed by: STUDENT IN AN ORGANIZED HEALTH CARE EDUCATION/TRAINING PROGRAM

## 2020-01-31 PROCEDURE — 40000986 XR CHEST PORT 1 VW

## 2020-01-31 PROCEDURE — C9113 INJ PANTOPRAZOLE SODIUM, VIA: HCPCS | Performed by: STUDENT IN AN ORGANIZED HEALTH CARE EDUCATION/TRAINING PROGRAM

## 2020-01-31 PROCEDURE — 82947 ASSAY GLUCOSE BLOOD QUANT: CPT | Performed by: INTERNAL MEDICINE

## 2020-01-31 PROCEDURE — 99291 CRITICAL CARE FIRST HOUR: CPT | Mod: 25 | Performed by: INTERNAL MEDICINE

## 2020-01-31 PROCEDURE — 83051 HEMOGLOBIN PLASMA: CPT | Performed by: STUDENT IN AN ORGANIZED HEALTH CARE EDUCATION/TRAINING PROGRAM

## 2020-01-31 PROCEDURE — 82805 BLOOD GASES W/O2 SATURATION: CPT | Performed by: STUDENT IN AN ORGANIZED HEALTH CARE EDUCATION/TRAINING PROGRAM

## 2020-01-31 PROCEDURE — 83605 ASSAY OF LACTIC ACID: CPT | Performed by: STUDENT IN AN ORGANIZED HEALTH CARE EDUCATION/TRAINING PROGRAM

## 2020-01-31 PROCEDURE — 71045 X-RAY EXAM CHEST 1 VIEW: CPT

## 2020-01-31 PROCEDURE — 83615 LACTATE (LD) (LDH) ENZYME: CPT | Performed by: STUDENT IN AN ORGANIZED HEALTH CARE EDUCATION/TRAINING PROGRAM

## 2020-01-31 PROCEDURE — 86901 BLOOD TYPING SEROLOGIC RH(D): CPT | Performed by: STUDENT IN AN ORGANIZED HEALTH CARE EDUCATION/TRAINING PROGRAM

## 2020-01-31 PROCEDURE — 94003 VENT MGMT INPAT SUBQ DAY: CPT

## 2020-01-31 PROCEDURE — 85652 RBC SED RATE AUTOMATED: CPT | Performed by: STUDENT IN AN ORGANIZED HEALTH CARE EDUCATION/TRAINING PROGRAM

## 2020-01-31 PROCEDURE — 87040 BLOOD CULTURE FOR BACTERIA: CPT | Performed by: STUDENT IN AN ORGANIZED HEALTH CARE EDUCATION/TRAINING PROGRAM

## 2020-01-31 PROCEDURE — 85347 COAGULATION TIME ACTIVATED: CPT

## 2020-01-31 PROCEDURE — 40000275 ZZH STATISTIC RCP TIME EA 10 MIN

## 2020-01-31 PROCEDURE — 93321 DOPPLER ECHO F-UP/LMTD STD: CPT | Mod: 26 | Performed by: INTERNAL MEDICINE

## 2020-01-31 PROCEDURE — 93308 TTE F-UP OR LMTD: CPT | Mod: 26 | Performed by: INTERNAL MEDICINE

## 2020-01-31 PROCEDURE — 33949 ECMO/ECLS DAILY MGMT ARTERY: CPT

## 2020-01-31 PROCEDURE — 27210577 ZZ H INTRODUCER MICRO SET

## 2020-01-31 PROCEDURE — 00000146 ZZHCL STATISTIC GLUCOSE BY METER IP

## 2020-01-31 PROCEDURE — 82550 ASSAY OF CK (CPK): CPT | Performed by: INTERNAL MEDICINE

## 2020-01-31 PROCEDURE — 36415 COLL VENOUS BLD VENIPUNCTURE: CPT | Performed by: STUDENT IN AN ORGANIZED HEALTH CARE EDUCATION/TRAINING PROGRAM

## 2020-01-31 PROCEDURE — 85379 FIBRIN DEGRADATION QUANT: CPT | Performed by: INTERNAL MEDICINE

## 2020-01-31 PROCEDURE — 80053 COMPREHEN METABOLIC PANEL: CPT | Performed by: INTERNAL MEDICINE

## 2020-01-31 PROCEDURE — 80053 COMPREHEN METABOLIC PANEL: CPT | Performed by: STUDENT IN AN ORGANIZED HEALTH CARE EDUCATION/TRAINING PROGRAM

## 2020-01-31 PROCEDURE — 82947 ASSAY GLUCOSE BLOOD QUANT: CPT | Performed by: STUDENT IN AN ORGANIZED HEALTH CARE EDUCATION/TRAINING PROGRAM

## 2020-01-31 PROCEDURE — 84145 PROCALCITONIN (PCT): CPT | Performed by: STUDENT IN AN ORGANIZED HEALTH CARE EDUCATION/TRAINING PROGRAM

## 2020-01-31 PROCEDURE — 85730 THROMBOPLASTIN TIME PARTIAL: CPT | Performed by: INTERNAL MEDICINE

## 2020-01-31 PROCEDURE — 25000128 H RX IP 250 OP 636: Performed by: INTERNAL MEDICINE

## 2020-01-31 PROCEDURE — 27210437 ZZH NUTRITION PRODUCT SEMIELEM INTERMED LITER

## 2020-01-31 PROCEDURE — 33949 ECMO/ECLS DAILY MGMT ARTERY: CPT | Mod: GC | Performed by: INTERNAL MEDICINE

## 2020-01-31 PROCEDURE — 36569 INSJ PICC 5 YR+ W/O IMAGING: CPT

## 2020-01-31 PROCEDURE — 93005 ELECTROCARDIOGRAM TRACING: CPT

## 2020-01-31 PROCEDURE — 85730 THROMBOPLASTIN TIME PARTIAL: CPT | Performed by: STUDENT IN AN ORGANIZED HEALTH CARE EDUCATION/TRAINING PROGRAM

## 2020-01-31 PROCEDURE — 85520 HEPARIN ASSAY: CPT | Performed by: STUDENT IN AN ORGANIZED HEALTH CARE EDUCATION/TRAINING PROGRAM

## 2020-01-31 PROCEDURE — 85027 COMPLETE CBC AUTOMATED: CPT | Performed by: STUDENT IN AN ORGANIZED HEALTH CARE EDUCATION/TRAINING PROGRAM

## 2020-01-31 PROCEDURE — 85610 PROTHROMBIN TIME: CPT | Performed by: STUDENT IN AN ORGANIZED HEALTH CARE EDUCATION/TRAINING PROGRAM

## 2020-01-31 PROCEDURE — 85027 COMPLETE CBC AUTOMATED: CPT | Performed by: INTERNAL MEDICINE

## 2020-01-31 PROCEDURE — 40000196 ZZH STATISTIC RAPCV CVP MONITORING

## 2020-01-31 PROCEDURE — 86140 C-REACTIVE PROTEIN: CPT | Performed by: STUDENT IN AN ORGANIZED HEALTH CARE EDUCATION/TRAINING PROGRAM

## 2020-01-31 PROCEDURE — 82330 ASSAY OF CALCIUM: CPT | Performed by: INTERNAL MEDICINE

## 2020-01-31 PROCEDURE — 85379 FIBRIN DEGRADATION QUANT: CPT | Performed by: STUDENT IN AN ORGANIZED HEALTH CARE EDUCATION/TRAINING PROGRAM

## 2020-01-31 PROCEDURE — 93325 DOPPLER ECHO COLOR FLOW MAPG: CPT

## 2020-01-31 PROCEDURE — 85610 PROTHROMBIN TIME: CPT | Performed by: INTERNAL MEDICINE

## 2020-01-31 PROCEDURE — 82330 ASSAY OF CALCIUM: CPT | Performed by: STUDENT IN AN ORGANIZED HEALTH CARE EDUCATION/TRAINING PROGRAM

## 2020-01-31 PROCEDURE — 87070 CULTURE OTHR SPECIMN AEROBIC: CPT | Performed by: STUDENT IN AN ORGANIZED HEALTH CARE EDUCATION/TRAINING PROGRAM

## 2020-01-31 PROCEDURE — 27211417 ZZ H KIT, VPS RHYTHM STYLET

## 2020-01-31 RX ORDER — PIPERACILLIN SODIUM, TAZOBACTAM SODIUM 4; .5 G/20ML; G/20ML
4.5 INJECTION, POWDER, LYOPHILIZED, FOR SOLUTION INTRAVENOUS EVERY 6 HOURS
Status: DISCONTINUED | OUTPATIENT
Start: 2020-01-31 | End: 2020-01-31

## 2020-01-31 RX ORDER — HEPARIN SODIUM (PORCINE) LOCK FLUSH IV SOLN 100 UNIT/ML 100 UNIT/ML
5-10 SOLUTION INTRAVENOUS EVERY 30 MIN PRN
Status: DISCONTINUED | OUTPATIENT
Start: 2020-01-31 | End: 2020-01-31 | Stop reason: CLARIF

## 2020-01-31 RX ORDER — FUROSEMIDE 10 MG/ML
40 INJECTION INTRAMUSCULAR; INTRAVENOUS ONCE
Status: COMPLETED | OUTPATIENT
Start: 2020-01-31 | End: 2020-01-31

## 2020-01-31 RX ADMIN — Medication 100 MCG/HR: at 20:25

## 2020-01-31 RX ADMIN — PROPOFOL 25 MCG/KG/MIN: 10 INJECTION, EMULSION INTRAVENOUS at 22:45

## 2020-01-31 RX ADMIN — DEXMEDETOMIDINE 0.5 MCG/KG/HR: 100 INJECTION, SOLUTION, CONCENTRATE INTRAVENOUS at 23:43

## 2020-01-31 RX ADMIN — Medication 0.03 MCG/KG/MIN: at 15:45

## 2020-01-31 RX ADMIN — PROPOFOL 25 MCG/KG/MIN: 10 INJECTION, EMULSION INTRAVENOUS at 12:33

## 2020-01-31 RX ADMIN — MULTIVITAMIN 15 ML: LIQUID ORAL at 08:44

## 2020-01-31 RX ADMIN — CALCIUM GLUCONATE 1 G: 98 INJECTION, SOLUTION INTRAVENOUS at 05:42

## 2020-01-31 RX ADMIN — VASOPRESSIN 3 UNITS/HR: 20 INJECTION INTRAVENOUS at 10:45

## 2020-01-31 RX ADMIN — DEXMEDETOMIDINE 0.5 MCG/KG/HR: 100 INJECTION, SOLUTION, CONCENTRATE INTRAVENOUS at 13:11

## 2020-01-31 RX ADMIN — POLYETHYLENE GLYCOL 3350 17 G: 17 POWDER, FOR SOLUTION ORAL at 08:43

## 2020-01-31 RX ADMIN — OSELTAMIVIR PHOSPHATE 75 MG: 6 POWDER, FOR SUSPENSION ORAL at 08:48

## 2020-01-31 RX ADMIN — POTASSIUM CHLORIDE 20 MEQ: 1.5 POWDER, FOR SOLUTION ORAL at 22:55

## 2020-01-31 RX ADMIN — CALCIUM GLUCONATE 2 G: 98 INJECTION, SOLUTION INTRAVENOUS at 12:29

## 2020-01-31 RX ADMIN — VASOPRESSIN 3 UNITS/HR: 20 INJECTION INTRAVENOUS at 13:10

## 2020-01-31 RX ADMIN — PROPOFOL 25 MCG/KG/MIN: 10 INJECTION, EMULSION INTRAVENOUS at 04:26

## 2020-01-31 RX ADMIN — DEXMEDETOMIDINE 0.5 MCG/KG/HR: 100 INJECTION, SOLUTION, CONCENTRATE INTRAVENOUS at 01:51

## 2020-01-31 RX ADMIN — OSELTAMIVIR PHOSPHATE 75 MG: 6 POWDER, FOR SUSPENSION ORAL at 19:32

## 2020-01-31 RX ADMIN — POTASSIUM CHLORIDE 20 MEQ: 29.8 INJECTION, SOLUTION INTRAVENOUS at 17:55

## 2020-01-31 RX ADMIN — HEPARIN SODIUM 1400 UNITS/HR: 10000 INJECTION, SOLUTION INTRAVENOUS at 18:24

## 2020-01-31 RX ADMIN — PIPERACILLIN AND TAZOBACTAM 3.38 G: 3; .375 INJECTION, POWDER, FOR SOLUTION INTRAVENOUS at 04:26

## 2020-01-31 RX ADMIN — FUROSEMIDE 40 MG: 10 INJECTION, SOLUTION INTRAVENOUS at 11:15

## 2020-01-31 RX ADMIN — POTASSIUM CHLORIDE 20 MEQ: 29.8 INJECTION, SOLUTION INTRAVENOUS at 13:13

## 2020-01-31 RX ADMIN — DEXMEDETOMIDINE 0.5 MCG/KG/HR: 100 INJECTION, SOLUTION, CONCENTRATE INTRAVENOUS at 11:53

## 2020-01-31 RX ADMIN — PANTOPRAZOLE SODIUM 40 MG: 40 INJECTION, POWDER, FOR SOLUTION INTRAVENOUS at 08:44

## 2020-01-31 RX ADMIN — POTASSIUM CHLORIDE 20 MEQ: 1.5 POWDER, FOR SOLUTION ORAL at 04:32

## 2020-01-31 ASSESSMENT — ACTIVITIES OF DAILY LIVING (ADL)
ADLS_ACUITY_SCORE: 12

## 2020-01-31 ASSESSMENT — MIFFLIN-ST. JEOR: SCORE: 1618.13

## 2020-01-31 NOTE — PROGRESS NOTES
ECMO Shift Summary:      ECMO Equipment:  Console serial number:[id number]  Circuit Lot number:[id number]  Oxygenator Lot number:[id number]          Patient remains on VA ECMO, all equipment is functioning and alarms are appropriately set. RPM's 3450 with flow range 3.8-4.3 L/min. Sweep gas is at 3 LPM and FiO2 60%. Circuit remains free of air, clot and fibrin. Cannulas are secure with no bleeding from site. Extremities are warm.     Significant Shift Events: ECMO circuit changed on 20 @ 2235 secondary to increasing Delta P from 20 to over 80 over last several hours with flows from 4.3 to 3.8 with increase in RPMs. Circuit clamped and changed in a little over a minute by Edvin from perfusion and ECMO specialist without complication. MD and RN at bedside.     Vent settings:  Ventilation Mode: CMV/AC  (Continuous Mandatory Ventilation/ Assist Control)  FiO2 (%): 40 %  Rate Set (breaths/minute): 12 breaths/min  Tidal Volume Set (mL): 400 mL  PEEP (cm H2O): 5 cmH2O  Oxygen Concentration (%): 40 %  Resp: 12  .    Heparin is running at 1100 u/hr, ACT range 167-183.    Urine output is documented byRN, blood loss was minimal. Product given included 2 Units of PRBCs abd 1 Unit of Platelets.      Intake/Output Summary (Last 24 hours) at 2020 0459  Last data filed at 2020 0421  Gross per 24 hour   Intake 6637.36 ml   Output 1036 ml   Net 5601.36 ml       ECHO:  No results found for this or any previous visit.No results found for this or any previous visit.    CXR:  Recent Results (from the past 24 hour(s))   Echo Limited    Narrative    [id number]  [id number]  [id number]  [id number]^DEANNA^TONI           [facility],[place]  Echocardiography [facility]  [address] [place] [id number]     Name: LUZ ELENA REDMOND  MRN: [id number]  : 1985  Study Date: 2020 08:53 AM  Age: 34 yrs  Gender: Female  Patient Location: [facility]  Reason For Study: Shock  Ordering Physician: DEANNA  TONI  Performed By: Neli Edward RDCS     BSA: 1.9 m2  Height: 63 in  Weight: 195 lb  HR: 103  BP: 87/61 mmHg  _____________________________________________________________________________  __        Procedure  Limited Portable Echo Adult.  _____________________________________________________________________________  __        Interpretation Summary  Patient is on VA ECMO support with 5LPM flow.     There is moderate to severe LV wall thickening, today it is measured at 1.7cm.  Imaging suggest myocardial edema. LV cavity is normal size. The ejection  fraction is at around 5%. There is severe diffuse hypokinesis.  RV wall is thickened and cavity is very small. This is likely due to a  combination of VA ECMO support and pericardial effusion. RV function is  severely reduced.  Aortic valve is normal in structure and function. Aortic valve opens  intermittently  IVC is normal is size (1.73cm). ECMO cannula noted. Descending thoracic aorta  appears normal, IABP is in place.  There is a moderate sized pericardial effusion, predominantly localized  posterior to the RV and LV. There is some fibrinous component to the effusion.     Compared to prior study patient is now on VA ECMO. Myocardial edema remains  and wall thickness has not changed significantly. BiV function is severely  reduced. Pericardial effusion has increased in size. Difficult to assess  hemodynamic consequences on VA ECMO (RV is small, IVC is normal in size)  _____________________________________________________________________________  __        Left Ventricle  There is moderate to severe LV wall thickening, today it is measured at 1.7cm.  Imaging suggest myocardial edema. LV cavity is normal size. The ejection  fraction is at around 5%. There is severe diffuse hypokinesis. Left  ventricular diastolic function is not assessable.     Right Ventricle  RV wall is thickened and cavity is very small. This is likely due to a  combination of VA ECMO support and  pericardial effusion. RV function is  severely reduced.     Atria  The atria cannot be assessed.     Mitral Valve  The mitral valve is normal. Trace mitral insufficiency is present.        Aortic Valve  Aortic valve is normal in structure and function. Aortic valve opens  intermittently. The aortic valve is tricuspid.     Tricuspid Valve  The tricuspid valve is normal. The peak velocity of the tricuspid regurgitant  jet is not obtainable.     Pulmonic Valve  The pulmonic valve is normal.     Vessels  IVC is normal is size (1.73cm). ECMO cannula noted. Descending thoracic aorta  appears normal, IABP is in place.     Pericardium  There is a moderate sized pericardial effusion, predominantly localized  posterior to the RV and LV. There is some fibrinous component to the effusion.        Compared to Previous Study  Compared to prior study patient is now on VA ECMO. Myocardial edema remains  and wall thickness has not changed significantly. BiV function is severely  reduced. Pericardial effusion has increased in size. Difficult to assess  hemodynamic consequences on VA ECMO (RV is small, IVC is normal in size).  _____________________________________________________________________________  __                          Report approved by: Kristin BELTRAN 01/30/2020 09:51 AM              _____________________________________________________________________________  __      XR Chest Port 1 View    Narrative    Exam: XR CHEST PORT 1 VW, 1/30/2020 3:45 PM    Indication: PICC placement    Comparison: 1/30/2020 at 0053    Findings:   AP view of the chest. Endotracheal tube with the tip in the mid  thoracic trachea. Enteric and gastric tubes extending beyond the  field-of-view. Right approach PICC line. The distal end is partially  obscured by ECMO cannula. The distal tip appears to be located the  level of the lower right atrium. Right IJ approach venous sheath.  Inferior approach ECMO cannula with the tip near the  superior  cavoatrial junction. Catheter could be within the cannula itself.  Inferior approach [place]-Mar[place] catheter with the tip in the right main  pulmonary artery. Intra-aortic balloon pump with the marker at the  level of elena/T5.    No new focal airspace consolidation, pleural effusion or pneumothorax.  Mild interstitial pulmonary edema. Cardiac silhouette is unchanged.  Visualized portion of the upper abdomen is unremarkable. No acute  osseous findings.      Impression    Impression:   1. Right approach PICC line with the distal end partially obscured by  ECMO cannula. Appears to be at the level of the lower right atrium. It  may also be within the cannula itself. Consider retraction.  2. Additional support as detailed above.  3. No new focal airspace consolidation. There is mild interstitial  pulmonary edema.    I have personally reviewed the examination and initial interpretation  and I agree with the findings.    LALIT STEWART MD   XR Chest Port 1 View   Result Value    Radiologist flags Malpositioned PICC line (Urgent)    Narrative    XR CHEST PORT 1 VW  1/30/2020 3:45 PM      HISTORY: PICC placement    COMPARISON: Same day, 1/29/2020, 1/28/2012    FINDINGS: Single AP chest radiograph. Endotracheal tube tip projects  approximately 1.6 cm above the elena. Right upper extremity PICC line  and central line tips project approximately at the level of the  innominate confluence. Enteric tubing extends beyond the  field-of-view, feeding tube tip projects over the right upper  abdominal quadrant. Stable inferior approach pulmonary arterial  catheter and ECMO cannulae.    Trachea is midline, cardiomegaly. Pulmonary vascular congestion.  Streaky perihilar interstitial opacities. No pneumothorax or pleural  effusion. No acute osseous or upper abdominal abnormality.      Impression    IMPRESSION:  1. Right upper approach PICC line tip projects at the innominate  confluence.  2. Cardiomegaly with mild pulmonary  edema.  3. Stable support devices as above.    [Urgent Result: Malpositioned PICC line]    Finding was identified on 1/30/2020 3:51 PM.     Dr. Newsome was contacted by Dr. Gallagher at 1/30/2020 3:54 PM and  verbalized understanding of the urgent finding.     I have personally reviewed the examination and initial interpretation  and I agree with the findings.    LALIT STEWART MD       Labs:  Recent Labs   Lab 01/31/20  0333 01/31/20  0208 01/30/20  2357 01/30/20  2141   PH 7.38 7.38 7.40 7.38   PCO2 42 41 39 40   PO2 178* 193* 188* 209*   HCO3 24 24 24 24   O2PER 40 40 40 40       Lab Results   Component Value Date    HGB 9.3 (L) 01/31/2020    PHGB 110 (H) 01/30/2020    PLT 84 (L) 01/31/2020    FIBR 372 01/31/2020    INR 1.19 (H) 01/31/2020     (HH) 01/31/2020    DD 0.5 01/31/2020    AXA 0.47 01/31/2020    ANTCH 83 (L) 01/30/2020         Plan is to remain on VA ECMO support.      Danielle Hidalgo, RT  1/31/2020 4:59 AM

## 2020-01-31 NOTE — PROCEDURES
Procedure Date: 01/31/2020      EEG #-3      DATE OF RECORDING/SERVICE DATE:  01/31/2020, day 3.      SOURCE FILE DURATION:  7 hours and 45 minutes.      HISTORY:  Azra Tom is a 34-year-old female who presented with cardiogenic shock in the setting of fulminant viral myocarditis.  She is currently being treated with ECMO.  EEG is requested to evaluate for seizures.      MEDICATIONS:  Precedex 0.5 mcg, Fentanyl 100 mcg per hour, Propofol 25 mcg/kg/min.      TECHNICAL SUMMARY:  EEG is recorded from scalp electrodes placed according to the 10-20 international system.  Additional electrodes were utilized for referencing, artifact detection, and recording from other cerebral regions.  Video was continuously recorded.  Video was reviewed for clinical correlation and to assist with EEG interpretation.      FINDINGS:  This video EEG is abnormal due to presence of diffuse generalized delta slowing with occasional superimposed faster frequency in the beta range.  Delta activity is up to 100 microvolts and 1-2 Hz frequency.  There is no well-formed parietooccipital rhythm or sleep architecture seen.  The patient remained unresponsive throughout the recording.  No epileptiform discharges were present.      ICTAL ABNORMALITIES:  No electrographic seizures seen.      IMPRESSION:  Video EEG day 3 is abnormal due to presence of diffuse generalized delta slowing with superimposed faster frequency beta activity.  These findings are consistent with severe diffuse encephalopathy.  EEG is nonreactive.  This may in part be related to sedative drips employed.  No epileptiform discharges or electrographic seizures were recorded.      SUMMARY OF 3 DAYS OF VIDEO EEG RECORDING:  During these 3 days of video EEG recording, the patient had persistent generalized delta-theta slowing followed by diffuse generalized delta slowing with superimposed faster frequency beta activity.  Well-formed parietooccipital rhythm or sleep  architecture was not seen.  EEG was discontinuous and non reactive. These findings are consistent with diffuse encephalopathy, the degree of which is not clear, since sedative drips employed, may also alter the EEG. No epileptiform discharges or seizures were recorded.        This report will be reviewed and signed by Dr. Debo Choe.    As dictated by HUMBERTO VALDEZ MD      I personally reviewed the video EEG and agree with the reported findings.    Debo Choe MD       D: 2020   T: 2020   MT: CORNELIO      Name:     LUZ ELENA SHEPHERD   MRN:      [id number]        Account:        [id number]   :      1985           Procedure Date: 2020      Document: [id number]

## 2020-01-31 NOTE — PROGRESS NOTES
Continuous EEG Monitoring  CPT Code: 71854  [facility]/VEEG-d/c'ed before Noon  MD: Jean Carlos    Date Continuous EEG monitoring initiated 1/29/20  Hair braided:yes  Leads O1 and O2 changed:n/a  Optifoam around O1 and O2: n/a  Skin breakdown/concerns: n/a  Asked about continuing EEG monitoring past Day 5(Day 3): n/a  Due for hygiene break (day 5): n/a  Removed electrodes: monitoring d/c'ed today

## 2020-01-31 NOTE — PROGRESS NOTES
ECMO specialist pointed out in report that Delta P on ECMO rising. Previously in the low teens, up to 20 at the 6 o'clock check, and 29 when I took over. Will continue to monitor closely.

## 2020-01-31 NOTE — PHARMACY
Pharmacy Tube Feeding Consult    Medication reviewed for administration by feeding tube and for potential food/drug interactions.    Recommendation: No changes are needed at this time.     Pharmacy will continue to follow as new medications are ordered.    Pina Hollingsworth, JakiD

## 2020-01-31 NOTE — PROGRESS NOTES
ECMO Shift Summary:      ECMO Equipment:  Console serial number: [id number]  Circuit Lot number: [id number]  Oxygenator Lot number: [id number]        Patient remains on [facility] ECMO, all equipment is functioning and alarms are appropriately set. RPM's 3450 with flow range 4.02-4.27 L/min. Sweep gas is at 2 LPM and FiO2 60%. Circuit remains free of air, clot and fibrin. Cannulas are secure with minimal oozing from site. Extremities are warm.    Significant Shift Events: None    Vent settings:  Ventilation Mode: CMV/AC  (Continuous Mandatory Ventilation/ Assist Control)  FiO2 (%): 40 %  Rate Set (breaths/minute): 12 breaths/min  Tidal Volume Set (mL): 400 mL  PEEP (cm H2O): 5 cmH2O  Oxygen Concentration (%): 40 %  Resp: 12  .    Heparin is running at 1200 u//hr, ACT range 146-219.    Urine output is >100 ml/hr, blood loss was minimal. No product given.      Intake/Output Summary (Last 24 hours) at 1/31/2020 1537  Last data filed at 1/31/2020 1500  Gross per 24 hour   Intake 5358.32 ml   Output 2570 ml   Net 2788.32 ml       ECHO:  No results found for this or any previous visit.No results found for this or any previous visit.    CXR:  Recent Results (from the past 24 hour(s))   XR Chest Port 1 View    Narrative    Exam: XR CHEST PORT 1 VW, 1/30/2020 3:45 PM    Indication: PICC placement    Comparison: 1/30/2020 at 0053    Findings:   AP view of the chest. Endotracheal tube with the tip in the mid  thoracic trachea. Enteric and gastric tubes extending beyond the  field-of-view. Right approach PICC line. The distal end is partially  obscured by ECMO cannula. The distal tip appears to be located the  level of the lower right atrium. Right IJ approach venous sheath.  Inferior approach ECMO cannula with the tip near the superior  cavoatrial junction. Catheter could be within the cannula itself.  Inferior approach Irvona-Marques catheter with the tip in the right main  pulmonary artery. Intra-aortic balloon pump with the marker at  the  level of elena/T5.    No new focal airspace consolidation, pleural effusion or pneumothorax.  Mild interstitial pulmonary edema. Cardiac silhouette is unchanged.  Visualized portion of the upper abdomen is unremarkable. No acute  osseous findings.      Impression    Impression:   1. Right approach PICC line with the distal end partially obscured by  ECMO cannula. Appears to be at the level of the lower right atrium. It  may also be within the cannula itself. Consider retraction.  2. Additional support as detailed above.  3. No new focal airspace consolidation. There is mild interstitial  pulmonary edema.    I have personally reviewed the examination and initial interpretation  and I agree with the findings.    LALIT STEWART MD   XR Chest Port 1 View   Result Value    Radiologist flags Malpositioned PICC line (Urgent)    Narrative    XR CHEST PORT 1 VW  1/30/2020 3:45 PM      HISTORY: PICC placement    COMPARISON: Same day, 1/29/2020, 1/28/2012    FINDINGS: Single AP chest radiograph. Endotracheal tube tip projects  approximately 1.6 cm above the elena. Right upper extremity PICC line  and central line tips project approximately at the level of the  innominate confluence. Enteric tubing extends beyond the  field-of-view, feeding tube tip projects over the right upper  abdominal quadrant. Stable inferior approach pulmonary arterial  catheter and ECMO cannulae.    Trachea is midline, cardiomegaly. Pulmonary vascular congestion.  Streaky perihilar interstitial opacities. No pneumothorax or pleural  effusion. No acute osseous or upper abdominal abnormality.      Impression    IMPRESSION:  1. Right upper approach PICC line tip projects at the innominate  confluence.  2. Cardiomegaly with mild pulmonary edema.  3. Stable support devices as above.    [Urgent Result: Malpositioned PICC line]    Finding was identified on 1/30/2020 3:51 PM.     Dr. Newsome was contacted by Dr. Gallagher at 1/30/2020 3:54 PM and  verbalized  understanding of the urgent finding.     I have personally reviewed the examination and initial interpretation  and I agree with the findings.    LALIT STEWART MD   XR Chest Port 1 View    Narrative    XR CHEST PORT 1 VW  2020 2:00 AM      HISTORY: Check endotracheal tube placement and ECLS cannula placement.  DO NOT log-roll patient.  Place film under patient using patient  safety handling process.    COMPARISON: 2020    FINDINGS: Endotracheal tube tip projects over the midthoracic trachea.  Right upper extremity PICC line tip is in the left innominate vein.  Stable right IJ central venous line. Enteric tubing extends beyond the  field-of-view, feeding tube tip projects over the right upper  abdominal quadrant. New IABP with tip at the elena. Stable inferior  approach pulmonary arterial catheter and ECMO cannulae.    Trachea is midline, cardiomegaly. Pulmonary vascular congestion.  Streaky perihilar interstitial opacities. No pneumothorax or pleural  effusion. No acute osseous or upper abdominal abnormality.      Impression    IMPRESSION:  1. New IABP with tip at the elena.  2. Right upper approach PICC line tip projects at the left innominate,  again, recommend repositioning.  3. Cardiomegaly with mild pulmonary edema.      I have personally reviewed the examination and initial interpretation  and I agree with the findings.    NATHALIE GARNER MD   Echo Limited    Narrative    [id number]  [id number]  [id number]  [id number]^ARVIND^ZANDER^[facility]  [facility]ch[facility]  [address] [place] [id number]     Name: LUZ ELENA SHEPHERD  MRN: [id number]  : 1985  Study Date: 2020 08:47 AM  Age: 34 yrs  Gender: Female  Patient Location: [facility]  Reason For Study: Heart Failure  Ordering Physician: ZANDER SAMUELS  Performed By: Swapnil Walden     BSA: 2.0 m2  Height: 63 in  Weight: 207 lb  HR: 92  BP: 68/50  mmHg  _____________________________________________________________________________  __        Procedure  Limited Portable Echo Adult.  _____________________________________________________________________________  __        Interpretation Summary  Patient on VA ECMO at 4.3 LPM.     Severely (EF 5-10%) reduced left ventricular function is present. Severe  diffuse hypokinesis is present.  Right ventricular wall thickness is increased. Global right ventricular  function is severely reduced.  The aortic valve opens with every beat.  There is a moderate size circumferential pericardial effusion without  ventricular chamber collapse.  _____________________________________________________________________________  __        Left Ventricle  Moderate to severe increased thickness of the myocardium present. Severely (EF  5-10%) reduced left ventricular function is present. Severe diffuse  hypokinesis is present.     Right Ventricle  Right ventricular wall thickness is increased. Global right ventricular  function is severely reduced. A right heart catheter is noted in the right  ventricle.     Atria  Both atria appear normal. There is diastolic collaps of the right atrium.     Mitral Valve  The mitral valve is normal.        Aortic Valve  Aortic valve is normal in structure and function. The aortic valve is  tricuspid. The aortic valve opens with every beat.     Tricuspid Valve  The tricuspid valve is normal.     Vessels  Mild dilatation of the aorta is present. Ascending aorta 4.3 cm. The inferior  vena cava was dilated at 2.3 cm without respiratory variability, consistent  with increased right atrial pressure. Unable to assess mean RA pressure given  the patient is on a ventilator. Venous ECMO cannula visualized in the IVC.     Pericardium  There is a moderate size circumferential pericardial effusion without  ventricular chamber collapse.     Compared to Previous Study  This study was compared with the study from 1/30/20 .  Compared to prior study,  there is little change. LV function remains very poor with severely increased  thickness of the myocardium. The pericardial effusion appears to be of similar  size.        Attestation  I have personally viewed the imaging and agree with the interpretation and  report as documented by the fellow, Dirk Lagunas, and/or edited by me.  _____________________________________________________________________________  __  MMode/2D Measurements & Calculations     IVSd: 1.8 cm  LVIDd: 3.6 cm  LVIDs: 3.6 cm  LVPWd: 1.6 cm  FS: -0.85 %  LV mass(C)d: 246.7 grams  LV mass(C)dI: 125.7 grams/m2  RWT: 0.92           _____________________________________________________________________________  __           Report approved by: Kristin Gonzales 01/31/2020 11:20 AM      XR Chest Port 1 View   Result Value    Radiologist flags (Urgent)     Right approach PICC line possibly within the venous    Narrative    Exam: XR CHEST PORT 1 VW, 1/31/2020 12:48 PM    Indication: PICC line placement    Comparison: 1/31/2020 and 0145    Findings:   AP view of the chest. Right approach PICC line, the tip at the level  of the venous ECMO cannula. Endotracheal tube with the tip in the mid  thoracic trachea. Intra-aortic balloon pump with the marker at the  level of the elena. Partially visualized enteric tubes. Feeding tube  is looped in the stomach. Right IJ approach venous line with the tip  in the innominate confluence.    Cardiac silhouette is unchanged. Low lung volumes with left basilar  atelectasis versus consolidation similar to prior. Unchanged  interstitial pulmonary edema Small left pleural effusion. No  pneumothorax. No acute findings in the upper abdomen.      Impression    Impression:   1. Right approach PICC line with the distal end projecting at the  level of the distal ECMO cannula. Lead may possibly be within the ECMO  line.  2. Unchanged coronary edema and left basilar atelectasis versus  consolidation.  3.  Small left pleural effusion.    [Urgent Result: Right approach PICC line possibly within the venous  ECMO cannula.    Finding was identified on 1/31/2020 12:58 PM.     Dr. Scott ([id number])  was contacted by Dr. Denis Borges DO  (Radiology R3) at 1/31/2020 1:02 PM and verbalized understanding of  the urgent finding.     I have personally reviewed the examination and initial interpretation  and I agree with the findings.    LALIT STEWART MD   XR Chest Port 1 View    Narrative    Exam: XR CHEST PORT 1 VW, 1/31/2020 2:28 PM    Indication: picc tip determination    Comparison: Earlier today    Findings:   Right arm PICC tip at the confluence of the innominate veins. The  lower extremity Wheeling-Marques catheter tip in the right pulmonary artery.  Lower extremity ECMO cannula tip in the low superior vena cava/high  right atrium. Right internal jugular vein sheath tip in the right  innominate vein. Endotracheal tube in the mid thoracic trachea 2 cm  above the elena. Feeding tube and enteric tube are seen coursing  through the mediastinum. There are tips project off the film.  Intra-aortic balloon tip just below the elena, unchanged    Heart upper limits of normal in size. Silhouetting of the left  hemidiaphragm with a retrocardiac opacity. Mild interstitial changes  in the lung.      Impression    Impression:   1. Right arm PICC tip at the confluence of the innominate veins.  Support devices are otherwise stable.  2. Unchanged left lower lobe opacity suggesting effusion and  atelectasis.  3. Stable mild pulmonary interstitial edema.    LALIT STEWART MD       Labs:  Recent Labs   Lab 01/31/20  1358 01/31/20  1206 01/31/20  1025 01/31/20  0800   PH 7.40 7.50* 7.45 7.40   PCO2 41 31* 33* 38   PO2 87 73* 60* 92   HCO3 25 24 23 24   O2PER 40 40 40 40       Lab Results   Component Value Date    HGB 9.8 (L) 01/31/2020    PHGB 90 (H) 01/31/2020    PLT 76 (L) 01/31/2020    FIBR 372 01/31/2020    INR 1.13 01/31/2020    PTT 78  (H) 01/31/2020    DD 0.5 01/31/2020    AXA 0.22 01/31/2020    ANTCH 55 (L) 01/31/2020         Plan is continue on [facility] ECMO.      Sabrina Cancino RN  1/31/2020 3:37 PM

## 2020-01-31 NOTE — PROGRESS NOTES
SBAR: WOC asked to see pt for extravasation of blood product.  Per chart review and discussion with Karie MENON no open wound at this time.  Pictures in chart show bruising at this time.  Per RN extravasation protocol followed.  WOC not appropriate at this time.  Please re-consult if wound develops.  May consider surgical or plastics consult if skin deteriorates.      Cristel Magana RN BSN CWOCN

## 2020-01-31 NOTE — PROGRESS NOTES
ECMO Shift Summary:      ECMO Equipment:  Console serial number:[id number]  Circuit Lot number: [id number]  Oxygenator Lot number:[id number]        Patient remains on VA ECMO, all equipment is functioning and alarms are appropriately set. RPM's 3650 with flow range 4.0-4.63 L/min. Sweep gas is at 3 LPM and FiO2 70%. Circuit remains free of air, clot and fibrin. Cannulas are secure with moderate oozing from site. Extremities are equally perfused.    Significant Shift Events: PICC placement at bedside during shift. Increased Flows in response to variable BP, after PICC placement.    Vent settings:  Ventilation Mode: CMV/AC  (Continuous Mandatory Ventilation/ Assist Control)  FiO2 (%): 40 %  Rate Set (breaths/minute): 16 breaths/min  Tidal Volume Set (mL): 400 mL  PEEP (cm H2O): 5 cmH2O  Oxygen Concentration (%): 40 %  Resp: 16  .    Heparin is running at 1400 u/hr, ACT range 154.    Urine output is as charted, blood loss was isolated to cannulation site oozing. Product given included 1unit PRBC, 1unit Platelets and 1L of Albumin.      Intake/Output Summary (Last 24 hours) at 1/30/2020 1824  Last data filed at 1/30/2020 1800  Gross per 24 hour   Intake 5493.74 ml   Output 1299 ml   Net 4194.74 ml       ECHO:  No results found for this or any previous visit.No results found for this or any previous visit.    CXR:  Recent Results (from the past 24 hour(s))   XR Chest Port 1 View    Narrative    Exam: XR CHEST PORT 1 VW, 1/29/2020 7:41 PM    Indication: [place] repositioned    Comparison: Same day    Findings:   Endotracheal tube tip projects at the lower thoracic trachea.  Pulmonary arterial catheter tip projects over a segmental right  pulmonary artery.     Stable IABP, ECMO cannula, and right jugular central line. Trachea is  midline, normal heart size. No focal pulmonary opacity, pleural  effusion, pneumothorax. No acute osseous or upper abdominal  abnormality.      Impression    IMPRESSION:    1. Pulmonary arterial  catheter tip projects over a segmental right  pulmonary artery.   2. Slight interval increase in pulmonary edema.    I have personally reviewed the examination and initial interpretation  and I agree with the findings.    CAMILLE KOHLER MD   US Lower Extremity Arterial Duplex Bilateral    Narrative    Exam: Duplex ultrasound of bilateral lower extremity arteries dated  1/29/2020 7:49 PM     Clinical information: Baseline to assess blood flow to Lower  Extremities (VA ECMO)     Comparison: None    Technique: Includes Gray Scale images, color Doppler, spectral Doppler  waveforms and velocities with appropriate angles of 60 degrees or  less.    Findings:     Right lower extremity:     Mid SFA: 83 cm/sec.  Distal SFA: 77 cm/sec.  Popliteal artery: 62 cm/sec.  PTA ankle: 43 cm/sec.  ROWDY ankle: 13 cm/sec.    Waveforms: Biphasic and monophasic waveforms consistent with ECMO.    Left lower extremity:    Mid SFA: 25 cm/sec.  Distal SFA: 24 cm/sec.  Popliteal artery: 21 cm/sec.  PTA ankle: 13 cm/sec.  ROWDY ankle: 21 cm/sec.    Waveforms: Monophasic waveforms consistent with ECMO.      Impression    Impression: Patent bilateral lower extremity arteries with waveforms  consistent with ECMO.    Guidelines:  [facility] duplex criteria for lower limb arterial  occlusive disease  -Percent stenosis- Normal (1-19%): Peak systolic velocity (cm/s):  <150, End-diastolic velocity (cm/s): <40, Velocity ration (Vr): <1.5,  Distal arterial waveform: Triphasic  -Percent stenosis- 20-49%: Peak systolic velocity (cm/s): 150-200,  End-diastolic velocity (cm/s): <40, Velocity ration (Vr): 1.5-2.0,  Distal arterial waveform: Triphasic  -Percent stenosis- 50-75%: Peak systolic velocity (cm/s): 200-300,  End-diastolic velocity (cm/s): <90, Velocity ration (Vr): 2.0-3.9,  Distal arterial waveform: Poststenotic turbulence distal to stenosis,  monophasic distal waveform  -Percent stenosis- >75%: Peak systolic velocity (cm/s):  >300,  End-diastolic velocity (cm/s): <90, Velocity ration (Vr): >4.0, Distal  arterial waveform: Dampened distal waveform and low PSV/EDV* in the  stenosis  -Percent stenosis- Occlusion: Absent flow by color Doppler/pulsed  Doppler spectral analysis; length of occlusion estimated from distance  between exit and reentry collateral arteries  *PSV = peak systolic velocity, EDV = end-diastolic velocity  http://link.ventura.com/chapter/10.1007/737-9-3653-4005-4_23/fulltext  html    I have personally reviewed the examination and initial interpretation  and I agree with the findings.    CAMILLE KOHLER MD   XR Chest Port 1 View    Narrative    Exam: TEMPORARY, 1/29/2020 6:33 PM    Indication: [place] placement    Comparison: Same day    Findings:   Single AP chest radiograph. Endotracheal tube tip projects at the  lower thoracic trachea. Pulmonary arterial catheter tip projects at  the proximal right main pulmonary artery. Stable IABP, ECMO cannula,  and right jugular central line. Trachea is midline, normal heart size.  No focal pulmonary opacity, pleural effusion, pneumothorax. No acute  osseous or upper abdominal abnormality.      Impression    IMPRESSION:  1. Inferior approach pulmonary arterial catheter tip projects at the  proximal right main pulmonary artery.  2. Otherwise stable chest.    I have personally reviewed the examination and initial interpretation  and I agree with the findings.    CAMILLE KOHLER MD   XR Chest Port 1 View    Narrative    Exam: XR CHEST PORT 1 VW, 1/30/2020 1:10 AM    Indication: ETT placement, ECMO cannulation    Comparison: 1/29/2020    Findings:   Endotracheal tube tip projects at the mid thoracic trachea. Pulmonary  arterial catheter tip projects over a segmental right pulmonary  artery.     Stable IABP, ECMO cannula, and right jugular central line. Trachea is  midline, normal heart size. No focal pulmonary opacity, pleural  effusion, pneumothorax. No acute osseous or upper  abdominal  abnormality.      Impression    IMPRESSION:  1. Endotracheal tube over the midthoracic trachea. ECMO cannula  projecting at the mid SVC.   2. Mild interstitial pulmonary edema.    I have personally reviewed the examination and initial interpretation  and I agree with the findings.    MISSY SYLVESTER MD   Echo Limited    Narrative    [id number]  [id number]  [id number]  [id number]^DEANNA^TONI           [facility],[place]  Echocardiography Laboratory  [address] [place] [id number]     Name: LUZ ELENA REDMOND  MRN: [id number]  : 1985  Study Date: 2020 08:53 AM  Age: 34 yrs  Gender: Female  Patient Location: [facility]  Reason For Study: Shock  Ordering Physician: TONI HUERTA  Performed By: Neli Edward RDCS     BSA: 1.9 m2  Height: 63 in  Weight: 195 lb  HR: 103  BP: 87/61 mmHg  _____________________________________________________________________________  __        Procedure  Limited Portable Echo Adult.  _____________________________________________________________________________  __        Interpretation Summary  Patient is on VA ECMO support with 5LPM flow.     There is moderate to severe LV wall thickening, today it is measured at 1.7cm.  Imaging suggest myocardial edema. LV cavity is normal size. The ejection  fraction is at around 5%. There is severe diffuse hypokinesis.  RV wall is thickened and cavity is very small. This is likely due to a  combination of VA ECMO support and pericardial effusion. RV function is  severely reduced.  Aortic valve is normal in structure and function. Aortic valve opens  intermittently  IVC is normal is size (1.73cm). ECMO cannula noted. Descending thoracic aorta  appears normal, IABP is in place.  There is a moderate sized pericardial effusion, predominantly localized  posterior to the RV and LV. There is some fibrinous component to the effusion.     Compared to prior study patient is now on VA ECMO. Myocardial edema  remains  and wall thickness has not changed significantly. BiV function is severely  reduced. Pericardial effusion has increased in size. Difficult to assess  hemodynamic consequences on VA ECMO (RV is small, IVC is normal in size)  _____________________________________________________________________________  __        Left Ventricle  There is moderate to severe LV wall thickening, today it is measured at 1.7cm.  Imaging suggest myocardial edema. LV cavity is normal size. The ejection  fraction is at around 5%. There is severe diffuse hypokinesis. Left  ventricular diastolic function is not assessable.     Right Ventricle  RV wall is thickened and cavity is very small. This is likely due to a  combination of VA ECMO support and pericardial effusion. RV function is  severely reduced.     Atria  The atria cannot be assessed.     Mitral Valve  The mitral valve is normal. Trace mitral insufficiency is present.        Aortic Valve  Aortic valve is normal in structure and function. Aortic valve opens  intermittently. The aortic valve is tricuspid.     Tricuspid Valve  The tricuspid valve is normal. The peak velocity of the tricuspid regurgitant  jet is not obtainable.     Pulmonic Valve  The pulmonic valve is normal.     Vessels  IVC is normal is size (1.73cm). ECMO cannula noted. Descending thoracic aorta  appears normal, IABP is in place.     Pericardium  There is a moderate sized pericardial effusion, predominantly localized  posterior to the RV and LV. There is some fibrinous component to the effusion.        Compared to Previous Study  Compared to prior study patient is now on VA ECMO. Myocardial edema remains  and wall thickness has not changed significantly. BiV function is severely  reduced. Pericardial effusion has increased in size. Difficult to assess  hemodynamic consequences on VA ECMO (RV is small, IVC is normal in size).  _____________________________________________________________________________  __                           Report approved by: Kristin BELTRAN 01/30/2020 09:51 AM              _____________________________________________________________________________  __      XR Chest Port 1 View    Narrative    Exam: XR CHEST PORT 1 VW, 1/30/2020 3:45 PM    Indication: PICC placement    Comparison: 1/30/2020 at 0053    Findings:   AP view of the chest. Endotracheal tube with the tip in the mid  thoracic trachea. Enteric and gastric tubes extending beyond the  field-of-view. Right approach PICC line. The distal end is partially  obscured by ECMO cannula. The distal tip appears to be located the  level of the lower right atrium. Right IJ approach venous sheath.  Inferior approach ECMO cannula with the tip near the superior  cavoatrial junction. Catheter could be within the cannula itself.  Inferior approach [place]-Marques catheter with the tip in the right main  pulmonary artery. Intra-aortic balloon pump with the marker at the  level of elena/T5.    No new focal airspace consolidation, pleural effusion or pneumothorax.  Mild interstitial pulmonary edema. Cardiac silhouette is unchanged.  Visualized portion of the upper abdomen is unremarkable. No acute  osseous findings.      Impression    Impression:   1. Right approach PICC line with the distal end partially obscured by  ECMO cannula. Appears to be at the level of the lower right atrium. It  may also be within the cannula itself. Consider retraction.  2. Additional support as detailed above.  3. No new focal airspace consolidation. There is mild interstitial  pulmonary edema.    I have personally reviewed the examination and initial interpretation  and I agree with the findings.    LALIT STEWART MD   XR Chest Port 1 View   Result Value    Radiologist flags Malpositioned PICC line (Urgent)    Narrative    XR CHEST PORT 1 VW  1/30/2020 3:45 PM      HISTORY: PICC placement    COMPARISON: Same day, 1/29/2020, 1/28/2012    FINDINGS: Single AP chest radiograph.  Endotracheal tube tip projects  approximately 1.6 cm above the elena. Right upper extremity PICC line  and central line tips project approximately at the level of the  innominate confluence. Enteric tubing extends beyond the  field-of-view, feeding tube tip projects over the right upper  abdominal quadrant. Stable inferior approach pulmonary arterial  catheter and ECMO cannulae.    Trachea is midline, cardiomegaly. Pulmonary vascular congestion.  Streaky perihilar interstitial opacities. No pneumothorax or pleural  effusion. No acute osseous or upper abdominal abnormality.      Impression    IMPRESSION:  1. Right upper approach PICC line tip projects at the innominate  confluence.  2. Cardiomegaly with mild pulmonary edema.  3. Stable support devices as above.    [Urgent Result: Malpositioned PICC line]    Finding was identified on 1/30/2020 3:51 PM.     Dr. Newsome was contacted by Dr. Gallagher at 1/30/2020 3:54 PM and  verbalized understanding of the urgent finding.     I have personally reviewed the examination and initial interpretation  and I agree with the findings.    LALIT STEWART MD       Labs:  Recent Labs   Lab 01/30/20  1807 01/30/20  1603 01/30/20  1358 01/30/20  1208   PH 7.42 7.42 7.36 7.35   PCO2 39 38 46* 46*   PO2 203* 225* 118* 115*   HCO3 25 25 26 26   O2PER 40 40 40 40       Lab Results   Component Value Date    HGB 8.4 (L) 01/30/2020    PHGB 110 (H) 01/30/2020    PLT 82 (L) 01/30/2020    FIBR 314 01/30/2020    INR 1.21 (H) 01/30/2020    PTT 88 (H) 01/30/2020    DD 0.8 (H) 01/30/2020    AXA 0.25 01/30/2020    ANTCH 83 (L) 01/30/2020         Plan is to remain stable on VA ECMO.      Mary Lamas, RT  1/30/2020 6:24 PM

## 2020-01-31 NOTE — PLAN OF CARE
Major Shift Events:  lasix this am with greater than 1L urine response. Picc repositioned x 2. R femoral swan pulled without issues. When weaning propofol patient wakes up and follows commands. Oozing from both groins & menses, no significant bleeding today. Obtained extravasation Kit/ took pictures at beginning of shift at 1630 ( 24 hours post.) woc consulted for extravasation- per woc please re-consult if skin breaks/sloughs/ becomes non-intact.  Up on fio2 on vent and circuit this evening due to low po2s on abgs.  Plan: possible turn down tomorrow.  For vital signs and complete assessments, please see documentation flowsheets.

## 2020-01-31 NOTE — PLAN OF CARE
Major Shift Events:  ECMO  circuit changed per perfusion d/t rising pressure in oxygenator. 2 units PRBC's and 1 unit platelets. K+ and Ca+ replaced. Norepi titrated off. Vaso titrated down. Sedation increased.   Plan: Continue plan of care. Update team with any changes/concerns. Turndown later this weekend.   For vital signs and complete assessments, please see documentation flowsheets.

## 2020-01-31 NOTE — PROCEDURES
Procedure Date: 01/30/2020      EEG#:  -2.      DATE OF RECORDING/SERVICE DATE:  Day 2 on 01/30/2020.      SOURCE FILE DURATION:  23 hours and 53 minutes.      HISTORY:  Azra Tom is a 34-year-old female who presented with cardiogenic shock in the setting of fulminant viral myocarditis.  She is currently being treated with ECMO.  EEG is requested to evaluate for seizures.      MEDICATIONS:  Precedex 0.4-0.5 mcg per hour, Fentanyl 100 mcg per hour, Versed 2-3 mg per hour; discontinued at 03:35 a.m., Propofol 25-30 mcg/kg/min.      TECHNICAL SUMMARY:  EEG is recorded from scalp electrodes placed according to the 10-20 international system.  Additional electrodes were utilized for referencing, artifact detection, and recording from other cerebral regions.  Video was continuously recorded.  Video was reviewed for clinical correlation and to assist with EEG interpretation.      FINDINGS:  This video EEG is abnormal due to presence of diffuse generalized delta-theta slowing superimposed with faster frequency in alpha range, concentrated in the midline and frontocentral derivations.  There are periods of intermittent attenuation lasting up to 1-2 seconds.  After 13:38 p.m., the background activity mainly consisted of diffuse generalized delta slowing superimposed with faster frequency in the beta range.  The delta activity is up to 100 microvolts in amplitude and 1-2 Hz frequency.  There is no well-formed parietooccipital rhythm or sleep architecture seen.  The patient remained unresponsive throughout the recording.      OTHER INTERICTAL ABNORMALITIES:  No epileptiform discharges seen.      ICTAL ABNORMALITIES:  No electrographic seizures seen.      ACTIVATION PROCEDURES:  Sensory and auditory stimulation is performed utilizing ICU protocol at around 9:26 a.m. There was no clinical or EEG response.      IMPRESSION:  Video EEG day #2 is abnormal due to presence of diffuse generalized delta-theta slowing with  periods of intermittent attenuation lasting up to 2 seconds.  After 13:38 p.m., the background activity mainly consisted of diffuse generalized delta slowing of 1-2 Hz frequency.  These findings are consistent with severe diffuse encephalopathy.  EEG is discontinuous and nonreactive.  This may in part be related to sedative drips employed.   No epileptiform discharges or electrographic seizures were recorded.          This report will be reviewed and signed by Dr. Debo Hernández.   As dictated by HUMBERTO VALDEZ MD      I personally reviewed the video EEG and agree with the reported findings.     DEBO HERNÁNDEZ MD                  D: 2020   T: 2020   MT: HARITHA      Name:     LUZ ELENA SHEPHERD   MRN:      [id number]        Account:        [id number]   :      1985           Procedure Date: 2020      Document: [id number]

## 2020-01-31 NOTE — PROGRESS NOTES
[facility] Cardiology Progress Note           Assessment and Plan:     Azra Ta is a 34 year old female with no significant PMH  who presents with cardiogenic shock in the setting of fulminant viral myocarditis. Currently on VA ECMO    Neuro     Sedation  - Currently on fent, precidex and propofol  - Will maintain sedation presently    Cardiology:     #Cardiogenic shock on VA ECMO   #Acute systolic heart failure   #Possible viral myopericarditis  Presenting with SOB and chest pain for one week on 1/27. TTE on 1/27 revealed an EF:25-30%, subsequent RHC revealed mild Rv overload with significant low flow state. cMRI revealed moderate LVH with LGE in the basal and inferolateral regions in the setting of viral myocarditis vs amyloid. Repeat TTE here did show increased LV thickness, suspicious for myocardial edema. Endomyocardial biopsy revealed myocarditis. Currently on VA ECMO. Ultimate plan is to allow LV recovery with hopeful decannulation of VA ECMO over the course of hospitlization.   - Maintain MAPS@65mmHg with NE and vasopressin, would ideally add phenyephrine if pressor requirements going up  - Can consider nipride if MAPs>80, would hold for now due to hypotension  - VA ECMO flows  - IABP 1:1  - Act goal @160-180 due to oozing from distal reperfusion site on 1/30  - No indication for DAPT due to low clinical suspicion for ischemia  - Hold BB and ACEI due to cardiogenic shock    #Pericardial effusion  Effusion appears stable on TTE 1/30--->1/31, no obvious signs of tamponade. However, difficult to determine while patient on VA ECMO.   - If worsening hemodynamics, will undergo pericardiocenteisis       ANI:    Risk for shock liver  - LFTs normal  - Will continue to trend    Tube feeds:  Consult for NJ tube placed  - On tube feeds   - On IV protonix     ID      #Risk for aspiration  - On IV Vanco(1/29-1/31)   - On IV Zosyn(1/29-1/31)  - Elected to hold antibiotics as patient low risk for aspiration   -  On Tamiflu 1/28-)    Heme Onc:  - Supplement when HGB<8, Plt<100  - Trend WBC    Nephro    Risk for ATN  - Cr: WNL  - UOP beginning to slow  - Will diurese with IV lasix 40mg due to patient being lasix naive     Endocrine  Insulin ggt           Staffed with Dr.Alexy Catarina Yoo MD  Cardiology Fellow  [id number]        Subjective:     Azra Ta is a 34 year old female with no significant PMH  who presents with myocarditis c/b cardiogenic shock s/p IABP    S/IE  - Currently on precidex, propofol and midazolam  - Weaning NE  - Repeat TTE showed stable pericardial effusion  - HR has improved after volume and pRBC yesterday              Review of Systems:   A comprehensive review of systems was performed and found to be negative except as described in this note          Medications:     Current Facility-Administered Medications   Medication     acetaminophen (TYLENOL) Suppository 650 mg     acetaminophen (TYLENOL) tablet 650 mg     alum & mag hydroxide-simethicone (MYLANTA ES/MAALOX  ES) suspension 30 mL     artificial tears ophthalmic ointment     dexmedetomidine (PRECEDEX) 400 mcg in 0.9% sodium chloride 100 mL     dextrose 10% infusion     dextrose 10% infusion     glucose gel 15-30 g    Or     dextrose 50 % injection 25-50 mL    Or     glucagon injection 1 mg     EPINEPHrine (ADRENALIN) 5 mg in sodium chloride 0.9 % 250 mL infusion     fentaNYL (PF) (SUBLIMAZE) injection 50 mcg     fentaNYL (SUBLIMAZE) infusion     heparin 2 unit/mL in 0.9% NaCl (for REPERFUSION CATHETER)     heparin 25,000 units in 0.45% NaCl 250 mL ANTICOAGULANT  infusion     heparin lock flush 10 UNIT/ML injection 2-5 mL     HYDROmorphone (DILAUDID) injection 0.2 mg     insulin 1 unit/mL in saline (NovoLIN, HumuLIN Regular) drip - ADULT IV Infusion     lidocaine (LMX4) cream     lidocaine (LMX4) cream     lidocaine (LMX4) cream     lidocaine 1 % 0.1-1 mL     lidocaine 1 % 0.1-1 mL     lidocaine 1 % 0.1-1 mL     magnesium sulfate 2 g in NS  intermittent infusion (PharMEDium or FV Cmpd)     magnesium sulfate 4 g in 100 mL sterile water (premade)     medication instruction     metoclopramide (REGLAN) injection 10 mg     midazolam (VERSED) drip - ADULT 100 mg/100 mL in NS PRE-MIX     multivitamins w/minerals (CERTAVITE) liquid 15 mL     naloxone (NARCAN) injection 0.1-0.4 mg     norepinephrine (LEVOPHED) 16 mg in  mL infusion     ondansetron (ZOFRAN) injection 4 mg     oseltamivir (TAMIFLU) suspension 75 mg     pantoprazole (PROTONIX) 40 mg IV push injection     Patient is already receiving anticoagulation with heparin, enoxaparin (LOVENOX), warfarin (COUMADIN)  or other anticoagulant medication     polyethylene glycol (MIRALAX/GLYCOLAX) Packet 17 g     potassium chloride (KLOR-CON) Packet 20-40 mEq     potassium chloride 10 mEq in 100 mL intermittent infusion with 10 mg lidocaine     potassium chloride 10 mEq in 100 mL sterile water intermittent infusion (premix)     potassium chloride 20 mEq in 50 mL intermittent infusion     potassium chloride 20 mEq in 50 mL intermittent infusion     potassium chloride ER (K-DUR/KLOR-CON M) CR tablet 20-40 mEq     propofol (DIPRIVAN) infusion     Reason ACE/ARB/ARNI order not selected     Reason beta blocker not prescribed     sennosides (SENOKOT) tablet 8.6 mg     sodium chloride (PF) 0.9% PF flush 3 mL     sodium chloride (PF) 0.9% PF flush 3 mL     sodium chloride (PF) 0.9% PF flush 3 mL     sodium chloride (PF) 0.9% PF flush 3 mL     sodium chloride (PF) 0.9% PF flush 5-50 mL     sodium phosphate 10 mmol in D5W intermittent infusion     sodium phosphate 15 mmol in D5W intermittent infusion     sodium phosphate 20 mmol in D5W intermittent infusion     sodium phosphate 25 mmol in D5W intermittent infusion     vasopressin (VASOSTRICT) 40 Units in D5W 40 mL infusion               Objective:   Temp: 98.2  F (36.8  C) Temp src: Axillary    Heart Rate: 92 Resp: 12 SpO2: 100 % O2 Device: Mechanical Ventilator            Physical Exam:   Vitals were reviewed  Temp: 98.2  F (36.8  C) Temp src: Axillary    Heart Rate: 92 Resp: 12 SpO2: 100 % O2 Device: Mechanical Ventilator    Constitutional:   Intubated and sedated      Eyes:   Lids and lashes normal, pupils equal, round and reactive to light, extra ocular muscles intact, sclera clear, conjunctiva normal     Lungs:   Mechanical breath sounds      Cardiovascular:   Tachycardia      Chest / Breast:   Breasts symmetrical, skin without lesion(s), no nipple retraction or dimpling, no nipple discharge, no masses palpated, no axillary or supraclavicular adenopathy     Abdomen:   No scars, normal bowel sounds, soft, non-distended, non-tender, no masses palpated, no hepatosplenomegally     Skin:   no bruising or bleeding. Cannula site clean and intact              Data:     Lab Results   Component Value Date    WBC 8.0 01/31/2020    HGB 9.8 (L) 01/31/2020    HCT 29.6 (L) 01/31/2020    PLT 76 (L) 01/31/2020     01/31/2020    POTASSIUM 3.8 01/31/2020    CHLORIDE 110 (H) 01/31/2020    CO2 24 01/31/2020    BUN 29 01/31/2020    CR 0.97 01/31/2020     (H) 01/31/2020     (H) 01/31/2020    SED 15 01/31/2020    DD 0.5 01/31/2020    TROPI 4.071 (HH) 01/30/2020    AST 30 01/31/2020    ALT 16 01/31/2020    ALKPHOS 40 01/31/2020    BILITOTAL 1.4 (H) 01/31/2020    INR 1.13 01/31/2020

## 2020-01-31 NOTE — PROCEDURES
[facility], [place]    Triple Lumen PICC Placement  Date/Time: 1/31/2020 12:45 PM  Performed by: Lauren aPyan RN  Authorized by: Abilio Mulligan MD     UNIVERSAL PROTOCOL   Site Marked: Yes  Prior Images Obtained and Reviewed:  Yes  Required items: Required blood products, implants, devices and special equipment available    Patient identity confirmed:  Arm band, provided demographic data, hospital-assigned identification number and anonymous protocol, patient vented/unresponsive  NA - No sedation, light sedation, or local anesthesia  Confirmation Checklist:  Patient's identity using two indicators, relevant allergies, procedure was appropriate and matched the consent or emergent situation and correct equipment/implants were available  Time out: Immediately prior to the procedure a time out was called    Universal Protocol: the Joint Commission Universal Protocol was followed    Preparation: Patient was prepped and draped in usual sterile fashion           ANESTHESIA    Local Anesthetic: Lidocaine 1% without epinephrine      SEDATION    Patient Sedated: Yes (not for PICC insertion)    Sedation:  See MAR for details  Vital signs: Vital signs monitored during sedation        Preparation: skin prepped with ChloraPrep  Skin prep agent: skin prep agent completely dried prior to procedure  Sterile barriers: maximum sterile barriers were used: cap, mask, sterile gown, sterile gloves, and large sterile sheet  Hand hygiene: hand hygiene performed prior to central venous catheter insertion  Type of line used: Power PICC and PICC  Catheter type: triple lumen  Lumen type: non-valved  Catheter size: 6 Fr  : Bioflo.  Lot number: [id number]  Placement method: MST and tip confirmation system  Number of attempts: 1  Successful placement: yes  Orientation: right  Location: basilic vein  Arm circumference: adults 10 cm  Extremity circumference: 32  Visible catheter length: 3  Total  catheter length: 37  Dressing and securement: chlorhexidine disc applied, dressing applied, glue, line secured, securement device, site cleaned and sterile dressing applied (secura cath)  PROCEDURE   Patient Tolerance:  Patient tolerated the procedure well with no immediate complications  Describe Procedure: PICC ok to use , tip in low SVC

## 2020-01-31 NOTE — PROGRESS NOTES
ECMO circuit changed on 1/30/20 @ 2235 secondary to increasing Delta P from 20 to over 80 over last several hours with flows from 4.3 to 3.8 with increase in RPMs. Circuit clamped and changed in a little over a minute by Edvin from perfusion and ECMO specialist without complication. MD and RN at bedside.

## 2020-02-01 ENCOUNTER — APPOINTMENT (OUTPATIENT)
Dept: GENERAL RADIOLOGY | Facility: CLINIC | Age: 35
DRG: 003 | End: 2020-02-01
Attending: STUDENT IN AN ORGANIZED HEALTH CARE EDUCATION/TRAINING PROGRAM
Payer: COMMERCIAL

## 2020-02-01 LAB
ALBUMIN SERPL-MCNC: 2.6 G/DL (ref 3.4–5)
ALBUMIN SERPL-MCNC: 2.7 G/DL (ref 3.4–5)
ALP SERPL-CCNC: 46 U/L (ref 40–150)
ALP SERPL-CCNC: 54 U/L (ref 40–150)
ALP SERPL-CCNC: 60 U/L (ref 40–150)
ALP SERPL-CCNC: 62 U/L (ref 40–150)
ALT SERPL W P-5'-P-CCNC: 15 U/L (ref 0–50)
ALT SERPL W P-5'-P-CCNC: 15 U/L (ref 0–50)
ALT SERPL W P-5'-P-CCNC: 16 U/L (ref 0–50)
ALT SERPL W P-5'-P-CCNC: 16 U/L (ref 0–50)
ANION GAP SERPL CALCULATED.3IONS-SCNC: 1 MMOL/L (ref 3–14)
ANION GAP SERPL CALCULATED.3IONS-SCNC: 2 MMOL/L (ref 3–14)
ANION GAP SERPL CALCULATED.3IONS-SCNC: 3 MMOL/L (ref 3–14)
ANION GAP SERPL CALCULATED.3IONS-SCNC: 5 MMOL/L (ref 3–14)
APTT PPP: 77 SEC (ref 22–37)
APTT PPP: 84 SEC (ref 22–37)
APTT PPP: 84 SEC (ref 22–37)
APTT PPP: 85 SEC (ref 22–37)
AST SERPL W P-5'-P-CCNC: 26 U/L (ref 0–45)
AST SERPL W P-5'-P-CCNC: 29 U/L (ref 0–45)
AST SERPL W P-5'-P-CCNC: 29 U/L (ref 0–45)
AST SERPL W P-5'-P-CCNC: 30 U/L (ref 0–45)
AT III ACT/NOR PPP CHRO: 58 % (ref 85–135)
BASE EXCESS BLDA CALC-SCNC: 1.4 MMOL/L
BASE EXCESS BLDA CALC-SCNC: 1.9 MMOL/L
BASE EXCESS BLDA CALC-SCNC: 2 MMOL/L
BASE EXCESS BLDA CALC-SCNC: 2.1 MMOL/L
BASE EXCESS BLDA CALC-SCNC: 2.7 MMOL/L
BASE EXCESS BLDA CALC-SCNC: 2.8 MMOL/L
BASE EXCESS BLDA CALC-SCNC: 3 MMOL/L
BASE EXCESS BLDA CALC-SCNC: 4.1 MMOL/L
BASE EXCESS BLDA CALC-SCNC: 4.5 MMOL/L
BASE EXCESS BLDV CALC-SCNC: 2.3 MMOL/L
BASE EXCESS BLDV CALC-SCNC: 2.8 MMOL/L
BILIRUB SERPL-MCNC: 1.3 MG/DL (ref 0.2–1.3)
BILIRUB SERPL-MCNC: 1.3 MG/DL (ref 0.2–1.3)
BILIRUB SERPL-MCNC: 1.4 MG/DL (ref 0.2–1.3)
BILIRUB SERPL-MCNC: 1.6 MG/DL (ref 0.2–1.3)
BLD PROD TYP BPU: NORMAL
BLD UNIT ID BPU: 0
BLD UNIT ID BPU: 0
BLOOD PRODUCT CODE: NORMAL
BLOOD PRODUCT CODE: NORMAL
BPU ID: NORMAL
BPU ID: NORMAL
BUN SERPL-MCNC: 31 MG/DL (ref 7–30)
BUN SERPL-MCNC: 32 MG/DL (ref 7–30)
BUN SERPL-MCNC: 34 MG/DL (ref 7–30)
BUN SERPL-MCNC: 36 MG/DL (ref 7–30)
CA-I BLD-MCNC: 4.4 MG/DL (ref 4.4–5.2)
CA-I BLD-MCNC: 4.4 MG/DL (ref 4.4–5.2)
CA-I BLD-MCNC: 4.5 MG/DL (ref 4.4–5.2)
CA-I BLD-MCNC: 4.5 MG/DL (ref 4.4–5.2)
CA-I BLD-MCNC: 4.6 MG/DL (ref 4.4–5.2)
CALCIUM SERPL-MCNC: 7.7 MG/DL (ref 8.5–10.1)
CALCIUM SERPL-MCNC: 7.8 MG/DL (ref 8.5–10.1)
CALCIUM SERPL-MCNC: 7.9 MG/DL (ref 8.5–10.1)
CALCIUM SERPL-MCNC: 8 MG/DL (ref 8.5–10.1)
CHLORIDE SERPL-SCNC: 111 MMOL/L (ref 94–109)
CHLORIDE SERPL-SCNC: 112 MMOL/L (ref 94–109)
CHLORIDE SERPL-SCNC: 113 MMOL/L (ref 94–109)
CHLORIDE SERPL-SCNC: 114 MMOL/L (ref 94–109)
CK SERPL-CCNC: 201 U/L (ref 30–225)
CO2 SERPL-SCNC: 27 MMOL/L (ref 20–32)
CO2 SERPL-SCNC: 28 MMOL/L (ref 20–32)
CO2 SERPL-SCNC: 28 MMOL/L (ref 20–32)
CO2 SERPL-SCNC: 30 MMOL/L (ref 20–32)
CREAT SERPL-MCNC: 0.77 MG/DL (ref 0.52–1.04)
CREAT SERPL-MCNC: 0.79 MG/DL (ref 0.52–1.04)
CREAT SERPL-MCNC: 0.8 MG/DL (ref 0.52–1.04)
CREAT SERPL-MCNC: 0.82 MG/DL (ref 0.52–1.04)
CRP SERPL-MCNC: 160 MG/L (ref 0–8)
D DIMER PPP FEU-MCNC: 1.2 UG/ML FEU (ref 0–0.5)
D DIMER PPP FEU-MCNC: 1.9 UG/ML FEU (ref 0–0.5)
ERYTHROCYTE [DISTWIDTH] IN BLOOD BY AUTOMATED COUNT: 15.7 % (ref 10–15)
ERYTHROCYTE [DISTWIDTH] IN BLOOD BY AUTOMATED COUNT: 15.9 % (ref 10–15)
ERYTHROCYTE [DISTWIDTH] IN BLOOD BY AUTOMATED COUNT: 16 % (ref 10–15)
ERYTHROCYTE [DISTWIDTH] IN BLOOD BY AUTOMATED COUNT: 16.2 % (ref 10–15)
ERYTHROCYTE [SEDIMENTATION RATE] IN BLOOD BY WESTERGREN METHOD: 54 MM/H (ref 0–20)
FIBRINOGEN PPP-MCNC: 638 MG/DL (ref 200–420)
FIBRINOGEN PPP-MCNC: 701 MG/DL (ref 200–420)
GFR SERPL CREATININE-BSD FRML MDRD: >90 ML/MIN/{1.73_M2}
GLUCOSE BLD-MCNC: 117 MG/DL (ref 70–99)
GLUCOSE BLD-MCNC: 132 MG/DL (ref 70–99)
GLUCOSE BLD-MCNC: 139 MG/DL (ref 70–99)
GLUCOSE BLD-MCNC: 140 MG/DL (ref 70–99)
GLUCOSE BLDC GLUCOMTR-MCNC: 114 MG/DL (ref 70–99)
GLUCOSE BLDC GLUCOMTR-MCNC: 117 MG/DL (ref 70–99)
GLUCOSE BLDC GLUCOMTR-MCNC: 122 MG/DL (ref 70–99)
GLUCOSE BLDC GLUCOMTR-MCNC: 125 MG/DL (ref 70–99)
GLUCOSE BLDC GLUCOMTR-MCNC: 125 MG/DL (ref 70–99)
GLUCOSE SERPL-MCNC: 114 MG/DL (ref 70–99)
GLUCOSE SERPL-MCNC: 131 MG/DL (ref 70–99)
GLUCOSE SERPL-MCNC: 134 MG/DL (ref 70–99)
GLUCOSE SERPL-MCNC: 141 MG/DL (ref 70–99)
HCO3 BLD-SCNC: 25 MMOL/L (ref 21–28)
HCO3 BLD-SCNC: 25 MMOL/L (ref 21–28)
HCO3 BLD-SCNC: 26 MMOL/L (ref 21–28)
HCO3 BLD-SCNC: 26 MMOL/L (ref 21–28)
HCO3 BLD-SCNC: 27 MMOL/L (ref 21–28)
HCO3 BLD-SCNC: 28 MMOL/L (ref 21–28)
HCO3 BLD-SCNC: 28 MMOL/L (ref 21–28)
HCO3 BLD-SCNC: 29 MMOL/L (ref 21–28)
HCO3 BLD-SCNC: 29 MMOL/L (ref 21–28)
HCO3 BLDA-SCNC: 28 MMOL/L (ref 21–28)
HCO3 BLDA-SCNC: 30 MMOL/L (ref 21–28)
HCO3 BLDV-SCNC: 29 MMOL/L (ref 21–28)
HCO3 BLDV-SCNC: 30 MMOL/L (ref 21–28)
HCT VFR BLD AUTO: 25.6 % (ref 35–47)
HCT VFR BLD AUTO: 27.4 % (ref 35–47)
HGB BLD-MCNC: 8.1 G/DL (ref 11.7–15.7)
HGB BLD-MCNC: 8.7 G/DL (ref 11.7–15.7)
HGB FREE PLAS-MCNC: 40 MG/DL
INR PPP: 1.02 (ref 0.86–1.14)
INR PPP: 1.05 (ref 0.86–1.14)
INR PPP: 1.1 (ref 0.86–1.14)
INR PPP: 1.1 (ref 0.86–1.14)
KCT BLD-ACNC: 167 SEC (ref 75–150)
KCT BLD-ACNC: 171 SEC (ref 75–150)
KCT BLD-ACNC: 175 SEC (ref 75–150)
KCT BLD-ACNC: 179 SEC (ref 75–150)
KCT BLD-ACNC: 179 SEC (ref 75–150)
LACTATE BLD-SCNC: 0.4 MMOL/L (ref 0.7–2)
LACTATE BLD-SCNC: 0.5 MMOL/L (ref 0.7–2)
LACTATE BLD-SCNC: 0.5 MMOL/L (ref 0.7–2)
LACTATE BLD-SCNC: 0.6 MMOL/L (ref 0.7–2)
LACTATE BLD-SCNC: 0.7 MMOL/L (ref 0.7–2)
LACTATE BLD-SCNC: 0.8 MMOL/L (ref 0.7–2)
LACTATE BLD-SCNC: 1 MMOL/L (ref 0.7–2)
LDH SERPL L TO P-CCNC: 385 U/L (ref 81–234)
LMWH PPP CHRO-ACNC: 0.28 IU/ML
LMWH PPP CHRO-ACNC: 0.3 IU/ML
LMWH PPP CHRO-ACNC: 0.37 IU/ML
LMWH PPP CHRO-ACNC: 0.38 IU/ML
MAGNESIUM SERPL-MCNC: 2.3 MG/DL (ref 1.6–2.3)
MAGNESIUM SERPL-MCNC: 2.5 MG/DL (ref 1.6–2.3)
MAGNESIUM SERPL-MCNC: 2.5 MG/DL (ref 1.6–2.3)
MAGNESIUM SERPL-MCNC: 2.6 MG/DL (ref 1.6–2.3)
MCH RBC QN AUTO: 28 PG (ref 26.5–33)
MCH RBC QN AUTO: 28.3 PG (ref 26.5–33)
MCH RBC QN AUTO: 28.3 PG (ref 26.5–33)
MCH RBC QN AUTO: 28.7 PG (ref 26.5–33)
MCHC RBC AUTO-ENTMCNC: 31.6 G/DL (ref 31.5–36.5)
MCHC RBC AUTO-ENTMCNC: 31.8 G/DL (ref 31.5–36.5)
MCV RBC AUTO: 88 FL (ref 78–100)
MCV RBC AUTO: 90 FL (ref 78–100)
MCV RBC AUTO: 90 FL (ref 78–100)
MCV RBC AUTO: 91 FL (ref 78–100)
NUM BPU REQUESTED: 1
O2/TOTAL GAS SETTING VFR VENT: 40 %
O2/TOTAL GAS SETTING VFR VENT: 50 %
O2/TOTAL GAS SETTING VFR VENT: 60 %
OXYHGB MFR BLD: 96 % (ref 92–100)
OXYHGB MFR BLD: 97 % (ref 92–100)
OXYHGB MFR BLD: 98 % (ref 92–100)
OXYHGB MFR BLDA: 98 % (ref 75–100)
OXYHGB MFR BLDA: 99 % (ref 75–100)
OXYHGB MFR BLDV: 81 %
OXYHGB MFR BLDV: 83 %
PCO2 BLD: 35 MM HG (ref 35–45)
PCO2 BLD: 36 MM HG (ref 35–45)
PCO2 BLD: 36 MM HG (ref 35–45)
PCO2 BLD: 37 MM HG (ref 35–45)
PCO2 BLD: 37 MM HG (ref 35–45)
PCO2 BLD: 38 MM HG (ref 35–45)
PCO2 BLD: 41 MM HG (ref 35–45)
PCO2 BLD: 42 MM HG (ref 35–45)
PCO2 BLD: 43 MM HG (ref 35–45)
PCO2 BLD: 44 MM HG (ref 35–45)
PCO2 BLDA: 48 MM HG (ref 35–45)
PCO2 BLDA: 64 MM HG (ref 35–45)
PCO2 BLDV: 51 MM HG (ref 40–50)
PCO2 BLDV: 66 MM HG (ref 40–50)
PH BLD: 7.41 PH (ref 7.35–7.45)
PH BLD: 7.42 PH (ref 7.35–7.45)
PH BLD: 7.42 PH (ref 7.35–7.45)
PH BLD: 7.43 PH (ref 7.35–7.45)
PH BLD: 7.43 PH (ref 7.35–7.45)
PH BLD: 7.44 PH (ref 7.35–7.45)
PH BLD: 7.45 PH (ref 7.35–7.45)
PH BLD: 7.45 PH (ref 7.35–7.45)
PH BLD: 7.46 PH (ref 7.35–7.45)
PH BLD: 7.47 PH (ref 7.35–7.45)
PH BLDA: 7.27 PH (ref 7.35–7.45)
PH BLDA: 7.37 PH (ref 7.35–7.45)
PH BLDV: 7.27 PH (ref 7.32–7.43)
PH BLDV: 7.36 PH (ref 7.32–7.43)
PHOSPHATE SERPL-MCNC: 2.4 MG/DL (ref 2.5–4.5)
PHOSPHATE SERPL-MCNC: 3.6 MG/DL (ref 2.5–4.5)
PHOSPHATE SERPL-MCNC: 4.7 MG/DL (ref 2.5–4.5)
PLATELET # BLD AUTO: 65 10E9/L (ref 150–450)
PLATELET # BLD AUTO: 68 10E9/L (ref 150–450)
PLATELET # BLD AUTO: 70 10E9/L (ref 150–450)
PLATELET # BLD AUTO: 70 10E9/L (ref 150–450)
PO2 BLD: 102 MM HG (ref 80–105)
PO2 BLD: 104 MM HG (ref 80–105)
PO2 BLD: 105 MM HG (ref 80–105)
PO2 BLD: 106 MM HG (ref 80–105)
PO2 BLD: 113 MM HG (ref 80–105)
PO2 BLD: 115 MM HG (ref 80–105)
PO2 BLD: 117 MM HG (ref 80–105)
PO2 BLD: 120 MM HG (ref 80–105)
PO2 BLD: 123 MM HG (ref 80–105)
PO2 BLD: 131 MM HG (ref 80–105)
PO2 BLD: 132 MM HG (ref 80–105)
PO2 BLD: 160 MM HG (ref 80–105)
PO2 BLDA: 271 MM HG (ref 80–105)
PO2 BLDA: 431 MM HG (ref 80–105)
PO2 BLDV: 50 MM HG (ref 25–47)
PO2 BLDV: 56 MM HG (ref 25–47)
POTASSIUM BLD-SCNC: 4.1 MMOL/L (ref 3.4–5.3)
POTASSIUM SERPL-SCNC: 3.8 MMOL/L (ref 3.4–5.3)
POTASSIUM SERPL-SCNC: 3.9 MMOL/L (ref 3.4–5.3)
POTASSIUM SERPL-SCNC: 3.9 MMOL/L (ref 3.4–5.3)
POTASSIUM SERPL-SCNC: 4.6 MMOL/L (ref 3.4–5.3)
PROT SERPL-MCNC: 5.3 G/DL (ref 6.8–8.8)
PROT SERPL-MCNC: 5.5 G/DL (ref 6.8–8.8)
PROT SERPL-MCNC: 5.6 G/DL (ref 6.8–8.8)
PROT SERPL-MCNC: 5.7 G/DL (ref 6.8–8.8)
RBC # BLD AUTO: 2.82 10E12/L (ref 3.8–5.2)
RBC # BLD AUTO: 2.86 10E12/L (ref 3.8–5.2)
RBC # BLD AUTO: 2.86 10E12/L (ref 3.8–5.2)
RBC # BLD AUTO: 3.11 10E12/L (ref 3.8–5.2)
SODIUM SERPL-SCNC: 141 MMOL/L (ref 133–144)
SODIUM SERPL-SCNC: 144 MMOL/L (ref 133–144)
SODIUM SERPL-SCNC: 144 MMOL/L (ref 133–144)
SODIUM SERPL-SCNC: 145 MMOL/L (ref 133–144)
TRANSFUSION STATUS PATIENT QL: NORMAL
WBC # BLD AUTO: 6.8 10E9/L (ref 4–11)
WBC # BLD AUTO: 7.1 10E9/L (ref 4–11)
WBC # BLD AUTO: 7.2 10E9/L (ref 4–11)
WBC # BLD AUTO: 7.5 10E9/L (ref 4–11)

## 2020-02-01 PROCEDURE — 85384 FIBRINOGEN ACTIVITY: CPT | Performed by: INTERNAL MEDICINE

## 2020-02-01 PROCEDURE — 40000014 ZZH STATISTIC ARTERIAL MONITORING DAILY

## 2020-02-01 PROCEDURE — 25000128 H RX IP 250 OP 636: Performed by: STUDENT IN AN ORGANIZED HEALTH CARE EDUCATION/TRAINING PROGRAM

## 2020-02-01 PROCEDURE — 82330 ASSAY OF CALCIUM: CPT | Performed by: STUDENT IN AN ORGANIZED HEALTH CARE EDUCATION/TRAINING PROGRAM

## 2020-02-01 PROCEDURE — 93005 ELECTROCARDIOGRAM TRACING: CPT

## 2020-02-01 PROCEDURE — 33949 ECMO/ECLS DAILY MGMT ARTERY: CPT | Mod: GC | Performed by: INTERNAL MEDICINE

## 2020-02-01 PROCEDURE — 25000132 ZZH RX MED GY IP 250 OP 250 PS 637: Performed by: STUDENT IN AN ORGANIZED HEALTH CARE EDUCATION/TRAINING PROGRAM

## 2020-02-01 PROCEDURE — 27210437 ZZH NUTRITION PRODUCT SEMIELEM INTERMED LITER

## 2020-02-01 PROCEDURE — 25000125 ZZHC RX 250: Performed by: STUDENT IN AN ORGANIZED HEALTH CARE EDUCATION/TRAINING PROGRAM

## 2020-02-01 PROCEDURE — 83051 HEMOGLOBIN PLASMA: CPT | Performed by: INTERNAL MEDICINE

## 2020-02-01 PROCEDURE — 85610 PROTHROMBIN TIME: CPT | Performed by: INTERNAL MEDICINE

## 2020-02-01 PROCEDURE — 99291 CRITICAL CARE FIRST HOUR: CPT | Mod: 25 | Performed by: INTERNAL MEDICINE

## 2020-02-01 PROCEDURE — 82947 ASSAY GLUCOSE BLOOD QUANT: CPT | Performed by: INTERNAL MEDICINE

## 2020-02-01 PROCEDURE — P9016 RBC LEUKOCYTES REDUCED: HCPCS | Performed by: STUDENT IN AN ORGANIZED HEALTH CARE EDUCATION/TRAINING PROGRAM

## 2020-02-01 PROCEDURE — 25800030 ZZH RX IP 258 OP 636: Performed by: STUDENT IN AN ORGANIZED HEALTH CARE EDUCATION/TRAINING PROGRAM

## 2020-02-01 PROCEDURE — 85347 COAGULATION TIME ACTIVATED: CPT

## 2020-02-01 PROCEDURE — 27211402 ZZH SENSOR NIRS OXIMETER, ADULT

## 2020-02-01 PROCEDURE — 85730 THROMBOPLASTIN TIME PARTIAL: CPT | Performed by: INTERNAL MEDICINE

## 2020-02-01 PROCEDURE — 85520 HEPARIN ASSAY: CPT | Performed by: INTERNAL MEDICINE

## 2020-02-01 PROCEDURE — 40000076 ZZH STATISTIC IABP MONITORING

## 2020-02-01 PROCEDURE — 83735 ASSAY OF MAGNESIUM: CPT | Performed by: INTERNAL MEDICINE

## 2020-02-01 PROCEDURE — 82330 ASSAY OF CALCIUM: CPT | Performed by: INTERNAL MEDICINE

## 2020-02-01 PROCEDURE — 33949 ECMO/ECLS DAILY MGMT ARTERY: CPT

## 2020-02-01 PROCEDURE — 83605 ASSAY OF LACTIC ACID: CPT | Performed by: INTERNAL MEDICINE

## 2020-02-01 PROCEDURE — 86140 C-REACTIVE PROTEIN: CPT | Performed by: INTERNAL MEDICINE

## 2020-02-01 PROCEDURE — 93010 ELECTROCARDIOGRAM REPORT: CPT | Performed by: INTERNAL MEDICINE

## 2020-02-01 PROCEDURE — 84132 ASSAY OF SERUM POTASSIUM: CPT | Performed by: INTERNAL MEDICINE

## 2020-02-01 PROCEDURE — 36415 COLL VENOUS BLD VENIPUNCTURE: CPT | Performed by: STUDENT IN AN ORGANIZED HEALTH CARE EDUCATION/TRAINING PROGRAM

## 2020-02-01 PROCEDURE — 25000132 ZZH RX MED GY IP 250 OP 250 PS 637: Performed by: INTERNAL MEDICINE

## 2020-02-01 PROCEDURE — 85652 RBC SED RATE AUTOMATED: CPT | Performed by: INTERNAL MEDICINE

## 2020-02-01 PROCEDURE — C9113 INJ PANTOPRAZOLE SODIUM, VIA: HCPCS | Performed by: STUDENT IN AN ORGANIZED HEALTH CARE EDUCATION/TRAINING PROGRAM

## 2020-02-01 PROCEDURE — P9037 PLATE PHERES LEUKOREDU IRRAD: HCPCS | Performed by: STUDENT IN AN ORGANIZED HEALTH CARE EDUCATION/TRAINING PROGRAM

## 2020-02-01 PROCEDURE — 25000128 H RX IP 250 OP 636: Performed by: INTERNAL MEDICINE

## 2020-02-01 PROCEDURE — 71045 X-RAY EXAM CHEST 1 VIEW: CPT

## 2020-02-01 PROCEDURE — 83605 ASSAY OF LACTIC ACID: CPT | Performed by: STUDENT IN AN ORGANIZED HEALTH CARE EDUCATION/TRAINING PROGRAM

## 2020-02-01 PROCEDURE — 94003 VENT MGMT INPAT SUBQ DAY: CPT

## 2020-02-01 PROCEDURE — 84100 ASSAY OF PHOSPHORUS: CPT | Performed by: INTERNAL MEDICINE

## 2020-02-01 PROCEDURE — 87040 BLOOD CULTURE FOR BACTERIA: CPT | Performed by: STUDENT IN AN ORGANIZED HEALTH CARE EDUCATION/TRAINING PROGRAM

## 2020-02-01 PROCEDURE — 82805 BLOOD GASES W/O2 SATURATION: CPT | Performed by: STUDENT IN AN ORGANIZED HEALTH CARE EDUCATION/TRAINING PROGRAM

## 2020-02-01 PROCEDURE — 87070 CULTURE OTHR SPECIMN AEROBIC: CPT | Performed by: INTERNAL MEDICINE

## 2020-02-01 PROCEDURE — 85027 COMPLETE CBC AUTOMATED: CPT | Performed by: INTERNAL MEDICINE

## 2020-02-01 PROCEDURE — 85379 FIBRIN DEGRADATION QUANT: CPT | Performed by: INTERNAL MEDICINE

## 2020-02-01 PROCEDURE — 00000146 ZZHCL STATISTIC GLUCOSE BY METER IP

## 2020-02-01 PROCEDURE — 82805 BLOOD GASES W/O2 SATURATION: CPT | Performed by: INTERNAL MEDICINE

## 2020-02-01 PROCEDURE — 80053 COMPREHEN METABOLIC PANEL: CPT | Performed by: INTERNAL MEDICINE

## 2020-02-01 PROCEDURE — 85300 ANTITHROMBIN III ACTIVITY: CPT | Performed by: INTERNAL MEDICINE

## 2020-02-01 PROCEDURE — 82550 ASSAY OF CK (CPK): CPT | Performed by: INTERNAL MEDICINE

## 2020-02-01 PROCEDURE — 20000004 ZZH R&B ICU UMMC

## 2020-02-01 PROCEDURE — 40000275 ZZH STATISTIC RCP TIME EA 10 MIN

## 2020-02-01 PROCEDURE — 82947 ASSAY GLUCOSE BLOOD QUANT: CPT | Performed by: STUDENT IN AN ORGANIZED HEALTH CARE EDUCATION/TRAINING PROGRAM

## 2020-02-01 PROCEDURE — 83615 LACTATE (LD) (LDH) ENZYME: CPT | Performed by: INTERNAL MEDICINE

## 2020-02-01 RX ORDER — BISACODYL 10 MG
10 SUPPOSITORY, RECTAL RECTAL DAILY PRN
Status: DISCONTINUED | OUTPATIENT
Start: 2020-02-01 | End: 2020-02-11 | Stop reason: HOSPADM

## 2020-02-01 RX ORDER — AMOXICILLIN 250 MG
1 CAPSULE ORAL AT BEDTIME
Status: DISCONTINUED | OUTPATIENT
Start: 2020-02-01 | End: 2020-02-02

## 2020-02-01 RX ORDER — ACETAMINOPHEN AND CODEINE PHOSPHATE 120; 12 MG/5ML; MG/5ML
0.35 SOLUTION ORAL DAILY
Status: DISCONTINUED | OUTPATIENT
Start: 2020-02-01 | End: 2020-02-02

## 2020-02-01 RX ORDER — FUROSEMIDE 10 MG/ML
40 INJECTION INTRAMUSCULAR; INTRAVENOUS EVERY 12 HOURS
Status: DISCONTINUED | OUTPATIENT
Start: 2020-02-01 | End: 2020-02-03

## 2020-02-01 RX ORDER — FENTANYL CITRATE 50 UG/ML
25 INJECTION, SOLUTION INTRAMUSCULAR; INTRAVENOUS EVERY 4 HOURS PRN
Status: DISCONTINUED | OUTPATIENT
Start: 2020-02-01 | End: 2020-02-05

## 2020-02-01 RX ADMIN — Medication 20 MG: at 12:18

## 2020-02-01 RX ADMIN — SENNOSIDES AND DOCUSATE SODIUM 1 TABLET: 8.6; 5 TABLET ORAL at 20:50

## 2020-02-01 RX ADMIN — POTASSIUM CHLORIDE 20 MEQ: 1.5 POWDER, FOR SOLUTION ORAL at 22:55

## 2020-02-01 RX ADMIN — POTASSIUM CHLORIDE 20 MEQ: 29.8 INJECTION, SOLUTION INTRAVENOUS at 11:55

## 2020-02-01 RX ADMIN — VASOPRESSIN 1 UNITS/HR: 20 INJECTION INTRAVENOUS at 11:36

## 2020-02-01 RX ADMIN — FENTANYL CITRATE 50 MCG: 50 INJECTION, SOLUTION INTRAMUSCULAR; INTRAVENOUS at 18:10

## 2020-02-01 RX ADMIN — Medication 100 MCG/HR: at 19:44

## 2020-02-01 RX ADMIN — FENTANYL CITRATE 25 MCG: 50 INJECTION INTRAMUSCULAR; INTRAVENOUS at 22:25

## 2020-02-01 RX ADMIN — DEXMEDETOMIDINE 0.5 MCG/KG/HR: 100 INJECTION, SOLUTION, CONCENTRATE INTRAVENOUS at 19:44

## 2020-02-01 RX ADMIN — PROPOFOL 20 MCG/KG/MIN: 10 INJECTION, EMULSION INTRAVENOUS at 04:39

## 2020-02-01 RX ADMIN — BISACODYL 10 MG: 10 SUPPOSITORY RECTAL at 17:53

## 2020-02-01 RX ADMIN — PANTOPRAZOLE SODIUM 40 MG: 40 INJECTION, POWDER, FOR SOLUTION INTRAVENOUS at 07:24

## 2020-02-01 RX ADMIN — DEXMEDETOMIDINE 0.5 MCG/KG/HR: 100 INJECTION, SOLUTION, CONCENTRATE INTRAVENOUS at 10:16

## 2020-02-01 RX ADMIN — HEPARIN SODIUM 1400 UNITS/HR: 10000 INJECTION, SOLUTION INTRAVENOUS at 14:07

## 2020-02-01 RX ADMIN — MULTIVITAMIN 15 ML: LIQUID ORAL at 07:23

## 2020-02-01 RX ADMIN — PROPOFOL 30 MCG/KG/MIN: 10 INJECTION, EMULSION INTRAVENOUS at 22:23

## 2020-02-01 RX ADMIN — PROPOFOL 10 MCG/KG/MIN: 10 INJECTION, EMULSION INTRAVENOUS at 16:10

## 2020-02-01 RX ADMIN — OSELTAMIVIR PHOSPHATE 75 MG: 6 POWDER, FOR SUSPENSION ORAL at 07:24

## 2020-02-01 RX ADMIN — OSELTAMIVIR PHOSPHATE 75 MG: 6 POWDER, FOR SUSPENSION ORAL at 20:47

## 2020-02-01 RX ADMIN — POLYETHYLENE GLYCOL 3350 17 G: 17 POWDER, FOR SOLUTION ORAL at 07:23

## 2020-02-01 RX ADMIN — Medication 20 MG: at 18:52

## 2020-02-01 RX ADMIN — Medication 20 MG: at 18:00

## 2020-02-01 RX ADMIN — Medication 20 MG: at 18:21

## 2020-02-01 RX ADMIN — FUROSEMIDE 40 MG: 10 INJECTION, SOLUTION INTRAVENOUS at 18:22

## 2020-02-01 RX ADMIN — POTASSIUM CHLORIDE 20 MEQ: 1.5 POWDER, FOR SOLUTION ORAL at 04:39

## 2020-02-01 RX ADMIN — FUROSEMIDE 40 MG: 10 INJECTION, SOLUTION INTRAVENOUS at 07:34

## 2020-02-01 RX ADMIN — FENTANYL CITRATE 50 MCG: 50 INJECTION, SOLUTION INTRAMUSCULAR; INTRAVENOUS at 18:52

## 2020-02-01 RX ADMIN — NORETHINDRONE 0.35 MG: 0.35 TABLET ORAL at 20:48

## 2020-02-01 RX ADMIN — Medication 20 MG: at 22:24

## 2020-02-01 RX ADMIN — SODIUM PHOSPHATE, MONOBASIC, MONOHYDRATE AND SODIUM PHOSPHATE, DIBASIC, ANHYDROUS 15 MMOL: 276; 142 INJECTION, SOLUTION INTRAVENOUS at 07:35

## 2020-02-01 ASSESSMENT — ACTIVITIES OF DAILY LIVING (ADL)
ADLS_ACUITY_SCORE: 16
ADLS_ACUITY_SCORE: 16
ADLS_ACUITY_SCORE: 14
ADLS_ACUITY_SCORE: 16

## 2020-02-01 ASSESSMENT — MIFFLIN-ST. JEOR: SCORE: 1659.13

## 2020-02-01 NOTE — PLAN OF CARE
Assessment:   Cardiac: SR, Levophed weaned off, Vasopressin titrated to keep MAP > 65. IABP 1:1 via EKG. A/V ECMO, Heparin gtt infusing.  Resp: CMV 50%/400/12/5.  Neuro: Precedex, Fentanyl, Propofol providing adequate sedation. Follows commands and moves all extremities.   : Valerio patent, UO 40-60 cc/hr.  GI: Tube feedings via NJ @ 50 cc/hr. OG to LIS.  Skin:  ECMO cannula site ozzing.  Endocrine: No supplemental insulin required.    Interventions: 1 unit of Platelets, given. No significant events overnight.     Plan: Will continue to monitor/assess and update MD as needed.

## 2020-02-01 NOTE — PLAN OF CARE
Major Shift Events:  Platelet recheck at 68 from 65 (after transfusion.) Dr. Raymundo notified, will wait to transfuse and see what next result shows.  Notified dr. Plummer of fist sized clot that came out of patient's vagina this evening with turn, starting patient on norethindrone. After 1800 turn patient became hypertensive & tachycardic, had a hard time settling back down, bolused with propofol & fentanyl & dr. Reynolds notified. Will continue to monitor.    Plan: turn down Monday     For vital signs and complete assessments, please see documentation flowsheets.

## 2020-02-01 NOTE — PROGRESS NOTES
[facility] Cardiology Progress Note           Assessment and Plan:     Ms. Tom is a 34 year old with a PMHx of Tobacco Abuse and Bipolar Disorder who presents with cardiogenic shock likely from viral myocarditis. She is currently on VA-ECMO support given severe bi-ventricular dysfunction.     #Cardiogenic Shock on VA-ECMO from Viral Myocarditis   -Patient presented with sinus tachycardia with low pulse pressure despite IABP placement. Therefore, VA-ECMO was placed. Continue VA-ECMO at 4 Liters/Minute.   -Continue Vasopressin at 1 Unit/Hour. Can wean down if MAP is still above 65.   -Continue IABP 1:1 to help assist with LV afterload.  -Will perform turndown on Monday with ECHO to assess for underlying heart function. She does have pulsatility even at 4 Liters of flow. The pulsatility mildly increases when turning the flow down to 1.5 Liters.   -Overall, the best case scenario is for cardiac recovery. She is not a transplant candidate given her active smoking. After 1 week on VA-ECMO support and patient is still requiring ECMO to maintain end organ perfusion, we can consider placing Subclavian Impella 5.0 to bridge towards recovery. If there is no sign of recovery, our only option would be LVAD.     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  -Start Lasix 40mg IV BID for goal net even. If she does have issues with chugging, we will hold Lasix.    #Hypoxic Respiratory Failure  -Patient was electively intubated for VA-ECMO.   -Continue to wean FiO2 on circuit and ventilator.  -Goal Oxygen Saturation >90%    #Pericardial Effusion   -Likely related to myopericarditis.  -No signs of tamponade currently; however, physiology is difficult to assess since patient is on VA-ECMO. If patient is having significant chugging episodes or becomes progressively hypotensive, will consider pericardial effusion.     #Thrombocytopenia  -Patient's Plt at OSHx was 150, currently  around 65. Patient has multiple reasons to have TCT including ECMO and IABP.  -If continues to decrease and/or patient is having more issues with clotting (has already clotted off the oxygenator x 1), will send for HIT panel.    -Goal Platelet for transfusion is around 80.     #Hyperclycemia   -Continue Insulin drip.     #Extravasation of Blood on Left Arm   -Continue to monitor. Wound care has no active recommendations at this time.    Rickey Reynolds   Cardiology Fellow   Pager: [phone]       Subjective:     No acute events              Review of Systems:   A comprehensive review of systems was performed and found to be negative except as described in this note          Medications:     Current Facility-Administered Medications   Medication     acetaminophen (TYLENOL) Suppository 650 mg     acetaminophen (TYLENOL) tablet 650 mg     alum & mag hydroxide-simethicone (MYLANTA ES/MAALOX  ES) suspension 30 mL     artificial tears ophthalmic ointment     dexmedetomidine (PRECEDEX) 400 mcg in 0.9% sodium chloride 100 mL     dextrose 10% infusion     dextrose 10% infusion     glucose gel 15-30 g    Or     dextrose 50 % injection 25-50 mL    Or     glucagon injection 1 mg     fentaNYL (PF) (SUBLIMAZE) injection 50 mcg     fentaNYL (SUBLIMAZE) infusion     furosemide (LASIX) injection 40 mg     heparin 2 unit/mL in 0.9% NaCl (for REPERFUSION CATHETER)     heparin 25,000 units in 0.45% NaCl 250 mL ANTICOAGULANT  infusion     heparin lock flush 10 UNIT/ML injection 2-5 mL     HYDROmorphone (DILAUDID) injection 0.2 mg     insulin 1 unit/mL in saline (NovoLIN, HumuLIN Regular) drip - ADULT IV Infusion     lidocaine (LMX4) cream     lidocaine (LMX4) cream     lidocaine (LMX4) cream     lidocaine 1 % 0.1-1 mL     lidocaine 1 % 0.1-1 mL     lidocaine 1 % 0.1-1 mL     magnesium sulfate 2 g in NS intermittent infusion (PharMEDium or FV Cmpd)     magnesium sulfate 4 g in 100 mL sterile water (premade)     medication instruction      metoclopramide (REGLAN) injection 10 mg     multivitamins w/minerals (CERTAVITE) liquid 15 mL     naloxone (NARCAN) injection 0.1-0.4 mg     norepinephrine (LEVOPHED) 16 mg in  mL infusion     ondansetron (ZOFRAN) injection 4 mg     oseltamivir (TAMIFLU) suspension 75 mg     pantoprazole (PROTONIX) 40 mg IV push injection     Patient is already receiving anticoagulation with heparin, enoxaparin (LOVENOX), warfarin (COUMADIN)  or other anticoagulant medication     polyethylene glycol (MIRALAX/GLYCOLAX) Packet 17 g     potassium chloride (KLOR-CON) Packet 20-40 mEq     potassium chloride 10 mEq in 100 mL intermittent infusion with 10 mg lidocaine     potassium chloride 10 mEq in 100 mL sterile water intermittent infusion (premix)     potassium chloride 20 mEq in 50 mL intermittent infusion     potassium chloride 20 mEq in 50 mL intermittent infusion     potassium chloride ER (K-DUR/KLOR-CON M) CR tablet 20-40 mEq     propofol (DIPRIVAN) infusion     Reason ACE/ARB/ARNI order not selected     Reason beta blocker not prescribed     sennosides (SENOKOT) tablet 8.6 mg     sodium chloride (PF) 0.9% PF flush 3 mL     sodium chloride (PF) 0.9% PF flush 3 mL     sodium chloride (PF) 0.9% PF flush 3 mL     sodium chloride (PF) 0.9% PF flush 3 mL     sodium chloride (PF) 0.9% PF flush 5-50 mL     sodium phosphate 10 mmol in D5W intermittent infusion     sodium phosphate 15 mmol in D5W intermittent infusion     sodium phosphate 20 mmol in D5W intermittent infusion     sodium phosphate 25 mmol in D5W intermittent infusion     vasopressin (VASOSTRICT) 40 Units in D5W 40 mL infusion               Objective:   Temp: 98  F (36.7  C) Temp src: Axillary    Heart Rate: 88 Resp: 12 SpO2: 100 % O2 Device: Mechanical Ventilator           Physical Exam:   Vitals were reviewed  Temp: 98  F (36.7  C) Temp src: Axillary    Heart Rate: 88 Resp: 12 SpO2: 100 % O2 Device: Mechanical Ventilator      Gen: Intubated and Sedated   HEENT: ET  Tube in Place  CV: RRR  Pulm: Coarse Breath Sounds   Ext: No edema   Neuro: No focal defects           Data:     Lab Results   Component Value Date    WBC PENDING 02/01/2020    HGB 8.1 (L) 02/01/2020    HCT 25.6 (L) 02/01/2020    PLT 68 (L) 02/01/2020     02/01/2020    POTASSIUM 3.9 02/01/2020    CHLORIDE 113 (H) 02/01/2020    CO2 28 02/01/2020    BUN 32 (H) 02/01/2020    CR 0.79 02/01/2020     (H) 02/01/2020    SED 54 (H) 02/01/2020    DD 1.2 (H) 02/01/2020    TROPI 4.071 (HH) 01/30/2020    AST 26 02/01/2020    ALT 15 02/01/2020    ALKPHOS 46 02/01/2020    BILITOTAL 1.4 (H) 02/01/2020    INR 1.10 02/01/2020

## 2020-02-01 NOTE — PROGRESS NOTES
ECMO Shift Summary:      ECMO Equipment:  Console serial number: [id number]  Circuit Lot number: [id number]  Oxygenator Lot number: [id number]        Patient remains on VA ECMO, all equipment is functioning and alarms are appropriately set. RPM's 3450 with flow range 4.06-4.17 L/min. Sweep gas is at 2 LPM and FiO2 70%. Circuit remains free of air and clot, but fibrin appreciated in corners. Cannulas are secure with oozing from site. Extremities are warm and well perfused. Suctioned ETT for yellow and black specks of sputum.    Significant Shift Events: none    Vent settings:  Ventilation Mode: CMV/AC  (Continuous Mandatory Ventilation/ Assist Control)  FiO2 (%): 60 %  Rate Set (breaths/minute): 12 breaths/min  Tidal Volume Set (mL): 400 mL  PEEP (cm H2O): 5 cmH2O  Oxygen Concentration (%): 60 %  Resp: 12  .    Heparin is running at 1400 units/hr, ACT goal 160-180, with range 158-179.    Urine output is as charted, blood loss was scant. Product given included 1 unit of platelets.      Intake/Output Summary (Last 24 hours) at 2020 0410  Last data filed at 2020 0400  Gross per 24 hour   Intake 3036.84 ml   Output 2924 ml   Net 112.84 ml       ECHO:  No results found for this or any previous visit.No results found for this or any previous visit.    CXR:  Recent Results (from the past 24 hour(s))   Echo Limited    Narrative    [id number]  [id number]  [id number]  [id number]^ARVIND^ZANDER^[facility]  Echocardiography Laboratory  [address] [place] [id number]     Name: LUZ ELENA SHEPHERD  MRN: [id number]  : 1985  Study Date: 2020 08:47 AM  Age: 34 yrs  Gender: Female  Patient Location: [facility]  Reason For Study: Heart Failure  Ordering Physician: ZANDER SAMUELS  Performed By: Swapnil Walden     BSA: 2.0 m2  Height: 63 in  Weight: 207 lb  HR: 92  BP: 68/50 mmHg  _____________________________________________________________________________  __         Procedure  Limited Portable Echo Adult.  _____________________________________________________________________________  __        Interpretation Summary  Patient on VA ECMO at 4.3 LPM.     Severely (EF 5-10%) reduced left ventricular function is present. Severe  diffuse hypokinesis is present.  Right ventricular wall thickness is increased. Global right ventricular  function is severely reduced.  The aortic valve opens with every beat.  There is a moderate size circumferential pericardial effusion without  ventricular chamber collapse.  _____________________________________________________________________________  __        Left Ventricle  Moderate to severe increased thickness of the myocardium present. Severely (EF  5-10%) reduced left ventricular function is present. Severe diffuse  hypokinesis is present.     Right Ventricle  Right ventricular wall thickness is increased. Global right ventricular  function is severely reduced. A right heart catheter is noted in the right  ventricle.     Atria  Both atria appear normal. There is diastolic collaps of the right atrium.     Mitral Valve  The mitral valve is normal.        Aortic Valve  Aortic valve is normal in structure and function. The aortic valve is  tricuspid. The aortic valve opens with every beat.     Tricuspid Valve  The tricuspid valve is normal.     Vessels  Mild dilatation of the aorta is present. Ascending aorta 4.3 cm. The inferior  vena cava was dilated at 2.3 cm without respiratory variability, consistent  with increased right atrial pressure. Unable to assess mean RA pressure given  the patient is on a ventilator. Venous ECMO cannula visualized in the IVC.     Pericardium  There is a moderate size circumferential pericardial effusion without  ventricular chamber collapse.     Compared to Previous Study  This study was compared with the study from 1/30/20 . Compared to prior study,  there is little change. LV function remains very poor with  severely increased  thickness of the myocardium. The pericardial effusion appears to be of similar  size.        Attestation  I have personally viewed the imaging and agree with the interpretation and  report as documented by the fellow, Dirk Lagunas, and/or edited by me.  _____________________________________________________________________________  __  MMode/2D Measurements & Calculations     IVSd: 1.8 cm  LVIDd: 3.6 cm  LVIDs: 3.6 cm  LVPWd: 1.6 cm  FS: -0.85 %  LV mass(C)d: 246.7 grams  LV mass(C)dI: 125.7 grams/m2  RWT: 0.92           _____________________________________________________________________________  __           Report approved by: Kristin Gonzales 01/31/2020 11:20 AM      XR Chest Port 1 View   Result Value    Radiologist flags (Urgent)     Right approach PICC line possibly within the venous    Narrative    Exam: XR CHEST PORT 1 VW, 1/31/2020 12:48 PM    Indication: PICC line placement    Comparison: 1/31/2020 and 0145    Findings:   AP view of the chest. Right approach PICC line, the tip at the level  of the venous ECMO cannula. Endotracheal tube with the tip in the mid  thoracic trachea. Intra-aortic balloon pump with the marker at the  level of the elena. Partially visualized enteric tubes. Feeding tube  is looped in the stomach. Right IJ approach venous line with the tip  in the innominate confluence.    Cardiac silhouette is unchanged. Low lung volumes with left basilar  atelectasis versus consolidation similar to prior. Unchanged  interstitial pulmonary edema Small left pleural effusion. No  pneumothorax. No acute findings in the upper abdomen.      Impression    Impression:   1. Right approach PICC line with the distal end projecting at the  level of the distal ECMO cannula. Lead may possibly be within the ECMO  line.  2. Unchanged coronary edema and left basilar atelectasis versus  consolidation.  3. Small left pleural effusion.    [Urgent Result: Right approach PICC line possibly  within the venous  ECMO cannula.    Finding was identified on 1/31/2020 12:58 PM.     Dr. Scott ([id number])  was contacted by Dr. Denis Borges DO  (Radiology R3) at 1/31/2020 1:02 PM and verbalized understanding of  the urgent finding.     I have personally reviewed the examination and initial interpretation  and I agree with the findings.    LALIT STEWART MD   XR Chest Port 1 View    Narrative    Exam: XR CHEST PORT 1 VW, 1/31/2020 2:28 PM    Indication: picc tip determination    Comparison: Earlier today    Findings:   Right arm PICC tip at the confluence of the innominate veins. The  lower extremity Dewey-Marques catheter tip in the right pulmonary artery.  Lower extremity ECMO cannula tip in the low superior vena cava/high  right atrium. Right internal jugular vein sheath tip in the right  innominate vein. Endotracheal tube in the mid thoracic trachea 2 cm  above the elena. Feeding tube and enteric tube are seen coursing  through the mediastinum. There are tips project off the film.  Intra-aortic balloon tip just below the elena, unchanged    Heart upper limits of normal in size. Silhouetting of the left  hemidiaphragm with a retrocardiac opacity. Mild interstitial changes  in the lung.      Impression    Impression:   1. Right arm PICC tip at the confluence of the innominate veins.  Support devices are otherwise stable.  2. Unchanged left lower lobe opacity suggesting effusion and  atelectasis.  3. Stable mild pulmonary interstitial edema.    LALIT STEWART MD   XR Chest Port 1 View    Narrative    EXAMINATION: TEMPORARY, 1/31/2020 6:15 PM    INDICATION: Intra-arterial balloon pump    COMPARISON: Chest radiograph September    FINDINGS: Single AP supine radiograph of chest.     Endotracheal tube approximately 3 cm above the elena. Right IJ  central venous catheter with tip projecting over the mid SVC. Right  inferior approach ECMO cannula with tip projecting near the superior  cavoatrial junction. Feeding  tube tip likely at the pylorus. Enteric  tube with tip collimated off the field-of-view. Interval reposition of  right upper extremity PICC line with tip now projecting in the left  innominate vein. Intra-aortic balloon pump with superior marker  adjacent to the T5 vertebral body. Stable enlarged cardiac silhouette.  Cardiac and mediastinal borders are clear. Pulmonary vascularity is  indistinct. Left lower lobe pleural effusion and probable left lower  lobe atelectasis. Stable mild pulmonary interstitial edema.      Impression    IMPRESSION:  1. Intra-arterial balloon pump with superior marker projecting  adjacent to the T5 vertebral body, possibly slightly low in position  as the arch normal position is approximately 5 cm more cephalad..  2. Stable mild pulmonary interstitial edema.  3. Unchanged left pleural effusion and possible left lower lobe  retrocardiac atelectasis.    I have personally reviewed the examination and initial interpretation  and I agree with the findings.    CAMILLE KOHLER MD       Labs:  Recent Labs   Lab 02/01/20  0349 02/01/20  0201 02/01/20  0003 01/31/20  2205   PH 7.47* 7.46* 7.45 7.46*   PCO2 37 36 36 36   PO2 123* 131* 120* 123*   HCO3 26 26 25 25   O2PER 60 60 60 60       Lab Results   Component Value Date    HGB 8.7 (L) 02/01/2020    PHGB 90 (H) 01/31/2020    PLT 65 (L) 02/01/2020    FIBR 559 (H) 01/31/2020    INR 1.13 01/31/2020    PTT 90 (H) 01/31/2020    DD 0.7 (H) 01/31/2020    AXA 0.34 01/31/2020    ANTCH 55 (L) 01/31/2020         Plan is VA ECMO. Continue to support and wean as tolerated. Possible turn down today.      Alka Borges, RN  2/1/2020 4:10 AM

## 2020-02-02 ENCOUNTER — ANESTHESIA EVENT (OUTPATIENT)
Dept: SURGERY | Facility: CLINIC | Age: 35
DRG: 003 | End: 2020-02-02
Payer: COMMERCIAL

## 2020-02-02 ENCOUNTER — APPOINTMENT (OUTPATIENT)
Dept: CARDIOLOGY | Facility: CLINIC | Age: 35
DRG: 003 | End: 2020-02-02
Attending: INTERNAL MEDICINE
Payer: COMMERCIAL

## 2020-02-02 ENCOUNTER — APPOINTMENT (OUTPATIENT)
Dept: GENERAL RADIOLOGY | Facility: CLINIC | Age: 35
DRG: 003 | End: 2020-02-02
Attending: STUDENT IN AN ORGANIZED HEALTH CARE EDUCATION/TRAINING PROGRAM
Payer: COMMERCIAL

## 2020-02-02 LAB
ABO + RH BLD: NORMAL
ABO + RH BLD: NORMAL
ALBUMIN SERPL-MCNC: 2.5 G/DL (ref 3.4–5)
ALBUMIN SERPL-MCNC: 2.5 G/DL (ref 3.4–5)
ALP SERPL-CCNC: 72 U/L (ref 40–150)
ALP SERPL-CCNC: 73 U/L (ref 40–150)
ALT SERPL W P-5'-P-CCNC: 16 U/L (ref 0–50)
ALT SERPL W P-5'-P-CCNC: 21 U/L (ref 0–50)
ANION GAP SERPL CALCULATED.3IONS-SCNC: 1 MMOL/L (ref 3–14)
ANION GAP SERPL CALCULATED.3IONS-SCNC: 1 MMOL/L (ref 3–14)
ANION GAP SERPL CALCULATED.3IONS-SCNC: 3 MMOL/L (ref 3–14)
ANION GAP SERPL CALCULATED.3IONS-SCNC: <1 MMOL/L (ref 3–14)
APTT PPP: 53 SEC (ref 22–37)
APTT PPP: 61 SEC (ref 22–37)
APTT PPP: 66 SEC (ref 22–37)
APTT PPP: 69 SEC (ref 22–37)
AST SERPL W P-5'-P-CCNC: 31 U/L (ref 0–45)
AST SERPL W P-5'-P-CCNC: 32 U/L (ref 0–45)
AT III ACT/NOR PPP CHRO: 71 % (ref 85–135)
BACTERIA SPEC CULT: ABNORMAL
BACTERIA SPEC CULT: ABNORMAL
BACTERIA SPEC CULT: NO GROWTH
BASE EXCESS BLDA CALC-SCNC: 2.2 MMOL/L
BASE EXCESS BLDA CALC-SCNC: 4 MMOL/L
BASE EXCESS BLDA CALC-SCNC: 4.2 MMOL/L
BASE EXCESS BLDA CALC-SCNC: 4.3 MMOL/L
BASE EXCESS BLDA CALC-SCNC: 4.7 MMOL/L
BASE EXCESS BLDA CALC-SCNC: 6.4 MMOL/L
BASE EXCESS BLDA CALC-SCNC: 6.6 MMOL/L
BASE EXCESS BLDA CALC-SCNC: 7.1 MMOL/L
BASE EXCESS BLDA CALC-SCNC: 7.5 MMOL/L
BASE EXCESS BLDA CALC-SCNC: 7.9 MMOL/L
BASE EXCESS BLDA CALC-SCNC: 8.2 MMOL/L
BASE EXCESS BLDA CALC-SCNC: 9 MMOL/L
BASE EXCESS BLDV CALC-SCNC: 3.9 MMOL/L
BASE EXCESS BLDV CALC-SCNC: 7.5 MMOL/L
BILIRUB SERPL-MCNC: 1.6 MG/DL (ref 0.2–1.3)
BILIRUB SERPL-MCNC: 1.6 MG/DL (ref 0.2–1.3)
BLD GP AB SCN SERPL QL: NORMAL
BLD PROD TYP BPU: NORMAL
BLD UNIT ID BPU: 0
BLOOD BANK CMNT PATIENT-IMP: NORMAL
BLOOD PRODUCT CODE: NORMAL
BPU ID: NORMAL
BUN SERPL-MCNC: 24 MG/DL (ref 7–30)
BUN SERPL-MCNC: 26 MG/DL (ref 7–30)
BUN SERPL-MCNC: 28 MG/DL (ref 7–30)
BUN SERPL-MCNC: 32 MG/DL (ref 7–30)
CA-I BLD-MCNC: 4.5 MG/DL (ref 4.4–5.2)
CALCIUM SERPL-MCNC: 7.9 MG/DL (ref 8.5–10.1)
CALCIUM SERPL-MCNC: 7.9 MG/DL (ref 8.5–10.1)
CALCIUM SERPL-MCNC: 8.3 MG/DL (ref 8.5–10.1)
CALCIUM SERPL-MCNC: 8.3 MG/DL (ref 8.5–10.1)
CHLORIDE SERPL-SCNC: 110 MMOL/L (ref 94–109)
CHLORIDE SERPL-SCNC: 112 MMOL/L (ref 94–109)
CHLORIDE SERPL-SCNC: 113 MMOL/L (ref 94–109)
CHLORIDE SERPL-SCNC: 114 MMOL/L (ref 94–109)
CO2 SERPL-SCNC: 30 MMOL/L (ref 20–32)
CO2 SERPL-SCNC: 32 MMOL/L (ref 20–32)
CO2 SERPL-SCNC: 33 MMOL/L (ref 20–32)
CO2 SERPL-SCNC: 34 MMOL/L (ref 20–32)
CREAT SERPL-MCNC: 0.57 MG/DL (ref 0.52–1.04)
CREAT SERPL-MCNC: 0.66 MG/DL (ref 0.52–1.04)
CREAT SERPL-MCNC: 0.67 MG/DL (ref 0.52–1.04)
CREAT SERPL-MCNC: 0.67 MG/DL (ref 0.52–1.04)
CRP SERPL-MCNC: 130 MG/L (ref 0–8)
D DIMER PPP FEU-MCNC: 2.8 UG/ML FEU (ref 0–0.5)
D DIMER PPP FEU-MCNC: 5.5 UG/ML FEU (ref 0–0.5)
ERYTHROCYTE [DISTWIDTH] IN BLOOD BY AUTOMATED COUNT: 15.9 % (ref 10–15)
ERYTHROCYTE [DISTWIDTH] IN BLOOD BY AUTOMATED COUNT: 15.9 % (ref 10–15)
ERYTHROCYTE [DISTWIDTH] IN BLOOD BY AUTOMATED COUNT: 16 % (ref 10–15)
ERYTHROCYTE [DISTWIDTH] IN BLOOD BY AUTOMATED COUNT: 16 % (ref 10–15)
ERYTHROCYTE [SEDIMENTATION RATE] IN BLOOD BY WESTERGREN METHOD: 82 MM/H (ref 0–20)
FIBRINOGEN PPP-MCNC: 711 MG/DL (ref 200–420)
FIBRINOGEN PPP-MCNC: 711 MG/DL (ref 200–420)
GFR SERPL CREATININE-BSD FRML MDRD: >90 ML/MIN/{1.73_M2}
GLUCOSE BLD-MCNC: 115 MG/DL (ref 70–99)
GLUCOSE BLD-MCNC: 127 MG/DL (ref 70–99)
GLUCOSE BLD-MCNC: 131 MG/DL (ref 70–99)
GLUCOSE BLD-MCNC: 134 MG/DL (ref 70–99)
GLUCOSE BLDC GLUCOMTR-MCNC: 109 MG/DL (ref 70–99)
GLUCOSE BLDC GLUCOMTR-MCNC: 118 MG/DL (ref 70–99)
GLUCOSE BLDC GLUCOMTR-MCNC: 120 MG/DL (ref 70–99)
GLUCOSE BLDC GLUCOMTR-MCNC: 126 MG/DL (ref 70–99)
GLUCOSE SERPL-MCNC: 114 MG/DL (ref 70–99)
GLUCOSE SERPL-MCNC: 127 MG/DL (ref 70–99)
GLUCOSE SERPL-MCNC: 133 MG/DL (ref 70–99)
GLUCOSE SERPL-MCNC: 136 MG/DL (ref 70–99)
HCO3 BLD-SCNC: 29 MMOL/L (ref 21–28)
HCO3 BLD-SCNC: 29 MMOL/L (ref 21–28)
HCO3 BLD-SCNC: 30 MMOL/L (ref 21–28)
HCO3 BLD-SCNC: 30 MMOL/L (ref 21–28)
HCO3 BLD-SCNC: 31 MMOL/L (ref 21–28)
HCO3 BLD-SCNC: 32 MMOL/L (ref 21–28)
HCO3 BLD-SCNC: 32 MMOL/L (ref 21–28)
HCO3 BLD-SCNC: 33 MMOL/L (ref 21–28)
HCO3 BLDA-SCNC: 28 MMOL/L (ref 21–28)
HCO3 BLDA-SCNC: 33 MMOL/L (ref 21–28)
HCO3 BLDV-SCNC: 30 MMOL/L (ref 21–28)
HCO3 BLDV-SCNC: 34 MMOL/L (ref 21–28)
HCT VFR BLD AUTO: 25.8 % (ref 35–47)
HCT VFR BLD AUTO: 27 % (ref 35–47)
HCT VFR BLD AUTO: 27.1 % (ref 35–47)
HCT VFR BLD AUTO: 27.2 % (ref 35–47)
HGB BLD-MCNC: 8.2 G/DL (ref 11.7–15.7)
HGB BLD-MCNC: 8.6 G/DL (ref 11.7–15.7)
HGB BLD-MCNC: 8.6 G/DL (ref 11.7–15.7)
HGB BLD-MCNC: 8.7 G/DL (ref 11.7–15.7)
HGB FREE PLAS-MCNC: 60 MG/DL
INR PPP: 1 (ref 0.86–1.14)
INR PPP: 1 (ref 0.86–1.14)
INR PPP: 1.01 (ref 0.86–1.14)
INR PPP: 1.04 (ref 0.86–1.14)
KCT BLD-ACNC: 150 SEC (ref 75–150)
KCT BLD-ACNC: 155 SEC (ref 75–150)
KCT BLD-ACNC: 155 SEC (ref 75–150)
KCT BLD-ACNC: 158 SEC (ref 75–150)
KCT BLD-ACNC: 158 SEC (ref 75–150)
KCT BLD-ACNC: 159 SEC (ref 75–150)
KCT BLD-ACNC: 159 SEC (ref 75–150)
KCT BLD-ACNC: 162 SEC (ref 75–150)
KCT BLD-ACNC: 163 SEC (ref 75–150)
KCT BLD-ACNC: 166 SEC (ref 75–150)
KCT BLD-ACNC: 171 SEC (ref 75–150)
LACTATE BLD-SCNC: 0.4 MMOL/L (ref 0.7–2)
LACTATE BLD-SCNC: 0.5 MMOL/L (ref 0.7–2)
LACTATE BLD-SCNC: 0.6 MMOL/L (ref 0.7–2)
LACTATE BLD-SCNC: 0.6 MMOL/L (ref 0.7–2)
LDH SERPL L TO P-CCNC: 385 U/L (ref 81–234)
LMWH PPP CHRO-ACNC: 0.2 IU/ML
LMWH PPP CHRO-ACNC: 0.27 IU/ML
LMWH PPP CHRO-ACNC: 0.28 IU/ML
LMWH PPP CHRO-ACNC: 0.3 IU/ML
Lab: ABNORMAL
Lab: NORMAL
MAGNESIUM SERPL-MCNC: 2.5 MG/DL (ref 1.6–2.3)
MAGNESIUM SERPL-MCNC: 2.5 MG/DL (ref 1.6–2.3)
MAGNESIUM SERPL-MCNC: 2.6 MG/DL (ref 1.6–2.3)
MAGNESIUM SERPL-MCNC: 2.6 MG/DL (ref 1.6–2.3)
MCH RBC QN AUTO: 28.9 PG (ref 26.5–33)
MCH RBC QN AUTO: 29 PG (ref 26.5–33)
MCH RBC QN AUTO: 29.1 PG (ref 26.5–33)
MCH RBC QN AUTO: 29.2 PG (ref 26.5–33)
MCHC RBC AUTO-ENTMCNC: 31.7 G/DL (ref 31.5–36.5)
MCHC RBC AUTO-ENTMCNC: 31.8 G/DL (ref 31.5–36.5)
MCHC RBC AUTO-ENTMCNC: 31.9 G/DL (ref 31.5–36.5)
MCHC RBC AUTO-ENTMCNC: 32 G/DL (ref 31.5–36.5)
MCV RBC AUTO: 90 FL (ref 78–100)
MCV RBC AUTO: 91 FL (ref 78–100)
MCV RBC AUTO: 92 FL (ref 78–100)
MCV RBC AUTO: 92 FL (ref 78–100)
NUM BPU REQUESTED: 1
NUM BPU REQUESTED: 6
O2/TOTAL GAS SETTING VFR VENT: 40 %
OXYHGB MFR BLD: 95 % (ref 92–100)
OXYHGB MFR BLD: 97 % (ref 92–100)
OXYHGB MFR BLD: 98 % (ref 92–100)
OXYHGB MFR BLDA: 98 % (ref 75–100)
OXYHGB MFR BLDA: 99 % (ref 75–100)
OXYHGB MFR BLDV: 86 %
OXYHGB MFR BLDV: 86 %
PCO2 BLD: 41 MM HG (ref 35–45)
PCO2 BLD: 43 MM HG (ref 35–45)
PCO2 BLD: 44 MM HG (ref 35–45)
PCO2 BLD: 44 MM HG (ref 35–45)
PCO2 BLD: 45 MM HG (ref 35–45)
PCO2 BLD: 45 MM HG (ref 35–45)
PCO2 BLD: 46 MM HG (ref 35–45)
PCO2 BLD: 46 MM HG (ref 35–45)
PCO2 BLD: 47 MM HG (ref 35–45)
PCO2 BLD: 47 MM HG (ref 35–45)
PCO2 BLDA: 45 MM HG (ref 35–45)
PCO2 BLDA: 53 MM HG (ref 35–45)
PCO2 BLDV: 53 MM HG (ref 40–50)
PCO2 BLDV: 55 MM HG (ref 40–50)
PH BLD: 7.42 PH (ref 7.35–7.45)
PH BLD: 7.43 PH (ref 7.35–7.45)
PH BLD: 7.43 PH (ref 7.35–7.45)
PH BLD: 7.44 PH (ref 7.35–7.45)
PH BLD: 7.45 PH (ref 7.35–7.45)
PH BLD: 7.46 PH (ref 7.35–7.45)
PH BLD: 7.47 PH (ref 7.35–7.45)
PH BLD: 7.52 PH (ref 7.35–7.45)
PH BLDA: 7.4 PH (ref 7.35–7.45)
PH BLDA: 7.4 PH (ref 7.35–7.45)
PH BLDV: 7.36 PH (ref 7.32–7.43)
PH BLDV: 7.39 PH (ref 7.32–7.43)
PHOSPHATE SERPL-MCNC: 2.7 MG/DL (ref 2.5–4.5)
PHOSPHATE SERPL-MCNC: 2.7 MG/DL (ref 2.5–4.5)
PHOSPHATE SERPL-MCNC: 3.3 MG/DL (ref 2.5–4.5)
PLATELET # BLD AUTO: 62 10E9/L (ref 150–450)
PLATELET # BLD AUTO: 66 10E9/L (ref 150–450)
PLATELET # BLD AUTO: 73 10E9/L (ref 150–450)
PLATELET # BLD AUTO: 86 10E9/L (ref 150–450)
PO2 BLD: 102 MM HG (ref 80–105)
PO2 BLD: 105 MM HG (ref 80–105)
PO2 BLD: 105 MM HG (ref 80–105)
PO2 BLD: 109 MM HG (ref 80–105)
PO2 BLD: 111 MM HG (ref 80–105)
PO2 BLD: 120 MM HG (ref 80–105)
PO2 BLD: 126 MM HG (ref 80–105)
PO2 BLD: 135 MM HG (ref 80–105)
PO2 BLD: 138 MM HG (ref 80–105)
PO2 BLD: 83 MM HG (ref 80–105)
PO2 BLDA: 241 MM HG (ref 80–105)
PO2 BLDA: 404 MM HG (ref 80–105)
PO2 BLDV: 54 MM HG (ref 25–47)
PO2 BLDV: 55 MM HG (ref 25–47)
POTASSIUM BLD-SCNC: 3.9 MMOL/L (ref 3.4–5.3)
POTASSIUM BLD-SCNC: 4 MMOL/L (ref 3.4–5.3)
POTASSIUM SERPL-SCNC: 3.6 MMOL/L (ref 3.4–5.3)
POTASSIUM SERPL-SCNC: 4.1 MMOL/L (ref 3.4–5.3)
POTASSIUM SERPL-SCNC: 4.1 MMOL/L (ref 3.4–5.3)
POTASSIUM SERPL-SCNC: 4.7 MMOL/L (ref 3.4–5.3)
PROT SERPL-MCNC: 5.6 G/DL (ref 6.8–8.8)
PROT SERPL-MCNC: 5.7 G/DL (ref 6.8–8.8)
RBC # BLD AUTO: 2.83 10E12/L (ref 3.8–5.2)
RBC # BLD AUTO: 2.95 10E12/L (ref 3.8–5.2)
RBC # BLD AUTO: 2.96 10E12/L (ref 3.8–5.2)
RBC # BLD AUTO: 3.01 10E12/L (ref 3.8–5.2)
SODIUM SERPL-SCNC: 144 MMOL/L (ref 133–144)
SODIUM SERPL-SCNC: 145 MMOL/L (ref 133–144)
SODIUM SERPL-SCNC: 146 MMOL/L (ref 133–144)
SODIUM SERPL-SCNC: 149 MMOL/L (ref 133–144)
SPECIMEN EXP DATE BLD: NORMAL
SPECIMEN SOURCE: ABNORMAL
SPECIMEN SOURCE: NORMAL
TRANSFUSION STATUS PATIENT QL: NORMAL
WBC # BLD AUTO: 6.4 10E9/L (ref 4–11)
WBC # BLD AUTO: 6.6 10E9/L (ref 4–11)
WBC # BLD AUTO: 6.8 10E9/L (ref 4–11)
WBC # BLD AUTO: 7.4 10E9/L (ref 4–11)

## 2020-02-02 PROCEDURE — 84132 ASSAY OF SERUM POTASSIUM: CPT | Performed by: INTERNAL MEDICINE

## 2020-02-02 PROCEDURE — 83735 ASSAY OF MAGNESIUM: CPT | Performed by: INTERNAL MEDICINE

## 2020-02-02 PROCEDURE — 82805 BLOOD GASES W/O2 SATURATION: CPT | Performed by: INTERNAL MEDICINE

## 2020-02-02 PROCEDURE — 85027 COMPLETE CBC AUTOMATED: CPT | Performed by: INTERNAL MEDICINE

## 2020-02-02 PROCEDURE — P9037 PLATE PHERES LEUKOREDU IRRAD: HCPCS | Performed by: STUDENT IN AN ORGANIZED HEALTH CARE EDUCATION/TRAINING PROGRAM

## 2020-02-02 PROCEDURE — 82330 ASSAY OF CALCIUM: CPT | Performed by: INTERNAL MEDICINE

## 2020-02-02 PROCEDURE — 40000275 ZZH STATISTIC RCP TIME EA 10 MIN

## 2020-02-02 PROCEDURE — 25800030 ZZH RX IP 258 OP 636: Performed by: STUDENT IN AN ORGANIZED HEALTH CARE EDUCATION/TRAINING PROGRAM

## 2020-02-02 PROCEDURE — 33949 ECMO/ECLS DAILY MGMT ARTERY: CPT

## 2020-02-02 PROCEDURE — 40000076 ZZH STATISTIC IABP MONITORING

## 2020-02-02 PROCEDURE — 85384 FIBRINOGEN ACTIVITY: CPT | Performed by: INTERNAL MEDICINE

## 2020-02-02 PROCEDURE — 85730 THROMBOPLASTIN TIME PARTIAL: CPT | Performed by: INTERNAL MEDICINE

## 2020-02-02 PROCEDURE — 40000014 ZZH STATISTIC ARTERIAL MONITORING DAILY

## 2020-02-02 PROCEDURE — 33949 ECMO/ECLS DAILY MGMT ARTERY: CPT | Mod: GC | Performed by: INTERNAL MEDICINE

## 2020-02-02 PROCEDURE — 80053 COMPREHEN METABOLIC PANEL: CPT | Performed by: INTERNAL MEDICINE

## 2020-02-02 PROCEDURE — 00000146 ZZHCL STATISTIC GLUCOSE BY METER IP

## 2020-02-02 PROCEDURE — 71045 X-RAY EXAM CHEST 1 VIEW: CPT

## 2020-02-02 PROCEDURE — 25000128 H RX IP 250 OP 636: Performed by: INTERNAL MEDICINE

## 2020-02-02 PROCEDURE — P9016 RBC LEUKOCYTES REDUCED: HCPCS | Performed by: STUDENT IN AN ORGANIZED HEALTH CARE EDUCATION/TRAINING PROGRAM

## 2020-02-02 PROCEDURE — 85520 HEPARIN ASSAY: CPT | Performed by: STUDENT IN AN ORGANIZED HEALTH CARE EDUCATION/TRAINING PROGRAM

## 2020-02-02 PROCEDURE — 93325 DOPPLER ECHO COLOR FLOW MAPG: CPT | Mod: 26 | Performed by: INTERNAL MEDICINE

## 2020-02-02 PROCEDURE — 83735 ASSAY OF MAGNESIUM: CPT | Performed by: STUDENT IN AN ORGANIZED HEALTH CARE EDUCATION/TRAINING PROGRAM

## 2020-02-02 PROCEDURE — 83615 LACTATE (LD) (LDH) ENZYME: CPT | Performed by: STUDENT IN AN ORGANIZED HEALTH CARE EDUCATION/TRAINING PROGRAM

## 2020-02-02 PROCEDURE — 85379 FIBRIN DEGRADATION QUANT: CPT | Performed by: STUDENT IN AN ORGANIZED HEALTH CARE EDUCATION/TRAINING PROGRAM

## 2020-02-02 PROCEDURE — 85520 HEPARIN ASSAY: CPT | Performed by: INTERNAL MEDICINE

## 2020-02-02 PROCEDURE — 85384 FIBRINOGEN ACTIVITY: CPT | Performed by: STUDENT IN AN ORGANIZED HEALTH CARE EDUCATION/TRAINING PROGRAM

## 2020-02-02 PROCEDURE — 84100 ASSAY OF PHOSPHORUS: CPT | Performed by: STUDENT IN AN ORGANIZED HEALTH CARE EDUCATION/TRAINING PROGRAM

## 2020-02-02 PROCEDURE — 93308 TTE F-UP OR LMTD: CPT

## 2020-02-02 PROCEDURE — 85027 COMPLETE CBC AUTOMATED: CPT | Performed by: STUDENT IN AN ORGANIZED HEALTH CARE EDUCATION/TRAINING PROGRAM

## 2020-02-02 PROCEDURE — 20000004 ZZH R&B ICU UMMC

## 2020-02-02 PROCEDURE — 25000125 ZZHC RX 250: Performed by: STUDENT IN AN ORGANIZED HEALTH CARE EDUCATION/TRAINING PROGRAM

## 2020-02-02 PROCEDURE — 25000128 H RX IP 250 OP 636: Performed by: STUDENT IN AN ORGANIZED HEALTH CARE EDUCATION/TRAINING PROGRAM

## 2020-02-02 PROCEDURE — 85610 PROTHROMBIN TIME: CPT | Performed by: STUDENT IN AN ORGANIZED HEALTH CARE EDUCATION/TRAINING PROGRAM

## 2020-02-02 PROCEDURE — 94003 VENT MGMT INPAT SUBQ DAY: CPT

## 2020-02-02 PROCEDURE — 85730 THROMBOPLASTIN TIME PARTIAL: CPT | Performed by: STUDENT IN AN ORGANIZED HEALTH CARE EDUCATION/TRAINING PROGRAM

## 2020-02-02 PROCEDURE — 25000132 ZZH RX MED GY IP 250 OP 250 PS 637: Performed by: STUDENT IN AN ORGANIZED HEALTH CARE EDUCATION/TRAINING PROGRAM

## 2020-02-02 PROCEDURE — 85347 COAGULATION TIME ACTIVATED: CPT

## 2020-02-02 PROCEDURE — 85652 RBC SED RATE AUTOMATED: CPT | Performed by: STUDENT IN AN ORGANIZED HEALTH CARE EDUCATION/TRAINING PROGRAM

## 2020-02-02 PROCEDURE — 84100 ASSAY OF PHOSPHORUS: CPT | Performed by: INTERNAL MEDICINE

## 2020-02-02 PROCEDURE — 82947 ASSAY GLUCOSE BLOOD QUANT: CPT | Performed by: INTERNAL MEDICINE

## 2020-02-02 PROCEDURE — 25000132 ZZH RX MED GY IP 250 OP 250 PS 637: Performed by: INTERNAL MEDICINE

## 2020-02-02 PROCEDURE — 83051 HEMOGLOBIN PLASMA: CPT | Performed by: STUDENT IN AN ORGANIZED HEALTH CARE EDUCATION/TRAINING PROGRAM

## 2020-02-02 PROCEDURE — 27210437 ZZH NUTRITION PRODUCT SEMIELEM INTERMED LITER

## 2020-02-02 PROCEDURE — 99291 CRITICAL CARE FIRST HOUR: CPT | Mod: 25 | Performed by: INTERNAL MEDICINE

## 2020-02-02 PROCEDURE — 93005 ELECTROCARDIOGRAM TRACING: CPT

## 2020-02-02 PROCEDURE — 83605 ASSAY OF LACTIC ACID: CPT | Performed by: INTERNAL MEDICINE

## 2020-02-02 PROCEDURE — 80048 BASIC METABOLIC PNL TOTAL CA: CPT | Performed by: INTERNAL MEDICINE

## 2020-02-02 PROCEDURE — 85300 ANTITHROMBIN III ACTIVITY: CPT | Performed by: STUDENT IN AN ORGANIZED HEALTH CARE EDUCATION/TRAINING PROGRAM

## 2020-02-02 PROCEDURE — 85379 FIBRIN DEGRADATION QUANT: CPT | Performed by: INTERNAL MEDICINE

## 2020-02-02 PROCEDURE — 93321 DOPPLER ECHO F-UP/LMTD STD: CPT | Mod: 26 | Performed by: INTERNAL MEDICINE

## 2020-02-02 PROCEDURE — 80053 COMPREHEN METABOLIC PANEL: CPT | Performed by: STUDENT IN AN ORGANIZED HEALTH CARE EDUCATION/TRAINING PROGRAM

## 2020-02-02 PROCEDURE — 93308 TTE F-UP OR LMTD: CPT | Mod: 26 | Performed by: INTERNAL MEDICINE

## 2020-02-02 PROCEDURE — 93010 ELECTROCARDIOGRAM REPORT: CPT | Performed by: INTERNAL MEDICINE

## 2020-02-02 PROCEDURE — 85610 PROTHROMBIN TIME: CPT | Performed by: INTERNAL MEDICINE

## 2020-02-02 PROCEDURE — 86140 C-REACTIVE PROTEIN: CPT | Performed by: STUDENT IN AN ORGANIZED HEALTH CARE EDUCATION/TRAINING PROGRAM

## 2020-02-02 RX ORDER — AMOXICILLIN 250 MG
1 CAPSULE ORAL AT BEDTIME
Status: DISCONTINUED | OUTPATIENT
Start: 2020-02-02 | End: 2020-02-11 | Stop reason: HOSPADM

## 2020-02-02 RX ORDER — HEPARIN SODIUM 10000 [USP'U]/100ML
10-50 INJECTION, SOLUTION INTRAVENOUS CONTINUOUS
Status: DISCONTINUED | OUTPATIENT
Start: 2020-02-02 | End: 2020-02-02

## 2020-02-02 RX ORDER — HEPARIN SODIUM 10000 [USP'U]/100ML
10-50 INJECTION, SOLUTION INTRAVENOUS CONTINUOUS
Status: DISCONTINUED | OUTPATIENT
Start: 2020-02-03 | End: 2020-02-03

## 2020-02-02 RX ORDER — ACETAMINOPHEN AND CODEINE PHOSPHATE 120; 12 MG/5ML; MG/5ML
0.35 SOLUTION ORAL DAILY
Status: DISCONTINUED | OUTPATIENT
Start: 2020-02-03 | End: 2020-02-11 | Stop reason: HOSPADM

## 2020-02-02 RX ADMIN — SENNOSIDES AND DOCUSATE SODIUM 1 TABLET: 8.6; 5 TABLET ORAL at 20:18

## 2020-02-02 RX ADMIN — PROPOFOL 30 MCG/KG/MIN: 10 INJECTION, EMULSION INTRAVENOUS at 08:36

## 2020-02-02 RX ADMIN — OSELTAMIVIR PHOSPHATE 75 MG: 6 POWDER, FOR SUSPENSION ORAL at 07:42

## 2020-02-02 RX ADMIN — Medication 40 MG: at 07:40

## 2020-02-02 RX ADMIN — DEXMEDETOMIDINE 0.5 MCG/KG/HR: 100 INJECTION, SOLUTION, CONCENTRATE INTRAVENOUS at 15:21

## 2020-02-02 RX ADMIN — PROPOFOL 30 MCG/KG/MIN: 10 INJECTION, EMULSION INTRAVENOUS at 21:24

## 2020-02-02 RX ADMIN — Medication 100 MCG/HR: at 16:52

## 2020-02-02 RX ADMIN — DEXMEDETOMIDINE 0.5 MCG/KG/HR: 100 INJECTION, SOLUTION, CONCENTRATE INTRAVENOUS at 05:00

## 2020-02-02 RX ADMIN — HEPARIN SODIUM 1400 UNITS/HR: 10000 INJECTION, SOLUTION INTRAVENOUS at 10:50

## 2020-02-02 RX ADMIN — POTASSIUM CHLORIDE 20 MEQ: 1.5 POWDER, FOR SOLUTION ORAL at 02:43

## 2020-02-02 RX ADMIN — SODIUM PHOSPHATE, MONOBASIC, MONOHYDRATE AND SODIUM PHOSPHATE, DIBASIC, ANHYDROUS 10 MMOL: 276; 142 INJECTION, SOLUTION INTRAVENOUS at 06:50

## 2020-02-02 RX ADMIN — POLYETHYLENE GLYCOL 3350 17 G: 17 POWDER, FOR SOLUTION ORAL at 09:17

## 2020-02-02 RX ADMIN — NORETHINDRONE 0.35 MG: 0.35 TABLET ORAL at 07:49

## 2020-02-02 RX ADMIN — SODIUM PHOSPHATE, MONOBASIC, MONOHYDRATE AND SODIUM PHOSPHATE, DIBASIC, ANHYDROUS 10 MMOL: 276; 142 INJECTION, SOLUTION INTRAVENOUS at 23:41

## 2020-02-02 RX ADMIN — PROPOFOL 30 MCG/KG/MIN: 10 INJECTION, EMULSION INTRAVENOUS at 15:21

## 2020-02-02 RX ADMIN — MULTIVITAMIN 15 ML: LIQUID ORAL at 07:48

## 2020-02-02 RX ADMIN — POTASSIUM CHLORIDE 20 MEQ: 29.8 INJECTION, SOLUTION INTRAVENOUS at 10:50

## 2020-02-02 RX ADMIN — FUROSEMIDE 40 MG: 10 INJECTION, SOLUTION INTRAVENOUS at 07:00

## 2020-02-02 RX ADMIN — POTASSIUM CHLORIDE 20 MEQ: 1.5 POWDER, FOR SOLUTION ORAL at 21:21

## 2020-02-02 RX ADMIN — OSELTAMIVIR PHOSPHATE 75 MG: 6 POWDER, FOR SUSPENSION ORAL at 20:17

## 2020-02-02 ASSESSMENT — ACTIVITIES OF DAILY LIVING (ADL)
ADLS_ACUITY_SCORE: 18
ADLS_ACUITY_SCORE: 22
ADLS_ACUITY_SCORE: 18

## 2020-02-02 ASSESSMENT — MIFFLIN-ST. JEOR: SCORE: 1651.13

## 2020-02-02 NOTE — PROGRESS NOTES
ECMO Shift Summary:      ECMO Equipment:  Console serial number: [id number]  Circuit Lot number: [id number]  Oxygenator Lot number: [id number]        Patient remains on VA ECMO, all equipment is functioning and alarms are appropriately set. RPM's 3200 with flow range 3.41-3.49 L/min. Sweep gas is at 1 LPM and FiO2 60%. Circuit remains free of air, clot and fibrin. Cannulas are secure with no bleeding from site. Extremities are warm. Suctioned ETT for moderate yellow thick.    Significant Shift Events: Turn down with ISAURA done. See MD note. EF stayed within normal limits through.     Vent settings:  Ventilation Mode: CMV/AC  (Continuous Mandatory Ventilation/ Assist Control)  FiO2 (%): 40 %  Rate Set (breaths/minute): 12 breaths/min  Tidal Volume Set (mL): 400 mL  PEEP (cm H2O): 5 cmH2O  Oxygen Concentration (%): 40 %  Resp: 12  .    Heparin is running at 1400 u/hr, ACT range 158-159.    Urine output is as charted, blood loss was large amounts dripping down cannula site. MD aware.. Product given included 1 unit of PRBC.      Intake/Output Summary (Last 24 hours) at 2/2/2020 1426  Last data filed at 2/2/2020 1400  Gross per 24 hour   Intake 4203.18 ml   Output 5300 ml   Net -1096.82 ml       ECHO:  No results found for this or any previous visit.No results found for this or any previous visit.    CXR:  Recent Results (from the past 24 hour(s))   XR Chest Port 1 View    Narrative    EXAMINATION: XR CHEST PORT 1 VW, 2/2/2020 1:38 AM    INDICATION: ET placement    COMPARISON: 2/1/2020    FINDINGS:   Stable endotracheal tube, ECMO, enteric tubes, and left IJ central  venous line. Stable IABP. Right PICC is at the innominate confluence.    Stable hazy opacities with left pleural effusion and retrocardiac  opacity. No pneumothorax.      Impression    IMPRESSION:  1. Unchanged mild pulmonary edema.  2. Endotracheal tube at the midthoracic trachea..    I have personally reviewed the examination and initial interpretation  and I  agree with the findings.    GET CASTRO MD       Labs:  Recent Labs   Lab 02/02/20  1249 02/02/20  0948 02/02/20  0822 02/02/20  0629   PH 7.45 7.47* 7.45 7.43   PCO2 46* 45 45 44   PO2 105 120* 111* 83   HCO3 32* 33* 31* 29*   O2PER 40 40 40 40       Lab Results   Component Value Date    HGB 8.2 (L) 02/02/2020    PHGB 60 (H) 02/02/2020    PLT 73 (L) 02/02/2020    FIBR 711 (H) 02/02/2020    INR 1.01 02/02/2020    PTT 66 (H) 02/02/2020    DD 2.8 (H) 02/02/2020    AXA 0.28 02/02/2020    ANTCH 71 (L) 02/02/2020         Plan is decannulate tomorrow AM.      Rosalba Palmer, RT  2/2/2020 2:26 PM

## 2020-02-02 NOTE — PROGRESS NOTES
ECMO Shift Summary:      ECMO Equipment:  Console serial number: [id number]  Circuit Lot number: [id number]  Oxygenator Lot number: [id number]      Patient remains on VA ECMO, all equipment is functioning and alarms are appropriately set. RPM's 3450 with flow range 4.17 to 4.34 L/min. Sweep gas is at 0.5 LPM and FiO2 60%. Circuit remains free of air, clot and fibrin. Cannulas are secure with no bleeding from site. Extremities are warm. Suctioned ETT for thick yellow sputum.    Significant Shift Events: Informal turndown done. The plan is to do a formal turndown on Monday.    Vent settings:  Ventilation Mode: CMV/AC  (Continuous Mandatory Ventilation/ Assist Control)  FiO2 (%): 40 %  Rate Set (breaths/minute): 12 breaths/min  Tidal Volume Set (mL): 400 mL  PEEP (cm H2O): 5 cmH2O  Oxygen Concentration (%): (S) 40 %  Resp: 23  .    Heparin is running at 1400 u/hr, ACT range 167 to 175 seconds.    Urine output is 1300 ml per shift, blood loss was small. No product was given.      Intake/Output Summary (Last 24 hours) at 2/1/2020 1825  Last data filed at 2/1/2020 1800  Gross per 24 hour   Intake 3377.82 ml   Output 2491 ml   Net 886.82 ml       ECHO:  No results found for this or any previous visit.No results found for this or any previous visit.    CXR:  Recent Results (from the past 24 hour(s))   XR Chest Port 1 View    Narrative    EXAMINATION: XR CHEST PORT 1 VW, 2/1/2020 3:31 AM    INDICATION: ET placement    COMPARISON: 1/31/2020    FINDINGS:   Stable endotracheal tube, ECMO, enteric tubes, and left IJ central  venous line. Stable IABP. Right PICC is at the innominate confluence.    Stable hazy opacities with left pleural effusion and retrocardiac  opacity. No pneumothorax.      Impression    IMPRESSION:  1. Unchanged mild pulmonary edema.  2. Right-sided PICC tip at the innominate confluence, otherwise  unchanged support devices.    I have personally reviewed the examination and initial interpretation  and I agree  with the findings.    MISSY SYLVESTER MD       Labs:  Recent Labs   Lab 02/01/20  1746 02/01/20  1559 02/01/20  1411 02/01/20  1148   PH 7.42 7.41 7.43 7.42   PCO2 43 43 41 42   PO2 102 115* 117* 104   HCO3 28 28 27 27   O2PER 40 50 50 50       Lab Results   Component Value Date    HGB 8.1 (L) 02/01/2020    PHGB 40 (H) 02/01/2020    PLT 70 (L) 02/01/2020    FIBR 701 (H) 02/01/2020    INR 1.05 02/01/2020    PTT 84 (H) 02/01/2020    DD 1.9 (H) 02/01/2020    AXA 0.38 02/01/2020    ANTCH 58 (L) 02/01/2020         Plan is to remain on V-A ECMO, having a formal turndown soon.      Rio Agrawal, ECMO Specialist, RRT  2/1/2020 6:25 PM

## 2020-02-02 NOTE — PLAN OF CARE
Assessment: VA ECMO.    Neuro: JULIO CESAR orientation as patient is intubated, does follow minimal commands. PERRL, pupils 2 mm b/l. Normothermic.   CV: SR/ST, HR 80s-130s. Pt becomes tachycardic with cares. MAPs stable, patient also becomes hypertensive with cares. Good pulses in all extremities.   ECMO: Speed 3200, Flows 3.5 LPM, Sweep gas @1 LPM. 1U PRBC, 1U PLT given. Continual oozing from cannula site.  IABP: 1:1, 100% augmentation, pressures 80s-100s.  Pulm: LS coarse, CMV 40%/RR 12//PEEP 5/PIP 19-22. Thick, tan secretions via ETT.   GI: JULIO CESAR BS d/t IABP, no BM this shift. OG to LIS, moderate output.   : UOP initially robust d/t Lasix IVP; tapered to 60 ml/hr. Large, golf ball sized clot from patients' vagina present.  Lytes: K 40 meq, NaPhos 10 mmol replaced.

## 2020-02-02 NOTE — PROGRESS NOTES
[facility] Cardiology Progress Note           Assessment and Plan:     Ms. Tom is a 34 year old with a PMHx of Tobacco Abuse and Bipolar Disorder who presents with cardiogenic shock likely from viral myocarditis. She is currently on VA-ECMO support given severe bi-ventricular dysfunction.     #Cardiogenic Shock on VA-ECMO from Viral Myocarditis   -Patient presented with sinus tachycardia with low pulse pressure despite IABP placement. Therefore, VA-ECMO was placed. Continue VA-ECMO at 4 Liters/Minute.   - Off pressors  - ECMO turndown today @1.5LPM w/o IABP revealed EF: 60-65% with trace pericardial effusion. Plan for Decannulation tomorrow.  - Continue IABP 1:1 to help assist with LV afterload. Will keep IABP on for one day after decannulation   -Overall, the best case scenario is for cardiac recovery. She is not a transplant candidate given her active smoking. After 1 week on VA-ECMO support and patient is still requiring ECMO to maintain end organ perfusion, we can consider placing Subclavian Impella 5.0 to bridge towards recovery. If there is no sign of recovery, our only option would be LVAD. However with her destination to decannulation impending, I imagine that we will not have to pursue advanced therapies presently.     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  -Start Lasix 40mg IV BID for goal net even. If she does have issues with chugging, we will hold Lasix.    #Hypoxic Respiratory Failure  -Patient was electively intubated for VA-ECMO.   -Continue to wean FiO2 on circuit and ventilator.  -Goal Oxygen Saturation >90%    #Pericardial Effusion   -Likely related to myopericarditis.  -No signs of tamponade currently; however, physiology is difficult to assess since patient is on VA-ECMO. If patient is having significant chugging episodes or becomes progressively hypotensive, will consider pericardial effusion.     #Thrombocytopenia  -Patient's Plt  at [facility] was 150, currently around 65. Patient has multiple reasons to have TCT including ECMO and IABP.  -If continues to decrease and/or patient is having more issues with clotting (has already clotted off the oxygenator x 1), will send for HIT panel.    -Goal Platelet for transfusion is around 80.   - 4T:3    #Hyperclycemia   -Continue Insulin drip.     #Extravasation of Blood on Left Arm   -Continue to monitor. Wound care has no active recommendations at this time.    Catarina Yoo MD  Cardiology Fellow  PGY4       Subjective:     Obtained ECMO turndown today              Review of Systems:   A comprehensive review of systems was performed and found to be negative except as described in this note          Medications:     Current Facility-Administered Medications   Medication     acetaminophen (TYLENOL) Suppository 650 mg     acetaminophen (TYLENOL) tablet 650 mg     alum & mag hydroxide-simethicone (MYLANTA ES/MAALOX  ES) suspension 30 mL     artificial tears ophthalmic ointment     bisacodyl (DULCOLAX) Suppository 10 mg     dexmedetomidine (PRECEDEX) 400 mcg in 0.9% sodium chloride 100 mL     dextrose 10% infusion     dextrose 10% infusion     dextrose 50 % injection 25-50 mL    Or     glucagon injection 1 mg     fentaNYL (PF) (SUBLIMAZE) injection 25 mcg     fentaNYL (SUBLIMAZE) infusion     furosemide (LASIX) injection 40 mg     heparin 2 unit/mL in 0.9% NaCl (for REPERFUSION CATHETER)     heparin 25,000 units in 0.45% NaCl 250 mL ANTICOAGULANT  infusion     heparin lock flush 10 UNIT/ML injection 2-5 mL     HYDROmorphone (DILAUDID) injection 0.2 mg     insulin 1 unit/mL in saline (NovoLIN, HumuLIN Regular) drip - ADULT IV Infusion     lidocaine (LMX4) cream     lidocaine 1 % 0.1-1 mL     magnesium sulfate 2 g in NS intermittent infusion (PharMEDium or FV Cmpd)     magnesium sulfate 4 g in 100 mL sterile water (premade)     medication instruction     metoclopramide (REGLAN) injection 10 mg     multivitamins  w/minerals (CERTAVITE) liquid 15 mL     naloxone (NARCAN) injection 0.1-0.4 mg     norepinephrine (LEVOPHED) 16 mg in  mL infusion     [START ON 2/3/2020] norethindrone (MICRONOR) 0.35 MG per tablet 0.35 mg     ondansetron (ZOFRAN) injection 4 mg     oseltamivir (TAMIFLU) suspension 75 mg     pantoprazole (PROTONIX) 2 mg/mL suspension 40 mg     Patient is already receiving anticoagulation with heparin, enoxaparin (LOVENOX), warfarin (COUMADIN)  or other anticoagulant medication     polyethylene glycol (MIRALAX/GLYCOLAX) Packet 17 g     potassium chloride (KLOR-CON) Packet 20-40 mEq     potassium chloride 10 mEq in 100 mL intermittent infusion with 10 mg lidocaine     potassium chloride 10 mEq in 100 mL sterile water intermittent infusion (premix)     potassium chloride 20 mEq in 50 mL intermittent infusion     potassium chloride 20 mEq in 50 mL intermittent infusion     potassium chloride ER (K-DUR/KLOR-CON M) CR tablet 20-40 mEq     propofol (DIPRIVAN) bolus from infusion pump 10-20 mg     propofol (DIPRIVAN) infusion     Reason ACE/ARB/ARNI order not selected     Reason beta blocker not prescribed     senna-docusate (SENOKOT-S/PERICOLACE) 8.6-50 MG per tablet 1 tablet     sennosides (SENOKOT) tablet 8.6 mg     sodium chloride (PF) 0.9% PF flush 5-50 mL     sodium phosphate 10 mmol in D5W intermittent infusion     sodium phosphate 15 mmol in D5W intermittent infusion     sodium phosphate 20 mmol in D5W intermittent infusion     sodium phosphate 25 mmol in D5W intermittent infusion     vasopressin (VASOSTRICT) 40 Units in D5W 40 mL infusion               Objective:   Temp: 98.4  F (36.9  C) Temp src: Esophageal    Heart Rate: 85 Resp: 13 SpO2: 100 % O2 Device: Mechanical Ventilator           Physical Exam:   Vitals were reviewed  Temp: 98.4  F (36.9  C) Temp src: Esophageal    Heart Rate: 85 Resp: 13 SpO2: 100 % O2 Device: Mechanical Ventilator      Gen: Intubated and Sedated   HEENT: ET Tube in Place  CV:  RRR  Pulm: Coarse Breath Sounds   Ext: No edema   Neuro: No focal defects           Data:     Lab Results   Component Value Date    WBC 7.4 02/02/2020    HGB 8.2 (L) 02/02/2020    HCT 25.8 (L) 02/02/2020    PLT 73 (L) 02/02/2020     (H) 02/02/2020    POTASSIUM 3.6 02/02/2020    CHLORIDE 110 (H) 02/02/2020    CO2 34 (H) 02/02/2020    BUN 28 02/02/2020    CR 0.67 02/02/2020     (H) 02/02/2020    SED 82 (H) 02/02/2020    DD 2.8 (H) 02/02/2020    TROPI 4.071 (HH) 01/30/2020    AST 31 02/02/2020    ALT 16 02/02/2020    ALKPHOS 73 02/02/2020    BILITOTAL 1.6 (H) 02/02/2020    INR 1.01 02/02/2020

## 2020-02-02 NOTE — PROGRESS NOTES
ECMO [place] STUDY    Inotropes/Pressors:  None    Flow  BP  MAP HR IABP  3.5 120/52  85 82 1:1     3.0 105/69  88 83 1:1  2.5 86/58  93 83 1:1  2.0 88/61  72 91 1:1  1.5 90/64  78 92 1:1      1.5 98/67  83 92 standby         Please see TTE for EF and AV     Catarina Yoo MD  Cardiology Fellow  PGY4    February 2, 2020

## 2020-02-02 NOTE — PROGRESS NOTES
ECMO Shift Summary:      ECMO Equipment:  Console serial number: [id number]  Circuit Lot number: [id number]  Oxygenator Lot number: [id number]        Patient remains on VA ECMO, all equipment is functioning and alarms are appropriately set. RPM's 3200 (turned down at start of shift by Staff Attending) with flow range 3.50-3.69 L/min. Sweep gas is at 1 LPM and FiO2 60%. Circuit remains free of air and clot, but fibrin is appreciated a tiny bit at connection sites and oxy. Cannulas are secure with large amount of bleeding traveling down cannulas and draining at end of dressing. Also large clots coming out of vagina. Extremities are warm and well perfused.    Significant Shift Events: increase in bleeding down leg with large amounts of clot in vaginal area    Vent settings:  Ventilation Mode: CMV/AC  (Continuous Mandatory Ventilation/ Assist Control)  FiO2 (%): 40 %  Rate Set (breaths/minute): 12 breaths/min  Tidal Volume Set (mL): 400 mL  PEEP (cm H2O): 5 cmH2O  Oxygen Concentration (%): 40 %  Resp: 12  .    Heparin is running at 1400 units/hr, ACT goal 160-180, range 163-171.    Urine output is as charted, blood loss was increasing. Product given included 1 unit of PRBCs and 1 unit of platelets for 62 this am.       Intake/Output Summary (Last 24 hours) at 2/2/2020 0438  Last data filed at 2/2/2020 0400  Gross per 24 hour   Intake 3746.07 ml   Output 3985 ml   Net -238.93 ml       ECHO:  No results found for this or any previous visit.No results found for this or any previous visit.    CXR:  Recent Results (from the past 24 hour(s))   XR Chest Port 1 View    Narrative    EXAMINATION: XR CHEST PORT 1 , 2/2/2020 1:38 AM    INDICATION: ET placement    COMPARISON: 2/1/2020    FINDINGS:   Stable endotracheal tube, ECMO, enteric tubes, and left IJ central  venous line. Stable IABP. Right PICC is at the innominate confluence.    Stable hazy opacities with left pleural effusion and retrocardiac  opacity. No pneumothorax.       Impression    IMPRESSION:  1. Unchanged mild pulmonary edema.  2. Endotracheal tube at the midthoracic trachea..         Labs:  Recent Labs   Lab 02/02/20  0405 02/02/20  0213 02/02/20  0002 02/01/20  2209   PH 7.43 7.44 7.42 7.44   PCO2 44 43 46* 43   PO2 109* 102 126* 132*   HCO3 30* 29* 30* 29*   O2PER 40 40 40 40       Lab Results   Component Value Date    HGB 8.1 (L) 02/01/2020    PHGB 40 (H) 02/01/2020    PLT 70 (L) 02/01/2020    FIBR 701 (H) 02/01/2020    INR 1.02 02/01/2020    PTT 77 (H) 02/01/2020    DD 1.9 (H) 02/01/2020    AXA 0.28 02/01/2020    ANTCH 58 (L) 02/01/2020         Plan is for a formal turn down possible today. Remain on VA ECMO and continue to support and wean as tolerated.       Alka Borges RN  2/2/2020 4:38 AM

## 2020-02-03 ENCOUNTER — APPOINTMENT (OUTPATIENT)
Dept: CT IMAGING | Facility: CLINIC | Age: 35
DRG: 003 | End: 2020-02-03
Attending: INTERNAL MEDICINE
Payer: COMMERCIAL

## 2020-02-03 ENCOUNTER — APPOINTMENT (OUTPATIENT)
Dept: CARDIOLOGY | Facility: CLINIC | Age: 35
DRG: 003 | End: 2020-02-03
Attending: INTERNAL MEDICINE
Payer: COMMERCIAL

## 2020-02-03 ENCOUNTER — APPOINTMENT (OUTPATIENT)
Dept: GENERAL RADIOLOGY | Facility: CLINIC | Age: 35
DRG: 003 | End: 2020-02-03
Attending: STUDENT IN AN ORGANIZED HEALTH CARE EDUCATION/TRAINING PROGRAM
Payer: COMMERCIAL

## 2020-02-03 ENCOUNTER — ANESTHESIA (OUTPATIENT)
Dept: SURGERY | Facility: CLINIC | Age: 35
DRG: 003 | End: 2020-02-03
Payer: COMMERCIAL

## 2020-02-03 LAB
ABO + RH BLD: NORMAL
ABO + RH BLD: NORMAL
ALBUMIN SERPL-MCNC: 2.2 G/DL (ref 3.4–5)
ALP SERPL-CCNC: 70 U/L (ref 40–150)
ALT SERPL W P-5'-P-CCNC: 23 U/L (ref 0–50)
ANION GAP SERPL CALCULATED.3IONS-SCNC: 2 MMOL/L (ref 3–14)
ANION GAP SERPL CALCULATED.3IONS-SCNC: 2 MMOL/L (ref 3–14)
ANION GAP SERPL CALCULATED.3IONS-SCNC: <1 MMOL/L (ref 3–14)
APTT PPP: 31 SEC (ref 22–37)
APTT PPP: 33 SEC (ref 22–37)
APTT PPP: 59 SEC (ref 22–37)
AST SERPL W P-5'-P-CCNC: 33 U/L (ref 0–45)
AT III ACT/NOR PPP CHRO: 74 % (ref 85–135)
BACTERIA SPEC CULT: NORMAL
BASE EXCESS BLDA CALC-SCNC: 3.6 MMOL/L
BASE EXCESS BLDA CALC-SCNC: 5.8 MMOL/L
BASE EXCESS BLDA CALC-SCNC: 5.9 MMOL/L
BASE EXCESS BLDA CALC-SCNC: 6 MMOL/L
BASE EXCESS BLDA CALC-SCNC: 6 MMOL/L
BASE EXCESS BLDA CALC-SCNC: 7 MMOL/L
BASE EXCESS BLDA CALC-SCNC: 7 MMOL/L
BASE EXCESS BLDA CALC-SCNC: 7.1 MMOL/L
BASE EXCESS BLDA CALC-SCNC: 7.6 MMOL/L
BASE EXCESS BLDV CALC-SCNC: 5.6 MMOL/L
BASE EXCESS BLDV CALC-SCNC: 6.5 MMOL/L
BASE EXCESS BLDV CALC-SCNC: 8.5 MMOL/L
BASOPHILS # BLD AUTO: 0 10E9/L (ref 0–0.2)
BASOPHILS NFR BLD AUTO: 0.1 %
BILIRUB SERPL-MCNC: 1.5 MG/DL (ref 0.2–1.3)
BLD GP AB SCN SERPL QL: NORMAL
BLD PROD TYP BPU: NORMAL
BLD UNIT ID BPU: 0
BLOOD BANK CMNT PATIENT-IMP: NORMAL
BLOOD PRODUCT CODE: NORMAL
BPU ID: NORMAL
BUN SERPL-MCNC: 21 MG/DL (ref 7–30)
BUN SERPL-MCNC: 25 MG/DL (ref 7–30)
BUN SERPL-MCNC: 27 MG/DL (ref 7–30)
CA-I BLD-MCNC: 4.4 MG/DL (ref 4.4–5.2)
CA-I BLD-MCNC: 4.4 MG/DL (ref 4.4–5.2)
CA-I BLD-MCNC: 4.5 MG/DL (ref 4.4–5.2)
CA-I BLD-MCNC: 4.5 MG/DL (ref 4.4–5.2)
CA-I BLD-MCNC: 4.6 MG/DL (ref 4.4–5.2)
CALCIUM SERPL-MCNC: 7.7 MG/DL (ref 8.5–10.1)
CALCIUM SERPL-MCNC: 7.9 MG/DL (ref 8.5–10.1)
CALCIUM SERPL-MCNC: 8 MG/DL (ref 8.5–10.1)
CHLORIDE SERPL-SCNC: 114 MMOL/L (ref 94–109)
CO2 SERPL-SCNC: 31 MMOL/L (ref 20–32)
CO2 SERPL-SCNC: 32 MMOL/L (ref 20–32)
CO2 SERPL-SCNC: 33 MMOL/L (ref 20–32)
CREAT SERPL-MCNC: 0.54 MG/DL (ref 0.52–1.04)
CREAT SERPL-MCNC: 0.55 MG/DL (ref 0.52–1.04)
CREAT SERPL-MCNC: 0.57 MG/DL (ref 0.52–1.04)
CRP SERPL-MCNC: 100 MG/L (ref 0–8)
D DIMER PPP FEU-MCNC: 10.1 UG/ML FEU (ref 0–0.5)
DIFFERENTIAL METHOD BLD: ABNORMAL
EOSINOPHIL # BLD AUTO: 0.1 10E9/L (ref 0–0.7)
EOSINOPHIL NFR BLD AUTO: 0.7 %
ERYTHROCYTE [DISTWIDTH] IN BLOOD BY AUTOMATED COUNT: 16 % (ref 10–15)
ERYTHROCYTE [DISTWIDTH] IN BLOOD BY AUTOMATED COUNT: 16.1 % (ref 10–15)
ERYTHROCYTE [DISTWIDTH] IN BLOOD BY AUTOMATED COUNT: 16.2 % (ref 10–15)
ERYTHROCYTE [SEDIMENTATION RATE] IN BLOOD BY WESTERGREN METHOD: 78 MM/H (ref 0–20)
FIBRINOGEN PPP-MCNC: 622 MG/DL (ref 200–420)
GFR SERPL CREATININE-BSD FRML MDRD: >90 ML/MIN/{1.73_M2}
GLUCOSE BLD-MCNC: 116 MG/DL (ref 70–99)
GLUCOSE BLD-MCNC: 126 MG/DL (ref 70–99)
GLUCOSE BLD-MCNC: 134 MG/DL (ref 70–99)
GLUCOSE BLD-MCNC: 159 MG/DL (ref 70–99)
GLUCOSE BLD-MCNC: 163 MG/DL (ref 70–99)
GLUCOSE BLDC GLUCOMTR-MCNC: 116 MG/DL (ref 70–99)
GLUCOSE BLDC GLUCOMTR-MCNC: 140 MG/DL (ref 70–99)
GLUCOSE BLDC GLUCOMTR-MCNC: 141 MG/DL (ref 70–99)
GLUCOSE BLDC GLUCOMTR-MCNC: 146 MG/DL (ref 70–99)
GLUCOSE BLDC GLUCOMTR-MCNC: 163 MG/DL (ref 70–99)
GLUCOSE SERPL-MCNC: 132 MG/DL (ref 70–99)
GLUCOSE SERPL-MCNC: 158 MG/DL (ref 70–99)
GLUCOSE SERPL-MCNC: 163 MG/DL (ref 70–99)
HCO3 BLD-SCNC: 30 MMOL/L (ref 21–28)
HCO3 BLD-SCNC: 30 MMOL/L (ref 21–28)
HCO3 BLD-SCNC: 31 MMOL/L (ref 21–28)
HCO3 BLD-SCNC: 32 MMOL/L (ref 21–28)
HCO3 BLDA-SCNC: 31 MMOL/L (ref 21–28)
HCO3 BLDV-SCNC: 33 MMOL/L (ref 21–28)
HCO3 BLDV-SCNC: 34 MMOL/L (ref 21–28)
HCO3 BLDV-SCNC: 34 MMOL/L (ref 21–28)
HCT VFR BLD AUTO: 20.4 % (ref 35–47)
HCT VFR BLD AUTO: 25.2 % (ref 35–47)
HCT VFR BLD AUTO: 25.5 % (ref 35–47)
HGB BLD-MCNC: 6.5 G/DL (ref 11.7–15.7)
HGB BLD-MCNC: 8 G/DL (ref 11.7–15.7)
HGB BLD-MCNC: 8.2 G/DL (ref 11.7–15.7)
HGB BLD-MCNC: 8.2 G/DL (ref 11.7–15.7)
HGB BLD-MCNC: 8.3 G/DL (ref 11.7–15.7)
HGB FREE PLAS-MCNC: 70 MG/DL
IMM GRANULOCYTES # BLD: 0.2 10E9/L (ref 0–0.4)
IMM GRANULOCYTES NFR BLD: 2.5 %
INR PPP: 0.99 (ref 0.86–1.14)
INR PPP: 1.02 (ref 0.86–1.14)
INR PPP: 1.03 (ref 0.86–1.14)
INTERPRETATION ECG - MUSE: NORMAL
KCT BLD-ACNC: 138 SEC (ref 75–150)
KCT BLD-ACNC: 146 SEC (ref 75–150)
KCT BLD-ACNC: 150 SEC (ref 75–150)
KCT BLD-ACNC: 154 SEC (ref 75–150)
KCT BLD-ACNC: 155 SEC (ref 75–150)
LACTATE BLD-SCNC: 0.5 MMOL/L (ref 0.7–2)
LACTATE BLD-SCNC: 0.5 MMOL/L (ref 0.7–2)
LACTATE BLD-SCNC: 0.6 MMOL/L (ref 0.7–2)
LACTATE BLD-SCNC: 0.7 MMOL/L (ref 0.7–2)
LACTATE BLD-SCNC: 1.2 MMOL/L (ref 0.7–2)
LDH SERPL L TO P-CCNC: 356 U/L (ref 81–234)
LMWH PPP CHRO-ACNC: 0.27 IU/ML
LMWH PPP CHRO-ACNC: <0.1 IU/ML
LYMPHOCYTES # BLD AUTO: 1.5 10E9/L (ref 0.8–5.3)
LYMPHOCYTES NFR BLD AUTO: 15.3 %
Lab: NORMAL
MAGNESIUM SERPL-MCNC: 2.5 MG/DL (ref 1.6–2.3)
MAGNESIUM SERPL-MCNC: 2.7 MG/DL (ref 1.6–2.3)
MAGNESIUM SERPL-MCNC: 2.7 MG/DL (ref 1.6–2.3)
MCH RBC QN AUTO: 29 PG (ref 26.5–33)
MCH RBC QN AUTO: 29 PG (ref 26.5–33)
MCH RBC QN AUTO: 29.7 PG (ref 26.5–33)
MCHC RBC AUTO-ENTMCNC: 31.4 G/DL (ref 31.5–36.5)
MCHC RBC AUTO-ENTMCNC: 31.9 G/DL (ref 31.5–36.5)
MCHC RBC AUTO-ENTMCNC: 32.5 G/DL (ref 31.5–36.5)
MCV RBC AUTO: 91 FL (ref 78–100)
MCV RBC AUTO: 91 FL (ref 78–100)
MCV RBC AUTO: 92 FL (ref 78–100)
MONOCYTES # BLD AUTO: 1.3 10E9/L (ref 0–1.3)
MONOCYTES NFR BLD AUTO: 13.7 %
NEUTROPHILS # BLD AUTO: 6.5 10E9/L (ref 1.6–8.3)
NEUTROPHILS NFR BLD AUTO: 67.7 %
NRBC # BLD AUTO: 0 10*3/UL
NRBC BLD AUTO-RTO: 0 /100
NUM BPU REQUESTED: 1
NUM BPU REQUESTED: 3
O2/TOTAL GAS SETTING VFR VENT: 40 %
O2/TOTAL GAS SETTING VFR VENT: 96 %
O2/TOTAL GAS SETTING VFR VENT: 96 %
OXYHGB MFR BLD: 96 % (ref 92–100)
OXYHGB MFR BLD: 97 % (ref 92–100)
OXYHGB MFR BLD: 98 % (ref 92–100)
OXYHGB MFR BLD: 98 % (ref 92–100)
OXYHGB MFR BLDA: 98 % (ref 75–100)
OXYHGB MFR BLDV: 60 %
OXYHGB MFR BLDV: 71 %
OXYHGB MFR BLDV: 80 %
PCO2 BLD: 37 MM HG (ref 35–45)
PCO2 BLD: 39 MM HG (ref 35–45)
PCO2 BLD: 43 MM HG (ref 35–45)
PCO2 BLD: 43 MM HG (ref 35–45)
PCO2 BLD: 44 MM HG (ref 35–45)
PCO2 BLD: 45 MM HG (ref 35–45)
PCO2 BLD: 48 MM HG (ref 35–45)
PCO2 BLD: 51 MM HG (ref 35–45)
PCO2 BLDA: 61 MM HG (ref 35–45)
PCO2 BLDV: 51 MM HG (ref 40–50)
PCO2 BLDV: 55 MM HG (ref 40–50)
PCO2 BLDV: 66 MM HG (ref 40–50)
PH BLD: 7.41 PH (ref 7.35–7.45)
PH BLD: 7.42 PH (ref 7.35–7.45)
PH BLD: 7.45 PH (ref 7.35–7.45)
PH BLD: 7.45 PH (ref 7.35–7.45)
PH BLD: 7.46 PH (ref 7.35–7.45)
PH BLD: 7.48 PH (ref 7.35–7.45)
PH BLD: 7.5 PH (ref 7.35–7.45)
PH BLD: 7.53 PH (ref 7.35–7.45)
PH BLDA: 7.32 PH (ref 7.35–7.45)
PH BLDV: 7.32 PH (ref 7.32–7.43)
PH BLDV: 7.38 PH (ref 7.32–7.43)
PH BLDV: 7.43 PH (ref 7.32–7.43)
PHOSPHATE SERPL-MCNC: 3.9 MG/DL (ref 2.5–4.5)
PLATELET # BLD AUTO: 67 10E9/L (ref 150–450)
PLATELET # BLD AUTO: 68 10E9/L (ref 150–450)
PLATELET # BLD AUTO: 70 10E9/L (ref 150–450)
PO2 BLD: 118 MM HG (ref 80–105)
PO2 BLD: 123 MM HG (ref 80–105)
PO2 BLD: 126 MM HG (ref 80–105)
PO2 BLD: 129 MM HG (ref 80–105)
PO2 BLD: 141 MM HG (ref 80–105)
PO2 BLD: 272 MM HG (ref 80–105)
PO2 BLD: 291 MM HG (ref 80–105)
PO2 BLD: 96 MM HG (ref 80–105)
PO2 BLDA: 211 MM HG (ref 80–105)
PO2 BLDV: 33 MM HG (ref 25–47)
PO2 BLDV: 39 MM HG (ref 25–47)
PO2 BLDV: 50 MM HG (ref 25–47)
POTASSIUM BLD-SCNC: 4.6 MMOL/L (ref 3.4–5.3)
POTASSIUM BLD-SCNC: 4.7 MMOL/L (ref 3.4–5.3)
POTASSIUM SERPL-SCNC: 4 MMOL/L (ref 3.4–5.3)
POTASSIUM SERPL-SCNC: 4.3 MMOL/L (ref 3.4–5.3)
POTASSIUM SERPL-SCNC: 4.6 MMOL/L (ref 3.4–5.3)
PROT SERPL-MCNC: 5 G/DL (ref 6.8–8.8)
RBC # BLD AUTO: 2.24 10E12/L (ref 3.8–5.2)
RBC # BLD AUTO: 2.76 10E12/L (ref 3.8–5.2)
RBC # BLD AUTO: 2.76 10E12/L (ref 3.8–5.2)
SODIUM BLD-SCNC: 148 MMOL/L (ref 133–144)
SODIUM BLD-SCNC: 149 MMOL/L (ref 133–144)
SODIUM SERPL-SCNC: 146 MMOL/L (ref 133–144)
SODIUM SERPL-SCNC: 147 MMOL/L (ref 133–144)
SODIUM SERPL-SCNC: 148 MMOL/L (ref 133–144)
SPECIMEN EXP DATE BLD: NORMAL
SPECIMEN SOURCE: NORMAL
TRANSFUSION STATUS PATIENT QL: NORMAL
WBC # BLD AUTO: 6.6 10E9/L (ref 4–11)
WBC # BLD AUTO: 7.4 10E9/L (ref 4–11)
WBC # BLD AUTO: 9.6 10E9/L (ref 4–11)

## 2020-02-03 PROCEDURE — 85027 COMPLETE CBC AUTOMATED: CPT | Performed by: INTERNAL MEDICINE

## 2020-02-03 PROCEDURE — 93321 DOPPLER ECHO F-UP/LMTD STD: CPT | Mod: 26 | Performed by: INTERNAL MEDICINE

## 2020-02-03 PROCEDURE — 83605 ASSAY OF LACTIC ACID: CPT | Performed by: PHYSICIAN ASSISTANT

## 2020-02-03 PROCEDURE — 40000281 ZZH STATISTIC TRANSPORT TIME EA 15 MIN

## 2020-02-03 PROCEDURE — 25000125 ZZHC RX 250: Performed by: PHYSICIAN ASSISTANT

## 2020-02-03 PROCEDURE — P9041 ALBUMIN (HUMAN),5%, 50ML: HCPCS

## 2020-02-03 PROCEDURE — 25800030 ZZH RX IP 258 OP 636: Performed by: PHYSICIAN ASSISTANT

## 2020-02-03 PROCEDURE — 83615 LACTATE (LD) (LDH) ENZYME: CPT | Performed by: INTERNAL MEDICINE

## 2020-02-03 PROCEDURE — 25000128 H RX IP 250 OP 636: Performed by: STUDENT IN AN ORGANIZED HEALTH CARE EDUCATION/TRAINING PROGRAM

## 2020-02-03 PROCEDURE — 25000125 ZZHC RX 250

## 2020-02-03 PROCEDURE — 25000128 H RX IP 250 OP 636

## 2020-02-03 PROCEDURE — 93308 TTE F-UP OR LMTD: CPT

## 2020-02-03 PROCEDURE — 33949 ECMO/ECLS DAILY MGMT ARTERY: CPT

## 2020-02-03 PROCEDURE — 86022 PLATELET ANTIBODIES: CPT | Performed by: PHYSICIAN ASSISTANT

## 2020-02-03 PROCEDURE — 82947 ASSAY GLUCOSE BLOOD QUANT: CPT | Performed by: PHYSICIAN ASSISTANT

## 2020-02-03 PROCEDURE — 36000076 ZZH SURGERY LEVEL 6 EA 15 ADDTL MIN - UMMC: Performed by: THORACIC SURGERY (CARDIOTHORACIC VASCULAR SURGERY)

## 2020-02-03 PROCEDURE — 83735 ASSAY OF MAGNESIUM: CPT | Performed by: PHYSICIAN ASSISTANT

## 2020-02-03 PROCEDURE — 86900 BLOOD TYPING SEROLOGIC ABO: CPT | Performed by: INTERNAL MEDICINE

## 2020-02-03 PROCEDURE — 25800030 ZZH RX IP 258 OP 636: Performed by: STUDENT IN AN ORGANIZED HEALTH CARE EDUCATION/TRAINING PROGRAM

## 2020-02-03 PROCEDURE — 86901 BLOOD TYPING SEROLOGIC RH(D): CPT | Performed by: INTERNAL MEDICINE

## 2020-02-03 PROCEDURE — 87106 FUNGI IDENTIFICATION YEAST: CPT | Performed by: INTERNAL MEDICINE

## 2020-02-03 PROCEDURE — 80053 COMPREHEN METABOLIC PANEL: CPT | Performed by: INTERNAL MEDICINE

## 2020-02-03 PROCEDURE — 86140 C-REACTIVE PROTEIN: CPT | Performed by: INTERNAL MEDICINE

## 2020-02-03 PROCEDURE — 86850 RBC ANTIBODY SCREEN: CPT | Performed by: INTERNAL MEDICINE

## 2020-02-03 PROCEDURE — 25000125 ZZHC RX 250: Performed by: STUDENT IN AN ORGANIZED HEALTH CARE EDUCATION/TRAINING PROGRAM

## 2020-02-03 PROCEDURE — 85652 RBC SED RATE AUTOMATED: CPT | Performed by: INTERNAL MEDICINE

## 2020-02-03 PROCEDURE — 82330 ASSAY OF CALCIUM: CPT | Performed by: INTERNAL MEDICINE

## 2020-02-03 PROCEDURE — 85520 HEPARIN ASSAY: CPT | Performed by: INTERNAL MEDICINE

## 2020-02-03 PROCEDURE — 84295 ASSAY OF SERUM SODIUM: CPT | Performed by: ANESTHESIOLOGY

## 2020-02-03 PROCEDURE — 40000076 ZZH STATISTIC IABP MONITORING

## 2020-02-03 PROCEDURE — 82805 BLOOD GASES W/O2 SATURATION: CPT | Performed by: PHYSICIAN ASSISTANT

## 2020-02-03 PROCEDURE — 85610 PROTHROMBIN TIME: CPT | Performed by: INTERNAL MEDICINE

## 2020-02-03 PROCEDURE — P9037 PLATE PHERES LEUKOREDU IRRAD: HCPCS | Performed by: STUDENT IN AN ORGANIZED HEALTH CARE EDUCATION/TRAINING PROGRAM

## 2020-02-03 PROCEDURE — 82330 ASSAY OF CALCIUM: CPT | Performed by: ANESTHESIOLOGY

## 2020-02-03 PROCEDURE — 36000074 ZZH SURGERY LEVEL 6 1ST 30 MIN - UMMC: Performed by: THORACIC SURGERY (CARDIOTHORACIC VASCULAR SURGERY)

## 2020-02-03 PROCEDURE — 85610 PROTHROMBIN TIME: CPT | Performed by: PHYSICIAN ASSISTANT

## 2020-02-03 PROCEDURE — 27210437 ZZH NUTRITION PRODUCT SEMIELEM INTERMED LITER

## 2020-02-03 PROCEDURE — 99291 CRITICAL CARE FIRST HOUR: CPT | Mod: 25 | Performed by: INTERNAL MEDICINE

## 2020-02-03 PROCEDURE — 82805 BLOOD GASES W/O2 SATURATION: CPT | Performed by: STUDENT IN AN ORGANIZED HEALTH CARE EDUCATION/TRAINING PROGRAM

## 2020-02-03 PROCEDURE — 85379 FIBRIN DEGRADATION QUANT: CPT | Performed by: INTERNAL MEDICINE

## 2020-02-03 PROCEDURE — 33968 REMOVE AORTIC ASSIST DEVICE: CPT

## 2020-02-03 PROCEDURE — 85730 THROMBOPLASTIN TIME PARTIAL: CPT | Performed by: PHYSICIAN ASSISTANT

## 2020-02-03 PROCEDURE — 85347 COAGULATION TIME ACTIVATED: CPT

## 2020-02-03 PROCEDURE — 04PY03Z REMOVAL OF INFUSION DEVICE FROM LOWER ARTERY, OPEN APPROACH: ICD-10-PCS | Performed by: THORACIC SURGERY (CARDIOTHORACIC VASCULAR SURGERY)

## 2020-02-03 PROCEDURE — P9016 RBC LEUKOCYTES REDUCED: HCPCS | Performed by: INTERNAL MEDICINE

## 2020-02-03 PROCEDURE — 93010 ELECTROCARDIOGRAM REPORT: CPT | Mod: 59 | Performed by: INTERNAL MEDICINE

## 2020-02-03 PROCEDURE — 25000132 ZZH RX MED GY IP 250 OP 250 PS 637: Performed by: PHYSICIAN ASSISTANT

## 2020-02-03 PROCEDURE — 80048 BASIC METABOLIC PNL TOTAL CA: CPT | Performed by: PHYSICIAN ASSISTANT

## 2020-02-03 PROCEDURE — 83605 ASSAY OF LACTIC ACID: CPT | Performed by: INTERNAL MEDICINE

## 2020-02-03 PROCEDURE — 25000565 ZZH ISOFLURANE, EA 15 MIN: Performed by: THORACIC SURGERY (CARDIOTHORACIC VASCULAR SURGERY)

## 2020-02-03 PROCEDURE — 83735 ASSAY OF MAGNESIUM: CPT | Performed by: INTERNAL MEDICINE

## 2020-02-03 PROCEDURE — 83605 ASSAY OF LACTIC ACID: CPT | Performed by: ANESTHESIOLOGY

## 2020-02-03 PROCEDURE — 93308 TTE F-UP OR LMTD: CPT | Mod: 26 | Performed by: INTERNAL MEDICINE

## 2020-02-03 PROCEDURE — 82330 ASSAY OF CALCIUM: CPT | Performed by: PHYSICIAN ASSISTANT

## 2020-02-03 PROCEDURE — 00000146 ZZHCL STATISTIC GLUCOSE BY METER IP

## 2020-02-03 PROCEDURE — 85730 THROMBOPLASTIN TIME PARTIAL: CPT | Performed by: INTERNAL MEDICINE

## 2020-02-03 PROCEDURE — 86923 COMPATIBILITY TEST ELECTRIC: CPT | Performed by: INTERNAL MEDICINE

## 2020-02-03 PROCEDURE — 82805 BLOOD GASES W/O2 SATURATION: CPT | Performed by: INTERNAL MEDICINE

## 2020-02-03 PROCEDURE — 85384 FIBRINOGEN ACTIVITY: CPT | Performed by: INTERNAL MEDICINE

## 2020-02-03 PROCEDURE — 25800030 ZZH RX IP 258 OP 636: Performed by: INTERNAL MEDICINE

## 2020-02-03 PROCEDURE — 82947 ASSAY GLUCOSE BLOOD QUANT: CPT | Performed by: ANESTHESIOLOGY

## 2020-02-03 PROCEDURE — 87040 BLOOD CULTURE FOR BACTERIA: CPT | Performed by: INTERNAL MEDICINE

## 2020-02-03 PROCEDURE — 27210794 ZZH OR GENERAL SUPPLY STERILE: Performed by: THORACIC SURGERY (CARDIOTHORACIC VASCULAR SURGERY)

## 2020-02-03 PROCEDURE — 74176 CT ABD & PELVIS W/O CONTRAST: CPT

## 2020-02-03 PROCEDURE — 87070 CULTURE OTHR SPECIMN AEROBIC: CPT | Performed by: INTERNAL MEDICINE

## 2020-02-03 PROCEDURE — 87077 CULTURE AEROBIC IDENTIFY: CPT | Performed by: INTERNAL MEDICINE

## 2020-02-03 PROCEDURE — 71045 X-RAY EXAM CHEST 1 VIEW: CPT

## 2020-02-03 PROCEDURE — 94003 VENT MGMT INPAT SUBQ DAY: CPT

## 2020-02-03 PROCEDURE — 37000009 ZZH ANESTHESIA TECHNICAL FEE, EACH ADDTL 15 MIN: Performed by: THORACIC SURGERY (CARDIOTHORACIC VASCULAR SURGERY)

## 2020-02-03 PROCEDURE — 93005 ELECTROCARDIOGRAM TRACING: CPT

## 2020-02-03 PROCEDURE — 25000128 H RX IP 250 OP 636: Performed by: INTERNAL MEDICINE

## 2020-02-03 PROCEDURE — 20000004 ZZH R&B ICU UMMC

## 2020-02-03 PROCEDURE — 25000132 ZZH RX MED GY IP 250 OP 250 PS 637: Performed by: STUDENT IN AN ORGANIZED HEALTH CARE EDUCATION/TRAINING PROGRAM

## 2020-02-03 PROCEDURE — 40000275 ZZH STATISTIC RCP TIME EA 10 MIN

## 2020-02-03 PROCEDURE — 82803 BLOOD GASES ANY COMBINATION: CPT | Performed by: ANESTHESIOLOGY

## 2020-02-03 PROCEDURE — 25000128 H RX IP 250 OP 636: Performed by: HOSPITALIST

## 2020-02-03 PROCEDURE — 25000125 ZZHC RX 250: Performed by: NURSE ANESTHETIST, CERTIFIED REGISTERED

## 2020-02-03 PROCEDURE — 84100 ASSAY OF PHOSPHORUS: CPT | Performed by: INTERNAL MEDICINE

## 2020-02-03 PROCEDURE — 80048 BASIC METABOLIC PNL TOTAL CA: CPT | Performed by: INTERNAL MEDICINE

## 2020-02-03 PROCEDURE — 85025 COMPLETE CBC W/AUTO DIFF WBC: CPT | Performed by: STUDENT IN AN ORGANIZED HEALTH CARE EDUCATION/TRAINING PROGRAM

## 2020-02-03 PROCEDURE — 40000014 ZZH STATISTIC ARTERIAL MONITORING DAILY

## 2020-02-03 PROCEDURE — 25000128 H RX IP 250 OP 636: Performed by: PHYSICIAN ASSISTANT

## 2020-02-03 PROCEDURE — 93325 DOPPLER ECHO COLOR FLOW MAPG: CPT | Mod: 26 | Performed by: INTERNAL MEDICINE

## 2020-02-03 PROCEDURE — 83051 HEMOGLOBIN PLASMA: CPT | Performed by: INTERNAL MEDICINE

## 2020-02-03 PROCEDURE — 84132 ASSAY OF SERUM POTASSIUM: CPT | Performed by: ANESTHESIOLOGY

## 2020-02-03 PROCEDURE — 85300 ANTITHROMBIN III ACTIVITY: CPT | Performed by: INTERNAL MEDICINE

## 2020-02-03 PROCEDURE — 36415 COLL VENOUS BLD VENIPUNCTURE: CPT | Performed by: INTERNAL MEDICINE

## 2020-02-03 PROCEDURE — 37000008 ZZH ANESTHESIA TECHNICAL FEE, 1ST 30 MIN: Performed by: THORACIC SURGERY (CARDIOTHORACIC VASCULAR SURGERY)

## 2020-02-03 PROCEDURE — 82947 ASSAY GLUCOSE BLOOD QUANT: CPT | Performed by: INTERNAL MEDICINE

## 2020-02-03 PROCEDURE — P9016 RBC LEUKOCYTES REDUCED: HCPCS | Performed by: STUDENT IN AN ORGANIZED HEALTH CARE EDUCATION/TRAINING PROGRAM

## 2020-02-03 RX ORDER — FENTANYL CITRATE 50 UG/ML
INJECTION, SOLUTION INTRAMUSCULAR; INTRAVENOUS PRN
Status: DISCONTINUED | OUTPATIENT
Start: 2020-02-03 | End: 2020-02-03

## 2020-02-03 RX ORDER — ALBUMIN, HUMAN INJ 5% 5 %
12.5 SOLUTION INTRAVENOUS ONCE
Status: COMPLETED | OUTPATIENT
Start: 2020-02-03 | End: 2020-02-03

## 2020-02-03 RX ORDER — NOREPINEPHRINE BITARTRATE 0.06 MG/ML
0.03-0.4 INJECTION, SOLUTION INTRAVENOUS CONTINUOUS
Status: DISCONTINUED | OUTPATIENT
Start: 2020-02-03 | End: 2020-02-05

## 2020-02-03 RX ORDER — CEFAZOLIN SODIUM 2 G/100ML
INJECTION, SOLUTION INTRAVENOUS PRN
Status: DISCONTINUED | OUTPATIENT
Start: 2020-02-03 | End: 2020-02-03

## 2020-02-03 RX ORDER — NOREPINEPHRINE BITARTRATE 0.06 MG/ML
INJECTION, SOLUTION INTRAVENOUS
Status: COMPLETED
Start: 2020-02-03 | End: 2020-02-03

## 2020-02-03 RX ORDER — PIPERACILLIN SODIUM, TAZOBACTAM SODIUM 4; .5 G/20ML; G/20ML
4.5 INJECTION, POWDER, LYOPHILIZED, FOR SOLUTION INTRAVENOUS EVERY 6 HOURS
Status: COMPLETED | OUTPATIENT
Start: 2020-02-03 | End: 2020-02-05

## 2020-02-03 RX ORDER — ALBUMIN, HUMAN INJ 5% 5 %
12.5 SOLUTION INTRAVENOUS ONCE
Status: DISCONTINUED | OUTPATIENT
Start: 2020-02-03 | End: 2020-02-03

## 2020-02-03 RX ORDER — ALBUMIN, HUMAN INJ 5% 5 %
25 SOLUTION INTRAVENOUS ONCE
Status: DISCONTINUED | OUTPATIENT
Start: 2020-02-03 | End: 2020-02-03

## 2020-02-03 RX ORDER — SODIUM CHLORIDE, SODIUM GLUCONATE, SODIUM ACETATE, POTASSIUM CHLORIDE AND MAGNESIUM CHLORIDE 526; 502; 368; 37; 30 MG/100ML; MG/100ML; MG/100ML; MG/100ML; MG/100ML
INJECTION, SOLUTION INTRAVENOUS CONTINUOUS PRN
Status: DISCONTINUED | OUTPATIENT
Start: 2020-02-03 | End: 2020-02-03

## 2020-02-03 RX ORDER — ALBUMIN, HUMAN INJ 5% 5 %
12.5 SOLUTION INTRAVENOUS ONCE
Status: DISCONTINUED | OUTPATIENT
Start: 2020-02-03 | End: 2020-02-04 | Stop reason: CLARIF

## 2020-02-03 RX ORDER — PROTAMINE SULFATE 10 MG/ML
INJECTION, SOLUTION INTRAVENOUS PRN
Status: DISCONTINUED | OUTPATIENT
Start: 2020-02-03 | End: 2020-02-03

## 2020-02-03 RX ORDER — ALBUMIN, HUMAN INJ 5% 5 %
SOLUTION INTRAVENOUS
Status: COMPLETED
Start: 2020-02-03 | End: 2020-02-03

## 2020-02-03 RX ORDER — SODIUM CHLORIDE, SODIUM LACTATE, POTASSIUM CHLORIDE, CALCIUM CHLORIDE 600; 310; 30; 20 MG/100ML; MG/100ML; MG/100ML; MG/100ML
INJECTION, SOLUTION INTRAVENOUS CONTINUOUS PRN
Status: DISCONTINUED | OUTPATIENT
Start: 2020-02-03 | End: 2020-02-03

## 2020-02-03 RX ADMIN — DEXMEDETOMIDINE 0.7 MCG/KG/HR: 100 INJECTION, SOLUTION, CONCENTRATE INTRAVENOUS at 01:55

## 2020-02-03 RX ADMIN — Medication 100 MCG/HR: at 15:37

## 2020-02-03 RX ADMIN — FENTANYL CITRATE 100 MCG: 50 INJECTION, SOLUTION INTRAMUSCULAR; INTRAVENOUS at 07:50

## 2020-02-03 RX ADMIN — PROPOFOL 10 MCG/KG/MIN: 10 INJECTION, EMULSION INTRAVENOUS at 23:55

## 2020-02-03 RX ADMIN — NOREPINEPHRINE BITARTRATE 0.03 MCG/KG/MIN: 0.06 INJECTION, SOLUTION INTRAVENOUS at 19:48

## 2020-02-03 RX ADMIN — POLYETHYLENE GLYCOL 3350 17 G: 17 POWDER, FOR SOLUTION ORAL at 11:17

## 2020-02-03 RX ADMIN — SODIUM CHLORIDE, SODIUM GLUCONATE, SODIUM ACETATE, POTASSIUM CHLORIDE AND MAGNESIUM CHLORIDE: 526; 502; 368; 37; 30 INJECTION, SOLUTION INTRAVENOUS at 07:29

## 2020-02-03 RX ADMIN — Medication 0.03 MCG/KG/MIN: at 19:48

## 2020-02-03 RX ADMIN — NORETHINDRONE 0.35 MG: 0.35 TABLET ORAL at 11:33

## 2020-02-03 RX ADMIN — SODIUM CHLORIDE, POTASSIUM CHLORIDE, SODIUM LACTATE AND CALCIUM CHLORIDE 500 ML: 600; 310; 30; 20 INJECTION, SOLUTION INTRAVENOUS at 19:51

## 2020-02-03 RX ADMIN — PROTAMINE SULFATE 50 MG: 10 INJECTION, SOLUTION INTRAVENOUS at 08:56

## 2020-02-03 RX ADMIN — FENTANYL CITRATE 50 MCG: 50 INJECTION, SOLUTION INTRAMUSCULAR; INTRAVENOUS at 09:01

## 2020-02-03 RX ADMIN — POTASSIUM CHLORIDE 20 MEQ: 1.5 POWDER, FOR SOLUTION ORAL at 05:53

## 2020-02-03 RX ADMIN — EPINEPHRINE 0.05 MCG/KG/MIN: 1 INJECTION PARENTERAL at 08:52

## 2020-02-03 RX ADMIN — SUGAMMADEX 200 MG: 100 INJECTION, SOLUTION INTRAVENOUS at 09:40

## 2020-02-03 RX ADMIN — ALBUMIN HUMAN 12.5 G: 0.05 INJECTION, SOLUTION INTRAVENOUS at 18:41

## 2020-02-03 RX ADMIN — ROCURONIUM BROMIDE 50 MG: 10 INJECTION INTRAVENOUS at 07:43

## 2020-02-03 RX ADMIN — DEXMEDETOMIDINE 1 MCG/KG/HR: 100 INJECTION, SOLUTION, CONCENTRATE INTRAVENOUS at 23:37

## 2020-02-03 RX ADMIN — MULTIVITAMIN 15 ML: LIQUID ORAL at 11:17

## 2020-02-03 RX ADMIN — PROPOFOL 10 MCG/KG/MIN: 10 INJECTION, EMULSION INTRAVENOUS at 21:08

## 2020-02-03 RX ADMIN — SODIUM CHLORIDE, POTASSIUM CHLORIDE, SODIUM LACTATE AND CALCIUM CHLORIDE 500 ML: 600; 310; 30; 20 INJECTION, SOLUTION INTRAVENOUS at 11:29

## 2020-02-03 RX ADMIN — HEPARIN SODIUM 1500 UNITS/HR: 10000 INJECTION, SOLUTION INTRAVENOUS at 06:42

## 2020-02-03 RX ADMIN — CEFAZOLIN SODIUM 2 G: 2 INJECTION, SOLUTION INTRAVENOUS at 07:50

## 2020-02-03 RX ADMIN — VANCOMYCIN HYDROCHLORIDE 2250 MG: 10 INJECTION, POWDER, LYOPHILIZED, FOR SOLUTION INTRAVENOUS at 20:46

## 2020-02-03 RX ADMIN — SENNOSIDES AND DOCUSATE SODIUM 1 TABLET: 8.6; 5 TABLET ORAL at 22:04

## 2020-02-03 RX ADMIN — FENTANYL CITRATE 50 MCG: 50 INJECTION, SOLUTION INTRAMUSCULAR; INTRAVENOUS at 08:17

## 2020-02-03 RX ADMIN — ROCURONIUM BROMIDE 50 MG: 10 INJECTION INTRAVENOUS at 08:22

## 2020-02-03 RX ADMIN — Medication 40 MG: at 11:17

## 2020-02-03 RX ADMIN — DEXMEDETOMIDINE 0.7 MCG/KG/HR: 100 INJECTION, SOLUTION, CONCENTRATE INTRAVENOUS at 17:05

## 2020-02-03 RX ADMIN — MIDAZOLAM 2 MG: 1 INJECTION INTRAMUSCULAR; INTRAVENOUS at 07:30

## 2020-02-03 RX ADMIN — ALBUMIN HUMAN 12.5 G: 50 SOLUTION INTRAVENOUS at 18:41

## 2020-02-03 RX ADMIN — PIPERACILLIN AND TAZOBACTAM 4.5 G: 4; .5 INJECTION, POWDER, FOR SOLUTION INTRAVENOUS at 19:59

## 2020-02-03 ASSESSMENT — MIFFLIN-ST. JEOR: SCORE: 1621.13

## 2020-02-03 ASSESSMENT — ACTIVITIES OF DAILY LIVING (ADL)
ADLS_ACUITY_SCORE: 20
ADLS_ACUITY_SCORE: 20
ADLS_ACUITY_SCORE: 18
ADLS_ACUITY_SCORE: 20
ADLS_ACUITY_SCORE: 18

## 2020-02-03 NOTE — ANESTHESIA POSTPROCEDURE EVALUATION
Anesthesia POST Procedure Evaluation    Patient: Azra Tom   MRN:     [id number] Gender:   female   Age:    34 year old :      1985        Preoperative Diagnosis: Cardiogenic shock (H) [R57.0]   Procedure(s):  REMOVAL Extra Corporeal Membrain Oxygenator, Inta operative transesophageal echocardiogram, Repair of femoral vessel   Postop Comments: No value filed.       Anesthesia Type:  Not documented  General    Reportable Event: NO     PAIN: Uncomplicated   Sign Out status: Comfortable, Well controlled pain     PONV: No PONV   Sign Out status:  No Nausea or Vomiting     Neuro/Psych: Uneventful perioperative course   Sign Out Status: Preoperative baseline; Age appropriate mentation     Airway/Resp.: Uneventful perioperative course   Sign Out Status: Airway Device present     CV: Uneventful perioperative course   Sign Out status: Appropriate BP and perfusion indices; Appropriate HR/Rhythm     Disposition:   Sign Out in:  ICU  Disposition:  ICU  Recovery Course: Recovery in ICU  Follow-Up: Not required     Comments/Narrative:  Patient transported to ICU intubated, sedated, ventilated with 100%O2, fully monitored.  VSS during transport.  Patient left in stable condition in the care of ICU team.  Full report given.           Last Anesthesia Record Vitals:  CRNA VITALS  2/3/2020 0858 - 2/3/2020 0958      2/3/2020             Pulse:  100    ART BP:  123/41    SpO2:  100 %          Last PACU Vitals:  Vitals Value Taken Time   BP     Temp     Pulse     Resp     SpO2 100 % 2/3/2020 10:02 AM   Temp src     NIBP     Pulse     SpO2     Resp     Temp     Ht Rate     Temp 2     Vitals shown include unvalidated device data.      Electronically Signed By: Meliza Landeros MD, February 3, 2020, 10:03 AM

## 2020-02-03 NOTE — PLAN OF CARE
Major Shift Events:        ECHO w/ ECMO turndown this am: tolerated well w/ ECMO flows to 1.5L/min and IABP paused. See MD note for full details.      Maintaining MAP / IABP mean 70s-80s off pressors.       Diuresing well w/ ordered Lasix IV doses.      Lightly sedated on Precedex/ Propofol gtts.  Usually follows verbal direction to open eyes and able to LITTLE.       Moderate amt bleeding from Lt groin ECMO cannula sites. Menses continue w/ moderate bleeding w/ occas small/mod sized clot.       PRBCs x1 and Plts x1 transfused this shift for Hgb 8 and Plt level 66.       Family here and updated.     Plan:       Continue to monitor closely w. Plans for OR for ECMO decannulation tomorrow.                                   For vital signs and complete assessments, please see documentation flowsheets.

## 2020-02-03 NOTE — ANESTHESIA PREPROCEDURE EVALUATION
Anesthesia Pre-Procedure Evaluation    Patient: Azra Tom   MRN:     [id number] Gender:   female   Age:    34 year old :      1985        Preoperative Diagnosis: Cardiogenic shock (H) [R57.0]   Procedure(s):  REMOVAL, CANNULA, ADULT, FOR ECMO     Past Medical History:   Diagnosis Date     Uncomplicated asthma       Past Surgical History:   Procedure Laterality Date     CV EXTRACORPERAL MEMBRANE OXYGENATION N/A 2020    Procedure: ECMO, NATIVE HEART BIOPSY;  Surgeon: Richard Montana MD;  Location:  HEART CARDIAC CATH LAB     CV HEART BIOPSY N/A 2020    Procedure: Heart Biopsy;  Surgeon: Richard Montana MD;  Location:  HEART CARDIAC CATH LAB        Azra Ta is a 34 year old female with no significant PMH who presents with cough and shortness of breath.      Patient initially presented to [facility] on  for chest pain and shortness of breath for the past 3 days. She initially went to urgent care on  and was prescribed prednisone and albuterol with no noted relief. While at [facility], she was noted to have new acute systolic HF with LVEF 25-30%. She underwent RHC on  and noted to have significant drop in BP during the procedure. She was placed on IABP and was started on levophed. Cardiac MRI done which showed anterolateral subepicardial enhancement concerning for possible myocarditis. RHC numbers showed CO 2.2-2.4, RA 18, PCWP 18, CI 1.26. She was also noted to be flu positive and was started on tamiflu. She was transferred to [facility] for advanced HF management.          Anesthesia Evaluation     .             ROS/MED HX    ENT/Pulmonary:     (+)asthma , . .    Neurologic:       Cardiovascular: Comment: SHF and DHF on ECMO        METS/Exercise Tolerance:     Hematologic:         Musculoskeletal:         GI/Hepatic:         Renal/Genitourinary:         Endo:         Psychiatric:         Infectious Disease:         Malignancy:         Other:      "                J[facility] AN PHYSICAL EXAM    LABS:  CBC:   Lab Results   Component Value Date    WBC 6.6 02/03/2020    WBC 6.8 02/02/2020    HGB 8.0 (L) 02/03/2020    HGB 8.7 (L) 02/02/2020    HCT 25.5 (L) 02/03/2020    HCT 27.2 (L) 02/02/2020    PLT 67 (L) 02/03/2020    PLT 86 (L) 02/02/2020     BMP:   Lab Results   Component Value Date     (H) 02/03/2020     (H) 02/02/2020    POTASSIUM 4.0 02/03/2020    POTASSIUM 4.7 02/02/2020    CHLORIDE 114 (H) 02/03/2020    CHLORIDE 114 (H) 02/02/2020    CO2 32 02/03/2020    CO2 32 02/02/2020    BUN 25 02/03/2020    BUN 24 02/02/2020    CR 0.54 02/03/2020    CR 0.57 02/02/2020     (H) 02/03/2020     (H) 02/03/2020     COAGS:   Lab Results   Component Value Date    PTT 59 (H) 02/03/2020    INR 1.03 02/03/2020    FIBR 622 (H) 02/03/2020     POC:   Lab Results   Component Value Date     (H) 02/03/2020    HCG Negative 01/29/2020     OTHER:   Lab Results   Component Value Date    PH 7.42 02/03/2020    LACT 0.5 (L) 02/03/2020    A1C 5.1 01/29/2020    YIN 7.7 (L) 02/03/2020    PHOS 3.9 02/03/2020    MAG 2.7 (H) 02/03/2020    ALBUMIN 2.2 (L) 02/03/2020    PROTTOTAL 5.0 (L) 02/03/2020    ALT 23 02/03/2020    AST 33 02/03/2020    ALKPHOS 70 02/03/2020    BILITOTAL 1.5 (H) 02/03/2020    TSH 2.11 01/29/2020    .0 (H) 02/03/2020    SED 78 (H) 02/03/2020        Preop Vitals    BP Readings from Last 3 Encounters:   01/29/20 (!) 81/60    Pulse Readings from Last 3 Encounters:   01/28/20 122      Resp Readings from Last 3 Encounters:   02/03/20 12    SpO2 Readings from Last 3 Encounters:   02/03/20 100%      Temp Readings from Last 1 Encounters:   02/03/20 36.7  C (98.1  F)    Ht Readings from Last 1 Encounters:   01/28/20 1.6 m (5' 3\")      Wt Readings from Last 1 Encounters:   02/03/20 94.2 kg (207 lb 10.8 oz)    Estimated body mass index is 36.79 kg/m  as calculated from the following:    Height as of this encounter: 1.6 m (5' 3\").    Weight as of this " encounter: 94.2 kg (207 lb 10.8 oz).     LDA:  Peripheral IV 01/30/20 Right Upper forearm (Active)   Site Assessment WDL 2/3/2020  4:00 AM   Line Status Saline locked 2/3/2020  4:00 AM   Phlebitis Scale 0-->no symptoms 2/3/2020  4:00 AM   Infiltration Scale 0 2/3/2020  4:00 AM   Extravasation? No 2/3/2020  4:00 AM   Number of days: 4       PICC Triple Lumen 01/31/20 Right Basilic ok to use  (Active)   Site Assessment WDL 2/3/2020  4:00 AM   External Cath Length (cm) 3 cm 1/31/2020 12:00 PM   Extremity Circumference (cm) 32 cm 1/31/2020 12:00 PM   Dressing Intervention Transparent;Chlorhexidine patch 2/3/2020  4:00 AM   Dressing Change Due 02/09/20 2/2/2020  8:00 PM   PICC Comment CDI 2/3/2020  4:00 AM   Lumen A - Color WHITE 2/3/2020  4:00 AM   Lumen A - Status infusing 2/3/2020  4:00 AM   Lumen A - Cap Change Due 02/04/20 2/2/2020  8:00 PM   Lumen B - Color PURPLE 2/3/2020  4:00 AM   Lumen B - Status infusing 2/3/2020  4:00 AM   Lumen B - Cap Change Due 02/04/20 2/2/2020  8:00 PM   Lumen C - Color RED 2/3/2020  4:00 AM   Lumen C - Status saline locked 2/3/2020  4:00 AM   Lumen C - Cap Change Due 02/04/20 2/2/2020  8:00 PM   Extravasation? No 2/3/2020  4:00 AM   Line Necessity Yes, meets criteria 2/3/2020  4:00 AM   Number of days: 3       Arterial Line 01/29/20 Radial (Active)   Site Assessment WDL 2/3/2020  4:00 AM   Line Status Pulsatile blood flow 2/3/2020  4:00 AM   Arterine Line Cap Change Due 02/04/20 2/1/2020  8:00 PM   Art Line Waveform Appropriate 2/3/2020  4:00 AM   Art Line Interventions Leveled;Connections checked and tightened 2/3/2020  4:00 AM   Color/Movement/Sensation Capillary refill less than 3 sec 2/3/2020  4:00 AM   Line Necessity Yes, meets criteria 2/3/2020  4:00 AM   Dressing Type Transparent 2/3/2020  4:00 AM   Dressing Status Clean, dry, intact 2/3/2020  4:00 AM   Dressing Intervention Dressing changed/new dressing 2/2/2020 12:00 AM   Dressing Change Due 02/09/20 2/2/2020  8:00 PM   Number  of days: 5       CVC Single Lumen 01/29/20 Right Internal jugular (Active)   Site Assessment WDL 2/3/2020  4:00 AM   Line Status Saline locked 2/3/2020  4:00 AM   Dressing Intervention Chlorhexidine sponge;Transparent 2/3/2020  4:00 AM   Extravasation? No 2/3/2020  4:00 AM   Dressing Change Due 02/09/20 2/2/2020  8:00 PM   Lumen A - Cap Change Due 02/04/20 2/2/2020  8:00 PM   CVC Comment Venous sheath 2/3/2020  4:00 AM   Line Necessity Yes, meets criteria 2/3/2020  4:00 AM   Number of days: 5       Arterial Sheath  (Active)   Specific Qualities Sutured;Left in Place 2/3/2020  4:00 AM   Site Assessment WDL 2/3/2020  4:00 AM   Dressing Type Transparent;Gauze;Biopatch 2/3/2020  4:00 AM   Dressing Intervention Dressing changed/new dressing 2/2/2020 12:00 AM   Arterial Sheath Comment IABP 2/3/2020  4:00 AM   Number of days: 6       Arterial Sheath  (Active)   Specific Qualities Sutured 2/3/2020  4:00 AM   Site Assessment Bleeding 2/3/2020  4:00 AM   Dressing Type Transparent 2/3/2020  4:00 AM   Dressing Intervention Dressing changed/new dressing 2/3/2020 12:00 AM   Arterial Sheath Comment ECMO 2/3/2020  4:00 AM   Number of days: 5       Arterial Sheath  (Active)   Specific Qualities Sutured 2/3/2020  4:00 AM   Site Assessment Bleeding 2/3/2020  4:00 AM   Dressing Type Transparent 2/3/2020  4:00 AM   Dressing Intervention Dressing changed/new dressing 2/3/2020 12:00 AM   Arterial Sheath Comment ECMO Reperfusion catheter 2/3/2020  4:00 AM   Number of days: 5       Venous Sheath (Active)   Specific Qualities Sutured 2/3/2020  4:00 AM   Site Assessment Bleeding 2/3/2020  4:00 AM   Dressing Type Transparent 2/3/2020  4:00 AM   Dressing Intervention Dressing changed/new dressing 2/3/2020 12:00 AM   Venous Sheath Comment ECMO 2/3/2020  4:00 AM   Number of days: 5       ETT 7 (Active)   Secured By Commercial tube [email or web address] 2/3/2020  3:30 AM   Site Appearance Clean and dry 2/3/2020  3:30 AM   Secured at (cm) to lip 24 cm 2/3/2020   3:30 AM   Site of ET Tube Securement At lip 2/3/2020  3:30 AM   Cuff Pressure - Type minimal leak technique 2/3/2020  3:30 AM   Tube Care/Reposition repositioned tube left side of mouth 2/3/2020  4:00 AM   Bite Block Center 2/3/2020  3:30 AM   Safety Measures manual resuscitator/PEEP valve in room 2/3/2020  3:30 AM   ETT Cuff Deflation Leak Test Leak 2/3/2020  3:30 AM   Number of days: 5       NG/OG/NJ Tube Orogastric (Active)   Site Description WDL 2/3/2020  4:00 AM   Status Suction-low intermittent 2/3/2020  4:00 AM   Drainage Appearance Bile;Tan 2/3/2020  4:00 AM   Placement Confirmation [place] unchanged 2/3/2020  4:00 AM   [place] (cm marking) at nare/mouth 65 cm 2/3/2020  4:00 AM   Flush/Free Water (mL) 30 mL 2/2/2020  4:00 PM   Container Amount 450 mL 2/3/2020  4:00 AM   Output (ml) 100 ml 2/3/2020  4:00 AM   Number of days: 5       NG/OG/NJ Tube Nasogastric 10 fr Right nostril (Active)   Site Description WDL 2/3/2020 12:00 AM   Status Enteral Feedings 2/3/2020  4:00 AM   Placement Confirmation [place] unchanged 2/3/2020  4:00 AM   [place] (cm marking) at nare/mouth 80 cm 2/3/2020  4:00 AM   Intake (ml) 80 ml 2/2/2020  8:00 PM   Flush/Free Water (mL) 60 mL 2/3/2020  4:00 AM   Number of days: 4       Urethral Catheter (Active)   Tube Description Positional 2/3/2020 12:00 AM   Catheter Care Done;Catheter wipes 2/3/2020 12:00 AM   Collection Container Standard;Patent 2/3/2020  4:00 AM   Securement Method Securing device (Describe) 2/3/2020  4:00 AM   Rationale for Continued Use Strict 1-2 Hour I&O 2/3/2020  4:00 AM   Urine Output 50 mL 2/3/2020  6:00 AM   Number of days: 5        Assessment:   ASA SCORE: 4    H&P: History and physical reviewed and following examination; no interval change.    NPO Status: NPO Appropriate     Plan:   Anes. Type:  General   Pre-Medication: None   Induction:  IV (Standard)   Airway: ETT; Oral   Access/Monitoring: PIV   Maintenance: Balanced     Postop Plan:   Postop Pain:  Opioids  Postop Sedation/Airway: Not planned  Disposition: ICU     PONV Management:   Adult Risk Factors: Female, Postop Opioids   Prevention: Ondansetron           Anesthesia Attending    HPI: Azra Tom is a 34 year old female who  has a past medical history of Uncomplicated asthma.  has a past surgical history that includes Extracorporeal Membrance Oxygenation (N/A, 1/29/2020) and Heart Biopsy (N/A, 1/29/2020). with a cardiogenic shock;Heart failure (H);Cardiogenic shock (H);Heart failure (H) scheduled for Procedure(s):  REMOVAL, CANNULA, ADULT, FOR ECMO.      PMHx/PSHx/ROS:  Past Medical History:   Diagnosis Date     Uncomplicated asthma        Past Surgical History:   Procedure Laterality Date     CV EXTRACORPERAL MEMBRANE OXYGENATION N/A 1/29/2020    Procedure: ECMO, NATIVE HEART BIOPSY;  Surgeon: Richard Montana MD;  Location:  HEART CARDIAC CATH LAB     CV HEART BIOPSY N/A 1/29/2020    Procedure: Heart Biopsy;  Surgeon: Richard Montana MD;  Location:  HEART CARDIAC CATH LAB       Soc Hx:   Social History     Tobacco Use     Smoking status: Former Smoker   Substance Use Topics     Alcohol use: Not Currently         Allergies: No Known Allergies    Meds:   Medications Prior to Admission   Medication Sig Dispense Refill Last Dose     predniSONE (DELTASONE) 20 MG tablet Taper: Starting 01/23/2020 take 60 mg by mouth daily for 3 days followed by 40 mg daily for 3 days and then 20 mg daily for 3 days   1/28/2020 at Unknown time     albuterol (PROAIR HFA/PROVENTIL HFA/VENTOLIN HFA) 108 (90 Base) MCG/ACT inhaler Inhale 2 puffs into the lungs every 4 hours as needed   Unknown at Unknown time       No current outpatient medications on file.         Labs:  BMP:  Recent Labs   Lab Test 02/03/20  0358   *   POTASSIUM 4.0   CHLORIDE 114*   CO2 32   BUN 25   CR 0.54   *  159*   YIN 7.7*     CBC:   Recent Labs   Lab Test 02/03/20 0358   WBC 6.6   RBC 2.76*   HGB 8.0*   HCT 25.5*   MCV 92   MCH 29.0    MCHC 31.4*   RDW 16.1*   PLT 67*     Coags:  Recent Labs   Lab Test 02/03/20  0358   INR 1.03   PTT 59*   FIBR 622*     HCG:  HCG Quantitative Serum   Date Value Ref Range Status   01/29/2020 <1 0 - 5 IU/L Final     Cardiac enzymes:  No results found for: CKTOTAL, CKMB, TROPN    Vital Signs:  T 98.1  P 122  BP 81/60  R 12  SpO2 100%    NPO status adequate.      34 year old female who  has a past medical history of Uncomplicated asthma. to OR for Procedure(s):  REMOVAL, CANNULA, ADULT, FOR ECMO    Plan for GA, standard monitors, large bore IVs, invasive monitors, intraoperative ISAURA, possible blood transfusion, postoperative ICU.  PONV prophylaxis.  Antibiotics per primary service.        Meliza Landeros MD

## 2020-02-03 NOTE — OP NOTE
OPERATIVE DATE: 2/3/2020    PRE-OPERATIVE DIAGNOSIS:  1) Viral myocarditis  2) Cardiogenic shock  3) VA ECMO  Patient Active Problem List   Diagnosis     Heart failure (H)     Acute combined systolic and diastolic heart failure (H)     Cardiogenic shock (H)     POST-OPERATIVE DIAGNOSIS:  1) Viral myocarditis  2) Cardiogenic shock  3) VA ECMO  Patient Active Problem List   Diagnosis     Heart failure (H)     Acute combined systolic and diastolic heart failure (H)     Cardiogenic shock (H)     PROCEDURE:  1) VA ECMO decannulation  2) Repair of left femoral vessels  3) Transesophageal echocardiogram    SURGEON: Vishnu Marx MD    ASSISTANT: Scar Connors PA-C    ANESTHESIA: GETA    ESTIMATED BLOOD LOSS: 50cc    INDICATIONS:  Ms. LUZ ELENA SHEPHERD is a 34 year old female admitted with viral myocarditis and cardiogenic shock. I was asked to evaluate for ECMO decannulation.  Risks and benefits of the operation were explained to the patient and their family including, but not limited to, bleeding, infection, stroke and even death.  They understood these risks and agreed to proceed electively.    OPERATIVE REPORT:  The patient was transferred to the operating room and positioned supine on the OR table.  General anesthesia was initiated by the anesthesia team.  Endotracheal intubation and IV access was already in place. The patients abdomen and bilateral lower extremities were clipped, prepped and draped in sterile fashion.  A pre-procedure time-out was performed confirming the correct patient, correct site and correct procedure.    A transesophageal echocardiogram was performed.  The patient was given 5000 U IV heparin.  Flow on the circuit was decreased to 1L.  An ABG after 5 minutes was stable.    A longitudinal skin incision was made in the left groin crease.  The femoral vessels were isolated.  Vessel loops were placed around the femoral artery proximal and distal to the access site.  A 4-0 prolene pursestring was  placed around the venous cannula.  Flow on the circuit was stopped.  The venous cannula was removed.  The site was oversewed with 4-0 prolene.  Next the arterial cannula was removed.  The site was repaired with 5-0 prolene stitches.      The wound was made hemostatic.  The incision was closed in layers using vicryl stitches.  Nylon stitches were used to approximate the skin.  A Proveena skin vac was applied.       The patient was then transferred from the operating bed to an ICU bed and transferred to the ICU in critical, but stable, condition.    All needle, sponge and instrument counts were correct at the end of the case.    Vishnu Marx  Cardiothoracic Surgery  Pager: [phone]

## 2020-02-03 NOTE — PROGRESS NOTES
[facility] Cardiology Progress Note           Assessment and Plan:     Ms. Tom is a 34 year old with a PMHx of Tobacco Abuse and Bipolar Disorder who presents with cardiogenic shock likely from viral myocarditis. She is currently on VA-ECMO support given severe bi-ventricular dysfunction.     #Cardiogenic Shock on VA-ECMO from Viral Myocarditis   -Patient presented with sinus tachycardia with low pulse pressure despite IABP placement. Therefore, VA-ECMO was placed.   - Underwent decannulation today without significant events. Because CVP was 6 on the floor and patient required a small dose of epi, gave LR 500cc.  - With improving hemodynamics and lack of augmentation from the IABP, will remove IABP today   - With patient's improvement in EF, she will likely not require advanced therapy.       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  -discontinue     #Hypoxic Respiratory Failure  -Patient was electively intubated for VA-ECMO.   -Continue to wean FiO2 on circuit and ventilator.  -Goal Oxygen Saturation >90%    #Pericardial Effusion   -Likely related to myopericarditis.  -Now trace to small on most recent TTE     #Thrombocytopenia  -Concern for thrombosis of circuitx2 along with declinig platelets  - Sent NAHUM panel and will determine if patient candidate for argatroban     #Hyperclycemia   -Continue Insulin drip.     #Extravasation of Blood on Left Arm   -Continue to monitor. Wound care has no active recommendations at this time.    Staffed with Dr. Gabriel Yoo MD  Cardiology Fellow  PGY4       Subjective:     - Delta P within ECMO circuit increased overnight, concerning for oxygenator thrombosis. Patient underwent decannulation this morning without incident shortly after this. Presented to the unit requiring small dose of epinephrine. Was weaned off epinephrine after obtaining fluid bolus             Review of Systems:   A comprehensive  review of systems was performed and found to be negative except as described in this note          Medications:     Current Facility-Administered Medications   Medication     acetaminophen (TYLENOL) Suppository 650 mg     acetaminophen (TYLENOL) tablet 650 mg     alum & mag hydroxide-simethicone (MYLANTA ES/MAALOX  ES) suspension 30 mL     artificial tears ophthalmic ointment     bisacodyl (DULCOLAX) Suppository 10 mg     dexmedetomidine (PRECEDEX) 400 mcg in 0.9% sodium chloride 100 mL     dextrose 10% infusion     dextrose 50 % injection 25-50 mL    Or     glucagon injection 1 mg     EPINEPHrine (ADRENALIN) 5 mg in sodium chloride 0.9 % 250 mL infusion     fentaNYL (PF) (SUBLIMAZE) injection 25 mcg     fentaNYL (SUBLIMAZE) infusion     HYDROmorphone (DILAUDID) injection 0.2 mg     lidocaine (LMX4) cream     lidocaine 1 % 0.1-1 mL     magnesium sulfate 2 g in NS intermittent infusion (PharMEDium or [facility]V Cmpd)     magnesium sulfate 4 g in 100 mL sterile water (premade)     medication instruction     metoclopramide (REGLAN) injection 10 mg     multivitamins w/minerals (CERTAVITE) liquid 15 mL     naloxone (NARCAN) injection 0.1-0.4 mg     norethindrone (MICRONOR) 0.35 MG per tablet 0.35 mg     ondansetron (ZOFRAN) injection 4 mg     pantoprazole (PROTONIX) 2 mg/mL suspension 40 mg     Patient is already receiving anticoagulation with heparin, enoxaparin (LOVENOX), warfarin (COUMADIN)  or other anticoagulant medication     polyethylene glycol (MIRALAX/GLYCOLAX) Packet 17 g     potassium chloride (KLOR-CON) Packet 20-40 mEq     potassium chloride 10 mEq in 100 mL intermittent infusion with 10 mg lidocaine     potassium chloride 10 mEq in 100 mL sterile water intermittent infusion (premix)     potassium chloride 20 mEq in 50 mL intermittent infusion     potassium chloride ER (K-DUR/KLOR-CON M) CR tablet 20-40 mEq     propofol (DIPRIVAN) bolus from infusion pump 10-20 mg     propofol (DIPRIVAN) infusion     Reason  ACE/ARB/ARNI order not selected     Reason beta blocker not prescribed     senna-docusate (SENOKOT-S/PERICOLACE) 8.6-50 MG per tablet 1 tablet     sennosides (SENOKOT) tablet 8.6 mg     sodium chloride (PF) 0.9% PF flush 5-50 mL     sodium phosphate 10 mmol in D5W intermittent infusion     sodium phosphate 15 mmol in D5W intermittent infusion     sodium phosphate 20 mmol in D5W intermittent infusion     sodium phosphate 25 mmol in D5W intermittent infusion               Objective:   Temp: 98.6  F (37  C) Temp src: Esophageal    Heart Rate: 107 Resp: 14 SpO2: 100 % O2 Device: Mechanical Ventilator           Physical Exam:   Vitals were reviewed  Temp: 98.6  F (37  C) Temp src: Esophageal    Heart Rate: 107 Resp: 14 SpO2: 100 % O2 Device: Mechanical Ventilator      Gen: Intubated and Sedated   HEENT: ET Tube in Place  CV: RRR  Pulm: Coarse Breath Sounds   Ext: No edema   Neuro: No focal defects           Data:     Lab Results   Component Value Date    WBC 7.4 02/03/2020    HGB 8.2 (L) 02/03/2020    HCT 25.2 (L) 02/03/2020    PLT 68 (L) 02/03/2020     (H) 02/03/2020    POTASSIUM 4.3 02/03/2020    CHLORIDE 114 (H) 02/03/2020    CO2 31 02/03/2020    BUN 27 02/03/2020    CR 0.57 02/03/2020     (H) 02/03/2020     (H) 02/03/2020    SED 78 (H) 02/03/2020    DD 10.1 (H) 02/03/2020    TROPI 4.071 (HH) 01/30/2020    AST 33 02/03/2020    ALT 23 02/03/2020    ALKPHOS 70 02/03/2020    BILITOTAL 1.5 (H) 02/03/2020    INR 1.02 02/03/2020

## 2020-02-03 NOTE — PROGRESS NOTES
ECMO Shift Summary:      ECMO Equipment:  Console serial number: [id number]  Circuit Lot number: [id number]  Oxygenator Lot number: [id number]        Patient remains on VA ECMO, all equipment is functioning and alarms are appropriately set. RPM's 3200 with flow range 3.20-3.55 L/min. Sweep gas is at 0.5 LPM and FiO2 60%. Circuit remains free of air, clot and fibrin. Cannulas are secure with heavy bleeding from site. Patient also has menses. Extremities are warm and well perfused.     Significant Shift Events: Start of shift Delta P was 23; now at 0400 Delta P at 63. Increasing every hour by 10. Lost 1/2 L of flow.  Post oxy gas on 100% was 211. Did text page perfusion and Dr. Marx about delta P and loss of 1/2 L of flow.     Vent settings:  Ventilation Mode: CMV/AC  (Continuous Mandatory Ventilation/ Assist Control)  FiO2 (%): 40 %  Rate Set (breaths/minute): 12 breaths/min  Tidal Volume Set (mL): 400 mL  PEEP (cm H2O): 5 cmH2O  Oxygen Concentration (%): 40 %  Resp: 12  .    Heparin is running at 1500 units/hr, ACT range 150-155.    Urine output is as charted, blood loss was large and full of clots. Product given included 2 units of PRBCs.      Intake/Output Summary (Last 24 hours) at 2/3/2020 0425  Last data filed at 2/3/2020 0400  Gross per 24 hour   Intake 4653.53 ml   Output 4140 ml   Net 513.53 ml       ECHO:  No results found for this or any previous visit.No results found for this or any previous visit.    CXR:  No results found for this or any previous visit (from the past 24 hour(s)).    Labs:  Recent Labs   Lab 02/03/20  0358 02/03/20  0153 02/03/20  0024 02/02/20  2159   PH 7.41 7.46* 7.48* 7.52*   PCO2 51* 45 43 41   PO2 141* 129* 118* 138*   HCO3 32* 32* 32* 33*   O2PER 40 40 40 40.0       Lab Results   Component Value Date    HGB 8.7 (L) 02/02/2020    PHGB 60 (H) 02/02/2020    PLT 86 (L) 02/02/2020    FIBR 711 (H) 02/02/2020    INR 1.00 02/02/2020    PTT 53 (H) 02/02/2020    DD 5.5 (H) 02/02/2020     AXA 0.20 02/02/2020    ANTCH 71 (L) 02/02/2020         Plan is decannulate in OR today at 0645 with Dr. Marx.      Alka Borges, RN  2/3/2020 4:25 AM

## 2020-02-03 NOTE — PROGRESS NOTES
IABP pulled w/o complications by Dr. Reynolds. [facility], [place]  Procedure Note           Intra-Aortic Balloon Pump Discontinuation:       Azra Tom  MRN# [id number]   February 3, 2020  3:45 PM             IABP discontinued: February 3, 2020, 3:45 PM   Removed by:    Catheter saved: No      Recorded by Ani Vance

## 2020-02-03 NOTE — PROGRESS NOTES
ECLS Discontinuation Note:    ECLS was discontinued 2/3/2020 @ 0832 in OR    Terrence Villa, RT, RRT  2/3/2020 9:02 AM

## 2020-02-03 NOTE — ANESTHESIA PROCEDURE NOTES
ISAURA Probe Insertion Note:    Inserted by:  Meliza Landeros MD (Responsible Anesthesiologist)  Performed by  Personally  Probe Number: [id number]  Probe Status PRE Insertion: NO obvious damage  Probe type:  Adult 3D    Bite block used:   Yes  Insertion Technique: Jaw Lift  Insertion complications: None obvious    Billing Report:ISAURA report by Anesthesiologist (See Separate Report note)    Probe Status POST Removal: NO obvious damage

## 2020-02-03 NOTE — ANESTHESIA CARE TRANSFER NOTE
Patient: Azra Tom    Procedure(s):  REMOVAL Extra Corporeal Membrain Oxygenator, Inta operative transesophageal echocardiogram, Repair of femoral vessel    Diagnosis: Cardiogenic shock (H) [R57.0]  Diagnosis Additional Information: No value filed.    Anesthesia Type:   General     Note:  Airway :ETT and Ventilator  Patient transferred to:ICU  Comments: Patient to 4E with ambu and monitors.  Balloon pump in place. Sedation infusing. Reversal dosed. Report to ICU team. VSS.ICU Handoff: Call for PAUSE to initiate/utilize ICU HANDOFF, Identified Patient, Identified Responsible Provider, Reviewed the Pertinent Medical History, Discussed Surgical Course, Reviewed Intra-OP Anesthesia Management and Issues during Anesthesia, Set Expectations for Post Procedure Period and Allowed Opportunity for Questions and Acknowledgement of Understanding      Vitals: (Last set prior to Anesthesia Care Transfer)    CRNA VITALS  2/3/2020 0858 - 2/3/2020 0942      2/3/2020             Pulse:  100    ART BP:  123/41    SpO2:  100 %                Electronically Signed By: JATIN Rasmussen CRNA  February 3, 2020  9:42 AM

## 2020-02-03 NOTE — PLAN OF CARE
Assessment: VA ECMO.     Neuro: JULIO CESAR orientation as patient is intubated, does follow minimal commands. PERRL, pupils 2 mm b/l. Normothermic.   CV: SR/ST, HR 80s-130s. Pt becomes tachycardic with cares. MAPs stable, patient also becomes hypertensive with cares. Good pulses in all extremities.   ECMO: Speed 3200, Flows 2.9-3s, Sweep gas @0.5 LPM. 2 U PRBC, 1U PLT given. Continual oozing from cannula site.   IABP: 1:1, 100% augmentation, pressures 80s-100s.  Pulm: LS coarse, CMV 40%/RR 12//PEEP 5/PIP 19-22. Thick, tan secretions via ETT.   GI: JULIO CESAR BS d/t IABP, no BM this shift. OG to LIS, moderate output.   : UOP adequate, PM Lasix held d/t ECMO cannula sway. Large, golf ball sized clot from patients' vagina present.  Lytes: K 40 meq, NaPhos 10 mmol replaced.

## 2020-02-03 NOTE — ANESTHESIA PROCEDURE NOTES
Perioperative ISAURA Report  Anesthesia Information  ISAURA probe placed and report generated by: : Meliza Landeros MD Orza, Daniela Violeta, MD  Images for this study have been archived.   Surgeon:  Vishnu Marx MD      Preanesthesia Checklist:  Patient identified, IV assessed, monitors and equipment assessed, procedure being performed at surgeon's request and anesthesia consent obtained.  General Procedure Information  Modalities:  2D, CW Doppler, PW Doppler and Color flow mapping    ECMO decannulation  Echocardiographic and Doppler Measurements  Right Ventricle:  Cavity size normal.   Hypertrophy not present.   Thrombus not present.    Global function normal.     Left Ventricle:  Cavity size normal.   Hypertrophy present.   Thrombus not present.   Global Function normal.   Ejection Fraction 55%.      Ventricular Regional Function:  1- Basal Anteroseptal:  normal  2- Basal Anterior:  normal  3- Basal Anterolateral:  normal  4- Basal Inferolateral:  normal  5- Basal Inferior:  normal  6- Basal Inferoseptal:  normal  7- Mid Anteroseptal:  normal  8- Mid Anterior:  normal  9- Mid Anterolateral:  normal  10- Mid Inferolateral:  normal  11- Mid Inferior:  normal  12- Mid Inferoseptal:  normal  13- Apical Anterior:  normal  14- Apical Lateral:  normal  15- Apical Inferior:  normal  16- Apical Septal:  normal  17- [place]:  normal    Valves  Aortic Valve: Annulus normal.  Stenosis not present.  Regurgitation absent.  Leaflets normal.  Leaflet motions normal.    Mitral Valve: Annulus normal.  Stenosis not present.  Regurgitation absent.  Leaflets normal.  Leaflet motions normal.    Tricuspid Valve: Annulus normal.  Stenosis not present.  Regurgitation absent.  Leaflets normal.  Leaflet motions normal.        Aorta: Ascending Aorta: Size normal.  Dissection not present.  Plaque thickness less than 3 mm.  Mobile plaque not present.    Aortic Arch: Size normal.   Dissection not present.   Plaque thickness less  than 3 mm.   Mobile plaque not present.    Descending Aorta: Size normal.   Dissection not present.   Plaque thickness less than 3 mm.   Mobile plaque not present.        Right Atrium:  Size normal.   Spontaneous echo contrast not present.   Thrombus not present.   Tumor not present.   Device not present.     Left Atrium: Size normal.  Spontaneous echo contrast not present.  Thrombus not present.  Tumor not present.  Device not present.    Left atrial appendage normal.     Atrial Septum: Intra-atrial septal morphology normal.     Ventricular Septum: Intra-ventricular septum morphology normal.       Other Findings:   Pericardium:  normal.  . .  .  No coronary sinus catheter present.  .  .  Post Intervention Findings  . Global function:  Unchanged.   Regional wall motion:  Unchanged   Surgeon(s) notified of all postintervention findings:  Yes  .  .  .   .  .  .  .  .  .  .    EF 55%, no other abnormalities noted by ISAURA.  ECMO decannulation successful.    Echocardiogram Comments

## 2020-02-03 NOTE — PROGRESS NOTES
Arrived from O.R. s/p VA ECMO decannulation.  Propofol @ 30 mcg/kg/min, precedex @ 0.7 mcg/kg/min, and Fentanyl @ 100 mcg/hr.  Epi off.  VSS.  UOP 70 ml.  Left groin C/D/I and wound vac on.  IABP 1:1. Means 60's via IABP.  CVP 7.  Dr. Raymundo notified.  Orders to give 500 ml LR.  Repeat CVP after bolus 16.  M.D. notified.  Following commands, LITTLE.        Admitted/transferred from: O.R.  Reason for admission/transfer:  S/p decannulation  Patient status upon admission/transfer: stable  Interventions: wean sedation if possible.  Plan: possible IABP removal.  2 RN skin assessment: completed by Ela Guadarrama and Emmanuel Yang.  Result of skin assessment and interventions/actions:   Mepilex on sacrum.    Height, weight, drug calc weight: done  Patient belongings: in closet  MDRO education (if applicable):

## 2020-02-04 ENCOUNTER — APPOINTMENT (OUTPATIENT)
Dept: GENERAL RADIOLOGY | Facility: CLINIC | Age: 35
DRG: 003 | End: 2020-02-04
Attending: PHYSICIAN ASSISTANT
Payer: COMMERCIAL

## 2020-02-04 ENCOUNTER — APPOINTMENT (OUTPATIENT)
Dept: OCCUPATIONAL THERAPY | Facility: CLINIC | Age: 35
DRG: 003 | End: 2020-02-04
Attending: STUDENT IN AN ORGANIZED HEALTH CARE EDUCATION/TRAINING PROGRAM
Payer: COMMERCIAL

## 2020-02-04 LAB
ALBUMIN SERPL-MCNC: 2.4 G/DL (ref 3.4–5)
ALBUMIN SERPL-MCNC: 2.4 G/DL (ref 3.4–5)
ALP SERPL-CCNC: 77 U/L (ref 40–150)
ALP SERPL-CCNC: 89 U/L (ref 40–150)
ALT SERPL W P-5'-P-CCNC: 32 U/L (ref 0–50)
ALT SERPL W P-5'-P-CCNC: 35 U/L (ref 0–50)
ANION GAP SERPL CALCULATED.3IONS-SCNC: 2 MMOL/L (ref 3–14)
ANION GAP SERPL CALCULATED.3IONS-SCNC: 4 MMOL/L (ref 3–14)
APTT PPP: 30 SEC (ref 22–37)
APTT PPP: 32 SEC (ref 22–37)
AST SERPL W P-5'-P-CCNC: 35 U/L (ref 0–45)
AST SERPL W P-5'-P-CCNC: 43 U/L (ref 0–45)
BACTERIA SPEC CULT: NO GROWTH
BACTERIA SPEC CULT: NO GROWTH
BASE EXCESS BLDA CALC-SCNC: 4.8 MMOL/L
BASE EXCESS BLDA CALC-SCNC: 5.4 MMOL/L
BASE EXCESS BLDA CALC-SCNC: 5.7 MMOL/L
BILIRUB DIRECT SERPL-MCNC: 1 MG/DL (ref 0–0.2)
BILIRUB SERPL-MCNC: 1.3 MG/DL (ref 0.2–1.3)
BILIRUB SERPL-MCNC: 1.5 MG/DL (ref 0.2–1.3)
BUN SERPL-MCNC: 17 MG/DL (ref 7–30)
BUN SERPL-MCNC: 18 MG/DL (ref 7–30)
CA-I BLD-MCNC: 4.6 MG/DL (ref 4.4–5.2)
CA-I BLD-MCNC: 4.6 MG/DL (ref 4.4–5.2)
CALCIUM SERPL-MCNC: 8 MG/DL (ref 8.5–10.1)
CALCIUM SERPL-MCNC: 8 MG/DL (ref 8.5–10.1)
CHLORIDE SERPL-SCNC: 115 MMOL/L (ref 94–109)
CHLORIDE SERPL-SCNC: 115 MMOL/L (ref 94–109)
CO2 SERPL-SCNC: 29 MMOL/L (ref 20–32)
CO2 SERPL-SCNC: 31 MMOL/L (ref 20–32)
CREAT SERPL-MCNC: 0.57 MG/DL (ref 0.52–1.04)
CREAT SERPL-MCNC: 0.58 MG/DL (ref 0.52–1.04)
ERYTHROCYTE [DISTWIDTH] IN BLOOD BY AUTOMATED COUNT: 15.7 % (ref 10–15)
ERYTHROCYTE [DISTWIDTH] IN BLOOD BY AUTOMATED COUNT: 15.8 % (ref 10–15)
ERYTHROCYTE [DISTWIDTH] IN BLOOD BY AUTOMATED COUNT: 16.1 % (ref 10–15)
ERYTHROCYTE [DISTWIDTH] IN BLOOD BY AUTOMATED COUNT: 16.3 % (ref 10–15)
GFR SERPL CREATININE-BSD FRML MDRD: >90 ML/MIN/{1.73_M2}
GFR SERPL CREATININE-BSD FRML MDRD: >90 ML/MIN/{1.73_M2}
GLUCOSE BLD-MCNC: 117 MG/DL (ref 70–99)
GLUCOSE SERPL-MCNC: 116 MG/DL (ref 70–99)
GLUCOSE SERPL-MCNC: 144 MG/DL (ref 70–99)
HCO3 BLD-SCNC: 29 MMOL/L (ref 21–28)
HCO3 BLD-SCNC: 30 MMOL/L (ref 21–28)
HCO3 BLD-SCNC: 30 MMOL/L (ref 21–28)
HCT VFR BLD AUTO: 26.6 % (ref 35–47)
HCT VFR BLD AUTO: 28.3 % (ref 35–47)
HGB BLD-MCNC: 8.6 G/DL (ref 11.7–15.7)
HGB BLD-MCNC: 9.1 G/DL (ref 11.7–15.7)
HGB BLD-MCNC: 9.3 G/DL (ref 11.7–15.7)
HGB BLD-MCNC: 9.4 G/DL (ref 11.7–15.7)
INR PPP: 1 (ref 0.86–1.14)
INTERPRETATION ECG - MUSE: NORMAL
LACTATE BLD-SCNC: 0.5 MMOL/L (ref 0.7–2)
LDH SERPL L TO P-CCNC: 326 U/L (ref 81–234)
Lab: NORMAL
Lab: NORMAL
MAGNESIUM SERPL-MCNC: 2.2 MG/DL (ref 1.6–2.3)
MAGNESIUM SERPL-MCNC: 2.6 MG/DL (ref 1.6–2.3)
MCH RBC QN AUTO: 29.4 PG (ref 26.5–33)
MCH RBC QN AUTO: 29.5 PG (ref 26.5–33)
MCH RBC QN AUTO: 29.6 PG (ref 26.5–33)
MCH RBC QN AUTO: 29.7 PG (ref 26.5–33)
MCHC RBC AUTO-ENTMCNC: 32.2 G/DL (ref 31.5–36.5)
MCHC RBC AUTO-ENTMCNC: 32.3 G/DL (ref 31.5–36.5)
MCHC RBC AUTO-ENTMCNC: 32.9 G/DL (ref 31.5–36.5)
MCHC RBC AUTO-ENTMCNC: 33.2 G/DL (ref 31.5–36.5)
MCV RBC AUTO: 90 FL (ref 78–100)
MCV RBC AUTO: 90 FL (ref 78–100)
MCV RBC AUTO: 91 FL (ref 78–100)
MCV RBC AUTO: 91 FL (ref 78–100)
O2/TOTAL GAS SETTING VFR VENT: 100 %
O2/TOTAL GAS SETTING VFR VENT: 40 %
O2/TOTAL GAS SETTING VFR VENT: 40 %
OXYHGB MFR BLD: 96 % (ref 92–100)
OXYHGB MFR BLD: 97 % (ref 92–100)
PCO2 BLD: 41 MM HG (ref 35–45)
PCO2 BLD: 42 MM HG (ref 35–45)
PCO2 BLD: 44 MM HG (ref 35–45)
PH BLD: 7.44 PH (ref 7.35–7.45)
PH BLD: 7.46 PH (ref 7.35–7.45)
PH BLD: 7.46 PH (ref 7.35–7.45)
PHOSPHATE SERPL-MCNC: 3.2 MG/DL (ref 2.5–4.5)
PLATELET # BLD AUTO: 70 10E9/L (ref 150–450)
PLATELET # BLD AUTO: 72 10E9/L (ref 150–450)
PLATELET # BLD AUTO: 72 10E9/L (ref 150–450)
PLATELET # BLD AUTO: 86 10E9/L (ref 150–450)
PLATELET INHIBITION WITH AA: 48 %
PLATELET INHIBITION WITH ADP: 59 %
PO2 BLD: 105 MM HG (ref 80–105)
PO2 BLD: 109 MM HG (ref 80–105)
PO2 BLD: 79 MM HG (ref 80–105)
POTASSIUM SERPL-SCNC: 3.6 MMOL/L (ref 3.4–5.3)
POTASSIUM SERPL-SCNC: 4.3 MMOL/L (ref 3.4–5.3)
PROT SERPL-MCNC: 5.3 G/DL (ref 6.8–8.8)
PROT SERPL-MCNC: 5.6 G/DL (ref 6.8–8.8)
RBC # BLD AUTO: 2.91 10E12/L (ref 3.8–5.2)
RBC # BLD AUTO: 3.1 10E12/L (ref 3.8–5.2)
RBC # BLD AUTO: 3.15 10E12/L (ref 3.8–5.2)
RBC # BLD AUTO: 3.16 10E12/L (ref 3.8–5.2)
SODIUM SERPL-SCNC: 148 MMOL/L (ref 133–144)
SODIUM SERPL-SCNC: 148 MMOL/L (ref 133–144)
SPECIMEN SOURCE: NORMAL
SPECIMEN SOURCE: NORMAL
WBC # BLD AUTO: 12 10E9/L (ref 4–11)
WBC # BLD AUTO: 12.2 10E9/L (ref 4–11)
WBC # BLD AUTO: 12.7 10E9/L (ref 4–11)
WBC # BLD AUTO: 13.1 10E9/L (ref 4–11)

## 2020-02-04 PROCEDURE — 94003 VENT MGMT INPAT SUBQ DAY: CPT

## 2020-02-04 PROCEDURE — 27210437 ZZH NUTRITION PRODUCT SEMIELEM INTERMED LITER

## 2020-02-04 PROCEDURE — 83615 LACTATE (LD) (LDH) ENZYME: CPT | Performed by: PHYSICIAN ASSISTANT

## 2020-02-04 PROCEDURE — 25000128 H RX IP 250 OP 636: Performed by: INTERNAL MEDICINE

## 2020-02-04 PROCEDURE — 25800030 ZZH RX IP 258 OP 636: Performed by: INTERNAL MEDICINE

## 2020-02-04 PROCEDURE — 25000128 H RX IP 250 OP 636: Performed by: HOSPITALIST

## 2020-02-04 PROCEDURE — 85730 THROMBOPLASTIN TIME PARTIAL: CPT | Performed by: STUDENT IN AN ORGANIZED HEALTH CARE EDUCATION/TRAINING PROGRAM

## 2020-02-04 PROCEDURE — 82330 ASSAY OF CALCIUM: CPT | Performed by: PHYSICIAN ASSISTANT

## 2020-02-04 PROCEDURE — 25000128 H RX IP 250 OP 636: Performed by: PHYSICIAN ASSISTANT

## 2020-02-04 PROCEDURE — 25000132 ZZH RX MED GY IP 250 OP 250 PS 637: Performed by: HOSPITALIST

## 2020-02-04 PROCEDURE — 85730 THROMBOPLASTIN TIME PARTIAL: CPT | Performed by: PHYSICIAN ASSISTANT

## 2020-02-04 PROCEDURE — 97535 SELF CARE MNGMENT TRAINING: CPT | Mod: GO

## 2020-02-04 PROCEDURE — 97165 OT EVAL LOW COMPLEX 30 MIN: CPT | Mod: GO

## 2020-02-04 PROCEDURE — 25000132 ZZH RX MED GY IP 250 OP 250 PS 637: Performed by: PHYSICIAN ASSISTANT

## 2020-02-04 PROCEDURE — 71045 X-RAY EXAM CHEST 1 VIEW: CPT

## 2020-02-04 PROCEDURE — 83605 ASSAY OF LACTIC ACID: CPT | Performed by: PHYSICIAN ASSISTANT

## 2020-02-04 PROCEDURE — 80053 COMPREHEN METABOLIC PANEL: CPT | Performed by: PHYSICIAN ASSISTANT

## 2020-02-04 PROCEDURE — 97110 THERAPEUTIC EXERCISES: CPT | Mod: GO

## 2020-02-04 PROCEDURE — 25000128 H RX IP 250 OP 636: Performed by: STUDENT IN AN ORGANIZED HEALTH CARE EDUCATION/TRAINING PROGRAM

## 2020-02-04 PROCEDURE — 85027 COMPLETE CBC AUTOMATED: CPT | Performed by: STUDENT IN AN ORGANIZED HEALTH CARE EDUCATION/TRAINING PROGRAM

## 2020-02-04 PROCEDURE — 25000125 ZZHC RX 250: Performed by: STUDENT IN AN ORGANIZED HEALTH CARE EDUCATION/TRAINING PROGRAM

## 2020-02-04 PROCEDURE — 25800030 ZZH RX IP 258 OP 636: Performed by: STUDENT IN AN ORGANIZED HEALTH CARE EDUCATION/TRAINING PROGRAM

## 2020-02-04 PROCEDURE — 80076 HEPATIC FUNCTION PANEL: CPT | Performed by: STUDENT IN AN ORGANIZED HEALTH CARE EDUCATION/TRAINING PROGRAM

## 2020-02-04 PROCEDURE — 80048 BASIC METABOLIC PNL TOTAL CA: CPT | Performed by: PHYSICIAN ASSISTANT

## 2020-02-04 PROCEDURE — 93005 ELECTROCARDIOGRAM TRACING: CPT

## 2020-02-04 PROCEDURE — 85027 COMPLETE CBC AUTOMATED: CPT | Performed by: PHYSICIAN ASSISTANT

## 2020-02-04 PROCEDURE — 93010 ELECTROCARDIOGRAM REPORT: CPT | Mod: 59 | Performed by: INTERNAL MEDICINE

## 2020-02-04 PROCEDURE — 99291 CRITICAL CARE FIRST HOUR: CPT | Mod: GC | Performed by: INTERNAL MEDICINE

## 2020-02-04 PROCEDURE — 85610 PROTHROMBIN TIME: CPT | Performed by: PHYSICIAN ASSISTANT

## 2020-02-04 PROCEDURE — 20000004 ZZH R&B ICU UMMC

## 2020-02-04 PROCEDURE — 94640 AIRWAY INHALATION TREATMENT: CPT | Mod: 76

## 2020-02-04 PROCEDURE — 84100 ASSAY OF PHOSPHORUS: CPT | Performed by: PHYSICIAN ASSISTANT

## 2020-02-04 PROCEDURE — 82803 BLOOD GASES ANY COMBINATION: CPT | Performed by: INTERNAL MEDICINE

## 2020-02-04 PROCEDURE — 82805 BLOOD GASES W/O2 SATURATION: CPT | Performed by: PHYSICIAN ASSISTANT

## 2020-02-04 PROCEDURE — 40000014 ZZH STATISTIC ARTERIAL MONITORING DAILY

## 2020-02-04 PROCEDURE — 83735 ASSAY OF MAGNESIUM: CPT | Performed by: PHYSICIAN ASSISTANT

## 2020-02-04 PROCEDURE — 82947 ASSAY GLUCOSE BLOOD QUANT: CPT | Performed by: PHYSICIAN ASSISTANT

## 2020-02-04 PROCEDURE — 40000196 ZZH STATISTIC RAPCV CVP MONITORING

## 2020-02-04 PROCEDURE — 40000275 ZZH STATISTIC RCP TIME EA 10 MIN

## 2020-02-04 RX ORDER — CARVEDILOL 3.12 MG/1
6.25 TABLET ORAL 2 TIMES DAILY WITH MEALS
Status: DISCONTINUED | OUTPATIENT
Start: 2020-02-04 | End: 2020-02-11 | Stop reason: HOSPADM

## 2020-02-04 RX ORDER — OXYCODONE HYDROCHLORIDE 5 MG/1
5 TABLET ORAL EVERY 4 HOURS PRN
Status: DISCONTINUED | OUTPATIENT
Start: 2020-02-04 | End: 2020-02-11 | Stop reason: HOSPADM

## 2020-02-04 RX ORDER — FUROSEMIDE 10 MG/ML
40 INJECTION INTRAMUSCULAR; INTRAVENOUS ONCE
Status: COMPLETED | OUTPATIENT
Start: 2020-02-04 | End: 2020-02-04

## 2020-02-04 RX ORDER — NALOXONE HYDROCHLORIDE 0.4 MG/ML
.1-.4 INJECTION, SOLUTION INTRAMUSCULAR; INTRAVENOUS; SUBCUTANEOUS
Status: DISCONTINUED | OUTPATIENT
Start: 2020-02-04 | End: 2020-02-11 | Stop reason: HOSPADM

## 2020-02-04 RX ADMIN — PIPERACILLIN AND TAZOBACTAM 4.5 G: 4; .5 INJECTION, POWDER, FOR SOLUTION INTRAVENOUS at 18:59

## 2020-02-04 RX ADMIN — NORETHINDRONE 0.35 MG: 0.35 TABLET ORAL at 09:12

## 2020-02-04 RX ADMIN — FENTANYL CITRATE 25 MCG: 50 INJECTION INTRAMUSCULAR; INTRAVENOUS at 23:18

## 2020-02-04 RX ADMIN — PIPERACILLIN AND TAZOBACTAM 4.5 G: 4; .5 INJECTION, POWDER, FOR SOLUTION INTRAVENOUS at 06:40

## 2020-02-04 RX ADMIN — ARGATROBAN 0.5 MCG/KG/MIN: 1 INJECTION INTRAVENOUS at 21:19

## 2020-02-04 RX ADMIN — DEXMEDETOMIDINE 1 MCG/KG/HR: 100 INJECTION, SOLUTION, CONCENTRATE INTRAVENOUS at 09:08

## 2020-02-04 RX ADMIN — Medication 40 MG: at 09:11

## 2020-02-04 RX ADMIN — POTASSIUM CHLORIDE 20 MEQ: 29.8 INJECTION, SOLUTION INTRAVENOUS at 23:42

## 2020-02-04 RX ADMIN — PIPERACILLIN AND TAZOBACTAM 4.5 G: 4; .5 INJECTION, POWDER, FOR SOLUTION INTRAVENOUS at 13:03

## 2020-02-04 RX ADMIN — VANCOMYCIN HYDROCHLORIDE 1750 MG: 10 INJECTION, POWDER, LYOPHILIZED, FOR SOLUTION INTRAVENOUS at 20:02

## 2020-02-04 RX ADMIN — MULTIVITAMIN 15 ML: LIQUID ORAL at 09:10

## 2020-02-04 RX ADMIN — PIPERACILLIN AND TAZOBACTAM 4.5 G: 4; .5 INJECTION, POWDER, FOR SOLUTION INTRAVENOUS at 01:27

## 2020-02-04 RX ADMIN — FUROSEMIDE 40 MG: 10 INJECTION, SOLUTION INTRAVENOUS at 21:50

## 2020-02-04 RX ADMIN — POLYETHYLENE GLYCOL 3350 17 G: 17 POWDER, FOR SOLUTION ORAL at 09:10

## 2020-02-04 RX ADMIN — VANCOMYCIN HYDROCHLORIDE 1750 MG: 10 INJECTION, POWDER, LYOPHILIZED, FOR SOLUTION INTRAVENOUS at 10:20

## 2020-02-04 RX ADMIN — DEXMEDETOMIDINE 1 MCG/KG/HR: 100 INJECTION, SOLUTION, CONCENTRATE INTRAVENOUS at 05:04

## 2020-02-04 RX ADMIN — CARVEDILOL 6.25 MG: 6.25 TABLET, FILM COATED ORAL at 22:54

## 2020-02-04 RX ADMIN — SENNOSIDES AND DOCUSATE SODIUM 1 TABLET: 8.6; 5 TABLET ORAL at 20:00

## 2020-02-04 RX ADMIN — ONDANSETRON 4 MG: 2 INJECTION INTRAMUSCULAR; INTRAVENOUS at 22:52

## 2020-02-04 ASSESSMENT — MIFFLIN-ST. JEOR: SCORE: 1627.13

## 2020-02-04 ASSESSMENT — ACTIVITIES OF DAILY LIVING (ADL)
ADLS_ACUITY_SCORE: 18

## 2020-02-04 NOTE — PHARMACY-VANCOMYCIN DOSING SERVICE
Pharmacy Vancomycin Initial Note  Date of Service February 3, 2020  Patient's  1985  34 year old, female    Indication: Bacteremia    Current estimated CrCl = Estimated Creatinine Clearance: 154.6 mL/min (based on SCr of 0.57 mg/dL).    Creatinine for last 3 days  2020:  8:05 PM Creatinine 0.84 mg/dL  2020:  3:49 AM Creatinine 0.79 mg/dL;  9:59 AM Creatinine 0.80 mg/dL;  3:59 PM Creatinine 0.82 mg/dL; 10:09 PM Creatinine 0.77 mg/dL  2020:  4:06 AM Creatinine 0.67 mg/dL;  9:55 AM Creatinine 0.67 mg/dL;  3:44 PM Creatinine 0.66 mg/dL;  9:59 PM Creatinine 0.57 mg/dL  2/3/2020:  3:58 AM Creatinine 0.54 mg/dL;  9:45 AM Creatinine 0.57 mg/dL    Recent Vancomycin Level(s) for last 3 days  No results found for requested labs within last 72 hours.      Vancomycin IV Administrations (past 72 hours)      No vancomycin orders with administrations in past 72 hours.                Nephrotoxins and other renal medications (From now, onward)    Start     Dose/Rate Route Frequency Ordered Stop    20 0900  vancomycin (VANCOCIN) 1,750 mg in sodium chloride 0.9 % 250 mL intermittent infusion      1,750 mg (central catheter)  over 60 Minutes Intravenous EVERY 12 HOURS 20 1842      20 1845  vancomycin (VANCOCIN) 2,250 mg in sodium chloride 0.9 % 250 mL intermittent infusion      2,250 mg (central catheter)  over 60 Minutes Intravenous ONCE 20 18420 1830  piperacillin-tazobactam (ZOSYN) 4.5 g vial to attach to  mL bag      4.5 g  over 30 Minutes Intravenous EVERY 6 HOURS 20 1828      20 1815  norepinephrine (LEVOPHED) 16 mg in  mL infusion      0.03-0.4 mcg/kg/min × 88.4 kg (Dosing Weight)  2.5-33.2 mL/hr  Intravenous CONTINUOUS 20 1808            Contrast Orders - past 72 hours (72h ago, onward)    None                Plan:  1.  Start vancomycin  2250 mg iv x 1 then 1750 mg iv q12h   2.  Goal Trough Level: 15-20 mg/L   3.  Pharmacy will check  trough levels as appropriate in 1-3 Days.    4. Serum creatinine levels will be ordered daily for the first week of therapy and at least twice weekly for subsequent weeks.    5. [place] method utilized to dose vancomycin therapy: Method 2    Jaki VieiraD

## 2020-02-04 NOTE — PROGRESS NOTES
02/04/20 1500   Quick Adds   Type of Visit Initial Occupational Therapy Evaluation   Living Environment   Lives With child(nicolette), dependent;parent(s);sibling(s)   Home Accessibility   (3 stairs to enter )   Transportation Anticipated family or friend will provide;car, drives self   Living Environment Comment Pt with stairs to enter and all needs met on main floor. Pt with tub shower combo. Pt's family supportive and able to assist following discharge.    Self-Care   Usual Activity Tolerance good   Current Activity Tolerance fair   Activity/Exercise/Self-Care Comment Pt independent with ADLs/IADLs at baseline. Pt is a manager at [employer].    General Information   Onset of Illness/Injury or Date of Surgery - Date 01/27/20   Referring Physician Scar Connors PA-C   Patient/Family Goals Statement return to OF    Additional Occupational Profile Info/Pertinent History of Current Problem Ms. Tom is a 34 year old with a PMHx of Tobacco Abuse and Bipolar Disorder who presents with cardiogenic shock likely from viral myocarditis.   Precautions/Limitations fall precautions;oxygen therapy device and L/min   Cognitive Status Examination   Orientation orientation to person, place and time   Level of Consciousness lethargic/somnolent   Follows Commands (Cognition) WNL   Cognitive Comment Further testing recommended.    Visual Perception   Visual Perception No deficits were identified   Sensory Examination   Sensory Quick Adds No deficits were identified   Pain Assessment   Patient Currently in Pain No   Range of Motion (ROM)   ROM Comment BUEs/LEs WFL   Strength   Strength Comments Pt with generalized weaknes, grossly 3/5 MMT in BUEs/LEs    Mobility   Bed Mobility Comments max A    Transfer Skills   Transfer Comments Pt dangled EOB with CGA-Joanna     Lower Body Dressing   Level of [place]: Dress Lower Body maximum assist (25% patients effort)   Activities of Daily Living Analysis   Impairments Contributing to Impaired  "Activities of Daily Living balance impaired;cognition impaired;pain;strength decreased;postural control impaired   General Therapy Interventions   Planned Therapy Interventions ADL retraining;IADL retraining;bed mobility training;balance training;cognition;strengthening;home program guidelines;progressive activity/exercise   Clinical Impression   Criteria for Skilled Therapeutic Interventions Met yes, treatment indicated   OT Diagnosis decreased ADL I    Influenced by the following impairments pain, weakness, cognitive impairment, deconditioning    Assessment of Occupational Performance 3-5 Performance Deficits   Identified Performance Deficits bed mobility, dressing, bathing, toileting, home management    Clinical Decision Making (Complexity) Low complexity   Therapy Frequency 5x/week   Predicted Duration of Therapy Intervention (days/wks) 2 weeks    Anticipated Discharge Disposition Acute Rehabilitation Facility   Risks and Benefits of Treatment have been explained. Yes   Patient, Family & other staff in agreement with plan of care Yes   Clinical Impression Comments Pt presents below baseline in ADLs. Pt to benefit from continued OT intervention to address the above stated deficits.    [facility] B-Obvious-PAC TM \"6 Clicks\"   2016, Trustees of [facility], under license to [employer].  All rights reserved.   6 Clicks Short Forms Daily Activity Inpatient Short Form   [facility] AM-PAC  \"6 Clicks\" Daily Activity Inpatient Short Form   1. Putting on and taking off regular lower body clothing? 2 - A Lot   2. Bathing (including washing, rinsing, drying)? 2 - A Lot   3. Toileting, which includes using toilet, bedpan or urinal? 2 - A Lot   4. Putting on and taking off regular upper body clothing? 2 - A Lot   5. Taking care of personal grooming such as brushing teeth? 2 - A Lot   6. Eating meals? 3 - A Little   Daily Activity Raw Score (Score out of 24.Lower scores equate to lower levels of function) 13 "   Total Evaluation Time   Total Evaluation Time (Minutes) 5

## 2020-02-04 NOTE — PLAN OF CARE
Discharge Planner OT   Patient plan for discharge: not addressed   Current status: OT evaluation completed. Pt extubated on 4L NC, hypertensive with systolic BP in 140s, HR 120s at rest and max  with activity. Pt tolerated supine UE/LE exercises, limited by generalized weakness and fatigue. Pt max A for bed mobility and CGA-min A for sitting EOB.   Barriers to return to prior living situation: medical status, deconditioning, level of assist for ADLs and mobility   Recommendations for discharge: ARU  Rationale for recommendations: Pt presents well below baseline in ADLs/IADLs. Pt would benefit from intensive, interdisciplinary rehab to address the above stated deficits to facilitate return towards prior level of function. Pt can/will be able to tolerate 3 hours of therapy a day.        Entered by: Palmira Crawley 02/04/2020 2:59 PM

## 2020-02-04 NOTE — PROGRESS NOTES
Major Shift Events: CT performed start of shift. Norepi weaned off this morning. Thick copious secretions from ETT, mouth and nose requiring lots of suctioning. OG with 1150cc out overnight. Tube feeds at goal. 3 units of PRBCs given for Hgb 6.5 Repeat Hgb 9.4 this AM. Patients family updated on plan of care, all questions addressed and answered.    Plan: Possible extubation today pending secretions  For vital signs and complete assessments, please see documentation flowsheets.

## 2020-02-04 NOTE — PLAN OF CARE
Major Shift Events:  Ecmo decannulation. IABP pulled. BP declined, started on Levo given 1L LR bolus, 250 Albumin. Hgb dropped to 6.5 3 PRBC ordered.  Plan: STAT CT.  For vital signs and complete assessments, please see documentation flowsheets.

## 2020-02-05 ENCOUNTER — APPOINTMENT (OUTPATIENT)
Dept: GENERAL RADIOLOGY | Facility: CLINIC | Age: 35
DRG: 003 | End: 2020-02-05
Attending: HOSPITALIST
Payer: COMMERCIAL

## 2020-02-05 ENCOUNTER — APPOINTMENT (OUTPATIENT)
Dept: OCCUPATIONAL THERAPY | Facility: CLINIC | Age: 35
DRG: 003 | End: 2020-02-05
Attending: INTERNAL MEDICINE
Payer: COMMERCIAL

## 2020-02-05 ENCOUNTER — APPOINTMENT (OUTPATIENT)
Dept: GENERAL RADIOLOGY | Facility: CLINIC | Age: 35
DRG: 003 | End: 2020-02-05
Attending: PHYSICIAN ASSISTANT
Payer: COMMERCIAL

## 2020-02-05 LAB
ANION GAP SERPL CALCULATED.3IONS-SCNC: 2 MMOL/L (ref 3–14)
ANION GAP SERPL CALCULATED.3IONS-SCNC: 5 MMOL/L (ref 3–14)
APTT PPP: 46 SEC (ref 22–37)
APTT PPP: 57 SEC (ref 22–37)
BACTERIA SPEC CULT: ABNORMAL
BACTERIA SPEC CULT: ABNORMAL
BACTERIA SPEC CULT: NO GROWTH
BUN SERPL-MCNC: 18 MG/DL (ref 7–30)
BUN SERPL-MCNC: 18 MG/DL (ref 7–30)
CALCIUM SERPL-MCNC: 8.4 MG/DL (ref 8.5–10.1)
CALCIUM SERPL-MCNC: 8.5 MG/DL (ref 8.5–10.1)
CHLORIDE SERPL-SCNC: 109 MMOL/L (ref 94–109)
CHLORIDE SERPL-SCNC: 111 MMOL/L (ref 94–109)
CO2 SERPL-SCNC: 29 MMOL/L (ref 20–32)
CO2 SERPL-SCNC: 31 MMOL/L (ref 20–32)
CREAT SERPL-MCNC: 0.62 MG/DL (ref 0.52–1.04)
CREAT SERPL-MCNC: 0.65 MG/DL (ref 0.52–1.04)
ERYTHROCYTE [DISTWIDTH] IN BLOOD BY AUTOMATED COUNT: 16 % (ref 10–15)
GFR SERPL CREATININE-BSD FRML MDRD: >90 ML/MIN/{1.73_M2}
GFR SERPL CREATININE-BSD FRML MDRD: >90 ML/MIN/{1.73_M2}
GLUCOSE SERPL-MCNC: 111 MG/DL (ref 70–99)
GLUCOSE SERPL-MCNC: 132 MG/DL (ref 70–99)
HCT VFR BLD AUTO: 25.7 % (ref 35–47)
HGB BLD-MCNC: 8.3 G/DL (ref 11.7–15.7)
INTERPRETATION ECG - MUSE: NORMAL
Lab: ABNORMAL
MAGNESIUM SERPL-MCNC: 2.4 MG/DL (ref 1.6–2.3)
MCH RBC QN AUTO: 29.7 PG (ref 26.5–33)
MCHC RBC AUTO-ENTMCNC: 32.3 G/DL (ref 31.5–36.5)
MCV RBC AUTO: 92 FL (ref 78–100)
PLATELET # BLD AUTO: 102 10E9/L (ref 150–450)
POTASSIUM SERPL-SCNC: 3.5 MMOL/L (ref 3.4–5.3)
POTASSIUM SERPL-SCNC: 3.6 MMOL/L (ref 3.4–5.3)
RBC # BLD AUTO: 2.79 10E12/L (ref 3.8–5.2)
SODIUM SERPL-SCNC: 143 MMOL/L (ref 133–144)
SODIUM SERPL-SCNC: 145 MMOL/L (ref 133–144)
SPECIMEN SOURCE: ABNORMAL
SPECIMEN SOURCE: NORMAL
WBC # BLD AUTO: 11.6 10E9/L (ref 4–11)

## 2020-02-05 PROCEDURE — 25000128 H RX IP 250 OP 636: Performed by: INTERNAL MEDICINE

## 2020-02-05 PROCEDURE — 25800030 ZZH RX IP 258 OP 636: Performed by: INTERNAL MEDICINE

## 2020-02-05 PROCEDURE — 25000132 ZZH RX MED GY IP 250 OP 250 PS 637: Performed by: PHYSICIAN ASSISTANT

## 2020-02-05 PROCEDURE — 83735 ASSAY OF MAGNESIUM: CPT | Performed by: STUDENT IN AN ORGANIZED HEALTH CARE EDUCATION/TRAINING PROGRAM

## 2020-02-05 PROCEDURE — 74018 RADEX ABDOMEN 1 VIEW: CPT

## 2020-02-05 PROCEDURE — 99291 CRITICAL CARE FIRST HOUR: CPT | Mod: GC | Performed by: INTERNAL MEDICINE

## 2020-02-05 PROCEDURE — 27210437 ZZH NUTRITION PRODUCT SEMIELEM INTERMED LITER

## 2020-02-05 PROCEDURE — 80048 BASIC METABOLIC PNL TOTAL CA: CPT | Performed by: INTERNAL MEDICINE

## 2020-02-05 PROCEDURE — 25000132 ZZH RX MED GY IP 250 OP 250 PS 637: Performed by: HOSPITALIST

## 2020-02-05 PROCEDURE — 40000014 ZZH STATISTIC ARTERIAL MONITORING DAILY

## 2020-02-05 PROCEDURE — 21400000 ZZH R&B CCU UMMC

## 2020-02-05 PROCEDURE — 25000128 H RX IP 250 OP 636: Performed by: STUDENT IN AN ORGANIZED HEALTH CARE EDUCATION/TRAINING PROGRAM

## 2020-02-05 PROCEDURE — 85027 COMPLETE CBC AUTOMATED: CPT | Performed by: STUDENT IN AN ORGANIZED HEALTH CARE EDUCATION/TRAINING PROGRAM

## 2020-02-05 PROCEDURE — 71045 X-RAY EXAM CHEST 1 VIEW: CPT

## 2020-02-05 PROCEDURE — 85730 THROMBOPLASTIN TIME PARTIAL: CPT | Performed by: STUDENT IN AN ORGANIZED HEALTH CARE EDUCATION/TRAINING PROGRAM

## 2020-02-05 PROCEDURE — 25800030 ZZH RX IP 258 OP 636: Performed by: STUDENT IN AN ORGANIZED HEALTH CARE EDUCATION/TRAINING PROGRAM

## 2020-02-05 PROCEDURE — 25000132 ZZH RX MED GY IP 250 OP 250 PS 637

## 2020-02-05 PROCEDURE — 80048 BASIC METABOLIC PNL TOTAL CA: CPT | Performed by: STUDENT IN AN ORGANIZED HEALTH CARE EDUCATION/TRAINING PROGRAM

## 2020-02-05 PROCEDURE — 40000196 ZZH STATISTIC RAPCV CVP MONITORING

## 2020-02-05 PROCEDURE — 40000556 ZZH STATISTIC PERIPHERAL IV START W US GUIDANCE

## 2020-02-05 PROCEDURE — 97535 SELF CARE MNGMENT TRAINING: CPT | Mod: GO | Performed by: OCCUPATIONAL THERAPIST

## 2020-02-05 PROCEDURE — 25000128 H RX IP 250 OP 636: Performed by: HOSPITALIST

## 2020-02-05 RX ORDER — FUROSEMIDE 10 MG/ML
40 INJECTION INTRAMUSCULAR; INTRAVENOUS ONCE
Status: COMPLETED | OUTPATIENT
Start: 2020-02-05 | End: 2020-02-05

## 2020-02-05 RX ORDER — LANOLIN ALCOHOL/MO/W.PET/CERES
6 CREAM (GRAM) TOPICAL
Status: DISCONTINUED | OUTPATIENT
Start: 2020-02-05 | End: 2020-02-11 | Stop reason: HOSPADM

## 2020-02-05 RX ORDER — CEFAZOLIN SODIUM 2 G/100ML
2 INJECTION, SOLUTION INTRAVENOUS EVERY 8 HOURS
Status: DISCONTINUED | OUTPATIENT
Start: 2020-02-06 | End: 2020-02-08

## 2020-02-05 RX ORDER — BENZONATATE 100 MG/1
100 CAPSULE ORAL 3 TIMES DAILY PRN
Status: DISCONTINUED | OUTPATIENT
Start: 2020-02-05 | End: 2020-02-11 | Stop reason: HOSPADM

## 2020-02-05 RX ADMIN — PIPERACILLIN AND TAZOBACTAM 4.5 G: 4; .5 INJECTION, POWDER, FOR SOLUTION INTRAVENOUS at 17:56

## 2020-02-05 RX ADMIN — MULTIVITAMIN 15 ML: LIQUID ORAL at 08:59

## 2020-02-05 RX ADMIN — POLYETHYLENE GLYCOL 3350 17 G: 17 POWDER, FOR SOLUTION ORAL at 08:59

## 2020-02-05 RX ADMIN — BENZONATATE 100 MG: 100 CAPSULE ORAL at 22:17

## 2020-02-05 RX ADMIN — CARVEDILOL 6.25 MG: 6.25 TABLET, FILM COATED ORAL at 08:59

## 2020-02-05 RX ADMIN — BISACODYL 10 MG: 10 SUPPOSITORY RECTAL at 02:38

## 2020-02-05 RX ADMIN — FUROSEMIDE 40 MG: 10 INJECTION, SOLUTION INTRAVENOUS at 11:49

## 2020-02-05 RX ADMIN — ARGATROBAN 0.5 MCG/KG/MIN: 1 INJECTION INTRAVENOUS at 15:30

## 2020-02-05 RX ADMIN — Medication 40 MG: at 09:00

## 2020-02-05 RX ADMIN — PIPERACILLIN AND TAZOBACTAM 4.5 G: 4; .5 INJECTION, POWDER, FOR SOLUTION INTRAVENOUS at 06:35

## 2020-02-05 RX ADMIN — CARVEDILOL 6.25 MG: 6.25 TABLET, FILM COATED ORAL at 18:29

## 2020-02-05 RX ADMIN — MELATONIN TAB 3 MG 6 MG: 3 TAB at 00:21

## 2020-02-05 RX ADMIN — POTASSIUM CHLORIDE 20 MEQ: 1.5 POWDER, FOR SOLUTION ORAL at 13:37

## 2020-02-05 RX ADMIN — POTASSIUM CHLORIDE 20 MEQ: 1.5 POWDER, FOR SOLUTION ORAL at 06:41

## 2020-02-05 RX ADMIN — VANCOMYCIN HYDROCHLORIDE 1750 MG: 10 INJECTION, POWDER, LYOPHILIZED, FOR SOLUTION INTRAVENOUS at 23:37

## 2020-02-05 RX ADMIN — VANCOMYCIN HYDROCHLORIDE 1750 MG: 10 INJECTION, POWDER, LYOPHILIZED, FOR SOLUTION INTRAVENOUS at 09:17

## 2020-02-05 RX ADMIN — NORETHINDRONE 0.35 MG: 0.35 TABLET ORAL at 09:00

## 2020-02-05 RX ADMIN — ACETAMINOPHEN 650 MG: 325 TABLET, FILM COATED ORAL at 20:28

## 2020-02-05 RX ADMIN — PIPERACILLIN AND TAZOBACTAM 4.5 G: 4; .5 INJECTION, POWDER, FOR SOLUTION INTRAVENOUS at 01:56

## 2020-02-05 RX ADMIN — PIPERACILLIN AND TAZOBACTAM 4.5 G: 4; .5 INJECTION, POWDER, FOR SOLUTION INTRAVENOUS at 12:00

## 2020-02-05 ASSESSMENT — ACTIVITIES OF DAILY LIVING (ADL)
ADLS_ACUITY_SCORE: 16
ADLS_ACUITY_SCORE: 17
ADLS_ACUITY_SCORE: 16
ADLS_ACUITY_SCORE: 16
ADLS_ACUITY_SCORE: 18
ADLS_ACUITY_SCORE: 16

## 2020-02-05 ASSESSMENT — MIFFLIN-ST. JEOR: SCORE: 1637.13

## 2020-02-05 ASSESSMENT — PAIN DESCRIPTION - DESCRIPTORS: DESCRIPTORS: CRAMPING

## 2020-02-05 NOTE — PLAN OF CARE
Major Shift Events: delined, up to chair x2, advancing diet  Plan: transfer to floor when bed available  For vital signs and complete assessments, please see documentation flowsheets.

## 2020-02-05 NOTE — PLAN OF CARE
Patient extubated around 1300. Denies pain. -140s Cards 2 notified multiple times of Tachycardia and Hypertension. Per Dr. Raymundo, notify staff of SBP maintaining 170s. See flowsheets for VS and outputs. Education provided that family can not stay in room over NOC. Cont with current POC.

## 2020-02-05 NOTE — PLAN OF CARE
Pt admit 1/28 with cough + SOB. Pt A/Ox4. VSS. ST. RA. Endorsing pain in groin/abd/chest when coughing (cross cover aware), PRN Tylenol given x1. TF turned off this evening per pt request d/t abd discomfort - cross cover notified. Fidel counts to be done 2/6-2/8. Pt endorsing gums swollen/inflamed, making it hard to eat - Cards 2 MD updated + PRN Oralgel ordered. Tessalon given x1 for cough. Argatroban gtt continues @ 0.5mcg/kg/min. Intermittent IV abx. L groin wound vac in place. +BM. Valerio in place, adequate UOP. Up to commode, assistx1-2/walker. Continue with plan of care and report changes to treatment team.

## 2020-02-05 NOTE — PROGRESS NOTES
[facility] Cardiology Progress Note           Assessment and Plan:     Ms. Tom is a 34 year old with a PMHx of Tobacco Abuse and Bipolar Disorder who presents with cardiogenic shock likely from viral myocarditis. She is currently on VA-ECMO support given severe bi-ventricular dysfunction.     #Cardiogenic Shock on VA-ECMO from Viral Myocarditis   -Patient presented with sinus tachycardia with low pulse pressure despite IABP placement. Therefore, VA-ECMO was placed.   - Decannulated on 2/3  - Developed HGB drop overnight that required 3 units of PRBC, CT A/P was negative.   - With patient's improvement in EF, she will likely not require advanced therapy.       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  - Per some case reports, patients do benefit from 3 months of anticoagulation. In the setting of patient being NAHUM positive, will start with argatroban and convert to coumadin prior to discharge     #Hypoxic Respiratory Failure  -Patient was electively intubated for VA-ECMO.   - Successfully extubated this morning     #Pericardial Effusion   -Likely related to myopericarditis.  -Now trace to small on most recent TTE     #Thrombocytopenia  -Concern for thrombosis of circuitx2 along with declinig platelets  - Sent NAHUM panel and returned positive, REED negative  - Plan for argatroban ggt this evening     #Hyperclycemia   -Continue Insulin drip.     #Extravasation of Blood on Left Arm   -Continue to monitor. Wound care has no active recommendations at this time.    Staffed with Dr. Gabriel Yoo MD  Cardiology Fellow  PGY4       Subjective:     - Extubated this morning after patient did well with SBT  - HIT +, REED pending             Review of Systems:   A comprehensive review of systems was performed and found to be negative except as described in this note          Medications:     Current Facility-Administered Medications   Medication      acetaminophen (TYLENOL) Suppository 650 mg     acetaminophen (TYLENOL) tablet 650 mg     alum & mag hydroxide-simethicone (MYLANTA ES/MAALOX  ES) suspension 30 mL     bisacodyl (DULCOLAX) Suppository 10 mg     dexmedetomidine (PRECEDEX) 400 mcg in 0.9% sodium chloride 100 mL     dextrose 10% infusion     dextrose 50 % injection 25-50 mL    Or     glucagon injection 1 mg     fentaNYL (PF) (SUBLIMAZE) injection 25 mcg     HYDROmorphone (DILAUDID) injection 0.2 mg     lidocaine (LMX4) cream     lidocaine 1 % 0.1-1 mL     medication instruction     metoclopramide (REGLAN) injection 10 mg     multivitamins w/minerals (CERTAVITE) liquid 15 mL     norepinephrine (LEVOPHED) 16 mg in  mL infusion     norethindrone (MICRONOR) 0.35 MG per tablet 0.35 mg     ondansetron (ZOFRAN) injection 4 mg     pantoprazole (PROTONIX) 2 mg/mL suspension 40 mg     Patient is already receiving anticoagulation with heparin, enoxaparin (LOVENOX), warfarin (COUMADIN)  or other anticoagulant medication     piperacillin-tazobactam (ZOSYN) 4.5 g vial to attach to  mL bag     polyethylene glycol (MIRALAX/GLYCOLAX) Packet 17 g     potassium chloride (KLOR-CON) Packet 20-40 mEq     potassium chloride 10 mEq in 100 mL intermittent infusion with 10 mg lidocaine     potassium chloride 10 mEq in 100 mL sterile water intermittent infusion (premix)     potassium chloride 20 mEq in 50 mL intermittent infusion     potassium chloride ER (K-DUR/KLOR-CON M) CR tablet 20-40 mEq     propofol (DIPRIVAN) bolus from infusion pump 10-20 mg     Reason ACE/ARB/ARNI order not selected     Reason beta blocker not prescribed     senna-docusate (SENOKOT-S/PERICOLACE) 8.6-50 MG per tablet 1 tablet     sennosides (SENOKOT) tablet 8.6 mg     sodium chloride (PF) 0.9% PF flush 5-50 mL     vancomycin (VANCOCIN) 1,750 mg in sodium chloride 0.9 % 250 mL intermittent infusion               Objective:   Temp: 97.8  F (36.6  C) Temp src: Oral BP: 116/54   Heart Rate: 128  Resp: 16 SpO2: 97 % O2 Device: Nasal cannula           Physical Exam:   Vitals were reviewed  Temp: 97.8  F (36.6  C) Temp src: Oral BP: 116/54   Heart Rate: 128 Resp: 16 SpO2: 97 % O2 Device: Nasal cannula      Gen: Intubated and Sedated   HEENT: ET Tube in Place  CV: RRR  Pulm: Coarse Breath Sounds   Ext: No edema   Neuro: No focal defects           Data:     Lab Results   Component Value Date    WBC 12.0 (H) 02/04/2020    HGB 9.1 (L) 02/04/2020    HCT 28.3 (L) 02/04/2020    PLT 72 (L) 02/04/2020     (H) 02/04/2020    POTASSIUM 4.3 02/04/2020    CHLORIDE 115 (H) 02/04/2020    CO2 31 02/04/2020    BUN 18 02/04/2020    CR 0.57 02/04/2020     (H) 02/04/2020     (H) 02/04/2020    SED 78 (H) 02/03/2020    DD 10.1 (H) 02/03/2020    TROPI 4.071 (HH) 01/30/2020    AST 35 02/04/2020    ALT 32 02/04/2020    ALKPHOS 77 02/04/2020    BILITOTAL 1.5 (H) 02/04/2020    INR 1.00 02/04/2020

## 2020-02-05 NOTE — PROGRESS NOTES
[facility] Cardiology Progress Note           Assessment and Plan:     Ms. Tom is a 34 year old with a PMHx of Tobacco Abuse and Bipolar Disorder who presents with cardiogenic shock likely from viral myocarditis.          #Cardiogenic Shock c/b myocarditis  - Decannulated on 2/3/2020. Initially did well, developed post op vasoplegia and anemia that necessitated pressors. CT A/P was negative. Off pressors.  - EF showed recovery to 55-60%. No indication for OGDT or advanced therapy  - Due to history of myocarditis, will require anticoagulation for 3 months. 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   - Started on PO coreg 6.25mng BID     #Non-Ischemic Cardiomyopathy with Viral Myocarditis (Confirmed on Biopsy)   -Support as above.  - Per some case reports, patients do benefit from 3 months of anticoagulation. In the setting of patient being NAHUM positive, will start with argatroban and convert to coumadin prior to discharge     #Hypoxic Respiratory Failure  -Patient currently on RA       #Pericardial Effusion   -Likely related to myopericarditis.  -Now trace to small on most recent TTE     #Thrombocytopenia  -Concern for thrombosis of circuitx2 along with declinig platelets  - Sent NAHUM panel and returned positive, REED negative  - Plan for argatroban ggt with transition to Coumadin.     #Hyperclycemia   -Continue Insulin drip.     #Extravasation of Blood on Left Arm   -Continue to monitor. Wound care has no active recommendations at this time.    Staffed with Dr. Gabriel Yoo MD  Cardiology Fellow  PGY4       Subjective:     -NAEON  - 1 small  overnight           Review of Systems:   A comprehensive review of systems was performed and found to be negative except as described in this note          Medications:     Current Facility-Administered Medications   Medication     acetaminophen  (TYLENOL) Suppository 650 mg     acetaminophen (TYLENOL) tablet 650 mg     alum & mag hydroxide-simethicone (MYLANTA ES/MAALOX  ES) suspension 30 mL     argatroban (ACOVA) 1 mg/mL ANTICOAGULANT infusion     bisacodyl (DULCOLAX) Suppository 10 mg     carvedilol (COREG) tablet 6.25 mg     dexmedetomidine (PRECEDEX) 400 mcg in 0.9% sodium chloride 100 mL     dextrose 10% infusion     dextrose 50 % injection 25-50 mL    Or     glucagon injection 1 mg     fentaNYL (PF) (SUBLIMAZE) injection 25 mcg     HYDROmorphone (DILAUDID) injection 0.2 mg     lidocaine (LMX4) cream     lidocaine 1 % 0.1-1 mL     medication instruction     melatonin tablet 6 mg     metoclopramide (REGLAN) injection 10 mg     multivitamins w/minerals (CERTAVITE) liquid 15 mL     naloxone (NARCAN) injection 0.1-0.4 mg     norepinephrine (LEVOPHED) 16 mg in  mL infusion     norethindrone (MICRONOR) 0.35 MG per tablet 0.35 mg     ondansetron (ZOFRAN) injection 4 mg     oxyCODONE (ROXICODONE) tablet 5 mg     pantoprazole (PROTONIX) 2 mg/mL suspension 40 mg     Patient is already receiving anticoagulation with heparin, enoxaparin (LOVENOX), warfarin (COUMADIN)  or other anticoagulant medication     piperacillin-tazobactam (ZOSYN) 4.5 g vial to attach to  mL bag     polyethylene glycol (MIRALAX/GLYCOLAX) Packet 17 g     potassium chloride (KLOR-CON) Packet 20-40 mEq     potassium chloride 10 mEq in 100 mL intermittent infusion with 10 mg lidocaine     potassium chloride 10 mEq in 100 mL sterile water intermittent infusion (premix)     potassium chloride 20 mEq in 50 mL intermittent infusion     potassium chloride ER (K-DUR/KLOR-CON M) CR tablet 20-40 mEq     propofol (DIPRIVAN) bolus from infusion pump 10-20 mg     Reason ACE/ARB/ARNI order not selected     Reason beta blocker not prescribed     senna-docusate (SENOKOT-S/PERICOLACE) 8.6-50 MG per tablet 1 tablet     sennosides (SENOKOT) tablet 8.6 mg     sodium chloride (PF) 0.9% PF flush 5-50 mL      vancomycin (VANCOCIN) 1,750 mg in sodium chloride 0.9 % 250 mL intermittent infusion               Objective:   Temp: 98.8  F (37.1  C) Temp src: Oral     Heart Rate: 100 Resp: 18 SpO2: 94 % O2 Device: None (Room air)           Physical Exam:   Vitals were reviewed  Temp: 98.8  F (37.1  C) Temp src: Oral     Heart Rate: 100 Resp: 18 SpO2: 94 % O2 Device: None (Room air)      Gen: Intubated and Sedated   HEENT: ET Tube in Place  CV: RRR  Pulm: Coarse Breath Sounds   Ext: No edema   Neuro: No focal defects           Data:     Lab Results   Component Value Date    WBC 11.6 (H) 02/05/2020    HGB 8.3 (L) 02/05/2020    HCT 25.7 (L) 02/05/2020     (L) 02/05/2020     (H) 02/05/2020    POTASSIUM 3.6 02/05/2020    CHLORIDE 111 (H) 02/05/2020    CO2 29 02/05/2020    BUN 18 02/05/2020    CR 0.62 02/05/2020     (H) 02/05/2020    SED 78 (H) 02/03/2020    DD 10.1 (H) 02/03/2020    TROPI 4.071 (HH) 01/30/2020    AST 43 02/04/2020    ALT 35 02/04/2020    ALKPHOS 89 02/04/2020    BILITOTAL 1.3 02/04/2020    INR 1.00 02/04/2020

## 2020-02-05 NOTE — PROGRESS NOTES
Major Shift Events:  Patient did not sleep much overnight. PRN melatonin ordered but did not help much. HR 140s, EKG obtained and carvedilol given with improvement. Room air. Lots of abdominal pain overnight and nausea. PRN Zofran given and TF stopped. KUB obtained and PRN suppository given. Patient eventually did have a bowel movement early AM and reports feeling much better. TF restarted this morning at 0630 per cards 2, and advanced to clear liquid diet. Valerio with good urine output. 40lasix given x1 with good response. Argatroban gtt started. PTTs therapeutic. Labs supplemented PRN. Up to commode with assist x2, tolerated well. Patient resting comfortably in bed, all questions addressed and answered.     Plan: Continue plan of care  For vital signs and complete assessments, please see documentation flowsheets.
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
## 2020-02-05 NOTE — PROGRESS NOTES
Pt passed cuff leak and was extubated to 2L NC. No complications. Lung sounds clear, pt has a good cough/productive cough. RT will continue to follow.

## 2020-02-05 NOTE — PROGRESS NOTES
Transferred to: Unit 6C at (time)  Belongings:   Valerio removed? No: continue to diurese  Central line removed? Yes  Chart and medications sent with patient Yes  Family notified: Yes

## 2020-02-05 NOTE — PROGRESS NOTES
Pt transferred from 4E to 6C. Upon arrival to unit, pt has martínez and L groin woundVAC in place. On argatroban gtt and TKO for IV abxs. Vss on RA. Upon arrival, pt was on tube feeding @ goal of 50cc/hr but pt was complaining about cramping in abdomen and she asked RN to turn rate down--currently TF @30cc/hr.   Shortly after arriving on 6C pt walked to bathroom for BM. Requiring assist of 1-2. Walker and commode ordered. Pt's sister in room.

## 2020-02-05 NOTE — PLAN OF CARE
Discharge Planner OT   Patient plan for discharge: rehab  Current status: Pt required Min A to stand and tx to a chair, safety cues to avoid plopping into chair. SBA for seated g/h tasks. Poor OOB/standing tolerance.  Barriers to return to prior living situation: deconditioning, post surgical precautions  Recommendations for discharge: ARU  Rationale for recommendations: to maximize ADL I and tolerance, is well below baseline for ADLs and mobility.        Entered by: Shahriar Chacon 02/05/2020 11:50 AM

## 2020-02-05 NOTE — PROGRESS NOTES
CLINICAL NUTRITION SERVICES - BRIEF NOTE   (see RD note on 1/30 for full assessment)    Pt extubated yesterday (2/5). Advanced to regular diet today. Pt is eager to eat and feels hungry. She ordered omelette and fruit for breakfast and is sipping on juice.     Nutrition interventions today:  - Goal to meet >60% nutrition needs (ie 1100 kcal and 70 g protein) prior to removing FT. Calorie counts ordered  - Provided education on optimizing intake with pt. Encouraged protein intake.     Ludmila Sanchez RD, LD  [id number]  Pgr: [phone]

## 2020-02-06 ENCOUNTER — HOME INFUSION (PRE-WILLOW HOME INFUSION) (OUTPATIENT)
Dept: PHARMACY | Facility: CLINIC | Age: 35
End: 2020-02-06

## 2020-02-06 ENCOUNTER — APPOINTMENT (OUTPATIENT)
Dept: OCCUPATIONAL THERAPY | Facility: CLINIC | Age: 35
DRG: 003 | End: 2020-02-06
Attending: INTERNAL MEDICINE
Payer: COMMERCIAL

## 2020-02-06 LAB
ANION GAP SERPL CALCULATED.3IONS-SCNC: 3 MMOL/L (ref 3–14)
ANION GAP SERPL CALCULATED.3IONS-SCNC: 4 MMOL/L (ref 3–14)
APTT PPP: 32 SEC (ref 22–37)
APTT PPP: 41 SEC (ref 22–37)
APTT PPP: 48 SEC (ref 22–37)
BUN SERPL-MCNC: 12 MG/DL (ref 7–30)
BUN SERPL-MCNC: 13 MG/DL (ref 7–30)
CALCIUM SERPL-MCNC: 8.2 MG/DL (ref 8.5–10.1)
CALCIUM SERPL-MCNC: 8.6 MG/DL (ref 8.5–10.1)
CHLORIDE SERPL-SCNC: 109 MMOL/L (ref 94–109)
CHLORIDE SERPL-SCNC: 110 MMOL/L (ref 94–109)
CO2 SERPL-SCNC: 27 MMOL/L (ref 20–32)
CO2 SERPL-SCNC: 28 MMOL/L (ref 20–32)
CREAT SERPL-MCNC: 0.52 MG/DL (ref 0.52–1.04)
CREAT SERPL-MCNC: 0.56 MG/DL (ref 0.52–1.04)
ERYTHROCYTE [DISTWIDTH] IN BLOOD BY AUTOMATED COUNT: 15.9 % (ref 10–15)
GFR SERPL CREATININE-BSD FRML MDRD: >90 ML/MIN/{1.73_M2}
GFR SERPL CREATININE-BSD FRML MDRD: >90 ML/MIN/{1.73_M2}
GLUCOSE SERPL-MCNC: 100 MG/DL (ref 70–99)
GLUCOSE SERPL-MCNC: 93 MG/DL (ref 70–99)
HCT VFR BLD AUTO: 27 % (ref 35–47)
HEP-DEP PLT ABY SRA: NORMAL
HGB BLD-MCNC: 8.4 G/DL (ref 11.7–15.7)
LAB SCANNED RESULT: NORMAL
MCH RBC QN AUTO: 29.7 PG (ref 26.5–33)
MCHC RBC AUTO-ENTMCNC: 31.1 G/DL (ref 31.5–36.5)
MCV RBC AUTO: 95 FL (ref 78–100)
PF4 HEPARIN CMPLX AB SER QL: POSITIVE
PLATELET # BLD AUTO: 125 10E9/L (ref 150–450)
POTASSIUM SERPL-SCNC: 3.7 MMOL/L (ref 3.4–5.3)
POTASSIUM SERPL-SCNC: 3.7 MMOL/L (ref 3.4–5.3)
RBC # BLD AUTO: 2.83 10E12/L (ref 3.8–5.2)
SODIUM SERPL-SCNC: 140 MMOL/L (ref 133–144)
SODIUM SERPL-SCNC: 142 MMOL/L (ref 133–144)
WBC # BLD AUTO: 13.2 10E9/L (ref 4–11)

## 2020-02-06 PROCEDURE — 21400000 ZZH R&B CCU UMMC

## 2020-02-06 PROCEDURE — 40000802 ZZH SITE CHECK

## 2020-02-06 PROCEDURE — 25000132 ZZH RX MED GY IP 250 OP 250 PS 637: Performed by: INTERNAL MEDICINE

## 2020-02-06 PROCEDURE — 25000128 H RX IP 250 OP 636: Performed by: STUDENT IN AN ORGANIZED HEALTH CARE EDUCATION/TRAINING PROGRAM

## 2020-02-06 PROCEDURE — 25000132 ZZH RX MED GY IP 250 OP 250 PS 637: Performed by: PHYSICIAN ASSISTANT

## 2020-02-06 PROCEDURE — 25000132 ZZH RX MED GY IP 250 OP 250 PS 637

## 2020-02-06 PROCEDURE — 25000132 ZZH RX MED GY IP 250 OP 250 PS 637: Performed by: HOSPITALIST

## 2020-02-06 PROCEDURE — 36415 COLL VENOUS BLD VENIPUNCTURE: CPT | Performed by: STUDENT IN AN ORGANIZED HEALTH CARE EDUCATION/TRAINING PROGRAM

## 2020-02-06 PROCEDURE — 97110 THERAPEUTIC EXERCISES: CPT | Mod: GO | Performed by: OCCUPATIONAL THERAPIST

## 2020-02-06 PROCEDURE — 99233 SBSQ HOSP IP/OBS HIGH 50: CPT | Mod: GC | Performed by: INTERNAL MEDICINE

## 2020-02-06 PROCEDURE — 80048 BASIC METABOLIC PNL TOTAL CA: CPT | Performed by: INTERNAL MEDICINE

## 2020-02-06 PROCEDURE — 85730 THROMBOPLASTIN TIME PARTIAL: CPT | Performed by: INTERNAL MEDICINE

## 2020-02-06 PROCEDURE — 25000128 H RX IP 250 OP 636: Performed by: HOSPITALIST

## 2020-02-06 PROCEDURE — 85260 CLOT FACTOR X STUART-POWER: CPT | Performed by: INTERNAL MEDICINE

## 2020-02-06 PROCEDURE — 85730 THROMBOPLASTIN TIME PARTIAL: CPT | Performed by: STUDENT IN AN ORGANIZED HEALTH CARE EDUCATION/TRAINING PROGRAM

## 2020-02-06 PROCEDURE — 36415 COLL VENOUS BLD VENIPUNCTURE: CPT | Performed by: INTERNAL MEDICINE

## 2020-02-06 PROCEDURE — 85027 COMPLETE CBC AUTOMATED: CPT | Performed by: STUDENT IN AN ORGANIZED HEALTH CARE EDUCATION/TRAINING PROGRAM

## 2020-02-06 PROCEDURE — 27210437 ZZH NUTRITION PRODUCT SEMIELEM INTERMED LITER

## 2020-02-06 PROCEDURE — 25000125 ZZHC RX 250: Performed by: STUDENT IN AN ORGANIZED HEALTH CARE EDUCATION/TRAINING PROGRAM

## 2020-02-06 PROCEDURE — 36592 COLLECT BLOOD FROM PICC: CPT | Performed by: INTERNAL MEDICINE

## 2020-02-06 RX ORDER — WARFARIN SODIUM 7.5 MG/1
7.5 TABLET ORAL
Status: COMPLETED | OUTPATIENT
Start: 2020-02-06 | End: 2020-02-06

## 2020-02-06 RX ADMIN — CEFAZOLIN SODIUM 2 G: 2 INJECTION, SOLUTION INTRAVENOUS at 15:59

## 2020-02-06 RX ADMIN — MULTIVITAMIN 15 ML: LIQUID ORAL at 08:24

## 2020-02-06 RX ADMIN — POTASSIUM CHLORIDE 20 MEQ: 750 TABLET, EXTENDED RELEASE ORAL at 11:07

## 2020-02-06 RX ADMIN — CEFAZOLIN SODIUM 2 G: 2 INJECTION, SOLUTION INTRAVENOUS at 08:42

## 2020-02-06 RX ADMIN — BENZONATATE 100 MG: 100 CAPSULE ORAL at 12:10

## 2020-02-06 RX ADMIN — CARVEDILOL 6.25 MG: 6.25 TABLET, FILM COATED ORAL at 17:17

## 2020-02-06 RX ADMIN — POTASSIUM CHLORIDE 20 MEQ: 750 TABLET, EXTENDED RELEASE ORAL at 21:55

## 2020-02-06 RX ADMIN — ARGATROBAN 0.75 MCG/KG/MIN: 1 INJECTION INTRAVENOUS at 08:54

## 2020-02-06 RX ADMIN — WARFARIN SODIUM 7.5 MG: 7.5 TABLET ORAL at 19:11

## 2020-02-06 RX ADMIN — ACETAMINOPHEN 650 MG: 325 TABLET, FILM COATED ORAL at 04:26

## 2020-02-06 RX ADMIN — BENZONATATE 100 MG: 100 CAPSULE ORAL at 04:25

## 2020-02-06 RX ADMIN — BENZONATATE 100 MG: 100 CAPSULE ORAL at 20:11

## 2020-02-06 RX ADMIN — CEFAZOLIN SODIUM 2 G: 2 INJECTION, SOLUTION INTRAVENOUS at 01:45

## 2020-02-06 RX ADMIN — OXYCODONE HYDROCHLORIDE 5 MG: 5 TABLET ORAL at 13:31

## 2020-02-06 RX ADMIN — Medication: at 00:18

## 2020-02-06 RX ADMIN — CARVEDILOL 6.25 MG: 6.25 TABLET, FILM COATED ORAL at 08:22

## 2020-02-06 RX ADMIN — ACETAMINOPHEN 650 MG: 325 TABLET, FILM COATED ORAL at 12:10

## 2020-02-06 RX ADMIN — ARGATROBAN 1 MCG/KG/MIN: 1 INJECTION INTRAVENOUS at 18:35

## 2020-02-06 RX ADMIN — POLYETHYLENE GLYCOL 3350 17 G: 17 POWDER, FOR SOLUTION ORAL at 12:18

## 2020-02-06 RX ADMIN — OXYCODONE HYDROCHLORIDE 5 MG: 5 TABLET ORAL at 21:55

## 2020-02-06 ASSESSMENT — ACTIVITIES OF DAILY LIVING (ADL)
ADLS_ACUITY_SCORE: 17

## 2020-02-06 ASSESSMENT — PAIN DESCRIPTION - DESCRIPTORS: DESCRIPTORS: BURNING;SHARP

## 2020-02-06 NOTE — CONSULTS
"Azra is a 34 year old female presenting with with acute viral myocarditis s/p ECMO. CORE consult requested. EF has recovered to 50-55%, although close follow-up needed.    Azra was provided with a CHF book.  I reviewed the importance of daily weights, 2 gm sodium diet, 2L fluid restriction and compliance with medications upon hospital discharge.  CORE contact phone numbers provided and patient is encouraged to call with any questions or concerns, including any weight gain or loss of 2 or more pounds in 24 hours or 5 or more pounds in 1 week.     Azra will be discharging within the next few days to ARU, then home after that. She plans to move to [place] with her kids to her mother's for more support in mid-March.  She agrees to have strong follow-up before she moves    Appointment made in [facility] HF clinic on 2020 with Dr. Raymundo at 8:30am, labs at 8am.  I will follow-up with the patient at that time, sooner if needed.  She may need CORE inbetween depending on how long she is in ARU. Thank you for the consult.    Deepti Hernandez RN BSN CHFN  Cardiology Care Coordinator - [facility]  Questions and [phone]  First press #1 for the ZOOM TV and then press #3 for \"Medical Advise\" to reach us Cardiology Nurses.     "

## 2020-02-06 NOTE — PLAN OF CARE
"/72 (BP Location: Left arm)   Pulse 122   Temp 99.2  F (37.3  C) (Oral)   Resp 18   Ht 1.6 m (5' 3\")   Wt 95.8 kg (211 lb 3.2 oz)   SpO2 98%   BMI 37.41 kg/m      Reason for admission: Cough, SOB, cardiogenic shock likely from viral myocarditis.  Hx of tobacco abuse, bipolar.  ECMO removed on 2/3.  Transferred from ICU on 2/5.  Vitals: VSS.   Activity: Up with A1 and walker.  Pain: Denies.  Neuro: AO x 4.  Cardiac: Sinus tach.    Respiratory: WDL.  GI/: Valerio in place with normal UOP.  Several loose stools overnight.  Diet: Feeding tube/ regular.  Pt asked feeding tube be turned off on eves.  MD aware.  Feeding tube was not restarted overnight.    Lines: R Triple lumen PICC, R PIV.  Argatroban gtt running at 0.5 mcg/kg/min.  Skin/Wounds: L groin wound vac with minimal drainage.  Previous L arm infiltration bruised.  Mepilex on coccyx.  Plan: Pt would like NG and Valerio removed today if possible      Continue to monitor and follow POC    Alexis Fairbanks RN on 2/6/2020 at 6:30 AM       "

## 2020-02-06 NOTE — PLAN OF CARE
Pt admit 1/28 with cardiogenic shock likely from viral myocarditis. Pt A/Ox4. VSS, Tmax 99.2. NSR/ST. RA. L groin wound vac removed this afternoon per CVTS - site GENOVEVA, scant drainage noted on gauze. Pt endorsing increased pain at L groin site this evening - site also noted to be warm to touch, cross cover notified and came to assess pt. Ice pack applied to site for comfort, PRN Oxycodone given with partial relief of pain. Argatoban gtt continues @ 1mcg/kg/min. Started on Warfarin this evening. PLC consult placed for Warfarin education. Hematology consult placed. Intermittent IV abx. Encouraging IS use. PRN Tessalon given for cough. Fidel counts 2/6-2/8. Adequate UOP. Evening K+ (3.7) replaced per protocol. Pt on menstrual cycle. Family supportive at bedside throughout shift. Continue with plan of care and report changes to treatment team.

## 2020-02-06 NOTE — PROGRESS NOTES
[facility] Cardiology Progress Note           Assessment and Plan:     Ms. Tom is a 34 year old with a PMHx of Tobacco Abuse and Bipolar Disorder who presents with cardiogenic shock likely from viral myocarditis.     #Cardiogenic Shock c/b myocarditis  - Decannulated on 2/3/2020. Initially did well, developed post op vasoplegia and anemia that necessitated pressors. CT A/P was negative. Off pressors.  - EF showed recovery to 55-60%. No indication for OGDT or advanced therapy  - Due to history of myocarditis, will require anticoagulation for 3 months. 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   - Continued  PO coreg 6.25mng BID     #Non-Ischemic Cardiomyopathy with Viral Myocarditis (Confirmed on Biopsy)   -Support as above.  - Per some case reports, patients do benefit from 3 months of anticoagulation. In the setting of patient being NAHUM positive, will start with argatroban and convert to coumadin prior to discharge     #Hypoxic Respiratory Failure  -Patient currently on RA       #Pericardial Effusion   -Likely related to myopericarditis.  -Now trace to small on most recent TTE     #Thrombocytopenia  -Concern for thrombosis of circuitx2 along with declinig platelets  - Sent NAHUM panel and returned positive, REED pending  - Plan for argatroban ggt with transition to Coumadin. Will discuss with hematology     #Hyperclycemia   -Continue Insulin drip.     #Extravasation of Blood on Left Arm   -Continue to monitor. Wound care has no active recommendations at this time.    Staffed with Dr. Gabriel Yoo MD  Cardiology Fellow  PGY4       Subjective:     -NAEON          Review of Systems:   A comprehensive review of systems was performed and found to be negative except as described in this note          Medications:     Current Facility-Administered Medications   Medication     acetaminophen  (TYLENOL) Suppository 650 mg     acetaminophen (TYLENOL) tablet 650 mg     alum & mag hydroxide-simethicone (MYLANTA ES/MAALOX  ES) suspension 30 mL     argatroban (ACOVA) 1 mg/mL ANTICOAGULANT infusion     benzocaine (ORAJEL MAXIMUM STRENGTH) 20 % gel     benzonatate (TESSALON) capsule 100 mg     bisacodyl (DULCOLAX) Suppository 10 mg     carvedilol (COREG) tablet 6.25 mg     ceFAZolin (ANCEF) intermittent infusion 2 g in 100 mL dextrose PRE-MIX     dextrose 10% infusion     dextrose 50 % injection 25-50 mL    Or     glucagon injection 1 mg     HYDROmorphone (DILAUDID) injection 0.2 mg     lidocaine (LMX4) cream     lidocaine 1 % 0.1-1 mL     medication instruction     melatonin tablet 6 mg     multivitamins w/minerals (CERTAVITE) liquid 15 mL     naloxone (NARCAN) injection 0.1-0.4 mg     norethindrone (MICRONOR) 0.35 MG per tablet 0.35 mg     ondansetron (ZOFRAN) injection 4 mg     oxyCODONE (ROXICODONE) tablet 5 mg     Patient is already receiving anticoagulation with heparin, enoxaparin (LOVENOX), warfarin (COUMADIN)  or other anticoagulant medication     polyethylene glycol (MIRALAX/GLYCOLAX) Packet 17 g     potassium chloride (KLOR-CON) Packet 20-40 mEq     potassium chloride 10 mEq in 100 mL intermittent infusion with 10 mg lidocaine     potassium chloride 10 mEq in 100 mL sterile water intermittent infusion (premix)     potassium chloride 20 mEq in 50 mL intermittent infusion     potassium chloride ER (K-DUR/KLOR-CON M) CR tablet 20-40 mEq     Reason ACE/ARB/ARNI order not selected     senna-docusate (SENOKOT-S/PERICOLACE) 8.6-50 MG per tablet 1 tablet     sennosides (SENOKOT) tablet 8.6 mg     sodium chloride (PF) 0.9% PF flush 3 mL     Warfarin Therapy Reminder (Check START DATE - warfarin may be starting in the FUTURE)               Objective:   Temp: 97.8  F (36.6  C) Temp src: Axillary BP: (!) 133/95   Heart Rate: 106 Resp: 18 SpO2: 97 % O2 Device: None (Room air)           Physical Exam:   Vitals were  reviewed  Temp: 97.8  F (36.6  C) Temp src: Axillary BP: (!) 133/95   Heart Rate: 106 Resp: 18 SpO2: 97 % O2 Device: None (Room air)      Gen: Intubated and Sedated   HEENT: ET Tube in Place  CV: RRR  Pulm: Coarse Breath Sounds   Ext: No edema   Neuro: No focal defects           Data:     Lab Results   Component Value Date    WBC 13.2 (H) 02/06/2020    HGB 8.4 (L) 02/06/2020    HCT 27.0 (L) 02/06/2020     (L) 02/06/2020     02/06/2020    POTASSIUM 3.7 02/06/2020    CHLORIDE 110 (H) 02/06/2020    CO2 28 02/06/2020    BUN 13 02/06/2020    CR 0.52 02/06/2020     (H) 02/06/2020    SED 78 (H) 02/03/2020    DD 10.1 (H) 02/03/2020    TROPI 4.071 (HH) 01/30/2020    AST 43 02/04/2020    ALT 35 02/04/2020    ALKPHOS 89 02/04/2020    BILITOTAL 1.3 02/04/2020    INR 1.00 02/04/2020

## 2020-02-06 NOTE — PROGRESS NOTES
Therapy: IV abx  Insurance: [employer]    Pt has 100% coverage     Only soft check-no FHI needs at this time    In reference to admission 1/28/20 to check IV abx coverage    Please contact Intake with any questions, [phone] or In Basket pool, [facility] ([phone]).

## 2020-02-06 NOTE — PLAN OF CARE
OT/CR 6C:  Discharge Planner OT   Patient plan for discharge: ARU  Current status: Pt able to ambulate approx 75ft + 75ft + 100ft with CGA and IV pole for BUE support with seated rest between bouts. Pt can be tangential with conversation and shows difficulty with attention. Pt benefits from vc throughout session to stay on task and move safely 2/2 impulsivity. Pt SBA with bed mobility and STS transfers. Pt VSS on RA.   Barriers to return to prior living situation: deconditioning, acute medical needs, weakness, fatigue  Recommendations for discharge: ARU  Rationale for recommendations: Pt progressing well in therapy and demonstrates increased independence in functional mobility. Pt is still below baseline with regards to mobility and independence with self cares and will benefit from continued skilled therapy intervention to address deficits. Anticipate pt will be able to tolerate 3 hours of therapy a day.       Entered by: Avani Feliz 02/06/2020 3:38 PM

## 2020-02-06 NOTE — PROGRESS NOTES
CLINICAL NUTRITION SERVICES - REASSESSMENT NOTE     Nutrition Prescription    RECOMMENDATIONS FOR MDs/PROVIDERS TO ORDER:  Monitor results of kcal counts. Restart TFs if unable to meet 1100 kcal and 70 g protein daily on average per kcal counts.    Malnutrition Status:    Patient does not meet two of the established criteria necessary for diagnosing malnutrition but possibly at risk for malnutrition    Recommendations already ordered by Registered Dietitian (RD):  None additionally at this time    Future/Additional Recommendations:  1. Continue regular diet, as ordered. Encourage oral intake at meals and small, frequent meals. If needs low-sodium diet in the future, rec update diet order.   2. Continue multivitamin with minerals to help ensure micronutrient needs are met.  3. Change scheduled senna and miralax to prn.      EVALUATION OF THE PROGRESS TOWARD GOALS   Diet: Regular diet order as of 2/5 am. Has a prn snack/supplement order. Extubated on 2/4.   Intake: No meals ordered this admission until lunch yesterday (2/5). Pt ordered two meals yesterday. Tolerating diet. Per chart, pt consumed 25% of meal at 12:00 with a fair appetite. Kcal counts are ordered for 2/6-2/8. Pt was busy with nursing staff during attempts to see.     Previous Nutrition Support:   - PreviousFeeding Tube (FT) access: NGT placed on 1/30 with bridle. Per team, feeding tube was removed today 2/6  - Current TF orders: Impact at 50 ml/hr provides 1800 kcals (30 kcal/kg/day), 113 g PRO (1.9 gm/kg/day), 924 ml free H2O, 77 g Fat (50% from MCTs), 168 g CHO and no Fiber daily.  - Feeding Tube Flushes: 60 mL Q 4 hrs.  - Intake: Pt received a five-day TF average of 1043 mL/day. This provided 1565 kcals and 98 g protein and meets pt's estimated needs noted below. TFs were started on 1/30. Pt asked that TFs stop on 2/5 pm (abdominal discomfort) and have been held since this time.       NEW FINDINGS   General: Missing tooth/teeth per chart  Stools:  Loose, brown stools. Last stool was noted on 2/6. Having 0-7 stools per day. Abdominal distention.  Wt Hx: 80.7 kg (8/10/17), 88.4 kg (1/28/20), 88.8 kg (1/28/20, admit), 95.8 kg (2/5/20) - Limited wt history available but suspect not significant/severe wt loss. Wt changes may be due to changes in fluid status.    ASSESSED NUTRITION NEEDS (updated)  Dosing Weight: 61 kg (adjusted, based on lowest wt this admission of 88.8 kg on 1/28)   Estimated Energy Needs: 1380-7338 kcals/day (20-25 kcals/kg)  Justification: Decreased needs with wt status. Rec the high end of this range.  Estimated Protein Needs: 73-92 grams protein/day (1.2-1.5 grams of pro/kg)  Justification: Increased needs with stress factors    MALNUTRITION  % Intake: No decreased intake noted  % Weight Loss: Difficult to assess with lack of data points available. however, wt is greater than wt of 80.7 kg on 8/10/17.  Subcutaneous Fat Loss: None observed per previous RD note. Pt was busy with nursing during attempts to see.   Muscle Loss: None observed per previous RD note. Pt was busy with nursing during attempts to see.   Fluid Accumulation/Edema: Does not meet criteria  Malnutrition Diagnosis: Patient does not meet two of the established criteria necessary for diagnosing malnutrition but possibly at risk for malnutrition    Previous Goals   Total avg nutritional intake to meet a minimum of 25 kcal/kg and 1.5 g PRO/kg daily (per dosing wt 61 kg).  Evaluation: Meeting. Changed below.     Previous Nutrition Diagnosis  Inadequate protein-energy intake related to NPO 2/2 intubation without enteral nutrition support as evidenced by no significant nutrition intake for at least the past 24hrs    Evaluation: Resolved. Changed to new nutrition dx below.     CURRENT NUTRITION DIAGNOSIS  Inadequate oral intake related to recent diet advancement and GI sx as evidenced by pt ordered two meals yesterday (2/6) and consumed 25% of a meal.        INTERVENTIONS  Implementation  Medical food supplement therapy: Left an oral supplement menu. Pt may request oral supplements prn.     Goals  Patient to consume % of nutritionally adequate meal trays TID, or the equivalent with supplements/snacks.    Monitoring/Evaluation  Progress toward goals will be monitored and evaluated per protocol.     Nutrition will continue to follow.      Rachel Loredo MS, RD, LD, [facility]   6C Pgr: [phone]

## 2020-02-06 NOTE — PLAN OF CARE
D: PMHx of Tobacco Abuse and Bipolar Disorder, admitted 1/28 with cardiogenic shock likely from viral myocarditis.    I: Monitored vitals and assessed pt status.   Running: Argatroban @ 1mcg/kg/min (5.3ml/hr)- continuing to monitor PTTs- next PTT tomorrow AM  PRN: oxy x1, tylenol x1, tessalon pearls x1  K 3.7- replaced per protocol    A: A0x4. VSS. Tmax 99.2. ST. Sating 90s on RA. C/o pain to L groin r/t wound vac, in addition to a headache. Blisters noted d/t dressing. Sx aware pt hoping [place] is d/cd- will reevaluate on rounds. Currently @ 125 cont- no output. Tylenol and oxy given for pain. Pt trying to avoid narcotics but pain too severe. Heat also applied with little relief. Productive, good cough- per pt this increases pain in groin. Tessalon pearls given. Fine crackles to lung bases- encourage IS. Iman removed this AM- void x1, unmeasured. Pt on menses. Multiple small BMs today- pt still feels 'full'. States it is due to the tube feeding she received overnight. NJT removed this AM. Encouraging meals- but pt with minimal appetite and states the food here is not good. Up with SBA to bathroom. Refused PT this AM. Per pt she didn't sleep much overnight, asking for rest times during the day. Pts sister at bedside. Pt pleasant, cooperative.     P: Continue to encourage pulm hygiene, nutrition, and activity. Continue on argatroban gtt. Continue to monitor Pt status and report changes to treatment team.

## 2020-02-06 NOTE — PLAN OF CARE
"6C PT - Cancel in AM. PT orders acknowledged and appreciated. Pt politely declined as pt had multiple student nurses/RN in room. Pt reported, \"maybe later, there is learning going on.\"     Checked back in later AM, pt requesting a 5 hour nap as she did not get sleep overnight and is fatigued from events throughout morning. OT to check on pt in the PM. PT educated pt on the importance of participating with therapy while IP. PT to reschedule evaluation for tomorrow.  "

## 2020-02-06 NOTE — PROGRESS NOTES
Social Work Services Progress Note    Hospital Day: 8  Date of Initial Social Work Evaluation:  2/5/2020  Collaborated with:  ARU Liaison (Amirah), Chart Review, Cardiology Team    Data:  SW contacted ARU Liaison (Amirah) regarding referral for ARU and to request review. Amirah asked about Argatroban and IV antibiotic as well as explained that they would be unable to review fully until PT successfully works with patient. LOGAN spoke with Cards 2 following and transition from Argatroban to oral medication will be happening team aware needs to be prior to discharge.  IV Cefeazolin stated to end 2/10/2020 per chart.  SW notified ARU Liaison (Amirah).  Amirah will get back to  tomorrow.     Intervention:  Referral and discussion with ARU to see if possible placement.    Assessment:  Pending d/c to ARU possibly ready 2/7/2020.    Plan:    Anticipated Disposition:  Facility:   ARU pending    Barriers to d/c plan:  Medical Stability, Accepting ARU     Follow Up:  SW to continue to follow and assist in discharge coordination.     IGOR Sinha, LGSW  [facility]   [facility]  Pager: [phone]

## 2020-02-07 ENCOUNTER — APPOINTMENT (OUTPATIENT)
Dept: ULTRASOUND IMAGING | Facility: CLINIC | Age: 35
DRG: 003 | End: 2020-02-07
Attending: STUDENT IN AN ORGANIZED HEALTH CARE EDUCATION/TRAINING PROGRAM
Payer: COMMERCIAL

## 2020-02-07 ENCOUNTER — APPOINTMENT (OUTPATIENT)
Dept: PHYSICAL THERAPY | Facility: CLINIC | Age: 35
DRG: 003 | End: 2020-02-07
Attending: STUDENT IN AN ORGANIZED HEALTH CARE EDUCATION/TRAINING PROGRAM
Payer: COMMERCIAL

## 2020-02-07 LAB
ANION GAP SERPL CALCULATED.3IONS-SCNC: 4 MMOL/L (ref 3–14)
ANION GAP SERPL CALCULATED.3IONS-SCNC: 6 MMOL/L (ref 3–14)
APTT PPP: 57 SEC (ref 22–37)
BACTERIA SPEC CULT: NO GROWTH
BACTERIA SPEC CULT: NO GROWTH
BACTERIA SPEC CULT: NORMAL
BASOPHILS # BLD AUTO: 0 10E9/L (ref 0–0.2)
BASOPHILS NFR BLD AUTO: 0.3 %
BUN SERPL-MCNC: 11 MG/DL (ref 7–30)
BUN SERPL-MCNC: 11 MG/DL (ref 7–30)
CALCIUM SERPL-MCNC: 8.4 MG/DL (ref 8.5–10.1)
CALCIUM SERPL-MCNC: 8.7 MG/DL (ref 8.5–10.1)
CHLORIDE SERPL-SCNC: 108 MMOL/L (ref 94–109)
CHLORIDE SERPL-SCNC: 109 MMOL/L (ref 94–109)
CK SERPL-CCNC: 76 U/L (ref 30–225)
CO2 SERPL-SCNC: 24 MMOL/L (ref 20–32)
CO2 SERPL-SCNC: 26 MMOL/L (ref 20–32)
CREAT SERPL-MCNC: 0.46 MG/DL (ref 0.52–1.04)
CREAT SERPL-MCNC: 0.5 MG/DL (ref 0.52–1.04)
CRP SERPL-MCNC: 120 MG/L (ref 0–8)
DAT POLY-SP REAG RBC QL: NORMAL
DIFFERENTIAL METHOD BLD: NORMAL
EOSINOPHIL # BLD AUTO: 0.2 10E9/L (ref 0–0.7)
EOSINOPHIL NFR BLD AUTO: 1.3 %
ERYTHROCYTE [DISTWIDTH] IN BLOOD BY AUTOMATED COUNT: 15.5 % (ref 10–15)
ERYTHROCYTE [DISTWIDTH] IN BLOOD BY AUTOMATED COUNT: 15.6 % (ref 10–15)
FACT X ACT/NOR PPP CHRO: 117 % (ref 70–130)
FACT X ACT/NOR PPP CHRO: >130 % (ref 70–130)
GFR SERPL CREATININE-BSD FRML MDRD: >90 ML/MIN/{1.73_M2}
GFR SERPL CREATININE-BSD FRML MDRD: >90 ML/MIN/{1.73_M2}
GLUCOSE SERPL-MCNC: 117 MG/DL (ref 70–99)
GLUCOSE SERPL-MCNC: 89 MG/DL (ref 70–99)
GRAM STN SPEC: NORMAL
GRAM STN SPEC: NORMAL
HCT VFR BLD AUTO: 24.4 % (ref 35–47)
HCT VFR BLD AUTO: 26.3 % (ref 35–47)
HGB BLD-MCNC: 7.7 G/DL (ref 11.7–15.7)
HGB BLD-MCNC: 8.2 G/DL (ref 11.7–15.7)
IMM GRANULOCYTES # BLD: 0.1 10E9/L (ref 0–0.4)
IMM GRANULOCYTES NFR BLD: 1.1 %
INR PPP: 1.66 (ref 0.86–1.14)
LYMPHOCYTES # BLD AUTO: 2.2 10E9/L (ref 0.8–5.3)
LYMPHOCYTES NFR BLD AUTO: 18.5 %
Lab: NORMAL
MCH RBC QN AUTO: 29.8 PG (ref 26.5–33)
MCH RBC QN AUTO: 30 PG (ref 26.5–33)
MCHC RBC AUTO-ENTMCNC: 31.2 G/DL (ref 31.5–36.5)
MCHC RBC AUTO-ENTMCNC: 31.6 G/DL (ref 31.5–36.5)
MCV RBC AUTO: 95 FL (ref 78–100)
MCV RBC AUTO: 96 FL (ref 78–100)
MONOCYTES # BLD AUTO: 1.2 10E9/L (ref 0–1.3)
MONOCYTES NFR BLD AUTO: 9.7 %
NEUTROPHILS # BLD AUTO: 8.2 10E9/L (ref 1.6–8.3)
NEUTROPHILS NFR BLD AUTO: 69.1 %
PLATELET # BLD AUTO: 158 10E9/L (ref 150–450)
PLATELET # BLD AUTO: 178 10E9/L (ref 150–450)
POTASSIUM SERPL-SCNC: 3.7 MMOL/L (ref 3.4–5.3)
POTASSIUM SERPL-SCNC: 3.8 MMOL/L (ref 3.4–5.3)
RBC # BLD AUTO: 2.57 10E12/L (ref 3.8–5.2)
RBC # BLD AUTO: 2.75 10E12/L (ref 3.8–5.2)
SODIUM SERPL-SCNC: 138 MMOL/L (ref 133–144)
SODIUM SERPL-SCNC: 140 MMOL/L (ref 133–144)
SPECIMEN SOURCE: NORMAL
WBC # BLD AUTO: 12 10E9/L (ref 4–11)
WBC # BLD AUTO: 12.4 10E9/L (ref 4–11)
YEAST SPEC QL CULT: NORMAL

## 2020-02-07 PROCEDURE — 40000802 ZZH SITE CHECK

## 2020-02-07 PROCEDURE — 25000128 H RX IP 250 OP 636: Performed by: INTERNAL MEDICINE

## 2020-02-07 PROCEDURE — 86140 C-REACTIVE PROTEIN: CPT | Performed by: INTERNAL MEDICINE

## 2020-02-07 PROCEDURE — 25000132 ZZH RX MED GY IP 250 OP 250 PS 637

## 2020-02-07 PROCEDURE — 25000132 ZZH RX MED GY IP 250 OP 250 PS 637: Performed by: HOSPITALIST

## 2020-02-07 PROCEDURE — 86880 COOMBS TEST DIRECT: CPT | Performed by: STUDENT IN AN ORGANIZED HEALTH CARE EDUCATION/TRAINING PROGRAM

## 2020-02-07 PROCEDURE — 25000132 ZZH RX MED GY IP 250 OP 250 PS 637: Performed by: PHYSICIAN ASSISTANT

## 2020-02-07 PROCEDURE — 85027 COMPLETE CBC AUTOMATED: CPT | Performed by: STUDENT IN AN ORGANIZED HEALTH CARE EDUCATION/TRAINING PROGRAM

## 2020-02-07 PROCEDURE — 93926 LOWER EXTREMITY STUDY: CPT | Mod: LT

## 2020-02-07 PROCEDURE — 82550 ASSAY OF CK (CPK): CPT | Performed by: INTERNAL MEDICINE

## 2020-02-07 PROCEDURE — 21400000 ZZH R&B CCU UMMC

## 2020-02-07 PROCEDURE — 87075 CULTR BACTERIA EXCEPT BLOOD: CPT | Performed by: PHYSICIAN ASSISTANT

## 2020-02-07 PROCEDURE — 87186 SC STD MICRODIL/AGAR DIL: CPT | Performed by: PHYSICIAN ASSISTANT

## 2020-02-07 PROCEDURE — 85260 CLOT FACTOR X STUART-POWER: CPT | Performed by: STUDENT IN AN ORGANIZED HEALTH CARE EDUCATION/TRAINING PROGRAM

## 2020-02-07 PROCEDURE — 99222 1ST HOSP IP/OBS MODERATE 55: CPT | Mod: GC | Performed by: INTERNAL MEDICINE

## 2020-02-07 PROCEDURE — 87205 SMEAR GRAM STAIN: CPT | Performed by: PHYSICIAN ASSISTANT

## 2020-02-07 PROCEDURE — 87102 FUNGUS ISOLATION CULTURE: CPT | Performed by: PHYSICIAN ASSISTANT

## 2020-02-07 PROCEDURE — 25000128 H RX IP 250 OP 636: Performed by: STUDENT IN AN ORGANIZED HEALTH CARE EDUCATION/TRAINING PROGRAM

## 2020-02-07 PROCEDURE — 80048 BASIC METABOLIC PNL TOTAL CA: CPT | Performed by: INTERNAL MEDICINE

## 2020-02-07 PROCEDURE — 97161 PT EVAL LOW COMPLEX 20 MIN: CPT | Mod: GP

## 2020-02-07 PROCEDURE — 87077 CULTURE AEROBIC IDENTIFY: CPT | Performed by: PHYSICIAN ASSISTANT

## 2020-02-07 PROCEDURE — 25000128 H RX IP 250 OP 636: Performed by: PHYSICIAN ASSISTANT

## 2020-02-07 PROCEDURE — 97530 THERAPEUTIC ACTIVITIES: CPT | Mod: GP

## 2020-02-07 PROCEDURE — 97116 GAIT TRAINING THERAPY: CPT | Mod: GP

## 2020-02-07 PROCEDURE — 85610 PROTHROMBIN TIME: CPT | Performed by: STUDENT IN AN ORGANIZED HEALTH CARE EDUCATION/TRAINING PROGRAM

## 2020-02-07 PROCEDURE — 25800030 ZZH RX IP 258 OP 636: Performed by: INTERNAL MEDICINE

## 2020-02-07 PROCEDURE — 87070 CULTURE OTHR SPECIMN AEROBIC: CPT | Performed by: PHYSICIAN ASSISTANT

## 2020-02-07 PROCEDURE — 99221 1ST HOSP IP/OBS SF/LOW 40: CPT | Performed by: SURGERY

## 2020-02-07 PROCEDURE — 36592 COLLECT BLOOD FROM PICC: CPT | Performed by: STUDENT IN AN ORGANIZED HEALTH CARE EDUCATION/TRAINING PROGRAM

## 2020-02-07 PROCEDURE — 25000128 H RX IP 250 OP 636: Performed by: HOSPITALIST

## 2020-02-07 PROCEDURE — 36592 COLLECT BLOOD FROM PICC: CPT | Performed by: INTERNAL MEDICINE

## 2020-02-07 PROCEDURE — 85730 THROMBOPLASTIN TIME PARTIAL: CPT | Performed by: STUDENT IN AN ORGANIZED HEALTH CARE EDUCATION/TRAINING PROGRAM

## 2020-02-07 PROCEDURE — 85004 AUTOMATED DIFF WBC COUNT: CPT | Performed by: STUDENT IN AN ORGANIZED HEALTH CARE EDUCATION/TRAINING PROGRAM

## 2020-02-07 PROCEDURE — 99233 SBSQ HOSP IP/OBS HIGH 50: CPT | Mod: GC | Performed by: INTERNAL MEDICINE

## 2020-02-07 RX ORDER — HEPARIN SODIUM 10000 [USP'U]/100ML
0-3500 INJECTION, SOLUTION INTRAVENOUS CONTINUOUS
Status: DISCONTINUED | OUTPATIENT
Start: 2020-02-07 | End: 2020-02-07

## 2020-02-07 RX ORDER — WARFARIN SODIUM 5 MG/1
5 TABLET ORAL
Status: DISCONTINUED | OUTPATIENT
Start: 2020-02-07 | End: 2020-02-07

## 2020-02-07 RX ADMIN — OXYCODONE HYDROCHLORIDE 5 MG: 5 TABLET ORAL at 20:49

## 2020-02-07 RX ADMIN — MULTIVITAMIN 15 ML: LIQUID ORAL at 07:57

## 2020-02-07 RX ADMIN — NORETHINDRONE 0.35 MG: 0.35 TABLET ORAL at 07:59

## 2020-02-07 RX ADMIN — CEFAZOLIN SODIUM 2 G: 2 INJECTION, SOLUTION INTRAVENOUS at 07:56

## 2020-02-07 RX ADMIN — CEFAZOLIN SODIUM 2 G: 2 INJECTION, SOLUTION INTRAVENOUS at 17:22

## 2020-02-07 RX ADMIN — ACETAMINOPHEN 650 MG: 325 TABLET, FILM COATED ORAL at 00:37

## 2020-02-07 RX ADMIN — OXYCODONE HYDROCHLORIDE 5 MG: 5 TABLET ORAL at 02:01

## 2020-02-07 RX ADMIN — CEFAZOLIN SODIUM 2 G: 2 INJECTION, SOLUTION INTRAVENOUS at 00:37

## 2020-02-07 RX ADMIN — SENNOSIDES AND DOCUSATE SODIUM 1 TABLET: 8.6; 5 TABLET ORAL at 20:33

## 2020-02-07 RX ADMIN — ACETAMINOPHEN 650 MG: 325 TABLET, FILM COATED ORAL at 17:22

## 2020-02-07 RX ADMIN — CEFAZOLIN SODIUM 2 G: 2 INJECTION, SOLUTION INTRAVENOUS at 23:30

## 2020-02-07 RX ADMIN — POTASSIUM CHLORIDE 20 MEQ: 750 TABLET, EXTENDED RELEASE ORAL at 17:22

## 2020-02-07 RX ADMIN — POTASSIUM CHLORIDE 20 MEQ: 750 TABLET, EXTENDED RELEASE ORAL at 07:56

## 2020-02-07 RX ADMIN — ARGATROBAN 1 MCG/KG/MIN: 1 INJECTION INTRAVENOUS at 04:32

## 2020-02-07 RX ADMIN — CARVEDILOL 6.25 MG: 6.25 TABLET, FILM COATED ORAL at 17:22

## 2020-02-07 RX ADMIN — Medication 0.2 MG: at 23:31

## 2020-02-07 RX ADMIN — CARVEDILOL 6.25 MG: 6.25 TABLET, FILM COATED ORAL at 07:56

## 2020-02-07 RX ADMIN — ARGATROBAN 1 MCG/KG/MIN: 1 INJECTION INTRAVENOUS at 13:47

## 2020-02-07 RX ADMIN — VANCOMYCIN HYDROCHLORIDE 1750 MG: 10 INJECTION, POWDER, LYOPHILIZED, FOR SOLUTION INTRAVENOUS at 15:16

## 2020-02-07 RX ADMIN — ACETAMINOPHEN 650 MG: 325 TABLET, FILM COATED ORAL at 06:10

## 2020-02-07 RX ADMIN — BENZONATATE 100 MG: 100 CAPSULE ORAL at 11:34

## 2020-02-07 RX ADMIN — ACETAMINOPHEN 650 MG: 325 TABLET, FILM COATED ORAL at 11:34

## 2020-02-07 RX ADMIN — HEPARIN SODIUM 1050 UNITS/HR: 10000 INJECTION, SOLUTION INTRAVENOUS at 14:52

## 2020-02-07 RX ADMIN — OXYCODONE HYDROCHLORIDE 5 MG: 5 TABLET ORAL at 06:10

## 2020-02-07 RX ADMIN — MELATONIN TAB 3 MG 6 MG: 3 TAB at 20:49

## 2020-02-07 ASSESSMENT — PAIN DESCRIPTION - DESCRIPTORS
DESCRIPTORS: ACHING

## 2020-02-07 ASSESSMENT — ACTIVITIES OF DAILY LIVING (ADL)
ADLS_ACUITY_SCORE: 18
ADLS_ACUITY_SCORE: 17

## 2020-02-07 NOTE — PROGRESS NOTES
Social Work Services Progress Note    Hospital Day: 10  Date of Initial Social Work Evaluation:  2/5/2020  Collaborated with:  RN, Charge RN 6C, Patient    Data:  Patient had multiple concerns about finances, care for her children, Short Term Disability Paperwork and getting a sleeping pill for her PTSD.     Intervention:  SW met with pt, listened to pts concerns and plan, SW to followed up with RN re: assisting pt in using a computer if possible perhaps in library.  Followed up with Cards 2 team about short term disability paperwork and need to complete and possibility of getting a sleeping aid as pt reports PTSD, team acknowledged and will look into Sleeping aid and will complete short term disability Paperwork.  Pt plans to work on finalizing plan for children, currently working on if sister that is local can change schedules to be with kids in afternoon/ evening after school and if daughters friends mom is able to have kids directly after school.     Assessment:  Pt presented plan and shared concerns with SW, pt is concerned while she is in patient longer than expected needing to get everything figured out with support system as original plan was through Sunday. Pt reports feeling a bit better now.      Plan:    Anticipated Disposition:  Facility:  D    Barriers to d/c plan:  Medical Stability,  arrangements    Follow Up:  SW to follow for needs and discharge planning.     IGOR Sinha, SW  [facility]   [facility]  Pager: [phone]

## 2020-02-07 NOTE — PHARMACY-ANTICOAGULATION SERVICE
Clinical Pharmacy - Warfarin Dosing Consult     Pharmacy has been consulted to manage this patient s warfarin therapy.  Indication: DVT/PE prophylaxis (myocarditis)  Therapy Goal: INR 2-2.5; CF10 while on argatroban  Warfarin Prior to Admission: No  Significant drug interactions: argatroban - falsely elevates INR    INR   Date Value Ref Range Status   02/04/2020 1.00 0.86 - 1.14 Final   02/03/2020 0.99 0.86 - 1.14 Final       Recommend warfarin 7.5 mg today.  Note argatroban will falsely elevate INR and CF10s will need to be monitored while on argatroban.  Ordered CF10 to be drawn today and run in AM to serve as baseline. Daily CF10s ordered. CF10s can only be run during day and not on evening shift w/o calling in on call tech.  Patient has recent INR showing her baseline is normal.  Pharmacy will monitor Azra S Reay daily and order warfarin doses to achieve specified goal.      Please contact pharmacy as soon as possible if the warfarin needs to be held for a procedure or if the warfarin goals change.      Love Walker, PharmD  Pager: [phone]

## 2020-02-07 NOTE — PLAN OF CARE
OT 6C: Pt declining therapy on first attempt, wanting to eat breakfast first. On second attempt, pt leaving room via transport. Will reschedule

## 2020-02-07 NOTE — PLAN OF CARE
6C PT  Discharge Planner PT   Patient plan for discharge: Open to rehab  Current status: PT evaluation complete, treatment initiated. Pt performs bed mobility Geraldo with HOB elevated, independent sit<>stand. Pt ambulates [id number] with FWW SBA-Geraldo, requires prolonged seated rest break 2/2 fatigue and L LE pain. Pt tearful when discussing her children and current health status. VSS on RA throughout.  Barriers to return to prior living situation: Medical status, decreased activity tolerance, weakness  Recommendations for discharge: TCU  Rationale for recommendations: Pt would benefit from continued skilled rehab services to progress functional strength, endurance, and independence with functional mobility.        Entered by: Keena Whyte 02/07/2020 1:55 PM

## 2020-02-07 NOTE — CONSULTS
[facility]   PM&R CONSULT    Consulting Provider: Jerod Lutz   Reason for Consult: Assessment of rehabilitation   Location of Patient: 6C  Date of Encounter: 2/7/2020   Date of Admission: 1/28/2020        ASSESSMENT/PLAN:    Ms. Azra Tom is a 34 year old yo female who presents with deconditioning/debility following a hospitalization for cardiogenic shock requiring ECMO and intubation. She has been decannulated and is currently on room air.    On examination, she is awake and alert. She has 5/5 strength throughout. She is able to transfer as well as bed mobility is modified independent.   Azra has a son and a daughter. Her  is in the care home. She has 2 sisters who are out of town and are supportive. Prior to this admit, she was living with her foster father, where she was getting ready to leave and move into her sister's home.   She is does not have intense rehab needs, rather discharge disposition needs that require coordination. She does have some deconditioning given the course of her hospitalization. She will benefit from a TCU stay.   Counseled patient regarding her rehab needs, discussed the timeline of recovery and functional gains over time. She expresses understanding       Thank you for consulting the PM&R Department.   For any questions, please feel free to page me at [phone]       Mona Kaba MD   Department of PM&R         HPI:    Ms. Tom is a 34 year old female with a PMHx of Tobacco Abuse and Bipolar Disorder who presented on 1/28/2020 with cardiogenic shock likely from viral myocarditis, requiring ECMO. She was decannulated on 2/3/2020. Initially did well, developed post op vasoplegia and anemia that necessitated pressors. She is currently on RA   EF showed recovery to 55-60%.  She also developed Thrombocytopenia secondary to herain. She will require anticoagulation for 3 months. 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 On PO coreg 6.25mng BID      Other issues are Pericardial Effusion, likely related to myopericarditis.  She has been on a insulin drip for hyperglycemia.   Feeding tube removed today.   She is noted to have draining left groin site. CVTS removed wound vac yesterday per nursing.       PREVIOUS LEVEL OF FUNCTION   She was independent prior to admit. She worked at [employer]   She did not drive       CURRENT FUNCTION   She ambulated approx 75ft + 75ft + 100ft with CGA and IV pole for BUE support with seated rest between bouts. Noted to be tangential and difficulty with attention. Issues with impulsitivity. Pt SBA with bed mobility and STS transfers. Pt VSS on RA.   She is improving, 2 days back, she was min assist with transfers.     LIVING SITUATION/SUPPORT  See above in HPI     SOCIAL HISTORY  Social History     Socioeconomic History     Marital status: None     Spouse name: None     Number of children: None     Years of education: None     Highest education level: None   Occupational History     None   Social Needs     Financial resource strain: None     Food insecurity:     Worry: None     Inability: None     Transportation needs:     Medical: None     Non-medical: None   Tobacco Use     Smoking status: Former Smoker   Substance and Sexual Activity     Alcohol use: Not Currently     Drug use: Not Currently     Sexual activity: None   Lifestyle     Physical activity:     Days per week: None     Minutes per session: None     Stress: None   Relationships     Social connections:     Talks on phone: None     Gets together: None     Attends Cheondoism service: None     Active member of club or organization: None     Attends meetings of clubs or organizations: None     Relationship status: None     Intimate partner violence:     Fear of current or ex partner: None     Emotionally abused: None     Physically abused: None     Forced  sexual activity: None   Other Topics Concern     None   Social History Narrative     None                   Past Medical History:  Past Medical History:   Diagnosis Date     Uncomplicated asthma            Current Medications:  Current Facility-Administered Medications   Medication     acetaminophen (TYLENOL) Suppository 650 mg     acetaminophen (TYLENOL) tablet 650 mg     alum & mag hydroxide-simethicone (MYLANTA ES/MAALOX  ES) suspension 30 mL     argatroban (ACOVA) 1 mg/mL ANTICOAGULANT infusion     benzocaine (ORAJEL MAXIMUM STRENGTH) 20 % gel     benzonatate (TESSALON) capsule 100 mg     bisacodyl (DULCOLAX) Suppository 10 mg     carvedilol (COREG) tablet 6.25 mg     ceFAZolin (ANCEF) intermittent infusion 2 g in 100 mL dextrose PRE-MIX     dextrose 10% infusion     dextrose 50 % injection 25-50 mL    Or     glucagon injection 1 mg     HYDROmorphone (DILAUDID) injection 0.2 mg     lidocaine (LMX4) cream     lidocaine 1 % 0.1-1 mL     medication instruction     melatonin tablet 6 mg     multivitamins w/minerals (CERTAVITE) liquid 15 mL     naloxone (NARCAN) injection 0.1-0.4 mg     norethindrone (MICRONOR) 0.35 MG per tablet 0.35 mg     ondansetron (ZOFRAN) injection 4 mg     oxyCODONE (ROXICODONE) tablet 5 mg     Patient is already receiving anticoagulation with heparin, enoxaparin (LOVENOX), warfarin (COUMADIN)  or other anticoagulant medication     polyethylene glycol (MIRALAX/GLYCOLAX) Packet 17 g     potassium chloride (KLOR-CON) Packet 20-40 mEq     potassium chloride 10 mEq in 100 mL intermittent infusion with 10 mg lidocaine     potassium chloride 10 mEq in 100 mL sterile water intermittent infusion (premix)     potassium chloride 20 mEq in 50 mL intermittent infusion     potassium chloride ER (K-DUR/KLOR-CON M) CR tablet 20-40 mEq     Reason ACE/ARB/ARNI order not selected     senna-docusate (SENOKOT-S/PERICOLACE) 8.6-50 MG per tablet 1 tablet     sennosides (SENOKOT) tablet 8.6 mg     sodium chloride  "(PF) 0.9% PF flush 3 mL     Warfarin Therapy Reminder (Check START DATE - warfarin may be starting in the FUTURE)         Review of Systems:  Total of ten systems reviewed, pertinent positives and negatives as follows  Denies any instability with standing and walking.    Feels generally fatigued  Reports generalized weakness   Denies any tingling or numbness  No problems with bladder.   LBM today  No chest pain. No cough or SOB.  No visual changes.   No headache or photophobia.   No nausea, or abdominal pain.  Slept poorly last night  No joint pain, muscle pain or swelling.  Mood is good, denies any symptoms of depression.   Remainder of the review of the systems was negative.          Labs   Lab Results   Component Value Date    WBC 12.0 (H) 02/07/2020    HGB 7.7 (L) 02/07/2020    HCT 24.4 (L) 02/07/2020    MCV 95 02/07/2020     02/07/2020     Lab Results   Component Value Date     02/07/2020    POTASSIUM 3.8 02/07/2020    CHLORIDE 109 02/07/2020    CO2 24 02/07/2020    GLC 89 02/07/2020     Lab Results   Component Value Date    GFRESTIMATED >90 02/07/2020    GFRESTBLACK >90 02/07/2020     Lab Results   Component Value Date    AST 43 02/04/2020    ALT 35 02/04/2020    ALKPHOS 89 02/04/2020    BILITOTAL 1.3 02/04/2020     Lab Results   Component Value Date    INR 1.66 (H) 02/07/2020     Lab Results   Component Value Date    BUN 11 02/07/2020    CR 0.50 (L) 02/07/2020         ON EXAMINATION:  Vitals:    02/06/20 2206 02/07/20 0023 02/07/20 0400 02/07/20 0731   BP:  117/75  121/73   BP Location:  Left arm  Left arm   Pulse:       Resp:  17 16 18   Temp: 99.2  F (37.3  C) 98.8  F (37.1  C)  98.9  F (37.2  C)   TempSrc: Oral Oral  Oral   SpO2:  99%  100%   Weight:       Height:           Physical Exam:  Blood pressure 121/73, pulse 122, temperature 98.9  F (37.2  C), temperature source Oral, resp. rate 18, height 1.6 m (5' 3\"), weight 95.8 kg (211 lb 3.2 oz), SpO2 100 %.    GEN: NAD, lying comfortably in " bed  She is awake alert, appropriate, cooperative  Speech is clear  Comprehension is remarkable  HEENT: NCAT  RESPIRATORY: CTAB, no wheezes/crackles/rales  CARDIAC: RRR, no m/g/r  MSK: full active and passive ROM at all major joints of the bilaterally upper and lower extremities  No muscle atrophy noted  ABD: soft, non tender, pos BS  NEURO:   CRANIAL NERVES: Discs flat. Pupils equal, round and reactive to light.  Extraocular movements full. Visual fields full. Face moves symmetrically.  Tongue midline. Hearing intact to finger rubbing.    Strength: 5/5 throughout    Cognition: fund of knowledge and train of thought appropriate  SKIN: no rashes or lesions noted.  She has dressing in the left groin   EXT: no edema bilaterally    Thank you for the consult. Please call for any questions. Pager number [phone]   Total of 70 min spent in this encounter more than 50% in counseling, see HPI/recommendations for details.    Mona Kaba

## 2020-02-07 NOTE — PROGRESS NOTES
Calorie Count    Intake recorded for: 2/6/2020  Total Kcals: ? Total Protein: ?g    Kcals from Hospital Food: ?   Protein: ?g    Kcals from Outside Food (average):? Protein: ?g    # Meals Recorded: 2 meals ordered from kitchen. No food intake recorded in Epic Food Record. Calorie count sheets missing from room during time of pick-up, but pt mentioned RN had helped her fill one out? New sheets left in room for calorie count recording 2/7 & 2/8.    # Supplements Recorded: no intake recorded

## 2020-02-07 NOTE — PROGRESS NOTES
CVTS:     Azra is 34 yr female with ECMO decannulation 2/3, wound vac removed off 2/5 due to dysfunctioning suction.     Wound is open at inferior apex, superficial dehiscence about 1.5 cm deep and a culture was obtained and sent for aerobic, anaerobic, fungal cultures.   Suspected MRSA due to Hx MRSA wound infections.   Started Vanco, pharmacy to dose. No renal issues.     Dressing changes BID dry gauze, change PRN to keep dry.   Recommend ID consult for further recommendations.       Oj Becerra PA-C  Cardiothoracic Surgery  Pager [phone]    12:32 PM   February 7, 2020

## 2020-02-07 NOTE — CONSULTS
[facility]GERY [facility] PATIENT CONSULTATION NOTE    HPI:  Azra Tom is a 34 year old female with past medical history of tobacco abuse and bipolar disorder who was admitted on 1/29/2020 as a transfer from [facility] with cardiogenic shock related to viral myocarditis.  Patient was placed on VA ECHO via left femoral artery and was subsequently decannulated on 2/3/2020 after concerns for thrombosis in the circuit.  Patient reported increased left groin pain for the past couple days and left groin venous and arterial ultrasound done this morning demonstrated partially thrombosed left femoral PSA.  Vascular surgery was asked to evaluate the patient for pseudoaneurysm.       REFERRAL SOURCE: Dr. Yoo     PAST MEDICAL HISTORY:  Past Medical History:   Diagnosis Date     Uncomplicated asthma        PAST SURGICAL HISTORY:  Past Surgical History:   Procedure Laterality Date     CV EXTRACORPERAL MEMBRANE OXYGENATION N/A 1/29/2020    Procedure: ECMO, NATIVE HEART BIOPSY;  Surgeon: Richard Montana MD;  Location:  HEART CARDIAC CATH LAB     CV HEART BIOPSY N/A 1/29/2020    Procedure: Heart Biopsy;  Surgeon: Richard Montana MD;  Location:  HEART CARDIAC CATH LAB     REMOVE EXTRACORPORAL MEMBRANE OXYGENATOR ADULT Left 2/3/2020    Procedure: REMOVAL Extra Corporeal Membrain Oxygenator, Inta operative transesophageal echocardiogram, Repair of femoral vessel;  Surgeon: Vishnu Marx MD;  Location:  OR       FAMILY HISTORY:  History reviewed. No pertinent family history.    SOCIAL HISTORY:   Social History     Tobacco Use     Smoking status: Former Smoker   Substance Use Topics     Alcohol use: Not Currently         HOME MEDICATIONS:  Prior to Admission medications    Medication Sig Start Date End Date Taking? Authorizing Provider   predniSONE (DELTASONE) 20 MG tablet Taper: Starting 01/23/2020 take 60 mg by mouth daily for 3 days followed by 40 mg daily for 3 days and then 20 mg daily for 3 days   Yes  Unknown, Entered By History   albuterol (PROAIR HFA/PROVENTIL HFA/VENTOLIN HFA) 108 (90 Base) MCG/ACT inhaler Inhale 2 puffs into the lungs every 4 hours as needed    Unknown, Entered By History       VITAL SIGNS:  Temp: 99.2  F (37.3  C) Temp src: Oral BP: 116/72 Pulse: 102 Heart Rate: 92 Resp: 18 SpO2: 99 % O2 Device: None (Room air)      211 lbs 3.21 oz      PHYSICAL EXAM:  NEURO/PSYCH:  The patient is alert and oriented.  Appropriate.  Moves all extremities.    SKIN:warm and dry.  PULMONARY:  No accessory muscle use.    EXTREMITIES: L groin dressing intact - patient refused removal, L thigh with tenderness and edema, no blanching erythema, L pedal pulses palpable      BMP  Recent Labs   Lab 02/07/20  1552 02/07/20  0628 02/06/20  1933 02/06/20  0958  02/05/20  0418  02/04/20  0959 02/04/20  0447 02/03/20  2226  02/03/20  0358 02/02/20  2159  02/02/20  1544    140 140 142   < > 145*   < >  --  148* 146*   < > 148* 149*  --  146*   POTASSIUM 3.7 3.8 3.7 3.7   < > 3.6   < >  --  4.3 4.6   < > 4.0 4.7   < > 4.1   CHLORIDE 108 109 109 110*   < > 111*   < >  --  115* 114*   < > 114* 114*  --  112*   CO2 26 24 27 28   < > 29   < >  --  31 33*   < > 32 32  --  33*   BUN 11 11 12 13   < > 18   < >  --  18 21   < > 25 24  --  26   CR 0.46* 0.50* 0.56 0.52   < > 0.62   < >  --  0.57 0.55   < > 0.54 0.57  --  0.66   * 89 93 100*   < > 111*   < >  --  116*  117* 134*  132*   < > 158*  159* 114*  115*  --  136*  134*   MAG  --   --   --   --   --  2.4*  --  2.2 2.6* 2.5*   < > 2.7* 2.5*  --  2.6*   PHOS  --   --   --   --   --   --   --   --  3.2  --   --  3.9 2.7  --  3.3    < > = values in this interval not displayed.     CBC  Recent Labs   Lab 02/07/20  1443 02/07/20  0628 02/06/20  0658 02/05/20  0418   WBC 12.4* 12.0* 13.2* 11.6*   HGB 8.2* 7.7* 8.4* 8.3*    158 125* 102*     INR/PTT  Recent Labs   Lab 02/07/20  0628 02/06/20  1326 02/06/20  0958 02/06/20  0658  02/04/20  1657 02/03/20  2226  02/03/20  0945   INR 1.66*  --   --   --   --  1.00 0.99 1.02   PTT 57* 48* 32 41*   < > 32 31 33    < > = values in this interval not displayed.     ABG  Recent Labs   Lab 02/04/20  1328 02/04/20  0959 02/04/20 0447 02/03/20  2226   PH 7.46* 7.44 7.46* 7.45   PCO2 41 44 42 44   PO2 79* 109* 105 123*   HCO3 29* 30* 30* 31*     LFT  Recent Labs   Lab 02/04/20 2005 02/04/20  0447 02/03/20  0358 02/02/20  1544   AST 43 35 33 32   ALT 35 32 23 21   ALKPHOS 89 77 70 72   BILITOTAL 1.3 1.5* 1.5* 1.6*   ALBUMIN 2.4* 2.4* 2.2* 2.5*     PancreasNo lab results found in last 7 days.      ULTRASOUND:  LIMITED LEFT GROIN DUPLEX ARTERIAL AND VENOUS ULTRASOUND 2/7/2020     CLINICAL HISTORY: Suspected pseudoaneurysm after ECMO catheter  Removal.    IMPRESSION: Left groin widemouthed 6 pseudoaneurysm neck measures 6 mm in diameter by 12 mm in length. By CT, the patient's left common femoral artery measures 7 mm in diameter. [place] of the  pseudoaneurysm precludes thrombin injection.     Pseudoaneurysm with internal flow measures 1.5 x 1.1 x 2.7 cm.  Thrombosed, round pseudoaneurysm measures 2.8 x 1.8 x 2.7 cm.      ASSESSMENT:   34F with small mostly thrombosed L groin pseudoaneurysm after surgical repair of L femoral arteriotomy with suture by CVTS with ECMO decannulation.       Patient Active Problem List   Diagnosis     Heart failure (H)     Acute combined systolic and diastolic heart failure (H)     Cardiogenic shock (H)           PLAN:  PSA will likely thrombose on its own given size and discontinuing anticoagulation  Repeat L groin ultrasound on 2/10/20  No surgical intervention necessary for now   Concern for potential for infection of hematoma - CVTS plans for vanc          Abimbola Weston MD  Vascular Surgery Fellow  [facility]

## 2020-02-07 NOTE — CONSULTS
HEMATOLOGY INITIAL CONSULT NOTE  02/07/20  7:59 AM    Reason for consult:  Anticoagulation in the setting of positive HIT screen    History of present illness:  Azra Tom is a 34-year-old woman who was admitted 1/29 as a transfer from [facility] with cardiogenic shock related to viral myocarditis.    The patient initially presented with chest pain and exertional shortness of breath (ongoing after recent ED visit at which she was diagnosed with viral URI). Evaluation notable for influenza A and elevated troponin. Found to have new HFrEF with EF 25-30%. Underwent right heart cath on 1/28 and had a BP drop requiring pressors and IABP (heparin gtt was started while on IABP). Transferred to [facility] on 1/29 and placed on VA ECMO. Subsequently decannulated on 2/3 after concerns for thrombosis in the circuit. Also noted to have worsening thrombocytopenia over this time, with decrease from ~150 at OSH to a arabella of 65 on 2/1. HIT panel returned positive on 2/3.    On interview, the patient reports that she is doing a lot better. She does note that her ECMO cannulation site is painful and infected. Her appetite is good and she is working with PT a lot. No signs of bleeding. Denies any known history of blood clots in the past.    A complete review of systems was performed and was negative with the exception of pertinent positives noted above.    Past medical history:  Combined systolic and diastolic heart failure  Exercise-induced asthma    Past surgical history:  Heart biopsy  Hand surgery  C section    Family history:  Mother has COPD  Father is healthy  No known history of bleeding or clotting disorders    Social history:  Was a daily tobacco and marijuana smoker PTA; expresses interest in stopping after discharge  Occasional alcohol    Medications:  Albuterol    Recently prescribed prednisone at ED visit prior to admission    Allergies:  Allergies   Allergen Reactions     Heparin      HIT/Thrombocytopenia 2/3 HIT  "w/Reflex sent___     OBJECTIVE DATA  /73 (BP Location: Left arm)   Pulse 122   Temp 98.9  F (37.2  C) (Oral)   Resp 18   Ht 1.6 m (5' 3\")   Wt 95.8 kg (211 lb 3.2 oz)   SpO2 100%   BMI 37.41 kg/m    -- General: no acute distress; well-developed and well-nourished  -- HEENT: mucous membranes moist, no erythema  -- Cardiovascular: regular rate and rhythm, no murmurs; no peripheral edema  -- Pulmonary: lungs clear bilaterally, no wheezes or crackles  -- Gastrointestinal: abdomen soft, non-tender, non-distended; bowel sounds present; no organomegaly  -- Musculoskeletal: no swelling or erythema of joints  -- Integumentary: no rashes; wound at ECMO site is moist and appears erythematous  -- Neurologic: alert and oriented to self, place, time; cranial nerves II-XII grossly intact; nonfocal  -- Psychiatric: appropriate affect, cooperative    Relevant laboratory workup:  BMP/lytes   Recent Labs   Lab Test 20  0628 20  1933 20  0958 20  1238    140 142 143   POTASSIUM 3.8 3.7 3.7 3.5   CHLORIDE 109 109 110* 109   CO2 24 27 28 31   ANIONGAP 6 4 3 2*   BUN 11 12 13 18   CR 0.50* 0.56 0.52 0.65   GLC 89 93 100* 132*     CBC   Recent Labs   Lab Test 20  0628 20  0658 20  0418   WBC 12.0* 13.2* 11.6*   HGB 7.7* 8.4* 8.3*   MCV 95 95 92    125* 102*    < > = values in this interval not displayed.     INR   Recent Labs   Lab Test 20  0628 20  0447 20  2226   INR 1.66* 1.00 0.99     HIT panel positive  REED negative    Relevant imagin/3 TTE:  Global and regional left ventricular function is normal with an EF of 55-60%.  Maximal thickness 1.3 cm in the basal anteroseptum.  Normal RV size and function.  Trivial pericardial effusion.    Assessment:  This patient's thrombocytopenia is not likely to be due to HIT. Low probability with 4T score of 3, and serotonin release assay is negative. Low platelets were likely in the setting of her cardiac " procedures, infection, and ECMO.    Given that this is not HIT, if the patient needs to be anticoagulated related to her cardiac condition, the anticoagulant does not need to be warfarin (which would have been preferred in HIT). Choosing a DOAC like apixaban would be appropriate with her kidney function.    Also noted that the patient has a new, acute anemia. Most likely related to blood loss though cannot fully exclude hemolysis given the elevated bilirubin and LDH while she was on ECMO. Would consider checking retic count and JAELYN as well as starting oral iron and folate supplements for the anemia.    Recommendations:  - Not concerned for HIT  - Ongoing anticoagulation need not be done with warfarin  - Could consider DOAC, favor apixaban, if anticoagulation is needed  - Consider sending retics, JAELYN for the anemia  - Start oral iron and folate supplements      Thank you for involving us in the care of Azra Tom. Patient discussed with Dr. Paris.    Talia Galvez MD  Internal Medicine, PGY-1

## 2020-02-07 NOTE — PLAN OF CARE
"D: PMHx of Tobacco Abuse and Bipolar Disorder, admitted 1/28 with cardiogenic shock likely from viral myocarditis.    I: Monitored vitals and assessed pt status. Reassured patient that antibiotics will suppress her new left groin infection.   Running: Argatroban @ 1mcg/kg/min (5.3ml/hr)-  PRN: oxycodone x 2, Tylenol x 2      A: A0x4. VSS. Tmax 98.8 t-max. Patient has slightly draining left groin site. CVTS removed wound vac yesterday. Aching pain, redness and warmth. Voiding in toilet. Patient has menses. IV ABX given as ordered. Patient has anxiety about possible left groin infection. Patient states \"infection will affect my heart.\"   P: Continue to encourage pulm hygiene, nutrition, and activity. Continue on argatroban gtt. Continue to monitor Pt status and report changes to treatment team. Possible discharge this weekend.        "

## 2020-02-07 NOTE — PROGRESS NOTES
Social Work Services Progress Note    Hospital Day: 9  Date of Initial Social Work Evaluation:  2/5/2020  Collaborated with:  ARU Liaison (Amirah), Chart Review, Cardiology Team    Data:  SW spoke with ARU Liason no longer appropriate for ARU, TCU being recommended.  Pt will not be discharge until Monday at earliest per Cards team.           Intervention:  Follow up with liason for ARU, not appropriate as TCU not recommended.      Assessment:  Pending further assessment and see if/when pt can discharge to TCU per cards team earliest discharge will be Monday.     Plan:    Anticipated Disposition:  TCU    Barriers to d/c plan:  Medical Stability, Accepting TCU     Follow Up:  SW to continue to follow and assist in discharge coordination.     IGOR Sinha, SW  [facility]   [facility]  Pager: [phone]

## 2020-02-07 NOTE — PLAN OF CARE
"/72 (BP Location: Left arm)   Pulse 102   Temp 99.2  F (37.3  C) (Oral)   Resp 18   Ht 1.6 m (5' 3\")   Wt 95.8 kg (211 lb 3.2 oz)   SpO2 99%   BMI 37.41 kg/m       Neuro: A/Ox4. Very anxious and tearful throughout the day. Appears to have manic speech at times. Pt states she hasn't slept in the last two days. Pt expressed multiple stressors; caring for children, financial issues, new infection, obtaining FMLA, etc..  following  Cardiac: VSS. ST with HR . Denies CP  Respiratory: On RA. No respiratory distress noted.   GI/: Voiding without difficulty. On menses. Last BM yesterday  Diet/Appetite: Regular diet. Fair appetite. Denies nausea. On juliana counts.   Skin: LLE groin site warm, red, and with drainage. CMS intact. CVTS following. Dressing applied per order. Suspected pseudoanyuresm. Vascular surgery to consult. Started on IV vanco.   LDA: TL PICC and PIV. Argatroban and hep gtt discontinued. PICC infusing TKO in between antibiotics  Activity: Up with SBA and walker. Showered. Worked with therapy and walked in benitez  Pain: C/o L groin pain. Managed with tylenolx2  Plan: Continue to monitor and follow POC. Notify MD with any changes or concerns.      "

## 2020-02-07 NOTE — PROGRESS NOTES
02/07/20 1237   Quick Adds   Type of Visit Initial PT Evaluation   Living Environment   Lives With child(nicolette), dependent;parent(s);sibling(s)   Living Arrangements house   Home Accessibility stairs to enter home   Number of Stairs, Main Entrance 3   Transportation Anticipated family or friend will provide;car, drives self   Living Environment Comment Pt lives with her 2 children and other family members per pt report. All needs met on mail level. Pt works full time as a manager at [employer].   Self-Care   Usual Activity Tolerance good   Current Activity Tolerance moderate   Regular Exercise No   Equipment Currently Used at Home none   Activity/Exercise/Self-Care Comment Pt active with children and working full time.   Functional Level Prior   Ambulation 0-->independent   Transferring 0-->independent   Toileting 0-->independent   Bathing 0-->independent   Communication 0-->understands/communicates without difficulty   Swallowing 0-->swallows foods/liquids without difficulty   Cognition 0 - no cognition issues reported   Fall history within last six months no   Which of the above functional risks had a recent onset or change? ambulation;transferring   Prior Functional Level Comment Prior independent with functional mobility.   General Information   Onset of Illness/Injury or Date of Surgery - Date 01/28/20   Referring Physician Catarina Yoo MD   Patient/Family Goals Statement to return home to her children   Pertinent History of Current Problem (include personal factors and/or comorbidities that impact the POC) Per chart, Ms. Tom is a 34 year old with a PMHx of Tobacco Abuse and Bipolar Disorder who presents with cardiogenic shock likely from viral myocarditis.    Precautions/Limitations   (L groin incision from ECMO cannulation)   General Info Comments Activity orders: ambulate   Cognitive Status Examination   Orientation orientation to person, place and time   Level of Consciousness alert   Follows Commands and  Answers Questions 100% of the time   Personal Safety and Judgment intact   Memory intact   Cognitive Comment Pt very tangential with conversation, needs cues to redirect attention   Integumentary/Edema   Integumentary/Edema Comments R groin incision not observed though pt endorses bruising and swelling in L groin. Several areas of ecchymosis on B UEs   Posture    Posture Forward head position;Protracted shoulders   Range of Motion (ROM)   ROM Comment L hip ROM limited 2/2 groin incision; all other ROM WFL   Strength   Strength Comments Not formally assessed though pt demos >3/5 B LE strength   Bed Mobility   Bed Mobility Comments Kush with HOB elevated   Transfer Skills   Transfer Comments Independent   Gait   Gait Comments SBA with FWW in room; decreased gait speed with slight antalgic gait pattern 2/2 L LE pain   Balance   Balance Comments Good seated balance, SBA for standing balance   Sensory Examination   Sensory Perception no deficits were identified   General Therapy Interventions   Planned Therapy Interventions balance training;gait training;neuromuscular re-education;bed mobility training;strengthening;transfer training;risk factor education;home program guidelines;progressive activity/exercise   Clinical Impression   Criteria for Skilled Therapeutic Intervention yes, treatment indicated   PT Diagnosis Impaired functional mobility   Influenced by the following impairments Decreased strength and endurance, pain   Functional limitations due to impairments Pt requires assist for safe functional mobility   Clinical Presentation Stable/Uncomplicated   Clinical Presentation Rationale PMH and clinical judgment   Clinical Decision Making (Complexity) Low complexity   Therapy Frequency 6x/week   Predicted Duration of Therapy Intervention (days/wks) 1 week   Anticipated Equipment Needs at Discharge   (TBD)   Anticipated Discharge Disposition Transitional Care Facility   Risk & Benefits of therapy have been explained  "Yes   Patient, Family & other staff in agreement with plan of care Yes   Clinical Impression Comments Pending LOS and progress with therapies, pt may be able to safely return home with assist and home therapies.   [facility] TM \"6 Clicks\"   2016, Trustees of [facility], under license to [employer].  All rights reserved.   6 Clicks Short Forms Basic Mobility Inpatient Short Form   [facility]  \"6 Clicks\" V.2 Basic Mobility Inpatient Short Form   1. Turning from your back to your side while in a flat bed without using bedrails? 4 - None   2. Moving from lying on your back to sitting on the side of a flat bed without using bedrails? 3 - A Little   3. Moving to and from a bed to a chair (including a wheelchair)? 3 - A Little   4. Standing up from a chair using your arms (e.g., wheelchair, or bedside chair)? 4 - None   5. To walk in hospital room? 3 - A Little   6. Climbing 3-5 steps with a railing? 3 - A Little   Basic Mobility Raw Score (Score out of 24.Lower scores equate to lower levels of function) 20   Total Evaluation Time   Total Evaluation Time (Minutes) 9     "

## 2020-02-07 NOTE — PROGRESS NOTES
CLINICAL NUTRITION SERVICES    INTERVENTIONS:  Implementation:  Enteral Nutrition, Feeding tube flush: Discontinued active TF and free water flush orders. Feeding tube was removed 2/6 am.    Follow up/Monitoring:  Will continue to follow pt.    Rachel Loredo, MS, RD, LD, CNSC   6C Pgr: [phone]

## 2020-02-07 NOTE — CONSULTS
Patient was able to teach back why she is taking Warfarin, how it works, importance of blood tests, problems to watch for, consistent vit K intake, and other drugs interations with Warfarin.       Literature given: Guide to Warfarin Therapy;

## 2020-02-07 NOTE — PROGRESS NOTES
BRIEF HEMATOLOGY NOTE  02/07/20  1:38 PM    Serotonin release assay is negative.  This patient does not have HIT.  Please see full Hematology note from today for further recommendations.    Lilly Nava MD  Hematology-Oncology-Transplant Fellow

## 2020-02-08 ENCOUNTER — APPOINTMENT (OUTPATIENT)
Dept: GENERAL RADIOLOGY | Facility: CLINIC | Age: 35
DRG: 003 | End: 2020-02-08
Attending: PHYSICIAN ASSISTANT
Payer: COMMERCIAL

## 2020-02-08 LAB
ANION GAP SERPL CALCULATED.3IONS-SCNC: 2 MMOL/L (ref 3–14)
ANION GAP SERPL CALCULATED.3IONS-SCNC: 5 MMOL/L (ref 3–14)
BUN SERPL-MCNC: 11 MG/DL (ref 7–30)
BUN SERPL-MCNC: 9 MG/DL (ref 7–30)
CALCIUM SERPL-MCNC: 8.2 MG/DL (ref 8.5–10.1)
CALCIUM SERPL-MCNC: 8.7 MG/DL (ref 8.5–10.1)
CHLORIDE SERPL-SCNC: 108 MMOL/L (ref 94–109)
CHLORIDE SERPL-SCNC: 110 MMOL/L (ref 94–109)
CO2 SERPL-SCNC: 25 MMOL/L (ref 20–32)
CO2 SERPL-SCNC: 28 MMOL/L (ref 20–32)
CREAT SERPL-MCNC: 0.53 MG/DL (ref 0.52–1.04)
CREAT SERPL-MCNC: 0.55 MG/DL (ref 0.52–1.04)
ERYTHROCYTE [DISTWIDTH] IN BLOOD BY AUTOMATED COUNT: 15.7 % (ref 10–15)
GFR SERPL CREATININE-BSD FRML MDRD: >90 ML/MIN/{1.73_M2}
GFR SERPL CREATININE-BSD FRML MDRD: >90 ML/MIN/{1.73_M2}
GLUCOSE SERPL-MCNC: 101 MG/DL (ref 70–99)
GLUCOSE SERPL-MCNC: 102 MG/DL (ref 70–99)
HCT VFR BLD AUTO: 25.1 % (ref 35–47)
HGB BLD-MCNC: 7.7 G/DL (ref 11.7–15.7)
INR PPP: 1.2 (ref 0.86–1.14)
MCH RBC QN AUTO: 29.8 PG (ref 26.5–33)
MCHC RBC AUTO-ENTMCNC: 30.7 G/DL (ref 31.5–36.5)
MCV RBC AUTO: 97 FL (ref 78–100)
PLATELET # BLD AUTO: 188 10E9/L (ref 150–450)
POTASSIUM SERPL-SCNC: 3.9 MMOL/L (ref 3.4–5.3)
POTASSIUM SERPL-SCNC: 3.9 MMOL/L (ref 3.4–5.3)
RBC # BLD AUTO: 2.58 10E12/L (ref 3.8–5.2)
SODIUM SERPL-SCNC: 138 MMOL/L (ref 133–144)
SODIUM SERPL-SCNC: 140 MMOL/L (ref 133–144)
WBC # BLD AUTO: 9.5 10E9/L (ref 4–11)

## 2020-02-08 PROCEDURE — 25000132 ZZH RX MED GY IP 250 OP 250 PS 637: Performed by: PHYSICIAN ASSISTANT

## 2020-02-08 PROCEDURE — 21400000 ZZH R&B CCU UMMC

## 2020-02-08 PROCEDURE — 80048 BASIC METABOLIC PNL TOTAL CA: CPT | Performed by: INTERNAL MEDICINE

## 2020-02-08 PROCEDURE — 25000132 ZZH RX MED GY IP 250 OP 250 PS 637: Performed by: HOSPITALIST

## 2020-02-08 PROCEDURE — 40000802 ZZH SITE CHECK

## 2020-02-08 PROCEDURE — 25000132 ZZH RX MED GY IP 250 OP 250 PS 637: Performed by: STUDENT IN AN ORGANIZED HEALTH CARE EDUCATION/TRAINING PROGRAM

## 2020-02-08 PROCEDURE — 25000128 H RX IP 250 OP 636: Performed by: INTERNAL MEDICINE

## 2020-02-08 PROCEDURE — 85610 PROTHROMBIN TIME: CPT | Performed by: INTERNAL MEDICINE

## 2020-02-08 PROCEDURE — 25800030 ZZH RX IP 258 OP 636: Performed by: INTERNAL MEDICINE

## 2020-02-08 PROCEDURE — 40000558 ZZH STATISTIC CVC DRESSING CHANGE

## 2020-02-08 PROCEDURE — 85027 COMPLETE CBC AUTOMATED: CPT | Performed by: INTERNAL MEDICINE

## 2020-02-08 PROCEDURE — 71045 X-RAY EXAM CHEST 1 VIEW: CPT

## 2020-02-08 PROCEDURE — 99232 SBSQ HOSP IP/OBS MODERATE 35: CPT | Mod: GC | Performed by: INTERNAL MEDICINE

## 2020-02-08 PROCEDURE — 25000128 H RX IP 250 OP 636: Performed by: PHYSICIAN ASSISTANT

## 2020-02-08 PROCEDURE — 25000128 H RX IP 250 OP 636: Performed by: STUDENT IN AN ORGANIZED HEALTH CARE EDUCATION/TRAINING PROGRAM

## 2020-02-08 PROCEDURE — 36592 COLLECT BLOOD FROM PICC: CPT | Performed by: INTERNAL MEDICINE

## 2020-02-08 RX ORDER — ALBUTEROL SULFATE 90 UG/1
2 AEROSOL, METERED RESPIRATORY (INHALATION) EVERY 6 HOURS PRN
Status: DISCONTINUED | OUTPATIENT
Start: 2020-02-08 | End: 2020-02-11 | Stop reason: HOSPADM

## 2020-02-08 RX ADMIN — OXYCODONE HYDROCHLORIDE 5 MG: 5 TABLET ORAL at 05:37

## 2020-02-08 RX ADMIN — NORETHINDRONE 0.35 MG: 0.35 TABLET ORAL at 08:55

## 2020-02-08 RX ADMIN — VANCOMYCIN HYDROCHLORIDE 1750 MG: 10 INJECTION, POWDER, LYOPHILIZED, FOR SOLUTION INTRAVENOUS at 03:22

## 2020-02-08 RX ADMIN — OXYCODONE HYDROCHLORIDE 5 MG: 5 TABLET ORAL at 22:03

## 2020-02-08 RX ADMIN — ACETAMINOPHEN 650 MG: 325 TABLET, FILM COATED ORAL at 03:37

## 2020-02-08 RX ADMIN — SENNOSIDES AND DOCUSATE SODIUM 1 TABLET: 8.6; 5 TABLET ORAL at 19:22

## 2020-02-08 RX ADMIN — Medication 0.2 MG: at 03:37

## 2020-02-08 RX ADMIN — CARVEDILOL 6.25 MG: 6.25 TABLET, FILM COATED ORAL at 19:22

## 2020-02-08 RX ADMIN — ACETAMINOPHEN 650 MG: 325 TABLET, FILM COATED ORAL at 13:08

## 2020-02-08 RX ADMIN — ACETAMINOPHEN 650 MG: 325 TABLET, FILM COATED ORAL at 21:09

## 2020-02-08 RX ADMIN — MULTIVITAMIN 15 ML: LIQUID ORAL at 08:54

## 2020-02-08 RX ADMIN — POTASSIUM CHLORIDE 20 MEQ: 750 TABLET, EXTENDED RELEASE ORAL at 09:40

## 2020-02-08 RX ADMIN — CEPHALEXIN 500 MG: 250 CAPSULE ORAL at 21:08

## 2020-02-08 RX ADMIN — CEPHALEXIN 500 MG: 250 CAPSULE ORAL at 15:57

## 2020-02-08 RX ADMIN — CARVEDILOL 6.25 MG: 6.25 TABLET, FILM COATED ORAL at 08:54

## 2020-02-08 RX ADMIN — OXYCODONE HYDROCHLORIDE 5 MG: 5 TABLET ORAL at 15:08

## 2020-02-08 RX ADMIN — POTASSIUM CHLORIDE 20 MEQ: 750 TABLET, EXTENDED RELEASE ORAL at 21:11

## 2020-02-08 RX ADMIN — CEFAZOLIN SODIUM 2 G: 2 INJECTION, SOLUTION INTRAVENOUS at 08:55

## 2020-02-08 ASSESSMENT — ACTIVITIES OF DAILY LIVING (ADL)
ADLS_ACUITY_SCORE: 18

## 2020-02-08 ASSESSMENT — PAIN DESCRIPTION - DESCRIPTORS: DESCRIPTORS: ACHING

## 2020-02-08 ASSESSMENT — MIFFLIN-ST. JEOR: SCORE: 1580.88

## 2020-02-08 NOTE — PLAN OF CARE
Pt admitted 1/28 with cardiogenic shock and found viral myocarditis s/p ECMO and IABP.  SR/ST low 100's, VS'S on RA with LLE pain and medicated with prn Tylenol.  IV abx's discontinued and switch to PO.  Vascular consulted and thrombin injection unlikely with LLE US f/up about 48 hours.  LLE wound dressing changed with small serous drainage and slight tunneling.  Fidel counts continue, appetite good.  Replaced 20 mEq of K.  Otherwise, pt doing well with some anxiety.  Continue to monitor and with POC.

## 2020-02-08 NOTE — PROGRESS NOTES
"Vascular Surgery Progress Note     Date of Admission:  1/28/2020  Date: February 8, 2020     Subjective  Ms. Tom reports doing overall well today. She explains that she has persistent pain in her left groin,, but feels like some of the warmth and fluctuance have resolved. She also thinks some of the ecchymosis is receeding from the previously marked area. She has been working with PT and has been able to ambulate. She is careful to watch the dressing changes and reports the drainage is ongoing from the left groin, but potentially slightly less.       Physical Exam   Temp: 98.5  F (36.9  C) Temp src: Oral BP: 126/75 Pulse: 103 Heart Rate: 93 Resp: 16 SpO2: 100 % O2 Device: None (Room air)    Vital Signs with Ranges  Temp:  [98.3  F (36.8  C)-100.5  F (38.1  C)] 98.5  F (36.9  C)  Pulse:  [102-106] 103  Heart Rate:  [] 93  Resp:  [16-18] 16  BP: (116-126)/(68-79) 126/75  SpO2:  [98 %-100 %] 100 %  198 lbs 12.8 oz    Constitutional: cooperative, no apparent distress, lying comfortably in bed  Vascular: pedal pulses palpable  Musculoskeletal: grossly normal and symmetric ROM and strength in BL extremities. She did not want us to examine the wound in her left inguinal crease this morning secondary to pain with dressing changes; there is a c/d/i bandage taped in place, without surrounding erythema or ecchymosis.   Neurologic: Awake, alert, oriented to name, place, time, and situation    Data   Most Recent 3 CBCs:  Recent Labs   Lab Test 02/08/20  0610 02/07/20  1443 02/07/20  0628   WBC 9.5 12.4* 12.0*   HGB 7.7* 8.2* 7.7*   MCV 97 96 95    178 158     Most Recent 3 INRs:  Recent Labs   Lab Test 02/08/20  0610 02/07/20  0628 02/04/20  0447   INR 1.20* 1.66* 1.00        Imaging:  I have reviewed the following images.  Duplex US, Left Groin (2/7/20): \"Left groin wide-mouthed pseudoaneurysm neck measures 0.6 cm in diameter by 1.2 cm in length. By CT, the patient's left common femoral artery measures 7 mm in " "diameter. Pseudoaneurysm with internal flow measures 1.5 x 1.1 x 2.7 cm.\" PSA appears to be partially thrombosed; widest area captured on images is 1.3 x 1.5 cm with flow, remainder thrombosed.      Assessment & Plan   Azra Tom is a 34 year old female who was admitted on 1/29/20 with cardiogenic shock secondary to viral myocarditis. She was placed on VA ECMO via the left femoral vessels and was decannulated with direct arterial and venous repair on 2/3/20. She was anticoagulated on argatroban --> coumadin due to concerns for NAHUM; this has been ruled out and she is now off anticoagulation.     She was found to have pain and drainage after her wound VAC was removed from the left groin, prompting ultrasonography, which revealed a small partially thrombosed PSA. She has been on antibiotics (ancef and vancomycin) due to concerns for superficial wound infection in the setting of MRSA colonization.       At this point, would recommend follow-up ultrasound in about 48 hours to re-evaluate the size of the pseudoaneurysm.     Discussed ultrasound-guided compression with her; she is fearful of the discomfort that would accompany this.     If needed, the PSA could potentially have ultrasound-guided thrombin injection, but there are concerns about the size of the neck and risk of vascular thrombosis.     As she is now off anticoagulation, this may resolve spontaneously, with complete thrombosis.    OK for her to continue ambulating.      Janene Mcfarland    "

## 2020-02-08 NOTE — PLAN OF CARE
Pt admitted 1/28 from outside hospital presenting with cardiogenic shock likely from viral myocarditis. Placed on ECMO 1/29 and deccanulated 2/3. PMH includes tobacco abuse and bipolar disorder. Pt now has a suspected pseudoaneurysm at previous ECMO cannula site and is being treated with IV antibiotics for possible infection. Pt on droplet precautions for the flu.    Neuro: Pt A&Ox4. She expressed feeling anxious and frustrated by current situation. She is anxious to get home and concerned about her children. Pt was able to sleep well overnight with pain medication and melatonin. Sister remains with pt in room. Temperature 100.5 at 0330. Tylenol given.  Cardiac: ST in 100's. 's. Denies dizziness, CP, numbness or tingling.  Respiratory: Sating >97% on room air. Lung sounds clear bilaterally. NAVA.  GI/: Pt voiding adequately into hat in bathroom. Last BM 2/6. Bowel sounds active. Pt is on menses.  Diet/Appetite: Regular diet. Appetite is increasing but pt did not eat very much for dinner. Pt on calorie counts.  Skin: Wound vac to LE groin removed 2/6. Site is red, warm, swollen, and painful to the touch. Moderate drainage overnight, dressing changed.  LDA: Right triple lumen PICC with antibiotics and TKO running intermittently. Right PIV SL.  Activity: SBA with walker.   Pain: Pt states her pain is a 15/10. Pain was not controlled by PO Tylenol. Pt said she does not like to take narcotic pain medication but agreed to take it. PRN oxycodone given at 2250 and 0540. PRN dilaudid given at 2330 and 0330.     Plan: Vascular surgery to consult for possible thrombin injection to pseudoaneurysm. Continue to monitor and alert team with any changes or concerns.

## 2020-02-08 NOTE — PROGRESS NOTES
Calorie Count  Intake recorded for: 2/7  Total Kcals: 1014 Total Protein: 30g  Kcals from Hospital Food: 1014  Protein: 30g  Kcals from Outside Food (average):0 Protein: 0g  # Meals Recorded: 2 meals (First - 100% 1% milk, 50% Fruit Loops)      (Second - 100% potato chips, 2 temo soda, 75% fries w/ ketchup, 50% hamburger w/ cheddar, shaikh, lettuce, pickles, tomato, ketchup, and mustard)  # Supplements Recorded: 0

## 2020-02-08 NOTE — PROGRESS NOTES
[facility] Progress Note      Faculty Attestation  Santi Vogel M.D.    I personally saw and examined this patient, reviewed recent laboratories and imaging studies, discussed the case with the housestaff, and conveyed plan to the patient.  I answered all questions from patient and/or family. I agree with the examination, assessment and plan outlined here.      General:  Chest:  Cor:  Abd:  Extr:       Assessment and Plan:     Ms. Tom is a 34 year old with a PMHx of Tobacco Abuse and Bipolar Disorder who presents with cardiogenic shock likely from viral myocarditis.     #Cardiogenic Shock (resolved)  #Non-Ischemic Cardiomyopathy with Viral Myocarditis (Confirmed on Biopsy)  Presented in cardiogenic shock, with thickened LV wall and consequently greatly decreased EF. Placed on VA ECMO to allow time for myocardium to recover. Decannulated 2/3. Initially did well, developed post op vasoplegia and anemia that necessitated pressors. CT A/P was negative. Off pressors 2/4.  - EF showed recovery to 55-60%. No indication for OGDT or advanced therapy    #Thrombosed Pseudoaneurysm  Pain and swelling of L groin ECMO cannulation site. Given history of MRSA wound infections before, placed on vancomycin and ID consulted. Vascular surgery consulted for pseuodaneurysm, and recommend observation off of anticoagulation. They feel that given size and already developing clot that this will spontaneously resolve. Should this not occur, may need compression per IR on Monday.  - Observation off of anticoagulation   - Repeat U/S 2/9  - Possible compression 2/10  - No restriction on ambulation    #Atrial tachycardia (resolved)  On the evening of 2/4, developed atach @140BPM.   - Continue  PO coreg 6.25mng BID     # Hypoxic Respiratory Failure (resolved)  # Suspicion for superimposed pneumonia in setting of influenza infection  Secondary to acute heart failure and possible flash pulmonary edema. Improved with diuresis  and temporary ventilator support while on ECMO.  -Patient currently on RA   - Positive MSSA sputum culture on 2/6   - Cefazolin 2g q8h x5 days, EOT 2/11    #Pericardial Effusion   Likely related to myopericarditis.  -Now trace to small on most recent TTE     #Thrombocytopenia  Concern for thrombosis of circuitx2 along with declining platelets  - Heme consult  - Sent NAHUM panel and returned positive, however REED negative  - Was on argatroban with plan to transition to coumadin, however with negative NAHUM confirmation and pseudoaneurysm, anticoagulation discontinued    #Extravasation of Blood on Left Arm   WOC consult  -Continue to monitor. Wound care has no active recommendations at this time.    Disposition - Possibly to ARU, PM&R consult placed and is pending    Staffed with Dr. Jaspreet Newsome MD  PGY1  [phone]       Subjective:   Notes reviewed, no acute events overnight.    Feels generally well, and says that pain is tolerable with current oral regimen. Reports that she hesitates to walk with pseudoaneurysm in place, reassured that she does not have mobility restrictions at this time.          Review of Systems:   A comprehensive review of systems was performed and found to be negative except as described in this note          Medications:     Current Facility-Administered Medications   Medication     acetaminophen (TYLENOL) Suppository 650 mg     acetaminophen (TYLENOL) tablet 650 mg     alum & mag hydroxide-simethicone (MYLANTA ES/MAALOX  ES) suspension 30 mL     benzocaine (ORAJEL MAXIMUM STRENGTH) 20 % gel     benzonatate (TESSALON) capsule 100 mg     bisacodyl (DULCOLAX) Suppository 10 mg     carvedilol (COREG) tablet 6.25 mg     ceFAZolin (ANCEF) intermittent infusion 2 g in 100 mL dextrose PRE-MIX     dextrose 10% infusion     dextrose 50 % injection 25-50 mL    Or     glucagon injection 1 mg     HYDROmorphone (DILAUDID) injection 0.2 mg     lidocaine (LMX4) cream     lidocaine  1 % 0.1-1 mL     medication instruction     melatonin tablet 6 mg     multivitamins w/minerals (CERTAVITE) liquid 15 mL     naloxone (NARCAN) injection 0.1-0.4 mg     norethindrone (MICRONOR) 0.35 MG per tablet 0.35 mg     ondansetron (ZOFRAN) injection 4 mg     oxyCODONE (ROXICODONE) tablet 5 mg     Patient is already receiving anticoagulation with heparin, enoxaparin (LOVENOX), warfarin (COUMADIN)  or other anticoagulant medication     polyethylene glycol (MIRALAX/GLYCOLAX) Packet 17 g     potassium chloride (KLOR-CON) Packet 20-40 mEq     potassium chloride 10 mEq in 100 mL intermittent infusion with 10 mg lidocaine     potassium chloride 10 mEq in 100 mL sterile water intermittent infusion (premix)     potassium chloride 20 mEq in 50 mL intermittent infusion     potassium chloride ER (K-DUR/KLOR-CON M) CR tablet 20-40 mEq     Reason ACE/ARB/ARNI order not selected     senna-docusate (SENOKOT-S/PERICOLACE) 8.6-50 MG per tablet 1 tablet     sennosides (SENOKOT) tablet 8.6 mg     sodium chloride (PF) 0.9% PF flush 3 mL     vancomycin (VANCOCIN) 1,750 mg in sodium chloride 0.9 % 500 mL intermittent infusion               Objective:   Temp: 100.5  F (38.1  C) Temp src: Oral BP: 118/68 Pulse: 103 Heart Rate: 100 Resp: 18 SpO2: 98 % O2 Device: None (Room air)           Physical Exam:   Vitals were reviewed  Temp: 100.5  F (38.1  C) Temp src: Oral BP: 118/68 Pulse: 103 Heart Rate: 100 Resp: 18 SpO2: 98 % O2 Device: None (Room air)      Gen: Resting comfortably in bed, no acute distress  HEENT: PERRLA, EOMI, MMM  CV: RRR  Pulm: Clear to auscultation b/l  Ext: Swelling L groin site, dressed wound, tender to palpation, warm  Neuro: No focal defects           Data:     Lab Results   Component Value Date    WBC 12.4 (H) 02/07/2020    HGB 8.2 (L) 02/07/2020    HCT 26.3 (L) 02/07/2020     02/07/2020     02/08/2020    POTASSIUM 3.9 02/08/2020    CHLORIDE 110 (H) 02/08/2020    CO2 25 02/08/2020    BUN 11 02/08/2020     CR 0.53 02/08/2020     (H) 02/08/2020    SED 78 (H) 02/03/2020    DD 10.1 (H) 02/03/2020    TROPI 4.071 (HH) 01/30/2020    AST 43 02/04/2020    ALT 35 02/04/2020    ALKPHOS 89 02/04/2020    BILITOTAL 1.3 02/04/2020    INR 1.20 (H) 02/08/2020

## 2020-02-08 NOTE — PROGRESS NOTES
[facility] Progress Note           Assessment and Plan:     Faculty Attestation  Santi Vogel M.D.    I personally saw and examined this patient, reviewed recent laboratories and imaging studies, discussed the case with the housestaff, and conveyed plan to the patient.  I answered all questions from patient and/or family. I agree with the examination, assessment and plan outlined here.        Ms. Tom is a 34 year old with a PMHx of Tobacco Abuse and Bipolar Disorder who presents with cardiogenic shock likely from viral myocarditis.     #Cardiogenic Shock c/b myocarditis  - Decannulated on 2/3/2020. Initially did well, developed post op vasoplegia and anemia that necessitated pressors. CT A/P was negative. Off pressors.  - EF showed recovery to 55-60%. No indication for OGDT or advanced therapy  - Due to pseudoaneurysm, will hold off on argatroban      #Thrombosed Pseudoanerysm  - Found US  - CVTS recommended Vascular surgery consult for thrombin injection     #Atrial tachycardia  - On the evening of 2/4, developed atach @140BPM.   - Continued  PO coreg 6.25mng BID     #Non-Ischemic Cardiomyopathy with Viral Myocarditis (Confirmed on Biopsy)   -Support as above.  - Per some case reports, patients do benefit from 3 months of anticoagulation. In the setting of patient being NAHUM positive, was on argatroban. However NAHUM is now negative and patient now has pseudoaneurysm, will hold off on heparin.     #Hypoxic Respiratory Failure  -Patient currently on RA       #Pericardial Effusion   -Likely related to myopericarditis.  -Now trace to small on most recent TTE     #Thrombocytopenia  -Concern for thrombosis of circuitx2 along with declinig platelets  - Sent NAHUM panel and returned positive, REED pending  - Plan for argatroban ggt with transition to Coumadin. Will discuss with hematology     #Hyperclycemia   -Continue Insulin drip.     #Extravasation of Blood on Left Arm   -Continue to monitor. Wound  care has no active recommendations at this time.    Staffed with Dr. Jaspreet Yoo MD  Cardiology Fellow  PGY4       Subjective:     - Pseudoanerysm found on ultrasound           Review of Systems:   A comprehensive review of systems was performed and found to be negative except as described in this note          Medications:     Current Facility-Administered Medications   Medication     acetaminophen (TYLENOL) Suppository 650 mg     acetaminophen (TYLENOL) tablet 650 mg     alum & mag hydroxide-simethicone (MYLANTA ES/MAALOX  ES) suspension 30 mL     benzocaine (ORAJEL MAXIMUM STRENGTH) 20 % gel     benzonatate (TESSALON) capsule 100 mg     bisacodyl (DULCOLAX) Suppository 10 mg     carvedilol (COREG) tablet 6.25 mg     ceFAZolin (ANCEF) intermittent infusion 2 g in 100 mL dextrose PRE-MIX     dextrose 10% infusion     dextrose 50 % injection 25-50 mL    Or     glucagon injection 1 mg     HYDROmorphone (DILAUDID) injection 0.2 mg     lidocaine (LMX4) cream     lidocaine 1 % 0.1-1 mL     medication instruction     melatonin tablet 6 mg     multivitamins w/minerals (CERTAVITE) liquid 15 mL     naloxone (NARCAN) injection 0.1-0.4 mg     norethindrone (MICRONOR) 0.35 MG per tablet 0.35 mg     ondansetron (ZOFRAN) injection 4 mg     oxyCODONE (ROXICODONE) tablet 5 mg     Patient is already receiving anticoagulation with heparin, enoxaparin (LOVENOX), warfarin (COUMADIN)  or other anticoagulant medication     polyethylene glycol (MIRALAX/GLYCOLAX) Packet 17 g     potassium chloride (KLOR-CON) Packet 20-40 mEq     potassium chloride 10 mEq in 100 mL intermittent infusion with 10 mg lidocaine     potassium chloride 10 mEq in 100 mL sterile water intermittent infusion (premix)     potassium chloride 20 mEq in 50 mL intermittent infusion     potassium chloride ER (K-DUR/KLOR-CON M) CR tablet 20-40 mEq     Reason ACE/ARB/ARNI order not selected     senna-docusate (SENOKOT-S/PERICOLACE) 8.6-50 MG per tablet 1  tablet     sennosides (SENOKOT) tablet 8.6 mg     sodium chloride (PF) 0.9% PF flush 3 mL     vancomycin (VANCOCIN) 1,750 mg in sodium chloride 0.9 % 500 mL intermittent infusion               Objective:   Temp: 99.2  F (37.3  C) Temp src: Oral BP: 116/72 Pulse: 102 Heart Rate: 92 Resp: 18 SpO2: 99 % O2 Device: None (Room air)           Physical Exam:   Vitals were reviewed  Temp: 99.2  F (37.3  C) Temp src: Oral BP: 116/72 Pulse: 102 Heart Rate: 92 Resp: 18 SpO2: 99 % O2 Device: None (Room air)      Gen: Intubated and Sedated   HEENT: ET Tube in Place  CV: RRR  Pulm: Coarse Breath Sounds   Ext: No edema   Neuro: No focal defects           Data:     Lab Results   Component Value Date    WBC 12.4 (H) 02/07/2020    HGB 8.2 (L) 02/07/2020    HCT 26.3 (L) 02/07/2020     02/07/2020     02/07/2020    POTASSIUM 3.7 02/07/2020    CHLORIDE 108 02/07/2020    CO2 26 02/07/2020    BUN 11 02/07/2020    CR 0.46 (L) 02/07/2020     (H) 02/07/2020    SED 78 (H) 02/03/2020    DD 10.1 (H) 02/03/2020    TROPI 4.071 (HH) 01/30/2020    AST 43 02/04/2020    ALT 35 02/04/2020    ALKPHOS 89 02/04/2020    BILITOTAL 1.3 02/04/2020    INR 1.66 (H) 02/07/2020

## 2020-02-09 ENCOUNTER — APPOINTMENT (OUTPATIENT)
Dept: PHYSICAL THERAPY | Facility: CLINIC | Age: 35
DRG: 003 | End: 2020-02-09
Attending: INTERNAL MEDICINE
Payer: COMMERCIAL

## 2020-02-09 ENCOUNTER — APPOINTMENT (OUTPATIENT)
Dept: GENERAL RADIOLOGY | Facility: CLINIC | Age: 35
DRG: 003 | End: 2020-02-09
Attending: PHYSICIAN ASSISTANT
Payer: COMMERCIAL

## 2020-02-09 LAB
ANION GAP SERPL CALCULATED.3IONS-SCNC: 4 MMOL/L (ref 3–14)
BACTERIA SPEC CULT: NO GROWTH
BACTERIA SPEC CULT: NO GROWTH
BUN SERPL-MCNC: 10 MG/DL (ref 7–30)
CALCIUM SERPL-MCNC: 8.6 MG/DL (ref 8.5–10.1)
CHLORIDE SERPL-SCNC: 108 MMOL/L (ref 94–109)
CO2 SERPL-SCNC: 27 MMOL/L (ref 20–32)
CREAT SERPL-MCNC: 0.54 MG/DL (ref 0.52–1.04)
ERYTHROCYTE [DISTWIDTH] IN BLOOD BY AUTOMATED COUNT: 15.7 % (ref 10–15)
GFR SERPL CREATININE-BSD FRML MDRD: >90 ML/MIN/{1.73_M2}
GLUCOSE SERPL-MCNC: 104 MG/DL (ref 70–99)
HCT VFR BLD AUTO: 25.8 % (ref 35–47)
HGB BLD-MCNC: 7.9 G/DL (ref 11.7–15.7)
Lab: NORMAL
Lab: NORMAL
MCH RBC QN AUTO: 29.3 PG (ref 26.5–33)
MCHC RBC AUTO-ENTMCNC: 30.6 G/DL (ref 31.5–36.5)
MCV RBC AUTO: 96 FL (ref 78–100)
PLATELET # BLD AUTO: 270 10E9/L (ref 150–450)
POTASSIUM SERPL-SCNC: 3.8 MMOL/L (ref 3.4–5.3)
RBC # BLD AUTO: 2.7 10E12/L (ref 3.8–5.2)
SODIUM SERPL-SCNC: 139 MMOL/L (ref 133–144)
SPECIMEN SOURCE: NORMAL
SPECIMEN SOURCE: NORMAL
WBC # BLD AUTO: 11.3 10E9/L (ref 4–11)

## 2020-02-09 PROCEDURE — 97530 THERAPEUTIC ACTIVITIES: CPT | Mod: GP | Performed by: PHYSICAL THERAPIST

## 2020-02-09 PROCEDURE — 25000132 ZZH RX MED GY IP 250 OP 250 PS 637: Performed by: PHYSICIAN ASSISTANT

## 2020-02-09 PROCEDURE — 21400000 ZZH R&B CCU UMMC

## 2020-02-09 PROCEDURE — 25000132 ZZH RX MED GY IP 250 OP 250 PS 637: Performed by: STUDENT IN AN ORGANIZED HEALTH CARE EDUCATION/TRAINING PROGRAM

## 2020-02-09 PROCEDURE — 71045 X-RAY EXAM CHEST 1 VIEW: CPT

## 2020-02-09 PROCEDURE — 85027 COMPLETE CBC AUTOMATED: CPT | Performed by: INTERNAL MEDICINE

## 2020-02-09 PROCEDURE — 25000132 ZZH RX MED GY IP 250 OP 250 PS 637

## 2020-02-09 PROCEDURE — 36592 COLLECT BLOOD FROM PICC: CPT | Performed by: INTERNAL MEDICINE

## 2020-02-09 PROCEDURE — 80048 BASIC METABOLIC PNL TOTAL CA: CPT | Performed by: INTERNAL MEDICINE

## 2020-02-09 PROCEDURE — 25000132 ZZH RX MED GY IP 250 OP 250 PS 637: Performed by: HOSPITALIST

## 2020-02-09 PROCEDURE — 25000128 H RX IP 250 OP 636: Performed by: STUDENT IN AN ORGANIZED HEALTH CARE EDUCATION/TRAINING PROGRAM

## 2020-02-09 PROCEDURE — 97116 GAIT TRAINING THERAPY: CPT | Mod: GP | Performed by: PHYSICAL THERAPIST

## 2020-02-09 PROCEDURE — 99232 SBSQ HOSP IP/OBS MODERATE 35: CPT | Mod: GC | Performed by: INTERNAL MEDICINE

## 2020-02-09 RX ADMIN — CARVEDILOL 6.25 MG: 6.25 TABLET, FILM COATED ORAL at 09:14

## 2020-02-09 RX ADMIN — OXYCODONE HYDROCHLORIDE 5 MG: 5 TABLET ORAL at 20:33

## 2020-02-09 RX ADMIN — CEPHALEXIN 500 MG: 250 CAPSULE ORAL at 06:20

## 2020-02-09 RX ADMIN — CARVEDILOL 6.25 MG: 6.25 TABLET, FILM COATED ORAL at 18:08

## 2020-02-09 RX ADMIN — ALBUTEROL SULFATE 2 PUFF: 90 AEROSOL, METERED RESPIRATORY (INHALATION) at 08:29

## 2020-02-09 RX ADMIN — SENNOSIDES AND DOCUSATE SODIUM 1 TABLET: 8.6; 5 TABLET ORAL at 20:24

## 2020-02-09 RX ADMIN — CEFEPIME HYDROCHLORIDE 2 G: 2 INJECTION, POWDER, FOR SOLUTION INTRAVENOUS at 10:42

## 2020-02-09 RX ADMIN — NORETHINDRONE 0.35 MG: 0.35 TABLET ORAL at 09:15

## 2020-02-09 RX ADMIN — MULTIVITAMIN 15 ML: LIQUID ORAL at 09:15

## 2020-02-09 RX ADMIN — POTASSIUM CHLORIDE 20 MEQ: 750 TABLET, EXTENDED RELEASE ORAL at 09:14

## 2020-02-09 RX ADMIN — OXYCODONE HYDROCHLORIDE 5 MG: 5 TABLET ORAL at 08:26

## 2020-02-09 RX ADMIN — POLYETHYLENE GLYCOL 3350 17 G: 17 POWDER, FOR SOLUTION ORAL at 09:32

## 2020-02-09 RX ADMIN — ACETAMINOPHEN 650 MG: 325 TABLET, FILM COATED ORAL at 20:33

## 2020-02-09 RX ADMIN — ACETAMINOPHEN 650 MG: 325 TABLET, FILM COATED ORAL at 14:41

## 2020-02-09 RX ADMIN — ACETAMINOPHEN 650 MG: 325 TABLET, FILM COATED ORAL at 03:01

## 2020-02-09 RX ADMIN — CEFEPIME HYDROCHLORIDE 2 G: 2 INJECTION, POWDER, FOR SOLUTION INTRAVENOUS at 20:24

## 2020-02-09 ASSESSMENT — ACTIVITIES OF DAILY LIVING (ADL)
ADLS_ACUITY_SCORE: 18
ADLS_ACUITY_SCORE: 15
ADLS_ACUITY_SCORE: 15
ADLS_ACUITY_SCORE: 16

## 2020-02-09 ASSESSMENT — PAIN DESCRIPTION - DESCRIPTORS
DESCRIPTORS: ACHING;SORE
DESCRIPTORS: ACHING

## 2020-02-09 ASSESSMENT — MIFFLIN-ST. JEOR: SCORE: 1562.28

## 2020-02-09 NOTE — PROGRESS NOTES
CVTS:     Azra is 34 yr female with ECMO decannulation 2/3, wound vac removed 2/5 due to dysfunctioning suction.       - Wound is open at inferior apex. Erythema and tenderness have improved, but wound probes deeper, from ~1.5 cm to around 2-3 cm. No purulence noted. Internal sutures visibile, will leave in place to keep wound as closed as possible.   - Wound culture growing GNR, abx transitioned to cefipime per primary. Follow up culture/sensitivities  - Start packing wound BID, cover with dry gauze and tape. Change PRN for saturation.  - ID following  - Noted pseudoaneurysm by US 02/07. Vascular following, planning repeat US 02/10    Nyla Aguilar PA-C  Cardiothoracic Surgery  Pager [phone]

## 2020-02-09 NOTE — PLAN OF CARE
D: Pt came on with CS secondary to viral myocarditis. Placed on VA ECMO via the left femoral vessels and was decannulated with direct arterial and venous repair  (2/3). Pt was on argatroban,coumadin due to concerns for NAHUM, ruled out and she is now off anticoagulation. Wound VAC was removed from the left groin, pt now has pain and drainage, US with a small partially thrombosed PSA. Got antibiotics (ancef and vancomycin) due to wound infection in the setting of MRSA colonization.    I: Monitored vitals and assessed pt status.   PRN:Oxycodone, tylenol     A: A0x4. VSS, on RA. SR/ST. Afebrile. Right groin incision pain controled with tylenol and oxycodone.   Dressing CDI. Denies nausea,no SOB observed.     Temp:  [98.2  F (36.8  C)-100.5  F (38.1  C)] 98.2  F (36.8  C)  Pulse:  [103] 103  Heart Rate:  [] 102  Resp:  [16-18] 18  BP: (116-128)/(68-76) 128/72  SpO2:  [96 %-100 %] 96 %      P: Continue to monitor Pt status and report changes to treatment team. Plan for  US today.

## 2020-02-09 NOTE — PLAN OF CARE
OT: Pt politely declined OT today, stating Sunday was her day to rest, Pt stating she ambulated last night w/ nursing ~9pm, plan to ambulate w/ PT ~2pm today. OT will reschedule for 2/10/20.

## 2020-02-09 NOTE — PLAN OF CARE
Discharge Planner PT   Patient plan for discharge: home tomorrow or Tuesday per pt report  Current status: Pt up ambulating short distances in room with SBA. Ambulated 250 ft x 2 with FWW, SBA. Wanting a FWW at discharge. Negotiated up/down 3 stairs x 3 with railing and SBA.   Barriers to return to prior living situation: LLE pain, medical status  Recommendations for discharge: home with assist, FWW  Rationale for recommendations: see above       Entered by: Cici Turk 02/09/2020 4:27 PM

## 2020-02-09 NOTE — PROGRESS NOTES
"VASCULAR SURGERY PROGRESS NOTE    Subjective:  No fevers overnight. Reports L groin pain significantly improved.     Objective:    Intake/Output Summary (Last 24 hours) at 2/9/2020 1053  Last data filed at 2/9/2020 0200  Gross per 24 hour   Intake 740 ml   Output 1200 ml   Net -460 ml     Labs:  ROUTINE IP LABS (Last four results)  BMP  Recent Labs   Lab 02/09/20  0506 02/08/20  1925 02/08/20  0610 02/07/20  1552    138 140 138   POTASSIUM 3.8 3.9 3.9 3.7   CHLORIDE 108 108 110* 108   YIN 8.6 8.7 8.2* 8.7   CO2 27 28 25 26   BUN 10 9 11 11   CR 0.54 0.55 0.53 0.46*   * 102* 101* 117*     CBC  Recent Labs   Lab 02/09/20  0506 02/08/20  0610 02/07/20  1443 02/07/20  0628   WBC 11.3* 9.5 12.4* 12.0*   RBC 2.70* 2.58* 2.75* 2.57*   HGB 7.9* 7.7* 8.2* 7.7*   HCT 25.8* 25.1* 26.3* 24.4*   MCV 96 97 96 95   MCH 29.3 29.8 29.8 30.0   MCHC 30.6* 30.7* 31.2* 31.6   RDW 15.7* 15.7* 15.5* 15.6*    188 178 158     INR  Recent Labs   Lab 02/08/20  0610 02/07/20  0628 02/04/20  0447 02/03/20  2226   INR 1.20* 1.66* 1.00 0.99     PHYSICAL EXAM:  /76 (BP Location: Left arm)   Pulse 103   Temp 98.6  F (37  C) (Oral)   Resp 18   Ht 1.6 m (5' 3\")   Wt 90.2 kg (198 lb 12.8 oz)   SpO2 99%   BMI 35.22 kg/m    General: The patient is alert and oriented. Appropriate. No acute distress  Psych: pleasant affect, answers questions appropriately  Skin: Color appropriate for race, warm, dry.  Respiratory: The patient does not require supplemental oxygen. Breathing unlabored  Extremities: L groin incision with small inferior superficial dehiscence, no drainage, no erythema, mildly TTP, LLE +edema, L DP palpable +2      ASSESSMENT:  34F with small L femoral pseudoaneurysm after ECMO decannulation.      PLAN:  Repeat ultrasound tomorrow  Okay for OOB & ambulate  Antibiotics per CVTS    Abimbola Weston MD  Vascular Surgery Fellow  Pgr [phone]                  "

## 2020-02-09 NOTE — PLAN OF CARE
D/I/A: Pt resting between cares and treatments.  A+O x4 and able to make needs known.  VSSA.  Up in hallway with SBA and tolerating; mild NAVA noted.  NAVA resolve with rest.  MD notified for inhaler; order obtained.  Voiding in fair amounts without difficulty.  Medicated effectively for pain with current med orders.  L groin site dressing changed; noted small area of tunneling with exposed internal sutures noted; small amount of serous drainage noted; tender to touch.  P: Continue to monitor status.

## 2020-02-09 NOTE — CONSULTS
GENERAL ID SERVICE CONSULTATION     Patient:  Azra Tom   Date of birth 1985, Medical record number [id number]  Date of Visit:  02/08/2020  Date of Admission: 1/28/2020  Consult Requester:Abilio Mulligan MD            Assessment and Recommendations:   PROBLEM LIST:  1. Viral myocarditis - confirmed by biopsy   2. Cardiogenic shock, placed on ECMO 1/29 and deccanulated 2/3   3. Pseudoaneurysm at previous ECMO cannula site (Left leg)  4. Cellulitis and superficial ECMO site wound infection   5. Hypoxic Respiratory Failure (resolved)    RECOMMENDATION:  1. Would recommend discontinuing Vancomycin and Cefazolin  2. Recommend starting Cephalexin 500 mg po Q8H  3. Recommend a total of a 10 day course of abx therapy for the treatment of wound infection (Start date 2/03 - End date 2/12)     ASSESSMENT: Azra Ta is a 34 year old female with viral myocarditis, cardiogenic shock, EMCO placement and decannulation (2/03), repair of femoral vessels, wound vac removal on 2/05, and ultimately wound dehiscence of the inferior apex, with wound infection and overlying cellulitis of the left thigh.     She has now received 6 days of a combination of Vancomycin, Pip/Tazo, and Cefazolin. She has clinically approved. Infection appears superficial and does not extend deep. While she states that she has had MRSA infections in the past (11 years ago), there is no evidence of MRSA currently. Sputum sample growing MSSA and MRSA nares negative. Therefore, I do not believe that Vancomycin is needed at this time. Would recommend transitioning from IV Vancomycin and Cefazolin to Cephalexin.     Antimicrobials:   Vancomycin 1/29-1/30, 2/03-2/05, 2/07-P  Pip/Tazo 1/29-1/31, 2/03-2/05  Oseltamivir 1/29-2/02  Cefazolin 2/06-P    Stephanie Terrazas,   Infectious Diseases   [phone]  ________________________________________________________________    Consult Question:.  Admission Diagnosis: cardiogenic shock  Heart failure  (H)  Cardiogenic shock (H)  Heart failure (H)         History of Present Illness:     Azra Ta is a 34 year old female who presented with a cough and shortness of breath. She presented to [facility] on 1/27 for chest pain and shortness of breath. She was noted to have new acute systolic HF with LVEF 25-30%. She had a RHC on 1/28 and became hypotensive. Cardiac MRI done which showed anterolateral subepicardial enhancement concerning for possible myocarditis. Influenza swab positive. She was transferred to [facility] for advanced HF management.     She initially had an IABP placed on 1/28. However, because she continued to have low pressures despite IABP, she had a VA-ECMO was placed. She was decannulated on 2/03 and left femoral vessels were repaired. Wound vac removed on 2/5 due to dysfunctioning suction. Noted that the wound was open at the inferior apex with superficial dehiscence about 1.5 cm deep. A culture was obtained and sent for aerobic, anaerobic, fungal cultures.     Overall, her wound has clinically improved. The cellulitis extending down the left thigh has improved. There is no purulent drainage. Tender to palpation. There is some induration above the pubis.          Review of Systems:   CONSTITUTIONAL:  No fevers or chills  EYES: negative for icterus  ENT:  negative for hearing loss, tinnitus and sore throat  RESPIRATORY:  + for cough, no sputum, no dyspnea  CARDIOVASCULAR:  negative for chest pain, dyspnea  GASTROINTESTINAL:  negative for nausea, vomiting, diarrhea and constipation  GENITOURINARY:  negative for dysuria  HEME:  No easy bruising  INTEGUMENT:  negative for rash and pruritus  NEURO:  Negative for headache         Past Medical History:     Past Medical History:   Diagnosis Date     Uncomplicated asthma           Past Surgical History:     Past Surgical History:   Procedure Laterality Date     CV EXTRACORPERAL MEMBRANE OXYGENATION N/A 1/29/2020    Procedure: ECMO, NATIVE HEART  BIOPSY;  Surgeon: Richard Montana MD;  Location:  HEART CARDIAC CATH LAB     CV HEART BIOPSY N/A 1/29/2020    Procedure: Heart Biopsy;  Surgeon: Richard Montaan MD;  Location:  HEART CARDIAC CATH LAB     REMOVE EXTRACORPORAL MEMBRANE OXYGENATOR ADULT Left 2/3/2020    Procedure: REMOVAL Extra Corporeal Membrain Oxygenator, Inta operative transesophageal echocardiogram, Repair of femoral vessel;  Surgeon: Vishnu Marx MD;  Location:  OR          Family History:   History reviewed. No pertinent family history.         Social History:     Social History     Tobacco Use     Smoking status: Former Smoker   Substance Use Topics     Alcohol use: Not Currently     History   Sexual Activity     Sexual activity: Not on file          Current Medications (antimicrobials listed in bold):       carvedilol  6.25 mg Oral BID w/meals     ceFAZolin  2 g Intravenous Q8H     multivitamins w/minerals  15 mL Per Feeding Tube Daily     norethindrone  0.35 mg Oral or Feeding Tube Daily     polyethylene glycol  17 g Oral or Feeding Tube Daily     senna-docusate  1 tablet Oral or Feeding Tube At Bedtime     sodium chloride (PF)  3 mL Intracatheter Q8H     vancomycin (VANCOCIN) IV  1,750 mg Intravenous Q12H          Allergies:   No Active Allergies         Physical Exam:   Vitals were reviewed  Ranges for his vital signs:  Temp:  [98.3  F (36.8  C)-100.5  F (38.1  C)] 98.5  F (36.9  C)  Pulse:  [102-106] 103  Heart Rate:  [] 93  Resp:  [16-18] 16  BP: (116-126)/(68-79) 126/75  SpO2:  [98 %-100 %] 100 %    Physical Examination:  GENERAL:  well-developed, well-nourished, in bed in no acute distress.   HEENT:  Head is normocephalic, atraumatic   EYES:  Eyes have anicteric sclerae without conjunctival injection or stigmata of endocarditis.    ENT:  Oropharynx is moist without exudates or ulcers. Tongue is midline  NECK:  Supple. No  Cervical lymphadenopathy  LUNGS:  Clear to auscultation bilateral.   CARDIOVASCULAR:   Regular rate and rhythm with no murmurs, gallops or rubs.  ABDOMEN:  Normal bowel sounds, soft, nontender. No appreciable hepatosplenomegaly  NEUROLOGIC:  Grossly nonfocal. Active x4 extremities  EXT: Inferior wound dehiscence, no significant drainage, mild tenderness to palpation.               Laboratory Data:     Inflammatory Markers    Recent Labs   Lab Test 02/07/20  0628 02/03/20  0358 02/02/20  0406 02/01/20  0349 01/31/20  0333 01/30/20  0352 01/29/20  1720 01/28/20  2248   SED  --  78* 82* 54* 15 12 9 7   .0* 100.0* 130.0* 160.0* 99.0* 30.0* 17.0* 10.0*     Hematology Studies    Recent Labs   Lab Test 02/08/20  0610 02/07/20  1443 02/07/20  0628 02/06/20  0658 02/05/20  0418 02/04/20  2005  02/03/20  1836  01/29/20  1720   WBC 9.5 12.4* 12.0* 13.2* 11.6* 12.7*   < > 9.6   < > 10.3   ANEU  --   --  8.2  --   --   --   --  6.5  --  8.8*   AEOS  --   --  0.2  --   --   --   --  0.1  --  0.0   HGB 7.7* 8.2* 7.7* 8.4* 8.3* 8.6*   < > 6.5*   < > 10.9*   MCV 97 96 95 95 92 91   < > 91   < > 85    178 158 125* 102* 86*   < > 70*   < > 74*    < > = values in this interval not displayed.     Immune Globulin Studies    Recent Labs   Lab Test 01/29/20  0922         IGA 37*     Metabolic Studies     Recent Labs   Lab Test 02/08/20  0610 02/07/20  1552 02/07/20  0628 02/06/20  1933 02/06/20  0958    138 140 140 142   POTASSIUM 3.9 3.7 3.8 3.7 3.7   CHLORIDE 110* 108 109 109 110*   CO2 25 26 24 27 28   BUN 11 11 11 12 13   CR 0.53 0.46* 0.50* 0.56 0.52   GFRESTIMATED >90 >90 >90 >90 >90     Hepatic Studies    Recent Labs   Lab Test 02/04/20  2005 02/04/20  0447 02/03/20  0358 02/02/20  1544 02/02/20  0406 02/01/20  2209   BILITOTAL 1.3 1.5* 1.5* 1.6* 1.6* 1.6*   ALKPHOS 89 77 70 72 73 62   ALBUMIN 2.4* 2.4* 2.2* 2.5* 2.5* 2.6*   AST 43 35 33 32 31 29   ALT 35 32 23 21 16 16     Thyroid Studies    Recent Labs   Lab Test 01/29/20  0922   TSH 2.11     Microbiology:  Culture Micro   Date  Value Ref Range Status   2020   Final    Canceled, Test credited  Incorrectly ordered by PCU/Clinic  Test reordered as correct code     2020 Culture negative monitoring continues  Preliminary   2020   Final    Canceled, Test credited  Incorrectly ordered by PCU/Clinic  Test reordered as correct code     2020 PENDING  Preliminary   2020 Culture in progress  Preliminary   2020 No growth after 5 days  Preliminary   2020 No growth after 5 days  Preliminary   2020 (A)  Final    Moderate growth  Staphylococcus aureus  Susceptibility testing done on previous specimen     2020 (A)  Final    Light growth  Candida albicans / dubliniensis  Candida albicans and Candida dubliniensis are not routinely speciated  Susceptibility testing not routinely done     2020 Light growth  Normal karmen    Final   2020 No growth  Final   2020 No growth  Final   2020 No growth  Final   2020 No growth  Final   2020 Moderate growth  Normal karmen    Final   2020 Moderate growth  Staphylococcus aureus   (A)  Final   2020 No growth  Final   2020 No growth  Final   2020 Light growth  Normal karmen    Final     Imagin/07 Ultrasound:   IMPRESSION: Left groin widemouthed 6 pseudoaneurysm neck measures 6 mm  in diameter by 12 mm in length. By CT, the patient's left common  femoral artery measures 7 mm in diameter. [place] of the  pseudoaneurysm precludes thrombin injection.     Pseudoaneurysm with internal flow measures 1.5 x 1.1 x 2.7 cm.  Thrombosed, round pseudoaneurysm measures 2.8 x 1.8 x 2.7 cm.

## 2020-02-09 NOTE — PROGRESS NOTES
[facility] Cardiology Progress Note           Assessment and Plan:     Ms. Tom is a 34 year old with a PMHx of Tobacco Abuse and Bipolar Disorder who presents with cardiogenic shock likely from viral myocarditis.       Faculty Attestation  Santi Vogel M.D.    I personally saw and examined this patient, reviewed recent laboratories and imaging studies, discussed the case with the housestaff, and conveyed plan to the patient.  I answered all questions from patient and/or family. I agree with the examination, assessment and plan outlined here.          Plan for today:  - Repeat LE U/S tomorrow to assess interval resolution of pseudoaneurysm  - Start cefepime 2 g q8h due to positive wound culture    #Cardiogenic Shock (resolved)  #Non-Ischemic Cardiomyopathy with Viral Myocarditis (Confirmed on Biopsy)  Presented in cardiogenic shock, with thickened LV wall and consequently greatly decreased EF. Placed on VA ECMO to allow time for myocardium to recover. Decannulated 2/3. Initially did well, developed post op vasoplegia and anemia that necessitated pressors. CT A/P was negative. Off pressors 2/4.  - EF showed recovery to 55-60%. No indication for OGDT or advanced therapy    #Thrombosed Pseudoaneurysm  # Wound infection, gram negative rods  Pain and swelling of L groin ECMO cannulation site. Given history of MRSA wound infections before, placed on vancomycin and ID consulted. Vascular surgery consulted for pseuodaneurysm, and recommend observation off of anticoagulation. They feel that given size and already developing clot that this will spontaneously resolve. Should this not occur, may need compression per IR on Monday.  - Observation off of anticoagulation   - Repeat U/S 2/9  - Possible compression 2/10  - No restriction on ambulation  - Cefepime 2g q8h 2/9 -     #Atrial tachycardia (resolved)  On the evening of 2/4, developed atach @140BPM.   - Continue  PO coreg 6.25mng BID     # Hypoxic Respiratory  Failure (resolved)  # Suspicion for superimposed pneumonia in setting of influenza infection (resolved)  Secondary to acute heart failure and possible flash pulmonary edema. Improved with diuresis and temporary ventilator support while on ECMO.  -Patient currently on RA     #Pericardial Effusion   Likely related to myopericarditis.  -Now trace to small on most recent TTE     #Thrombocytopenia  Concern for thrombosis of circuitx2 along with declining platelets  - Heme consult  - Sent NAHUM panel and returned positive, however REED negative  - Was on argatroban with plan to transition to coumadin, however with negative NAHUM confirmation and pseudoaneurysm, anticoagulation discontinued    #Extravasation of Blood on Left Arm   WOC consult  -Continue to monitor. Wound care has no active recommendations at this time.    Disposition - Possibly to ARU, PM&R consult placed and is pending    Staffed with Dr. Jaspreet Newsome MD  PGY1  [phone]       Subjective:   Notes reviewed, no acute events overnight.    Feels well this morning but anxious to discharge.          Review of Systems:   A comprehensive review of systems was performed and found to be negative except as described in this note          Medications:     Current Facility-Administered Medications   Medication     acetaminophen (TYLENOL) Suppository 650 mg     acetaminophen (TYLENOL) tablet 650 mg     albuterol (PROAIR HFA/PROVENTIL HFA/VENTOLIN HFA) 108 (90 Base) MCG/ACT inhaler 2 puff     alum & mag hydroxide-simethicone (MYLANTA ES/MAALOX  ES) suspension 30 mL     benzocaine (ORAJEL MAXIMUM STRENGTH) 20 % gel     benzonatate (TESSALON) capsule 100 mg     bisacodyl (DULCOLAX) Suppository 10 mg     carvedilol (COREG) tablet 6.25 mg     cephALEXin (KEFLEX) capsule 500 mg     dextrose 10% infusion     dextrose 50 % injection 25-50 mL    Or     glucagon injection 1 mg     HYDROmorphone (DILAUDID) injection 0.2 mg     lidocaine (LMX4) cream      lidocaine 1 % 0.1-1 mL     medication instruction     melatonin tablet 6 mg     multivitamins w/minerals (CERTAVITE) liquid 15 mL     naloxone (NARCAN) injection 0.1-0.4 mg     norethindrone (MICRONOR) 0.35 MG per tablet 0.35 mg     ondansetron (ZOFRAN) injection 4 mg     oxyCODONE (ROXICODONE) tablet 5 mg     Patient is already receiving anticoagulation with heparin, enoxaparin (LOVENOX), warfarin (COUMADIN)  or other anticoagulant medication     polyethylene glycol (MIRALAX/GLYCOLAX) Packet 17 g     potassium chloride (KLOR-CON) Packet 20-40 mEq     potassium chloride 10 mEq in 100 mL intermittent infusion with 10 mg lidocaine     potassium chloride 10 mEq in 100 mL sterile water intermittent infusion (premix)     potassium chloride 20 mEq in 50 mL intermittent infusion     potassium chloride ER (K-DUR/KLOR-CON M) CR tablet 20-40 mEq     Reason ACE/ARB/ARNI order not selected     senna-docusate (SENOKOT-S/PERICOLACE) 8.6-50 MG per tablet 1 tablet     sennosides (SENOKOT) tablet 8.6 mg     sodium chloride (PF) 0.9% PF flush 3 mL               Objective:   Temp: 98.6  F (37  C) Temp src: Oral BP: 124/76 Pulse: 103 Heart Rate: 98 Resp: 18 SpO2: 99 % O2 Device: None (Room air)           Physical Exam:   Vitals were reviewed  Temp: 98.6  F (37  C) Temp src: Oral BP: 124/76 Pulse: 103 Heart Rate: 98 Resp: 18 SpO2: 99 % O2 Device: None (Room air)      Gen: Resting comfortably in bed, no acute distress  HEENT: PERRLA, EOMI, MMM  CV: RRR  Pulm: Clear to auscultation b/l  Ext: Swelling L groin site, dressed wound, tender to palpation, warm  Neuro: No focal defects           Data:     Lab Results   Component Value Date    WBC 11.3 (H) 02/09/2020    HGB 7.9 (L) 02/09/2020    HCT 25.8 (L) 02/09/2020     02/09/2020     02/09/2020    POTASSIUM 3.8 02/09/2020    CHLORIDE 108 02/09/2020    CO2 27 02/09/2020    BUN 10 02/09/2020    CR 0.54 02/09/2020     (H) 02/09/2020    SED 78 (H) 02/03/2020    DD 10.1 (H)  02/03/2020    TROPI 4.071 (HH) 01/30/2020    AST 43 02/04/2020    ALT 35 02/04/2020    ALKPHOS 89 02/04/2020    BILITOTAL 1.3 02/04/2020    INR 1.20 (H) 02/08/2020

## 2020-02-09 NOTE — PROGRESS NOTES
Calorie Count  Intake recorded for: 2/8  Total Kcals: 1254 Total Protein: 31g  Kcals from Hospital Food: 1254  Protein: 31g  Kcals from Outside Food (average):0 Protein: 0g  # Meals Recorded: 2 meals (First - 100% Fruit Loops w/ milk, banana bread w/ butter, coffee w/ cream and sugar, green tea)      (Second - 100% 2 tomato soup, grilled cheese, 2 temo soda)  # Supplements Recorded: 0

## 2020-02-09 NOTE — PLAN OF CARE
Pt admitted 1/28 with cardiogenic shock and found viral myocarditis s/p ECMO and IABP.  SR/ST low 100's, VS'S on RA with LLE pain and medicated with prn Tylenol.  IV abx restarted r/t positive gram negative rods.  No Vanco.  Vascular consulted wants LLE US f/up done tomorrow morning.  LLE wound dressing changed twice with small serous drainage and slight tunneling.  Concern that pt note will get overwhelmed given her new sanitary goal (sticky note left).  Replaced 20 mEq of K.  Otherwise, pt doing well with some anxiety.  Continue to monitor and with POC.

## 2020-02-10 ENCOUNTER — APPOINTMENT (OUTPATIENT)
Dept: ULTRASOUND IMAGING | Facility: CLINIC | Age: 35
DRG: 003 | End: 2020-02-10
Attending: INTERNAL MEDICINE
Payer: COMMERCIAL

## 2020-02-10 ENCOUNTER — APPOINTMENT (OUTPATIENT)
Dept: OCCUPATIONAL THERAPY | Facility: CLINIC | Age: 35
DRG: 003 | End: 2020-02-10
Attending: INTERNAL MEDICINE
Payer: COMMERCIAL

## 2020-02-10 LAB
ANION GAP SERPL CALCULATED.3IONS-SCNC: 7 MMOL/L (ref 3–14)
BACTERIA SPEC CULT: ABNORMAL
BUN SERPL-MCNC: 10 MG/DL (ref 7–30)
CALCIUM SERPL-MCNC: 8.7 MG/DL (ref 8.5–10.1)
CHLORIDE SERPL-SCNC: 108 MMOL/L (ref 94–109)
CO2 SERPL-SCNC: 25 MMOL/L (ref 20–32)
CREAT SERPL-MCNC: 0.55 MG/DL (ref 0.52–1.04)
ERYTHROCYTE [DISTWIDTH] IN BLOOD BY AUTOMATED COUNT: 15.8 % (ref 10–15)
GFR SERPL CREATININE-BSD FRML MDRD: >90 ML/MIN/{1.73_M2}
GLUCOSE SERPL-MCNC: 93 MG/DL (ref 70–99)
HCT VFR BLD AUTO: 27.7 % (ref 35–47)
HGB BLD-MCNC: 8.3 G/DL (ref 11.7–15.7)
Lab: ABNORMAL
MCH RBC QN AUTO: 28.7 PG (ref 26.5–33)
MCHC RBC AUTO-ENTMCNC: 30 G/DL (ref 31.5–36.5)
MCV RBC AUTO: 96 FL (ref 78–100)
PLATELET # BLD AUTO: 184 10E9/L (ref 150–450)
POTASSIUM SERPL-SCNC: 3.8 MMOL/L (ref 3.4–5.3)
RBC # BLD AUTO: 2.89 10E12/L (ref 3.8–5.2)
SODIUM SERPL-SCNC: 140 MMOL/L (ref 133–144)
SPECIMEN SOURCE: ABNORMAL
WBC # BLD AUTO: 10.1 10E9/L (ref 4–11)

## 2020-02-10 PROCEDURE — 25000132 ZZH RX MED GY IP 250 OP 250 PS 637: Performed by: STUDENT IN AN ORGANIZED HEALTH CARE EDUCATION/TRAINING PROGRAM

## 2020-02-10 PROCEDURE — 25000132 ZZH RX MED GY IP 250 OP 250 PS 637: Performed by: PHYSICIAN ASSISTANT

## 2020-02-10 PROCEDURE — 80048 BASIC METABOLIC PNL TOTAL CA: CPT | Performed by: STUDENT IN AN ORGANIZED HEALTH CARE EDUCATION/TRAINING PROGRAM

## 2020-02-10 PROCEDURE — 99232 SBSQ HOSP IP/OBS MODERATE 35: CPT | Mod: 25 | Performed by: INTERNAL MEDICINE

## 2020-02-10 PROCEDURE — 36415 COLL VENOUS BLD VENIPUNCTURE: CPT | Performed by: STUDENT IN AN ORGANIZED HEALTH CARE EDUCATION/TRAINING PROGRAM

## 2020-02-10 PROCEDURE — 21400000 ZZH R&B CCU UMMC

## 2020-02-10 PROCEDURE — 25000128 H RX IP 250 OP 636: Performed by: PHYSICIAN ASSISTANT

## 2020-02-10 PROCEDURE — 85027 COMPLETE CBC AUTOMATED: CPT | Performed by: STUDENT IN AN ORGANIZED HEALTH CARE EDUCATION/TRAINING PROGRAM

## 2020-02-10 PROCEDURE — 25000132 ZZH RX MED GY IP 250 OP 250 PS 637: Performed by: HOSPITALIST

## 2020-02-10 PROCEDURE — 97110 THERAPEUTIC EXERCISES: CPT | Mod: GO | Performed by: OCCUPATIONAL THERAPIST

## 2020-02-10 PROCEDURE — 93926 LOWER EXTREMITY STUDY: CPT | Mod: LT

## 2020-02-10 PROCEDURE — 97535 SELF CARE MNGMENT TRAINING: CPT | Mod: GO | Performed by: OCCUPATIONAL THERAPIST

## 2020-02-10 PROCEDURE — 25000128 H RX IP 250 OP 636: Performed by: STUDENT IN AN ORGANIZED HEALTH CARE EDUCATION/TRAINING PROGRAM

## 2020-02-10 RX ADMIN — SENNOSIDES AND DOCUSATE SODIUM 1 TABLET: 8.6; 5 TABLET ORAL at 21:18

## 2020-02-10 RX ADMIN — MULTIVITAMIN 15 ML: LIQUID ORAL at 08:08

## 2020-02-10 RX ADMIN — CARVEDILOL 6.25 MG: 6.25 TABLET, FILM COATED ORAL at 17:31

## 2020-02-10 RX ADMIN — Medication 0.2 MG: at 21:47

## 2020-02-10 RX ADMIN — OXYCODONE HYDROCHLORIDE 5 MG: 5 TABLET ORAL at 00:41

## 2020-02-10 RX ADMIN — NORETHINDRONE 0.35 MG: 0.35 TABLET ORAL at 08:09

## 2020-02-10 RX ADMIN — CEFEPIME HYDROCHLORIDE 2 G: 2 INJECTION, POWDER, FOR SOLUTION INTRAVENOUS at 03:46

## 2020-02-10 RX ADMIN — POTASSIUM CHLORIDE 20 MEQ: 750 TABLET, EXTENDED RELEASE ORAL at 20:57

## 2020-02-10 RX ADMIN — ALBUTEROL SULFATE 2 PUFF: 90 AEROSOL, METERED RESPIRATORY (INHALATION) at 09:56

## 2020-02-10 RX ADMIN — OXYCODONE HYDROCHLORIDE 5 MG: 5 TABLET ORAL at 21:18

## 2020-02-10 RX ADMIN — ACETAMINOPHEN 650 MG: 325 TABLET, FILM COATED ORAL at 09:55

## 2020-02-10 RX ADMIN — CARVEDILOL 6.25 MG: 6.25 TABLET, FILM COATED ORAL at 08:08

## 2020-02-10 RX ADMIN — AMOXICILLIN AND CLAVULANATE POTASSIUM 1 TABLET: 875; 125 TABLET, FILM COATED ORAL at 20:57

## 2020-02-10 RX ADMIN — ACETAMINOPHEN 650 MG: 325 TABLET, FILM COATED ORAL at 21:18

## 2020-02-10 RX ADMIN — Medication 0.2 MG: at 11:47

## 2020-02-10 RX ADMIN — ACETAMINOPHEN 650 MG: 325 TABLET, FILM COATED ORAL at 00:41

## 2020-02-10 ASSESSMENT — PAIN DESCRIPTION - DESCRIPTORS
DESCRIPTORS: ACHING
DESCRIPTORS: ACHING

## 2020-02-10 ASSESSMENT — ACTIVITIES OF DAILY LIVING (ADL)
ADLS_ACUITY_SCORE: 13
ADLS_ACUITY_SCORE: 15
ADLS_ACUITY_SCORE: 13

## 2020-02-10 ASSESSMENT — MIFFLIN-ST. JEOR: SCORE: 1561.38

## 2020-02-10 NOTE — PROGRESS NOTES
CLINICAL NUTRITION SERVICES    Diet: Regular diet order as of 2/5. Has a prn snack/supplement order.   Intake: Tolerating diet. Flowsheets indicate pt consuming 25% of meals with a fair appetite on 2/5 and then consuming 100% of meals 2/9-2/10. May be eating some outside food.   Kcal counts:   2/6 2 meals ordered from kitchen. No food intake recorded in Epic Food Record. Calorie count sheets missing from room during time of pick-up, but pt mentioned RN had helped her fill one out? New sheets left in room for calorie count recording 2/7 & 2/8.   2/7   1014 kcals and 30 g protein (Two meal/s and no supplement/s recorded) - Pt consuming 25-75% of meals with a poor to good appetite per nursing flowsheets..   2/8   1254 kcals and 31 g protein (Two meal/s and no supplement/s recorded) - Pt consuming 100% of meals with a good appetite per nursing flowsheets.   * Pt consumed a two-day average of 1134 kcals and 30.5 g protein daily. Close to meeting estimated kcal needs of 6878-3530 kcals/day (20-25 kcals/kg) but not meeting protein needs of 73-92 grams protein/day (1.2-1.5 grams of pro/kg).       INTERVENTIONS:  Implementation:  None additionally at this time.    Follow up/Monitoring:  Will continue to follow pt.    Rachel Loredo, MS, RD, LD, [facility]   6C Pgr: [phone]

## 2020-02-10 NOTE — PROGRESS NOTES
[facility] Cardiology Progress Note           Assessment and Plan:     Ms. Tom is a 34 year old with a PMHx of Tobacco Abuse and Bipolar Disorder who presents with cardiogenic shock likely from viral myocarditis.     Faculty Attestation  Santi Vogel M.D.    I personally saw and examined this patient, reviewed recent laboratories and imaging studies, discussed the case with the housestaff, and conveyed plan to the patient.  I answered all questions from patient and/or family. I agree with the examination, assessment and plan outlined here.        Plan for today:  - Repeat LE U/S today to assess interval resolution of pseudoaneurysm   - Appears to have thrombosed  - Change cefepime to augmentin 875-125 BID to cover E coli    #Cardiogenic Shock (resolved)  #Non-Ischemic Cardiomyopathy with Viral Myocarditis (Confirmed on Biopsy)  Presented in cardiogenic shock, with thickened LV wall and consequently greatly decreased EF. Placed on VA ECMO to allow time for myocardium to recover. Decannulated 2/3. Initially did well, developed post op vasoplegia and anemia that necessitated pressors. CT A/P was negative. Off pressors 2/4.  - EF showed recovery to 55-60%. No indication for OGDT or advanced therapy  - Recent myocarditis with severely depressed EF with ECMO required for support; will pursue Life Vest before discharge for primary prevention of arrythmia    #Thrombosed Pseudoaneurysm  # Wound infection, E coli  Pain and swelling of L groin ECMO cannulation site. Given history of MRSA wound infections before, placed on vancomycin and ID consulted. Vascular surgery consulted for pseuodaneurysm, and recommend observation off of anticoagulation. They feel that given size and already developing clot that this will spontaneously resolve. Should this not occur, may need compression per IR on Monday.  - Observation off of anticoagulation   - Repeat U/S 2/10 revealed thrombosis of pseudoaneurysm  - No restriction on  ambulation  - Cefepime 2g q8h 2/9 - 2/10  - Augmentin 875-125 BID 2/10 - EOT 2/13    #Atrial tachycardia (resolved)  On the evening of 2/4, developed atach @140BPM.   - Continue PO coreg 6.25mg BID     # Hypoxic Respiratory Failure (resolved)  # Suspicion for superimposed pneumonia in setting of influenza infection (resolved)  Secondary to acute heart failure and possible flash pulmonary edema. Improved with diuresis and temporary ventilator support while on ECMO.  - Patient currently on RA     #Pericardial Effusion   Likely related to myopericarditis.  -Now trace to small on most recent TTE     #Thrombocytopenia  Concern for thrombosis of circuitx2 along with declining platelets  - Heme consult  - Sent NAHUM panel and returned positive, however REED negative  - Was on argatroban with plan to transition to coumadin, however with negative NAHUM confirmation and pseudoaneurysm, anticoagulation discontinued    #Extravasation of Blood on Left Arm   WOC consult  -Continue to monitor. Wound care has no active recommendations at this time.    Disposition - Likely discharge to home with home PT within 1-2 days    Staffed with Dr. Jaspreet Newsome MD  PGY1  [phone]       Subjective:   Notes reviewed, no acute events overnight.    No complaints this morning, feels generally well.          Review of Systems:   A comprehensive review of systems was performed and found to be negative except as described in this note          Medications:     Current Facility-Administered Medications   Medication     acetaminophen (TYLENOL) Suppository 650 mg     acetaminophen (TYLENOL) tablet 650 mg     albuterol (PROAIR HFA/PROVENTIL HFA/VENTOLIN HFA) 108 (90 Base) MCG/ACT inhaler 2 puff     alum & mag hydroxide-simethicone (MYLANTA ES/MAALOX  ES) suspension 30 mL     benzocaine (ORAJEL MAXIMUM STRENGTH) 20 % gel     benzonatate (TESSALON) capsule 100 mg     bisacodyl (DULCOLAX) Suppository 10 mg     carvedilol (COREG)  tablet 6.25 mg     ceFEPIme (MAXIPIME) 2 g vial to attach to  ml bag for ADULTS or 50 ml bag for PEDS     dextrose 10% infusion     dextrose 50 % injection 25-50 mL    Or     glucagon injection 1 mg     HYDROmorphone (DILAUDID) injection 0.2 mg     lidocaine (LMX4) cream     lidocaine 1 % 0.1-1 mL     medication instruction     melatonin tablet 6 mg     multivitamins w/minerals (CERTAVITE) liquid 15 mL     naloxone (NARCAN) injection 0.1-0.4 mg     norethindrone (MICRONOR) 0.35 MG per tablet 0.35 mg     ondansetron (ZOFRAN) injection 4 mg     oxyCODONE (ROXICODONE) tablet 5 mg     Patient is already receiving anticoagulation with heparin, enoxaparin (LOVENOX), warfarin (COUMADIN)  or other anticoagulant medication     polyethylene glycol (MIRALAX/GLYCOLAX) Packet 17 g     potassium chloride (KLOR-CON) Packet 20-40 mEq     potassium chloride 10 mEq in 100 mL intermittent infusion with 10 mg lidocaine     potassium chloride 10 mEq in 100 mL sterile water intermittent infusion (premix)     potassium chloride 20 mEq in 50 mL intermittent infusion     potassium chloride ER (K-DUR/KLOR-CON M) CR tablet 20-40 mEq     Reason ACE/ARB/ARNI order not selected     senna-docusate (SENOKOT-S/PERICOLACE) 8.6-50 MG per tablet 1 tablet     sennosides (SENOKOT) tablet 8.6 mg     sodium chloride (PF) 0.9% PF flush 3 mL               Objective:   Temp: 98.8  F (37.1  C) Temp src: Oral BP: 116/65   Heart Rate: 85 Resp: 18 SpO2: 100 % O2 Device: None (Room air)           Physical Exam:   Vitals were reviewed  Temp: 98.8  F (37.1  C) Temp src: Oral BP: 116/65   Heart Rate: 85 Resp: 18 SpO2: 100 % O2 Device: None (Room air)      Gen: Resting comfortably in bed, no acute distress  HEENT: PERRLA, EOMI, MMM  CV: RRR  Pulm: Clear to auscultation b/l  Ext: Swelling L groin site, dressed wound, tender to palpation, warm  Neuro: No focal defects           Data:     Lab Results   Component Value Date    WBC 10.1 02/10/2020    HGB 8.3 (L)  02/10/2020    HCT 27.7 (L) 02/10/2020     02/10/2020     02/09/2020    POTASSIUM 3.8 02/09/2020    CHLORIDE 108 02/09/2020    CO2 27 02/09/2020    BUN 10 02/09/2020    CR 0.54 02/09/2020     (H) 02/09/2020    SED 78 (H) 02/03/2020    DD 10.1 (H) 02/03/2020    TROPI 4.071 (HH) 01/30/2020    AST 43 02/04/2020    ALT 35 02/04/2020    ALKPHOS 89 02/04/2020    BILITOTAL 1.3 02/04/2020    INR 1.20 (H) 02/08/2020

## 2020-02-10 NOTE — PROGRESS NOTES
[id number]:  Neuro: A&Ox4. Calls appropriately.   Activity: Up ad anup. Steady on feet.   Vitals: Afebrile. VSS on RA.      LDAS: R triple lumen PICC SL. R PIV SL.   Cardiac: NSR, 80-90s.   Respiratory: Stable on RA. Denies SOB.      GI/: Voiding spontaneously. No BM this shift.   Skin: L groin site leaking. Dressing changed x1. LUE bruising.     Pain: C/o pain at L groin, improved with PRN Tylenol and Oxycodone.   Diet: Regular, denies nausea.   Plan: US of LLE this morning, pt wants to be pre-medicated with Oxycodone 30 minutes prior. Continue to monitor and follow POC.

## 2020-02-10 NOTE — CONSULTS
Social Work Services Progress Note    Hospital Day: 13  Date of Initial Social Work Evaluation:  02/04/2020  Collaborated with:  Patient    Data:  Consult request for follow up re: financial concerns etc.      Intervention:  SW met with pt bedside, pt asking about resources for accessing social security. SW to provide pt with Social Security contact and resources.      Assessment:  Pt presented calm and was clear with thoughts and what needed to happen. Pt politely requesting information and working on planning for post hospitalization.     Plan:    Anticipated Disposition:  Home with services    Barriers to d/c plan:  Medical stability    Follow Up:  SW to continue to provide support and resources as needed and to follow.     IGOR Sinha, LGSW  [facility]   [facility]  Pager: [phone]

## 2020-02-10 NOTE — PLAN OF CARE
6C PT -  Cancel. Pt declining standing or ambulation, and reluctantly worked with OT. Will reschedule.

## 2020-02-10 NOTE — PLAN OF CARE
OT/CR 6C:  Discharge Planner OT   Patient plan for discharge: Home  Current status: Pt able to use ergometer with BLE for 15 min sitting EOB. Pt able to don socks and pants with SBA and vc. AVSS on RA.  Barriers to return to prior living situation: deconditioning, acute medical needs, fatigue, weakness  Recommendations for discharge: Home with assist and OP Phase II CR  Rationale for recommendations: Pt progressing in therapy and is close to baseline in ADLs. Pt would benefit from OP Phase II CR to progress activity tolerance and maximize cardiac outcomes.       Entered by: Avani Feliz 02/10/2020 2:23 PM

## 2020-02-10 NOTE — PROGRESS NOTES
"VASCULAR SURGERY PROGRESS NOTE    Subjective:  No fevers overnight. L groin pain continues to improve. No complaints this AM. Grown wound 1/2 negative, 1/2 light growth E. coli.    Objective:    Intake/Output Summary (Last 24 hours) at 2/9/2020 1053  Last data filed at 2/9/2020 0200  Gross per 24 hour   Intake 740 ml   Output 1200 ml   Net -460 ml     Labs:  ROUTINE IP LABS (Last four results)  BMP  Recent Labs   Lab 02/10/20  0625 02/09/20  0506 02/08/20  1925 02/08/20  0610    139 138 140   POTASSIUM 3.8 3.8 3.9 3.9   CHLORIDE 108 108 108 110*   YIN 8.7 8.6 8.7 8.2*   CO2 25 27 28 25   BUN 10 10 9 11   CR 0.55 0.54 0.55 0.53   GLC 93 104* 102* 101*     CBC  Recent Labs   Lab 02/10/20  0625 02/09/20  0506 02/08/20  0610 02/07/20  1443   WBC 10.1 11.3* 9.5 12.4*   RBC 2.89* 2.70* 2.58* 2.75*   HGB 8.3* 7.9* 7.7* 8.2*   HCT 27.7* 25.8* 25.1* 26.3*   MCV 96 96 97 96   MCH 28.7 29.3 29.8 29.8   MCHC 30.0* 30.6* 30.7* 31.2*   RDW 15.8* 15.7* 15.7* 15.5*    270 188 178     INR  Recent Labs   Lab 02/08/20  0610 02/07/20  0628 02/04/20  0447 02/03/20  2226   INR 1.20* 1.66* 1.00 0.99     PHYSICAL EXAM:  /66 (BP Location: Left arm)   Pulse 111   Temp 99  F (37.2  C) (Oral)   Resp 18   Ht 1.6 m (5' 3\")   Wt 88.2 kg (194 lb 8 oz)   SpO2 100%   BMI 34.45 kg/m    General: The patient is alert and oriented. Appropriate. No acute distress  Psych: pleasant affect, answers questions appropriately  Skin: Color appropriate for race, warm, dry.  Respiratory: The patient does not require supplemental oxygen. Breathing unlabored  Extremities: L groin dressing intact with scant serous drainage, thigh NTTP, LLE +edema, L DP palpable +2      ASSESSMENT:  34F with small L femoral pseudoaneurysm after ECMO decannulation.      PLAN:  Repeat ultrasound today  Okay for OOB & ambulate  Agreed with discontinued vanc - now on cefepime    Abimbola Weston MD  Vascular Surgery Fellow  Pgr [phone]                  "

## 2020-02-10 NOTE — PLAN OF CARE
D/I/A: Pt up ad anup in room and in hallway.  A+O x4 and able to make needs known.  VSSA.  Medicated effectively for pain with current med orders.  L groin site noted to have increased serous drainage over the course of the evening; site redressed and packing remains in place. CMS intact.  Family at bedside and supportive.  P: Continue to monitor status.  Encourage activity.  Monitor L groin site and drainage.

## 2020-02-11 ENCOUNTER — APPOINTMENT (OUTPATIENT)
Dept: OCCUPATIONAL THERAPY | Facility: CLINIC | Age: 35
DRG: 003 | End: 2020-02-11
Attending: INTERNAL MEDICINE
Payer: COMMERCIAL

## 2020-02-11 ENCOUNTER — PATIENT OUTREACH (OUTPATIENT)
Dept: CARE COORDINATION | Facility: CLINIC | Age: 35
End: 2020-02-11

## 2020-02-11 VITALS
OXYGEN SATURATION: 100 % | SYSTOLIC BLOOD PRESSURE: 114 MMHG | RESPIRATION RATE: 14 BRPM | BODY MASS INDEX: 33.61 KG/M2 | HEART RATE: 111 BPM | HEIGHT: 63 IN | WEIGHT: 189.7 LBS | TEMPERATURE: 98.6 F | DIASTOLIC BLOOD PRESSURE: 71 MMHG

## 2020-02-11 PROBLEM — Z30.9 CONTRACEPTIVE MANAGEMENT: Status: ACTIVE | Noted: 2020-02-11

## 2020-02-11 LAB
ANION GAP SERPL CALCULATED.3IONS-SCNC: 8 MMOL/L (ref 3–14)
BUN SERPL-MCNC: 9 MG/DL (ref 7–30)
CALCIUM SERPL-MCNC: 8.9 MG/DL (ref 8.5–10.1)
CHLORIDE SERPL-SCNC: 106 MMOL/L (ref 94–109)
CO2 SERPL-SCNC: 25 MMOL/L (ref 20–32)
CREAT SERPL-MCNC: 0.52 MG/DL (ref 0.52–1.04)
ERYTHROCYTE [DISTWIDTH] IN BLOOD BY AUTOMATED COUNT: 15.4 % (ref 10–15)
FERRITIN SERPL-MCNC: 67 NG/ML (ref 12–150)
GFR SERPL CREATININE-BSD FRML MDRD: >90 ML/MIN/{1.73_M2}
GLUCOSE SERPL-MCNC: 95 MG/DL (ref 70–99)
HCT VFR BLD AUTO: 27.4 % (ref 35–47)
HGB BLD-MCNC: 8.3 G/DL (ref 11.7–15.7)
IRON SATN MFR SERPL: 15 % (ref 15–46)
IRON SERPL-MCNC: 35 UG/DL (ref 35–180)
MCH RBC QN AUTO: 29.2 PG (ref 26.5–33)
MCHC RBC AUTO-ENTMCNC: 30.3 G/DL (ref 31.5–36.5)
MCV RBC AUTO: 97 FL (ref 78–100)
PLATELET # BLD AUTO: 315 10E9/L (ref 150–450)
POTASSIUM SERPL-SCNC: 4.1 MMOL/L (ref 3.4–5.3)
RBC # BLD AUTO: 2.84 10E12/L (ref 3.8–5.2)
SODIUM SERPL-SCNC: 139 MMOL/L (ref 133–144)
TIBC SERPL-MCNC: 232 UG/DL (ref 240–430)
WBC # BLD AUTO: 10.9 10E9/L (ref 4–11)

## 2020-02-11 PROCEDURE — 97110 THERAPEUTIC EXERCISES: CPT | Mod: GO | Performed by: OCCUPATIONAL THERAPIST

## 2020-02-11 PROCEDURE — 82728 ASSAY OF FERRITIN: CPT | Performed by: STUDENT IN AN ORGANIZED HEALTH CARE EDUCATION/TRAINING PROGRAM

## 2020-02-11 PROCEDURE — 25000132 ZZH RX MED GY IP 250 OP 250 PS 637: Performed by: HOSPITALIST

## 2020-02-11 PROCEDURE — 25000132 ZZH RX MED GY IP 250 OP 250 PS 637: Performed by: STUDENT IN AN ORGANIZED HEALTH CARE EDUCATION/TRAINING PROGRAM

## 2020-02-11 PROCEDURE — 85027 COMPLETE CBC AUTOMATED: CPT | Performed by: STUDENT IN AN ORGANIZED HEALTH CARE EDUCATION/TRAINING PROGRAM

## 2020-02-11 PROCEDURE — 36592 COLLECT BLOOD FROM PICC: CPT | Performed by: STUDENT IN AN ORGANIZED HEALTH CARE EDUCATION/TRAINING PROGRAM

## 2020-02-11 PROCEDURE — 83550 IRON BINDING TEST: CPT | Performed by: STUDENT IN AN ORGANIZED HEALTH CARE EDUCATION/TRAINING PROGRAM

## 2020-02-11 PROCEDURE — 80048 BASIC METABOLIC PNL TOTAL CA: CPT | Performed by: STUDENT IN AN ORGANIZED HEALTH CARE EDUCATION/TRAINING PROGRAM

## 2020-02-11 PROCEDURE — 97535 SELF CARE MNGMENT TRAINING: CPT | Mod: GO | Performed by: OCCUPATIONAL THERAPIST

## 2020-02-11 PROCEDURE — 25000132 ZZH RX MED GY IP 250 OP 250 PS 637: Performed by: PHYSICIAN ASSISTANT

## 2020-02-11 PROCEDURE — 83540 ASSAY OF IRON: CPT | Performed by: STUDENT IN AN ORGANIZED HEALTH CARE EDUCATION/TRAINING PROGRAM

## 2020-02-11 RX ORDER — FERROUS SULFATE 325(65) MG
325 TABLET ORAL DAILY
Status: DISCONTINUED | OUTPATIENT
Start: 2020-02-11 | End: 2020-02-11 | Stop reason: HOSPADM

## 2020-02-11 RX ORDER — ACETAMINOPHEN AND CODEINE PHOSPHATE 120; 12 MG/5ML; MG/5ML
0.35 SOLUTION ORAL DAILY
Qty: 30 TABLET | Refills: 11 | Status: SHIPPED | OUTPATIENT
Start: 2020-02-12 | End: 2021-06-01

## 2020-02-11 RX ORDER — FERROUS SULFATE 325(65) MG
325 TABLET ORAL DAILY
Qty: 30 TABLET | Refills: 3 | Status: SHIPPED | OUTPATIENT
Start: 2020-02-12 | End: 2020-02-11

## 2020-02-11 RX ORDER — CARVEDILOL 6.25 MG/1
6.25 TABLET ORAL 2 TIMES DAILY WITH MEALS
Qty: 60 TABLET | Refills: 3 | Status: SHIPPED | OUTPATIENT
Start: 2020-02-11 | End: 2020-02-11

## 2020-02-11 RX ORDER — ACETAMINOPHEN AND CODEINE PHOSPHATE 120; 12 MG/5ML; MG/5ML
0.35 SOLUTION ORAL DAILY
Qty: 30 TABLET | Refills: 11 | Status: SHIPPED | OUTPATIENT
Start: 2020-02-12 | End: 2020-02-11

## 2020-02-11 RX ORDER — FERROUS SULFATE 325(65) MG
325 TABLET ORAL DAILY
Qty: 30 TABLET | Refills: 3 | Status: SHIPPED | OUTPATIENT
Start: 2020-02-12 | End: 2021-06-01

## 2020-02-11 RX ORDER — CARVEDILOL 6.25 MG/1
6.25 TABLET ORAL 2 TIMES DAILY WITH MEALS
Qty: 60 TABLET | Refills: 3 | Status: SHIPPED | OUTPATIENT
Start: 2020-02-11 | End: 2021-06-01

## 2020-02-11 RX ADMIN — FERROUS SULFATE TAB 325 MG (65 MG ELEMENTAL FE) 325 MG: 325 (65 FE) TAB at 11:51

## 2020-02-11 RX ADMIN — CARVEDILOL 6.25 MG: 6.25 TABLET, FILM COATED ORAL at 08:07

## 2020-02-11 RX ADMIN — NORETHINDRONE 0.35 MG: 0.35 TABLET ORAL at 08:08

## 2020-02-11 RX ADMIN — OXYCODONE HYDROCHLORIDE 5 MG: 5 TABLET ORAL at 14:52

## 2020-02-11 RX ADMIN — AMOXICILLIN AND CLAVULANATE POTASSIUM 1 TABLET: 875; 125 TABLET, FILM COATED ORAL at 08:07

## 2020-02-11 RX ADMIN — ACETAMINOPHEN 650 MG: 325 TABLET, FILM COATED ORAL at 04:20

## 2020-02-11 RX ADMIN — MULTIVITAMIN 15 ML: LIQUID ORAL at 08:07

## 2020-02-11 ASSESSMENT — ACTIVITIES OF DAILY LIVING (ADL)
ADLS_ACUITY_SCORE: 13

## 2020-02-11 ASSESSMENT — MIFFLIN-ST. JEOR: SCORE: 1539.6

## 2020-02-11 NOTE — PROGRESS NOTES
GENERAL (ORANGE) ID SERVICE PROGRESS NOTE -- SIGN OFF     Patient:  Azra Tom   Date of birth 1985, Medical record number [id number]  Date of Visit:  02/10/2020          Assessment and Recommendations:   RECOMMENDATIONS:  - Switch cefepime to Augmentin 875 mg PO BID through 2/13/20 to complete a ten day course (since starting antibiotics on 2/3/20) for the treatment of the wound infection (including the susceptible E coli isolated 2/7/20).  - No other antimicrobials are needed.    The case was discussed with the Cardiology 2 service.  With the above Recommendation, General ID will sign off now.  Thanks for allowing us to participate in the care of this woman.  Please page if additional ID questions or concerns arise.    Sree Stark MD  Pager [phone]    PROBLEM LIST:  1. Viral myocarditis - confirmed by biopsy   2. Cardiogenic shock, placed on ECMO 1/29 and deccanulated 2/3   3. Pseudoaneurysm at previous ECMO cannula site (Left leg)  4. Cellulitis and superficial ECMO site wound infection   5. Hypoxic Respiratory Failure (resolved)    ASSESSMENT:  A 34 year old woman with viral myocarditis, cardiogenic shock, EMCO placement and decannulation (2/3/20), repair of femoral vessels, wound vac removal on 2/5/20, and ultimately wound dehiscence of the inferior apex, with a superficial wound infection and overlying cellulitis of the left thigh.     She received 5+ days of a combination of Vancomycin, Pip/Tazo, and Cefazolin and clinically improved such that her antibiotics were de-escalated to oral cephalexin on 2/8/20, since the infection appeared to be superficial and does not extend deep. While she states that she has had MRSA infections in the distant past (11 years ago), there is no evidence of MRSA currently. Sputum sample growing MSSA and MRSA nares negative. Therefore, vancomycin is unneeded at this time. She did isolate E coli (quinolone and TMP-SMX resistant, but Unasyn-susceptible) from a  2/7/20 wound culture, however, so was briefly switched to cefepime 2/9/20 while the susceptibilities were being determined.  Now that is is known to be susceptible to Unasyn, oral therapy with Augmentin can be used both against the E coli and against the prior MSSA which was pan-susceptible.  For a superficial infection such as this, a total ten day course of antibiotic therapy should suffice.    Antimicrobials:   Vancomycin 1/29-1/30, 2/03-2/05, 2/07-P  Pip/Tazo 1/29-1/31, 2/03-2/05  Oseltamivir 1/29-2/02  Cefazolin 2/06-2/08  Cefepime 2/08 - 2/10           Interval History:   Ms. Ta is now afebrile (T max 100.0 degrees F) without chills or swweats over the past 48 hours on cefepime, to which she was switched on 2/8/20 as empiric coverage when a 2/7/20 superficial wound culture was noted to be growing E coli.  (The isolate is now known to be Unasyn-susceptible.)  Her left groin wound is clear (per notes, when re-dressed) and her groin wound pain is improving.  She lacks other new complaints, including EENT symptoms, rash, cough, dyspnea, chest pain, nausea, abdominal discomfort, diarrhea, or headache.  A left groin ultrasound today showed the prior 2.4 x 1.2 cm  pseudoaneurysm (which has perhaps thrombosed since the prior 2/7/20 study).  A 2/9/20 chest x-ray was stable without new infiltrates.         History of Present Illness (copied from the 2/8/20 General ID Consult Note):   A 34 year old woman who presented with a cough and shortness of breath. She presented to [facility] on 1/27 for chest pain and shortness of breath. She was noted to have new acute systolic HF with LVEF 25-30%. She had a RHC on 1/28 and became hypotensive. Cardiac MRI done which showed anterolateral subepicardial enhancement concerning for possible myocarditis. Influenza swab positive. She was transferred to [facility] for advanced HF management.   She initially had an IABP placed on 1/28. However, because she continued to have low  pressures despite IABP, she had a VA-ECMO was placed. She was decannulated on 2/03 and left femoral vessels were repaired. Wound vac removed on 2/5 due to dysfunctioning suction. Noted that the wound was open at the inferior apex with superficial dehiscence about 1.5 cm deep. A culture was obtained and sent for aerobic, anaerobic, fungal cultures.   Overall, her wound has clinically improved. The cellulitis extending down the left thigh has improved. There is no purulent drainage. Tender to palpation. There is some induration above the pubis.          Review of Systems:   CONSTITUTIONAL:  No fevers or chills  EYES: negative for icterus  ENT:  negative for hearing loss, tinnitus and sore throat  RESPIRATORY:  + for cough, no sputum, no dyspnea  CARDIOVASCULAR:  negative for chest pain, dyspnea  GASTROINTESTINAL:  negative for nausea, vomiting, diarrhea and constipation  GENITOURINARY:  negative for dysuria  HEME:  No easy bruising  INTEGUMENT:  negative for rash and pruritus  NEURO:  Negative for headache         Past Medical History:     Past Medical History:   Diagnosis Date     Atypical chest pain     history of atypical chest pain in 2017     Bipolar disorder (H)     diagnosed in [place], [place] in 2017     Dyslipidemia      Tobacco use      Uncomplicated asthma           Past Surgical History:     Past Surgical History:   Procedure Laterality Date     CV EXTRACORPERAL MEMBRANE OXYGENATION N/A 1/29/2020    Procedure: ECMO, NATIVE HEART BIOPSY;  Surgeon: Richard Montana MD;  Location:  HEART CARDIAC CATH LAB     CV HEART BIOPSY N/A 1/29/2020    Procedure: Heart Biopsy;  Surgeon: Richard Montana MD;  Location:  HEART CARDIAC CATH LAB     REMOVE EXTRACORPORAL MEMBRANE OXYGENATOR ADULT Left 2/3/2020    Procedure: REMOVAL Extra Corporeal Membrain Oxygenator, Inta operative transesophageal echocardiogram, Repair of femoral vessel;  Surgeon: Vishnu Marx MD;  Location:  OR          Family History:    History reviewed. No pertinent family history.         Social History:     Social History     Tobacco Use     Smoking status: Former Smoker   Substance Use Topics     Alcohol use: Not Currently     History   Sexual Activity     Sexual activity: Not on file          Current Medications (antimicrobials listed in bold):       amoxicillin-clavulanate  1 tablet Oral Q12H ANDREAS     carvedilol  6.25 mg Oral BID w/meals     multivitamins w/minerals  15 mL Per Feeding Tube Daily     norethindrone  0.35 mg Oral or Feeding Tube Daily     polyethylene glycol  17 g Oral or Feeding Tube Daily     senna-docusate  1 tablet Oral or Feeding Tube At Bedtime     sodium chloride (PF)  3 mL Intracatheter Q8H          Allergies:   No Active Allergies         Physical Exam:   Vitals were reviewed  Ranges for his vital signs over the past 24 hours:   Temp:  [98.2  F (36.8  C)-99.1  F (37.3  C)] 98.2  F (36.8  C)  Pulse:  [111] 111  Heart Rate:  [] 90  Resp:  [16-18] 18  BP: (113-138)/(65-77) 138/77  SpO2:  [95 %-100 %] 100 %    Intake/Output Summary (Last 24 hours) at 2/10/2020 1849  Last data filed at 2/10/2020 0616  Gross per 24 hour   Intake 920 ml   Output 500 ml   Net 420 ml     Physical Examination:  GENERAL:  Pleasant, interactive, WDWM 33 yo woman in NAD.  HEAD:  NCAT.  EYES:  EOMI, anicteric sclerae.  ENT:  No otorrhea.  No anterior oral lesion.  NECK:  Supple.  LYMPH:  No lymphadenopathy  LUNGS:  Clear to auscultation bilaterally.   CARDIOVASCULAR:  RRR, no murmur, gallop or rub.  ABDOMEN:  Normal bowel sounds, soft, nontender.  NEUROLOGIC:  Alert, oriented, moves extremities x 4.  SKIN:  No acute rash or lesion.  Right arm PICC line site lacks inflammation.  EXT: Left groin inferior wound dehiscence, no significant drainage, minimal tenderness to palpation.  (2/8/20 photo below.)             Laboratory Data:     Inflammatory Markers    Recent Labs   Lab Test 02/07/20  0628 02/03/20  0358 02/02/20  0406 02/01/20  0349  01/31/20  0333 01/30/20  0352 01/29/20  1720 01/28/20  2248   SED  --  78* 82* 54* 15 12 9 7   .0* 100.0* 130.0* 160.0* 99.0* 30.0* 17.0* 10.0*     Hematology Studies    Recent Labs   Lab Test 02/10/20  0625 02/09/20  0506 02/08/20  0610 02/07/20  1443 02/07/20  0628 02/06/20  0658  02/03/20  1836  01/29/20  1720   WBC 10.1 11.3* 9.5 12.4* 12.0* 13.2*   < > 9.6   < > 10.3   ANEU  --   --   --   --  8.2  --   --  6.5  --  8.8*   AEOS  --   --   --   --  0.2  --   --  0.1  --  0.0   HGB 8.3* 7.9* 7.7* 8.2* 7.7* 8.4*   < > 6.5*   < > 10.9*   MCV 96 96 97 96 95 95   < > 91   < > 85    270 188 178 158 125*   < > 70*   < > 74*    < > = values in this interval not displayed.     Immune Globulin Studies    Recent Labs   Lab Test 01/29/20  0922         IGA 37*     Metabolic Studies     Recent Labs   Lab Test 02/10/20  0625 02/09/20  0506 02/08/20  1925 02/08/20  0610 02/07/20  1552    139 138 140 138   POTASSIUM 3.8 3.8 3.9 3.9 3.7   CHLORIDE 108 108 108 110* 108   CO2 25 27 28 25 26   BUN 10 10 9 11 11   CR 0.55 0.54 0.55 0.53 0.46*   GFRESTIMATED >90 >90 >90 >90 >90     Hepatic Studies    Recent Labs   Lab Test 02/04/20 2005 02/04/20  0447 02/03/20  0358 02/02/20  1544 02/02/20  0406 02/01/20  2209   BILITOTAL 1.3 1.5* 1.5* 1.6* 1.6* 1.6*   ALKPHOS 89 77 70 72 73 62   ALBUMIN 2.4* 2.4* 2.2* 2.5* 2.5* 2.6*   AST 43 35 33 32 31 29   ALT 35 32 23 21 16 16     Thyroid Studies    Recent Labs   Lab Test 01/29/20  0922   TSH 2.11     Microbiology:  Culture Micro   Date Value Ref Range Status   02/07/2020   Final    Canceled, Test credited  Incorrectly ordered by PCU/Clinic  Test reordered as correct code     02/07/2020 Culture negative monitoring continues  Preliminary   02/07/2020   Final    Canceled, Test credited  Incorrectly ordered by PCU/Clinic  Test reordered as correct code     02/07/2020 Culture negative after 3 days  Preliminary   02/07/2020 Light growth  Escherichia coli   (A)  Final    2020 No growth  Final   2020 No growth  Final   2020 (A)  Final    Moderate growth  Staphylococcus aureus  Susceptibility testing done on previous specimen     2020 (A)  Final    Light growth  Candida albicans / dubliniensis  Candida albicans and Candida dubliniensis are not routinely speciated  Susceptibility testing not routinely done     2020 Light growth  Normal karmen    Final   2020 No growth  Final   2020 No growth  Final   2020 No growth  Final   2020 No growth  Final   2020 Moderate growth  Normal karmen    Final   2020 Moderate growth  Staphylococcus aureus   (A)  Final   2020 No growth  Final   2020 No growth  Final   2020 Light growth  Normal karmen    Final     Imagin/10 LLE groin U/S:  Hypoechoic collection in the left groin measuring 2.4 x 0.8 x 1.2 cm, without internal flow. This likely represents the previously seen pseudoaneurysm, which appears to have thrombosed since the prior exam on 2020.   LLE groin U/S:  Left groin widemouthed 6 pseudoaneurysm neck measures 6 mm in diameter by 12 mm in length. By CT, the patient's left common femoral artery measures 7 mm in diameter. [place] of the pseudoaneurysm precludes thrombin injection.  Pseudoaneurysm with internal flow measures 1.5 x 1.1 x 2.7 cm. Thrombosed, round pseudoaneurysm measures 2.8 x 1.8 x 2.7 cm.

## 2020-02-11 NOTE — SUMMARY OF CARE
-Pt has a unique way of becoming an expert on all the situations involving her care. The Pt is extremely involved in the details of her treatment and is very optimistic regarding her recovery.  -Pt had an ultrasound of her groin wound today and the wound was thoroughly washed and dressed per orders.

## 2020-02-11 NOTE — PLAN OF CARE
Pt admitted 1/28 with cardiogenic shock and found viral myocarditis s/p ECMO and IABP.  SR, VS'S on RA with LLE pain and medicated with prn Tylenol, Roxicodone, and Dilaudid.  Today's LLE US was done and thrombin observed.  LLE wound dressing changed and packed with one suture.  Replaced 20 mEq of K.  Otherwise, pt doing well and up independently.  Continue to monitor and with POC.

## 2020-02-11 NOTE — PROGRESS NOTES
Care Coordinator - Discharge Planning    Admission Date/Time:  1/28/2020  Attending MD:  Abilio Mulligan MD   Data  Chart reviewed, discussed with interdisciplinary team.   Patient was admitted for:   1. Acute combined systolic and diastolic heart failure (H)    2. Acute combined systolic and diastolic heart failure (H)    Assessment   Concerns with insurance coverage for discharge needs: None.  Current Living Situation: Patient said that she rents a room from a friend, Cristina Butler.   Support System: Supportive and Involved friend.   Services Involved: none currently.   Transportation at Discharge: Family or friend will provide  Transportation to Medical Appointments: friend.    - Name of caregiver: self.   Barriers to Discharge: arranging LifeVest.     Coordination of Care and Referrals: Provided patient/family with options for DME and home care.   Per MD, pt will need to be fitted for home LifeVest; pt is in agreement with this plan and pt wants home care arranged with [facility].   Intervention:       Per Rudy Cool Mgr from [facility] [employer] (Ph: [phone]) pt does not meet the insurance requirement for fitting of home LifeVest. Cards 2 spoke directly with Silvina and have accepted the denial.      Arrangements made with [facility] (Ph: [phone] Fax: [phone]) for RN eval post hospitalization, assess vital signs, respiratory and cardiac status, wound care teaching (LEFT groin- Pack BID with 1/4 inch packing, cover with gauze/tape,  change PRN if soiled/saturated to keep dry.), activity tolerance, hydration, nutrition, med set up and management. PT/OT eval and treat for deconditioning, strengthening, and endurance.     Plan  Anticipated Discharge Date:  2/11/2020  Anticipated Discharge Plan:  Discharge to home with home care.     SARAY BRUMFIELD RN BSN  Care Coordinator Unit   [phone]

## 2020-02-11 NOTE — PLAN OF CARE
Pt A&O x4 and up independently. Plan is for discharge today, only barrier is status of Life Vest. L groin dressing to be changed this shift. Triple lumen PICC in place

## 2020-02-11 NOTE — DISCHARGE SUMMARY
[facility], [place]    I personally participated in the formulation and organization of discharge plan and conveyed plan to patient.  35 minutes    Discharge Summary  Cardiology    Date of Admission:  1/28/2020  Date of Discharge:  2/11/2020  Discharging Provider: Abilio Triana MD    Discharge Diagnoses   Principal Problem:    Acute combined systolic and diastolic heart failure (H)  Active Problems:    Heart failure (H)    Cardiogenic shock (H)    Contraceptive management      History of Present Illness   Azra Tom is an 34 year old female with no significant PMH who presented from OSH with cough and shortness of breath found to be in cardiogenic shock secondary to viral myocarditis. Please see H&P and below summary for more details.    Hospital Course   Azra Tom was admitted on 1/28/2020.  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 TTE on admission with LVEF 25-30%, global hypokinesis. Cardiac MRI with anterolateral subepicardial enhancement consistent with myocarditis. Endocardial biopsy with findings of Focal mild chronic inflammation with myocytolysis, consistent with myocarditis. Influenza swab positive, s/p oseltamivir 1/29-2/2.  Required [facility] ECMO 1/29 - 2/3. Post operative vasoplegia and anemia that necessitated pressors for 1 day following ECMO decannulation. Viral myocarditis thought secondary to influenza.  - attempted to pursue LifeVest on discharge, concern for myocardial inflammation being arrhythmogenic. Insurance denied.  - Will follow up with Dr. Raymundo within the month. Per Patient, Dr. Raymundo told her he will see her on 2/26 - although not confirmed in chart.  - recommend cardiac MRI with gadolinium prior to cardiology followup  - consider starting ACE-inhibitor as outpatient given  cardiomyopathy     #Thrombosed Pseudoaneurysm  # Wound infection, E coli  Pain and swelling of L groin ECMO cannulation site. Following wound vac removal, developed wound dehiscence of inferior apex of wound. Developed superficial wound infection and local cellulitis. Wound cultured E coli, treated with 10 day course of antibiotics. Vascular surgery consulted for pseuodaneurysm, and recommend observation off of anticoagulation. They feel that given size and already developing clot that this will spontaneously resolve.  - Complete 10 day antibiotic course: Augmentin 875-125 BID to complete on 2/13/2020  - No restriction on ambulation  - No follow up per vascular, or repeat imaging of pseudoaneursym.  - f/u with PCP within 1-2 weeks to re evaluate wound.     #Atrial tachycardia, resolved  On the evening of 2/4, developed atach @140BPM.   - Continue PO coreg 6.25mg BID      # Hypoxic Respiratory Failure, resolved  Secondary to acute heart failure and possible flash pulmonary edema. Improved with diuresis and temporary ventilator support while on ECMO.     #Pericardial Effusion   Likely related to myopericarditis. Improved with serial imaging.     #Thrombocytopenia, resolved.  Likely secondary to acute blood loss in ECMO and thrombosis in pseudoaneurysm.      Abilio Triana MD   Internal Medicine, PGY-1    Significant Results and Procedures   Please see above summary    Pending Results   Cardiac MRI to be followed up Dr. Raymundo, or the cardiologist in his place.    Unresulted Labs Ordered in the Past 30 Days of this Admission     Date and Time Order Name Status Description    2/7/2020 1231 Anaerobic bacterial culture Preliminary     2/7/2020 1230 Fungus Culture, non-blood Preliminary     2/2/2020 1633 Platelets prepare order unit conditional In process     1/30/2020 2235 Platelets prepare order unit conditional In process     1/30/2020 0635 Plasma prepare order unit conditional In process     1/29/2020 1574  Drug Screen Comp Med Report WB In process           Code Status   Full Code    Primary Care Physician   JS LAROSE YARI    Physical Exam   Temp: 98.6  F (37  C) Temp src: Oral BP: 114/71   Heart Rate: 80 Resp: 14 SpO2: 100 % O2 Device: None (Room air)    Vitals:    02/09/20 1101 02/10/20 0520 02/11/20 0500   Weight: 88.3 kg (194 lb 11.2 oz) 88.2 kg (194 lb 8 oz) 86 kg (189 lb 11.2 oz)     Vital Signs with Ranges  Temp:  [97.9  F (36.6  C)-99  F (37.2  C)] 98.6  F (37  C)  Heart Rate:  [80-91] 80  Resp:  [14-16] 14  BP: (113-138)/(70-81) 114/71  SpO2:  [96 %-100 %] 100 %  I/O last 3 completed shifts:  In: 500 [P.O.:480; I.V.:20]  Out: 600 [Urine:600]     Gen: Resting comfortably in bed, no acute distress  HEENT: PERRLA, EOMI, MMM  CV: RRR, no murmurs rubs or gallops. No LE edema.   Pulm: CTAB no wheezes or crackles. Normal respiratory effort on RA.  Abdomen: soft, nontender.  Neuro: No focal defects     Time Spent on this Encounter   I, Abilio Triana MD, personally saw the patient today and spent greater than 30 minutes discharging this patient.    Discharge Disposition   Discharged to home  Condition at discharge: Stable    Consultations This Hospital Stay   CORE CLINIC EVALUATION IP CONSULT  NUTRITION SERVICES ADULT IP CONSULT  CARDIAC REHAB IP CONSULT  ADVANCE DIRECTIVE IP CONSULT  VASCULAR ACCESS CARE ADULT IP CONSULT  PHARMACY TO DOSE VANCO  PHARMACY TO DOSE VANCO  NUTRITION SERVICES ADULT IP CONSULT  PHARMACY IP CONSULT  VASCULAR ACCESS ADULT IP CONSULT  PHARMACY IP CONSULT  PHARMACY TO DOSE VANCO  WOUND OSTOMY CONTINENCE NURSE  IP CONSULT  PHARMACY IP CONSULT  PHARMACY TO DOSE VANCO  PHARMACY IP CONSULT  PHARMACY IP CONSULT  PHYSICAL THERAPY ADULT IP CONSULT  OCCUPATIONAL THERAPY ADULT IP CONSULT  NUTRITION SERVICES ADULT IP CONSULT  VASCULAR ACCESS CARE ADULT IP CONSULT  PHARMACY TO DOSE WARFARIN  HEMATOLOGY ADULT IP CONSULT  PATIENT LEARNING CENTER IP CONSULT  PHYSICAL MEDICINE & REHAB ASSESSMENT  FOR REHAB PLACEMENT ADULT IP CONSULT  PHARMACY TO DOSE VANCO  PHARMACY TO DOSE WARFARIN  VASCULAR SURGERY ADULT IP CONSULT  INFECTIOUS DISEASE GENERAL ADULT IP CONSULT  SOCIAL WORK IP CONSULT  SMOKING CESSATION PROGRAM IP CONSULT    Discharge Orders      MRI Cardiac w/contrast    Please schedule for a few days prior to cardiology followup appointment.     Home care nursing referral      Cardiac Rehab Referral      Reason for your hospital stay    You were treated for viral myocarditis and poor heart function. Please follow up with your cardiologist for check-in on how you are doing with the scheduled appointments     Adult [facility] Follow-up and recommended labs and tests    Follow up with Dr. Raymundo, at [facility], within 2 weeks  for hospital follow- up. The following labs/tests are recommended: cardiac MRI with gadolinium.    Appointments on [place] and/or [facility] (with [facility] or [facility] provider or service). Call [phone] if you haven't heard regarding these appointments within 7 days of discharge.     Activity    Your activity upon discharge: activity as tolerated, no activity with resistance (pushing, pulling, lifting, etc).     Follow Up and recommended labs and tests    Follow up with Dr. Raymundo or associated provider/YANELY, at [facility], within 1 month  for hospital follow- up. Dr Raymundo has requested a time on 2/26/2020. The following labs/tests are recommended: cardiac MRI with gadolinium.    Please follow up with primary care physician to examine wound healing progress within 1-2 weeks.     Discharge Instructions    If you develop lightheadedness, dizziness, chest pain, SOB, worsening swelling - please return to the hospital. Please follow up with a cardiologist in 2 weeks. We would like you to get an cardiac MRI prior to following up withy your cardiologist.    Follow up with primary care physician to evaluate progress of wound healing in 1-2 weeks.     Full Code     Diet     Follow this diet upon discharge: Orders Placed This Encounter      Regular Diet Adult     Discharge Medications   Current Discharge Medication List      START taking these medications    Details   amoxicillin-clavulanate (AUGMENTIN) 875-125 MG tablet Take 1 tablet by mouth 2 times daily  Qty: 4 tablet, Refills: 0    Associated Diagnoses: Incisional infection      carvedilol (COREG) 6.25 MG tablet Take 1 tablet (6.25 mg) by mouth 2 times daily (with meals)  Qty: 60 tablet, Refills: 3    Associated Diagnoses: Acute combined systolic and diastolic heart failure (H)      ferrous sulfate (FEROSUL) 325 (65 Fe) MG tablet Take 1 tablet (325 mg) by mouth daily  Qty: 30 tablet, Refills: 3    Associated Diagnoses: Acute combined systolic and diastolic heart failure (H)      norethindrone (MICRONOR) 0.35 MG tablet 1 tablet (0.35 mg) by Oral or Feeding Tube route daily  Qty: 30 tablet, Refills: 11    Associated Diagnoses: Encounter for surveillance of contraceptive pills         CONTINUE these medications which have NOT CHANGED    Details   albuterol (PROAIR HFA/PROVENTIL HFA/VENTOLIN HFA) 108 (90 Base) MCG/ACT inhaler Inhale 2 puffs into the lungs every 4 hours as needed    Comments: Pharmacy may dispense brand covered by insurance (Proair, or proventil or ventolin or generic albuterol inhaler)         STOP taking these medications       predniSONE (DELTASONE) 20 MG tablet Comments:   Reason for Stopping:             Allergies   No Active Allergies  Data

## 2020-02-11 NOTE — PLAN OF CARE
"/81 (BP Location: Left arm)   Pulse 111   Temp 98.8  F (37.1  C) (Oral)   Resp 16   Ht 1.6 m (5' 3\")   Wt 86 kg (189 lb 11.2 oz)   SpO2 96%   BMI 33.60 kg/m      D: Patient admitted on 1/28/2020 from [facility] for Chest pain, SOB and found to have viral myocarditis s/p ECMO and IABP.  No significant PMH.  Transferred to [facility] for advance HF management.  I/A: Patient is on droplet precaution for Flu positive and started on tamiflu. Patient is alert and oriented X4, on room air on tele SR/ST, L-groin oozing serosanguineous drainage, reinforced with gauze 4 and Tegaderm.  Up independent and voiding spontaneously.    P: Monitor electrolyte and replace as per plan of care.  "

## 2020-02-11 NOTE — PLAN OF CARE
OT/CR 6C:   Discharge Planner OT   Patient plan for discharge: Home with OP Phase II CR  Current status: Pt able to ambulate 2 x 200ft with SBA and 12 min on NuStep between bouts. Pt SBA with bed mobility supine <> EOB and with STS transfers. Pt VSS on RA.  Barriers to return to prior living situation: deconditioning, acute medical needs, weakness, fatigue  Recommendations for discharge: Home with OP Phase II CR  Rationale for recommendations: Pt progressing well in therapy and is IND with all ADLs. Pt would benefit from OP Phase II CR to progress activity tolerance and maximize cardiac outcomes.       Entered by: Avani Feliz 02/11/2020 3:57 PM

## 2020-02-12 ENCOUNTER — NURSE TRIAGE (OUTPATIENT)
Dept: NURSING | Facility: CLINIC | Age: 35
End: 2020-02-12

## 2020-02-12 LAB
11OH-THC SERPL-MCNC: NEGATIVE NG/ML
7AMINOCLONAZEPAM BLD CFM-MCNC: NEGATIVE NG/ML
ALPRAZ SERPL-MCNC: NEGATIVE NG/ML
AMPHETAMINES SPEC-MCNC: NEGATIVE NG/ML
APAP BLD-MCNC: NEGATIVE UG/ML
BARBITURATES SPEC-MCNC: NEGATIVE UG/ML
BENZODIAZ SERPL QL: POSITIVE
BENZODIAZ SPEC-MCNC: POSITIVE NG/ML
BUPRENORPHINE SERPLBLD-MCNC: NEGATIVE NG/ML
CANNABIDIOL: NEGATIVE NG/ML
CANNABINOIDS BLD CFM-MCNC: NEGATIVE NG/ML
CANNABINOIDS SERPL QL CFM: NORMAL
CANNABINOL: NEGATIVE NG/ML
CARBOXYTHC BLD-MCNC: ABNORMAL NG/ML
CARBOXYTHC UR QL: NORMAL
CARISOPRODOL IA: NEGATIVE UG/ML
CHLORDIAZEP SERPL-MCNC: NEGATIVE NG/ML
CLONAZEPAM SERPL CFM-MCNC: NEGATIVE NG/ML
COCAINE METABOLITE IA: NEGATIVE NG/ML
DECLARED MEDICATIONS: ABNORMAL
DESALKYLFLURAZ SERPL-MCNC: NEGATIVE NG/ML
DIAZEPAM SERPL-MCNC: NEGATIVE NG/ML
ETHANOL BLD-MCNC: NEGATIVE GM/DL
FENTANYL IA: NEGATIVE NG/ML
FLURAZEPAM SERPL-MCNC: NEGATIVE NG/ML
GABAPENTIN IA: NEGATIVE UG/ML
LORAZEPAM BLD CFM-MCNC: NEGATIVE NG/ML
MEPERIDINE SERPLBLD-MCNC: NEGATIVE NG/ML
METHADONE BLD-MCNC: NEGATIVE NG/ML
MIDAZOLAM BLD CFM-MCNC: 156 NG/ML
NORCHLORDIAZEP SERPL-MCNC: NEGATIVE NG/ML
NORDIAZEPAM SERPL-MCNC: NEGATIVE NG/ML
OPIATES SPEC-MCNC: NEGATIVE NG/ML
OTHER DRUGS DETECTED: POSITIVE
OXAZEPAM SERPL-MCNC: NEGATIVE NG/ML
OXYCODONE SERPLBLD SCN-MCNC: NEGATIVE NG/ML
PCP SPEC-MCNC: NEGATIVE NG/ML
PROPOXYPH SPEC-MCNC: NEGATIVE NG/ML
TEMAZEPAM SERPL-MCNC: NEGATIVE NG/ML
TRAMADOL BLD-MCNC: NEGATIVE NG/ML
TRIAZOLAM BLD-MCNC: NEGATIVE NG/ML

## 2020-02-12 NOTE — PLAN OF CARE
Occupational Therapy Discharge Summary    Reason for therapy discharge:    Discharged to home with outpatient therapy.    Progress towards therapy goal(s). See goals on Care Plan in [facility] electronic health record for goal details.  Goals partially met.  Barriers to achieving goals:   discharge from facility.    Therapy recommendation(s):    Recommend discharge to home with OP phase II CR to progress aerobic conditioning and maximize functional/cardiac outcomes.

## 2020-02-12 NOTE — PROGRESS NOTES
Late Entry Brief Update:    SW received medical leave paperwork fax from [facility] today 2/12/2020 for pt. SW contacted [facility] asking if this was additional information that was needed, SW was advised that they received a fax with the physician statement earlier today and to dis regard and that [facility] would contact the pt (their associate) if anything additional was needed.  LOGAN notified Cards 2 (Abilio).    IGOR Sinha, [facility]   [facility]  Pager: [phone]

## 2020-02-12 NOTE — PLAN OF CARE
Physical Therapy Discharge Summary    Reason for therapy discharge:    Discharged to home.    Progress towards therapy goal(s). See goals on Care Plan in [facility] electronic health record for goal details.  Goals not met.  Barriers to achieving goals:   discharge from facility.    Therapy recommendation(s):    Continued therapy is recommended.  Rationale/Recommendations:  to progress strength and endurance and maximize functional mobility IND.

## 2020-02-13 DIAGNOSIS — I50.41 ACUTE COMBINED SYSTOLIC AND DIASTOLIC HEART FAILURE (H): Primary | ICD-10-CM

## 2020-02-13 NOTE — TELEPHONE ENCOUNTER
"  Reason for Disposition    Caller has medication question, adult has minor symptoms, caller declines triage, and triager answers question    Additional Information    Negative: Drug overdose and nurse unable to answer question    Negative: Caller requesting information not related to medicine    Negative: Caller requesting a prescription for Strep throat and has a positive culture result    Negative: Rash while taking a medication or within 3 days of stopping it    Negative: Immunization reaction suspected    Negative: [1] Asthma and [2] having symptoms of asthma (cough, wheezing, etc)    Negative: MORE THAN A DOUBLE DOSE of a prescription or over-the-counter (OTC) drug    Negative: [1] DOUBLE DOSE (an extra dose or lesser amount) of over-the-counter (OTC) drug AND [2] any symptoms (e.g., dizziness, nausea, pain, sleepiness)    Negative: [1] DOUBLE DOSE (an extra dose or lesser amount) of prescription drug AND [2] any symptoms (e.g., dizziness, nausea, pain, sleepiness)    Negative: Took another person's prescription drug    Negative: [1] DOUBLE DOSE (an extra dose or lesser amount) of prescription drug AND [2] NO symptoms (Exception: a double dose of antibiotics)    Negative: Diabetes drug error or overdose (e.g., insulin or extra dose)    Negative: [1] Request for URGENT new prescription or refill of \"essential\" medication (i.e., likelihood of harm to patient if not taken) AND [2] triager unable to fill per unit policy    Negative: [1] Prescription not at pharmacy AND [2] was prescribed today by PCP    Negative: Pharmacy calling with prescription questions and triager unable to answer question    Negative: Caller has URGENT medication question about med that PCP prescribed and triager unable to answer question    Negative: Caller has NON-URGENT medication question about med that PCP prescribed and triager unable to answer question    Negative: Caller requesting a NON-URGENT new prescription or refill and triager " "unable to refill per unit policy    Negative: Caller has medication question about med not prescribed by PCP and triager unable to answer question (e.g., compatibility with other med, storage)    Negative: [1] DOUBLE DOSE (an extra dose or lesser amount) of over-the-counter (OTC) drug AND [2] NO symptoms    Negative: [1] DOUBLE DOSE (an extra dose or lesser amount) of antibiotic drug AND [2] NO symptoms    Negative: Caller has medication question only, adult not sick, and triager answers question    Answer Assessment - Initial Assessment Questions  1. SYMPTOMS: \"Do you have any symptoms?\"      Aching at ECMO site  2. SEVERITY: If symptoms are present, ask \"Are they mild, moderate or severe?\"      mild    Protocols used: MEDICATION QUESTION CALL-A-AH    "

## 2020-02-13 NOTE — TELEPHONE ENCOUNTER
Patient has a question about taking tylenol for pain. She only has tylenol arthritis formula. I advised that this is OK to take. She should not take more than 4000mg per day. Verbalizes understanding.  Shauna Reilly RN  [facility]

## 2020-02-14 LAB
BACTERIA SPEC CULT: NORMAL
Lab: NORMAL
SPECIMEN SOURCE: NORMAL

## 2020-02-18 ENCOUNTER — OFFICE VISIT (OUTPATIENT)
Dept: CARDIOLOGY | Facility: CLINIC | Age: 35
End: 2020-02-18
Attending: NURSE PRACTITIONER
Payer: COMMERCIAL

## 2020-02-18 VITALS
HEIGHT: 64 IN | HEART RATE: 66 BPM | SYSTOLIC BLOOD PRESSURE: 108 MMHG | BODY MASS INDEX: 32.25 KG/M2 | OXYGEN SATURATION: 100 % | DIASTOLIC BLOOD PRESSURE: 64 MMHG | WEIGHT: 188.9 LBS

## 2020-02-18 DIAGNOSIS — I50.41 ACUTE COMBINED SYSTOLIC AND DIASTOLIC HEART FAILURE (H): ICD-10-CM

## 2020-02-18 DIAGNOSIS — I50.22 CHRONIC SYSTOLIC HEART FAILURE (H): Primary | ICD-10-CM

## 2020-02-18 LAB
ANION GAP SERPL CALCULATED.3IONS-SCNC: 4 MMOL/L (ref 3–14)
BUN SERPL-MCNC: 4 MG/DL (ref 7–30)
CALCIUM SERPL-MCNC: 9.2 MG/DL (ref 8.5–10.1)
CHLORIDE SERPL-SCNC: 105 MMOL/L (ref 94–109)
CO2 SERPL-SCNC: 28 MMOL/L (ref 20–32)
CREAT SERPL-MCNC: 0.53 MG/DL (ref 0.52–1.04)
GFR SERPL CREATININE-BSD FRML MDRD: >90 ML/MIN/{1.73_M2}
GLUCOSE SERPL-MCNC: 89 MG/DL (ref 70–99)
NT-PROBNP SERPL-MCNC: 178 PG/ML (ref 0–125)
POTASSIUM SERPL-SCNC: 3.9 MMOL/L (ref 3.4–5.3)
SODIUM SERPL-SCNC: 137 MMOL/L (ref 133–144)

## 2020-02-18 PROCEDURE — 99214 OFFICE O/P EST MOD 30 MIN: CPT | Mod: ZP | Performed by: NURSE PRACTITIONER

## 2020-02-18 PROCEDURE — 80048 BASIC METABOLIC PNL TOTAL CA: CPT | Performed by: NURSE PRACTITIONER

## 2020-02-18 PROCEDURE — G0463 HOSPITAL OUTPT CLINIC VISIT: HCPCS | Mod: ZF

## 2020-02-18 PROCEDURE — 36415 COLL VENOUS BLD VENIPUNCTURE: CPT | Performed by: NURSE PRACTITIONER

## 2020-02-18 PROCEDURE — 83880 ASSAY OF NATRIURETIC PEPTIDE: CPT | Performed by: NURSE PRACTITIONER

## 2020-02-18 RX ORDER — ACETAMINOPHEN 325 MG/1
325-650 TABLET ORAL PRN
COMMUNITY
Start: 2017-08-08 | End: 2021-04-11

## 2020-02-18 RX ORDER — LISINOPRIL 5 MG/1
5 TABLET ORAL DAILY
Qty: 90 TABLET | Refills: 1 | Status: SHIPPED | OUTPATIENT
Start: 2020-02-18 | End: 2021-06-01

## 2020-02-18 ASSESSMENT — PAIN SCALES - GENERAL: PAINLEVEL: NO PAIN (0)

## 2020-02-18 ASSESSMENT — MIFFLIN-ST. JEOR: SCORE: 1543.9

## 2020-02-18 NOTE — PATIENT INSTRUCTIONS
Take your medicines every day, as directed    Changes made today:  o Start taking lisinopril 5mg once daily, in the evening.  o Call us with any new or changing symptoms.  o Try to weigh yourself regularly --> same time of day, same clothing.  8:00am works well --> try to be as consistent with timing and amount of clothing, so we can see what your weight trends are doing.  o Follow up with Dr. Raymundo next week.   Monitor Your Weight and Symptoms    Contact us if you:      Gain 2 pounds in one day or 5 pounds in one week    Feel more short of breath    Notice more leg swelling    Feel lightheadeded   Change your lifestyle    Limit Salt or Sodium:    2000 mg  Limit Fluids:    2000 mL or approximately 64 ounces  Eat a Heart Healthy Diet    Low in saturated fats  Stay Active:    Aim to move at least 150 minutes every  week         To Contact us    During Business Hours:  [phone], option # 1 (University)  Then option # 4 (medical questions)     After hours, weekends or holidays:   [phone], Option #4  Ask to speak to the On-Call Cardiologist. Inform them you are a CORE/heart failure patient at the [facility].     Use [employer]^It allows you to communicate directly with your heart team through secure messaging.    [employer] can be accessed any time on your phone, computer, or tablet.    If you need assistance, we'd be happy to help!         Keep your Heart Appointments:    As scheduled next week

## 2020-02-18 NOTE — PROGRESS NOTES
"Advanced Heart Failure Clinic -- CORE Evaluation  February 18, 2020    HPI:   Ms. Azra Tom is a 34yr old female with recent diagnosis of systolic heart failure secondary to NICM (thought to be secondary to viral myocarditis) who presents to [facility] for evaluation and follow up of recent hospitalization.      She presented to OSH 1/27 with a several day history of chest pain and SOB, where she was found to have LVEF 25-30%.  She underwent RHC 1/28, where she developed hypotension.  She was placed on IABP support, and started on pressors.  cMRI showed anterolateral subepicardial enhancement, concerning for possible myocarditis.  RHC showed RA 18, PCW 18, CI 1.26.  She was also found to be positive for influenza, so was started on Tamiflu and transferred to [facility].    On arrival to [facility], she was found to be in cardiogenic shock.  She was placed on VA ECMO 1/29, and decannulated 2/3.  Echo 2/3 showed LVEF 55-60%.  She had some vasoplegia post-ECMO, requiring pressors for 1 day.  She underwent a cardiac biopsy, which showed focal mild chronic inflammation with myocytolysis, consistent with myocarditis.  Her post-ECMO course was c/b left pseudoaneurysm at the cannula site, wound infection, and local cellulitis (s/p 10-day course of antibiotics).  She ultimately discharged home 2/11.    Since discharge, she states that she feels better.  She is walking regularly, with no exertional concerns but she states that she gets weak at times.  She plans to start cardiac rehab 2/20.  She notes a \"funny\" feeling throughout the day, sometimes related to when she takes carvedilol --> she states that her chest feels \"odd\" and \"not painful, but possibly pressure/heaviness.\"  Her breathing remains stable, and she denies SOB, PND, and orthopnea.  She is not weighing herself at home, but believes that her weight has remained stable.  She does not feel any fluid retention.  She is eating, with good appetite and no nausea, vomiting, or " diarrhea.  She has been trying to limit her salt intake, and is not sure how much fluid she is drinking.  She is not checking her BPs.  She has been taking her birth control, but is wondering if she needs to continue this as her tubes are tied.  She otherwise denies chest pain, palpitations, dizziness, headaches, acute vision changes, fevers, chills, cough, sore throat, and signs of bleeding.  Note that she plans to move to [place] ~mid March.    PAST MEDICAL HISTORY:  Past Medical History:   Diagnosis Date     Atypical chest pain     history of atypical chest pain in 2017     Bipolar disorder (H)     diagnosed in [place], [place] in 2017     Dyslipidemia      Tobacco use      Uncomplicated asthma        FAMILY HISTORY:  No family history on file.    SOCIAL HISTORY:  Social History     Socioeconomic History     Marital status: Single     Spouse name: None     Number of children: None     Years of education: None     Highest education level: None   Occupational History     None   Social Needs     Financial resource strain: None     Food insecurity:     Worry: None     Inability: None     Transportation needs:     Medical: None     Non-medical: None   Tobacco Use     Smoking status: Former Smoker     Smokeless tobacco: Never Used   Substance and Sexual Activity     Alcohol use: Not Currently     Drug use: Not Currently     Sexual activity: None   Lifestyle     Physical activity:     Days per week: None     Minutes per session: None     Stress: None   Relationships     Social connections:     Talks on phone: None     Gets together: None     Attends Jehovah's witness service: None     Active member of club or organization: None     Attends meetings of clubs or organizations: None     Relationship status: None     Intimate partner violence:     Fear of current or ex partner: None     Emotionally abused: None     Physically abused: None     Forced sexual activity: None   Other Topics Concern     None   Social History Narrative     None  "      CURRENT MEDICATIONS:  Current Outpatient Medications   Medication Sig Dispense Refill     acetaminophen (TYLENOL) 325 MG tablet Take 325-650 mg by mouth as needed       albuterol (PROAIR HFA/PROVENTIL HFA/VENTOLIN HFA) 108 (90 Base) MCG/ACT inhaler Inhale 2 puffs into the lungs every 4 hours as needed       carvedilol (COREG) 6.25 MG tablet Take 1 tablet (6.25 mg) by mouth 2 times daily (with meals) 60 tablet 3     ferrous sulfate (FEROSUL) 325 (65 Fe) MG tablet Take 1 tablet (325 mg) by mouth daily 30 tablet 3     norethindrone (MICRONOR) 0.35 MG tablet 1 tablet (0.35 mg) by Oral or Feeding Tube route daily 30 tablet 11       ROS:   Constitutional: No fever, chills, or sweats. Stable weight.   ENT: No visual disturbance, ear ache, epistaxis, sore throat.   Allergies/Immunologic: Negative.   Respiratory: No cough, hemoptysis.   Cardiovascular: As per HPI.   GI: No nausea, vomiting, hematemesis, melena, or hematochezia.   : No urinary frequency, dysuria, or hematuria.   Integument: Negative.   Psychiatric: Negative.   Neuro: Negative.   Endocrinology: Negative.   Musculoskeletal: Negative.    EXAM:  /64 (BP Location: Left arm, Patient Position: Chair, Cuff Size: Adult Regular)   Pulse 66   Ht 1.613 m (5' 3.5\")   Wt 85.7 kg (188 lb 14.4 oz)   SpO2 100%   BMI 32.94 kg/m    General: appears comfortable, alert and articulate  Head: normocephalic, atraumatic  Eyes: anicteric sclera, EOMI  Neck: no adenopathy  Orophyarynx: moist mucosa, no lesions, dentition intact  Heart: regular, S1/S2, no murmur, gallop, rub, JVD negative when sitting upright  Lungs: clear, no rales or wheezing  Abdomen: soft, non-tender, bowel sounds present, no hepatosplenomegaly  Extremities: no clubbing, cyanosis or LE edema  Neurological: normal speech and affect, no gross motor deficits  Integument: no rashes or jaundice.  Left groin wound open, no s/s infection or tenderness    Labs:  CBC RESULTS:  Lab Results   Component Value " Date    WBC 10.9 02/11/2020    RBC 2.84 (L) 02/11/2020    HGB 8.3 (L) 02/11/2020    HCT 27.4 (L) 02/11/2020    MCV 97 02/11/2020    MCH 29.2 02/11/2020    MCHC 30.3 (L) 02/11/2020    RDW 15.4 (H) 02/11/2020     02/11/2020       CMP RESULTS:  Lab Results   Component Value Date     02/18/2020    POTASSIUM 3.9 02/18/2020    CHLORIDE 105 02/18/2020    CO2 28 02/18/2020    ANIONGAP 4 02/18/2020    GLC 89 02/18/2020    BUN 4 (L) 02/18/2020    CR 0.53 02/18/2020    GFRESTIMATED >90 02/18/2020    GFRESTBLACK >90 02/18/2020    YIN 9.2 02/18/2020    BILITOTAL 1.3 02/04/2020    ALBUMIN 2.4 (L) 02/04/2020    ALKPHOS 89 02/04/2020    ALT 35 02/04/2020    AST 43 02/04/2020        INR RESULTS:  Lab Results   Component Value Date    INR 1.20 (H) 02/08/2020       Lab Results   Component Value Date    MAG 2.4 (H) 02/05/2020     No results found for: NTBNPI  Lab Results   Component Value Date    NTBNP 178 (H) 02/18/2020       Assessment and Plan:   Ms. Azra Tom is a 34yr old female with recent diagnosis of systolic heart failure secondary to NICM (thought to be secondary to viral myocarditis) who presents to [facility] for evaluation and follow up of recent hospitalization.      Labs today show stable electrolytes and kidney function.    Mr. Tom appears well today.  Her weight appears stable since discharge, and she appears euvolemic.  Her BPs remain stable.  Advised that she start taking lisinopril, and to check her BPs periodically.  She is to start CR this week, so asked that she monitor her BPs there.      Acute systolic heart failure secondary to NICM (thoguht to be secondary to viral myocarditis)  Cardiogenic shock, resolved  Stage C, NYHA Class II  - ACEi/ARB/ARNi:  Starting lisinopril 5mg daily today  - BB:  Carvedilol 6.25mg twice daily  - Aldosterone antagonist:  N/a, LVEF was > 35% per TTE 2/3  - SCD ppx:  Insurance denied LifeVest.  But post-ECMO TTE showed LVEF 55-60%  - fluid status:  Euvolemic, not on  diuretics    Left groin pseudoaneurysm  Needs to follow up with vascular.    Health maintenance  Advised that she follow up with her GYN re: her birth control and concerns for a yeast infection.      The above was reviewed with Ms. Tom, who verbalized understanding and will call with further questions/concerns.    60+ minutes spent face-to-face with patient, with >50% in counseling and/or coordination of care as described above.      Carol Richard DNP, FNP-BC, CHFN  Advanced Heart Failure Nurse Practitioner  [facility]  Patient Care Team:  Js Hernandez as PCP - General (Internal Medicine)  Care, [facility] (HOME HEALTH AGENCY ([facility]), ([place]))  Alka Mckenzie, RN as Specialty Care Coordinator (Cardiology)  Carol Richard NP as Nurse Practitioner (Nurse Practitioner)  Audie Raymundo MD as MD (Cardiology)  Abilio Mulligan MD as MD (Cardiology)  JS HERNANDEZ

## 2020-02-18 NOTE — NURSING NOTE
Chief Complaint   Patient presents with     New Patient     34 year old female presents with myocarditis now with recovered EF for hospital follow up and to establish care with labs prior     Vitals were taken and medications reconciled.     Brock Levin CMA  9:27 AM

## 2020-02-18 NOTE — LETTER
"2/18/2020      RE: Azra Tom  [address]  [place] [address]       Dear Colleague,    Thank you for the opportunity to participate in the care of your patient, Azra Tom, at the [facility]. Please see a copy of my visit note below.    Advanced Heart Failure Clinic -- CORE Evaluation  February 18, 2020    HPI:   Ms. Azra Tom is a 34yr old female with recent diagnosis of systolic heart failure secondary to NICM (thought to be secondary to viral myocarditis) who presents to [facility] for evaluation and follow up of recent hospitalization.      She presented to OSH 1/27 with a several day history of chest pain and SOB, where she was found to have LVEF 25-30%.  She underwent RHC 1/28, where she developed hypotension.  She was placed on IABP support, and started on pressors.  cMRI showed anterolateral subepicardial enhancement, concerning for possible myocarditis.  RHC showed RA 18, PCW 18, CI 1.26.  She was also found to be positive for influenza, so was started on Tamiflu and transferred to [facility].    On arrival to [facility], she was found to be in cardiogenic shock.  She was placed on VA ECMO 1/29, and decannulated 2/3.  Echo 2/3 showed LVEF 55-60%.  She had some vasoplegia post-ECMO, requiring pressors for 1 day.  She underwent a cardiac biopsy, which showed focal mild chronic inflammation with myocytolysis, consistent with myocarditis.  Her post-ECMO course was c/b left pseudoaneurysm at the cannula site, wound infection, and local cellulitis (s/p 10-day course of antibiotics).  She ultimately discharged home 2/11.    Since discharge, she states that she feels better.  She is walking regularly, with no exertional concerns but she states that she gets weak at times.  She plans to start cardiac rehab 2/20.  She notes a \"funny\" feeling throughout the day, sometimes related to when she takes carvedilol --> she states that her chest feels " "\"odd\" and \"not painful, but possibly pressure/heaviness.\"  Her breathing remains stable, and she denies SOB, PND, and orthopnea.  She is not weighing herself at home, but believes that her weight has remained stable.  She does not feel any fluid retention.  She is eating, with good appetite and no nausea, vomiting, or diarrhea.  She has been trying to limit her salt intake, and is not sure how much fluid she is drinking.  She is not checking her BPs.  She has been taking her birth control, but is wondering if she needs to continue this as her tubes are tied.  She otherwise denies chest pain, palpitations, dizziness, headaches, acute vision changes, fevers, chills, cough, sore throat, and signs of bleeding.  Note that she plans to move to [place] ~mid March.    PAST MEDICAL HISTORY:  Past Medical History:   Diagnosis Date     Atypical chest pain     history of atypical chest pain in 2017     Bipolar disorder (H)     diagnosed in [place], [place] in 2017     Dyslipidemia      Tobacco use      Uncomplicated asthma        FAMILY HISTORY:  No family history on file.    SOCIAL HISTORY:  Social History     Socioeconomic History     Marital status: Single     Spouse name: None     Number of children: None     Years of education: None     Highest education level: None   Occupational History     None   Social Needs     Financial resource strain: None     Food insecurity:     Worry: None     Inability: None     Transportation needs:     Medical: None     Non-medical: None   Tobacco Use     Smoking status: Former Smoker     Smokeless tobacco: Never Used   Substance and Sexual Activity     Alcohol use: Not Currently     Drug use: Not Currently     Sexual activity: None   Lifestyle     Physical activity:     Days per week: None     Minutes per session: None     Stress: None   Relationships     Social connections:     Talks on phone: None     Gets together: None     Attends Hindu service: None     Active member of club or " "organization: None     Attends meetings of clubs or organizations: None     Relationship status: None     Intimate partner violence:     Fear of current or ex partner: None     Emotionally abused: None     Physically abused: None     Forced sexual activity: None   Other Topics Concern     None   Social History Narrative     None       CURRENT MEDICATIONS:  Current Outpatient Medications   Medication Sig Dispense Refill     acetaminophen (TYLENOL) 325 MG tablet Take 325-650 mg by mouth as needed       albuterol (PROAIR HFA/PROVENTIL HFA/VENTOLIN HFA) 108 (90 Base) MCG/ACT inhaler Inhale 2 puffs into the lungs every 4 hours as needed       carvedilol (COREG) 6.25 MG tablet Take 1 tablet (6.25 mg) by mouth 2 times daily (with meals) 60 tablet 3     ferrous sulfate (FEROSUL) 325 (65 Fe) MG tablet Take 1 tablet (325 mg) by mouth daily 30 tablet 3     norethindrone (MICRONOR) 0.35 MG tablet 1 tablet (0.35 mg) by Oral or Feeding Tube route daily 30 tablet 11       ROS:   Constitutional: No fever, chills, or sweats. Stable weight.   ENT: No visual disturbance, ear ache, epistaxis, sore throat.   Allergies/Immunologic: Negative.   Respiratory: No cough, hemoptysis.   Cardiovascular: As per HPI.   GI: No nausea, vomiting, hematemesis, melena, or hematochezia.   : No urinary frequency, dysuria, or hematuria.   Integument: Negative.   Psychiatric: Negative.   Neuro: Negative.   Endocrinology: Negative.   Musculoskeletal: Negative.    EXAM:  /64 (BP Location: Left arm, Patient Position: Chair, Cuff Size: Adult Regular)   Pulse 66   Ht 1.613 m (5' 3.5\")   Wt 85.7 kg (188 lb 14.4 oz)   SpO2 100%   BMI 32.94 kg/m    General: appears comfortable, alert and articulate  Head: normocephalic, atraumatic  Eyes: anicteric sclera, EOMI  Neck: no adenopathy  Orophyarynx: moist mucosa, no lesions, dentition intact  Heart: regular, S1/S2, no murmur, gallop, rub, JVD negative when sitting upright  Lungs: clear, no rales or " wheezing  Abdomen: soft, non-tender, bowel sounds present, no hepatosplenomegaly  Extremities: no clubbing, cyanosis or LE edema  Neurological: normal speech and affect, no gross motor deficits  Integument: no rashes or jaundice.  Left groin wound open, no s/s infection or tenderness    Labs:  CBC RESULTS:  Lab Results   Component Value Date    WBC 10.9 02/11/2020    RBC 2.84 (L) 02/11/2020    HGB 8.3 (L) 02/11/2020    HCT 27.4 (L) 02/11/2020    MCV 97 02/11/2020    MCH 29.2 02/11/2020    MCHC 30.3 (L) 02/11/2020    RDW 15.4 (H) 02/11/2020     02/11/2020       CMP RESULTS:  Lab Results   Component Value Date     02/18/2020    POTASSIUM 3.9 02/18/2020    CHLORIDE 105 02/18/2020    CO2 28 02/18/2020    ANIONGAP 4 02/18/2020    GLC 89 02/18/2020    BUN 4 (L) 02/18/2020    CR 0.53 02/18/2020    GFRESTIMATED >90 02/18/2020    GFRESTBLACK >90 02/18/2020    YIN 9.2 02/18/2020    BILITOTAL 1.3 02/04/2020    ALBUMIN 2.4 (L) 02/04/2020    ALKPHOS 89 02/04/2020    ALT 35 02/04/2020    AST 43 02/04/2020        INR RESULTS:  Lab Results   Component Value Date    INR 1.20 (H) 02/08/2020       Lab Results   Component Value Date    MAG 2.4 (H) 02/05/2020     No results found for: NTBNPI  Lab Results   Component Value Date    NTBNP 178 (H) 02/18/2020       Assessment and Plan:   Ms. Azra Tom is a 34yr old female with recent diagnosis of systolic heart failure secondary to NICM (thought to be secondary to viral myocarditis) who [facility] to [facility] for evaluation and follow up of recent hospitalization.      Labs today show stable electrolytes and kidney function.    Mr. Tom appears well today.  Her weight appears stable since discharge, and she appears euvolemic.  Her BPs remain stable.  Advised that she start taking lisinopril, and to check her BPs periodically.  She is to start CR this week, so asked that she monitor her BPs there.      Acute systolic heart failure secondary to NICM (thoguht to be secondary to  viral myocarditis)  Cardiogenic shock, resolved  Stage C, NYHA Class II  - ACEi/ARB/ARNi:  Starting lisinopril 5mg daily today  - BB:  Carvedilol 6.25mg twice daily  - Aldosterone antagonist:  N/a, LVEF was > 35% per TTE 2/3  - SCD ppx:  Insurance denied LifeVest.  But post-ECMO TTE showed LVEF 55-60%  - fluid status:  Euvolemic, not on diuretics    Left groin pseudoaneurysm  Needs to follow up with vascular.    Health maintenance  Advised that she follow up with her GYN re: her birth control and concerns for a yeast infection.      The above was reviewed with Ms. Vaishali, who verbalized understanding and will call with further questions/concerns.    60+ minutes spent face-to-face with patient, with >50% in counseling and/or coordination of care as described above.      Carol Richard DNP, FNP-BC, CHFN  Advanced Heart Failure Nurse Practitioner  [facility]  Patient Care Team:  Js Hernandez as PCP - General (Internal Medicine)  Care, [facility] ([facility]ALTH AGENCY ([facility] ([facility]))  Alka Mckenzie, RN as Specialty Care Coordinator (Cardiology)  Carol Richard NP as Nurse Practitioner (Nurse Practitioner)  Audie Raymundo MD as MD (Cardiology)  Abilio Mulligan MD as MD (Cardiology)  JS HERNANDEZ

## 2020-02-20 ENCOUNTER — TELEPHONE (OUTPATIENT)
Dept: CARDIOLOGY | Facility: CLINIC | Age: 35
End: 2020-02-20

## 2020-02-20 ENCOUNTER — HOSPITAL ENCOUNTER (OUTPATIENT)
Dept: CARDIAC REHAB | Facility: CLINIC | Age: 35
End: 2020-02-20
Payer: COMMERCIAL

## 2020-02-20 DIAGNOSIS — I50.41 ACUTE COMBINED SYSTOLIC AND DIASTOLIC HEART FAILURE (H): ICD-10-CM

## 2020-02-20 PROCEDURE — 40000116 ZZH STATISTIC OP CR VISIT

## 2020-02-20 PROCEDURE — 93798 PHYS/QHP OP CAR RHAB W/ECG: CPT

## 2020-02-20 NOTE — TELEPHONE ENCOUNTER
[facility] Call Center    Phone Message    May a detailed message be left on voicemail: yes     Reason for Call: Other: Azra calling to request a call back. She has some questions on her medications and would like to speak with a nurse. Please call her back to discuss.      Action Taken: Message routed to:  [facility] ([facility]): sourav cardio    Travel Screening: Not Applicable

## 2020-02-20 NOTE — TELEPHONE ENCOUNTER
Returned call to Brock. SHe reports since starting the lisinopril on Tuesday she has not been sleeping well. She is wondering if she can take it in the morning instead of the evening. I let her know this is okay. She last took dose yesterday evening so she will not take any today and begin morning dosage tomorrow.  She also reports she is on levaquin for her groin site infection which was reportedly a bacterial infection per pt report. brock reports no further questions at this time.

## 2020-02-24 ENCOUNTER — HOSPITAL ENCOUNTER (OUTPATIENT)
Dept: CARDIAC REHAB | Facility: CLINIC | Age: 35
End: 2020-02-24
Attending: STUDENT IN AN ORGANIZED HEALTH CARE EDUCATION/TRAINING PROGRAM
Payer: COMMERCIAL

## 2020-02-24 PROCEDURE — 40000116 ZZH STATISTIC OP CR VISIT

## 2020-02-24 PROCEDURE — 93798 PHYS/QHP OP CAR RHAB W/ECG: CPT

## 2020-02-25 ENCOUNTER — NURSE TRIAGE (OUTPATIENT)
Dept: NURSING | Facility: CLINIC | Age: 35
End: 2020-02-25

## 2020-02-25 ENCOUNTER — APPOINTMENT (OUTPATIENT)
Dept: GENERAL RADIOLOGY | Facility: CLINIC | Age: 35
End: 2020-02-25
Attending: EMERGENCY MEDICINE
Payer: COMMERCIAL

## 2020-02-25 ENCOUNTER — HOSPITAL ENCOUNTER (EMERGENCY)
Facility: CLINIC | Age: 35
Discharge: HOME | End: 2020-02-25
Attending: EMERGENCY MEDICINE | Admitting: EMERGENCY MEDICINE
Payer: COMMERCIAL

## 2020-02-25 VITALS
OXYGEN SATURATION: 99 % | HEART RATE: 66 BPM | BODY MASS INDEX: 31.59 KG/M2 | RESPIRATION RATE: 16 BRPM | DIASTOLIC BLOOD PRESSURE: 61 MMHG | SYSTOLIC BLOOD PRESSURE: 113 MMHG | WEIGHT: 181.2 LBS | TEMPERATURE: 98.4 F

## 2020-02-25 DIAGNOSIS — R07.82 INTERCOSTAL PAIN: ICD-10-CM

## 2020-02-25 LAB
ANION GAP SERPL CALCULATED.3IONS-SCNC: 4 MMOL/L (ref 3–14)
BASOPHILS # BLD AUTO: 0 10E9/L (ref 0–0.2)
BASOPHILS NFR BLD AUTO: 0.4 %
BUN SERPL-MCNC: 10 MG/DL (ref 7–30)
CALCIUM SERPL-MCNC: 9.2 MG/DL (ref 8.5–10.1)
CHLORIDE SERPL-SCNC: 104 MMOL/L (ref 94–109)
CO2 SERPL-SCNC: 28 MMOL/L (ref 20–32)
CREAT SERPL-MCNC: 0.65 MG/DL (ref 0.52–1.04)
DIFFERENTIAL METHOD BLD: ABNORMAL
EOSINOPHIL # BLD AUTO: 0.1 10E9/L (ref 0–0.7)
EOSINOPHIL NFR BLD AUTO: 1.3 %
ERYTHROCYTE [DISTWIDTH] IN BLOOD BY AUTOMATED COUNT: 13.5 % (ref 10–15)
GFR SERPL CREATININE-BSD FRML MDRD: >90 ML/MIN/{1.73_M2}
GLUCOSE SERPL-MCNC: 92 MG/DL (ref 70–99)
HCT VFR BLD AUTO: 37.3 % (ref 35–47)
HGB BLD-MCNC: 11.6 G/DL (ref 11.7–15.7)
IMM GRANULOCYTES # BLD: 0 10E9/L (ref 0–0.4)
IMM GRANULOCYTES NFR BLD: 0.4 %
LYMPHOCYTES # BLD AUTO: 1.9 10E9/L (ref 0.8–5.3)
LYMPHOCYTES NFR BLD AUTO: 35.2 %
MCH RBC QN AUTO: 28.4 PG (ref 26.5–33)
MCHC RBC AUTO-ENTMCNC: 31.1 G/DL (ref 31.5–36.5)
MCV RBC AUTO: 91 FL (ref 78–100)
MONOCYTES # BLD AUTO: 0.6 10E9/L (ref 0–1.3)
MONOCYTES NFR BLD AUTO: 10.9 %
NEUTROPHILS # BLD AUTO: 2.8 10E9/L (ref 1.6–8.3)
NEUTROPHILS NFR BLD AUTO: 51.8 %
NRBC # BLD AUTO: 0 10*3/UL
NRBC BLD AUTO-RTO: 0 /100
NT-PROBNP SERPL-MCNC: 47 PG/ML (ref 0–450)
PLATELET # BLD AUTO: 187 10E9/L (ref 150–450)
POTASSIUM SERPL-SCNC: 3.9 MMOL/L (ref 3.4–5.3)
RBC # BLD AUTO: 4.09 10E12/L (ref 3.8–5.2)
SODIUM SERPL-SCNC: 135 MMOL/L (ref 133–144)
TROPONIN I SERPL-MCNC: <0.015 UG/L (ref 0–0.04)
TROPONIN I SERPL-MCNC: <0.015 UG/L (ref 0–0.04)
WBC # BLD AUTO: 5.3 10E9/L (ref 4–11)

## 2020-02-25 PROCEDURE — 84484 ASSAY OF TROPONIN QUANT: CPT | Performed by: EMERGENCY MEDICINE

## 2020-02-25 PROCEDURE — 93010 ELECTROCARDIOGRAM REPORT: CPT | Mod: 59 | Performed by: EMERGENCY MEDICINE

## 2020-02-25 PROCEDURE — 93308 TTE F-UP OR LMTD: CPT

## 2020-02-25 PROCEDURE — 85025 COMPLETE CBC W/AUTO DIFF WBC: CPT | Performed by: EMERGENCY MEDICINE

## 2020-02-25 PROCEDURE — 83880 ASSAY OF NATRIURETIC PEPTIDE: CPT | Performed by: EMERGENCY MEDICINE

## 2020-02-25 PROCEDURE — 99285 EMERGENCY DEPT VISIT HI MDM: CPT | Mod: 25

## 2020-02-25 PROCEDURE — 93308 TTE F-UP OR LMTD: CPT | Mod: 26 | Performed by: EMERGENCY MEDICINE

## 2020-02-25 PROCEDURE — 71046 X-RAY EXAM CHEST 2 VIEWS: CPT

## 2020-02-25 PROCEDURE — 80048 BASIC METABOLIC PNL TOTAL CA: CPT | Performed by: EMERGENCY MEDICINE

## 2020-02-25 PROCEDURE — 93005 ELECTROCARDIOGRAM TRACING: CPT

## 2020-02-25 PROCEDURE — 99285 EMERGENCY DEPT VISIT HI MDM: CPT | Mod: 25 | Performed by: EMERGENCY MEDICINE

## 2020-02-25 RX ORDER — LEVOFLOXACIN 500 MG/1
500 TABLET, FILM COATED ORAL 2 TIMES DAILY
COMMUNITY
Start: 2020-02-18 | End: 2020-02-25

## 2020-02-25 ASSESSMENT — ENCOUNTER SYMPTOMS
NECK STIFFNESS: 0
ARTHRALGIAS: 0
ABDOMINAL PAIN: 1
CONFUSION: 0
SHORTNESS OF BREATH: 0
EYE REDNESS: 0
HEADACHES: 0
COLOR CHANGE: 0
DIFFICULTY URINATING: 0
COUGH: 1
PALPITATIONS: 1
ABDOMINAL DISTENTION: 0
FEVER: 0

## 2020-02-25 NOTE — ED TRIAGE NOTES
Pt arrived via car with c/o chest tightness that 0900 this morning and has not relieved since that time. On arrival vitals stable.

## 2020-02-25 NOTE — ED NOTES
ED Triage Provider Note  [facility]  Encounter Date: Feb 25, 2020  3:28 PM     History:  Chief Complaint   Patient presents with     Chest Pain     Azra Tom is a 34 year old female who presents with chest pain.  Was recently on ECMO with heart failure from viral myocarditis.  She developed chest pain this am.  She denies SOB, fevers, chills.        Review of Systems:  Review of Systems  ROS: 14 point ROS neg other than the symptoms noted above in the HPI.      Exam:  /60   Temp 98.4  F (36.9  C) (Oral)   Resp 16   Wt 82.2 kg (181 lb 3.2 oz)   SpO2 100%   BMI 31.59 kg/m    General: No acute distress. Appears stated age.   Cardio: Regular rate, extremities well perfused  Resp: Normal work of breathing, grossly normal respiratory rate  Neuro: Alert. CN II-XII grossly intact. Grossly intact strength.       Medical Decision Making:  Patient arriving to the ED with problem as above. A medical screening exam was performed. EKG, labs orders initiated from Triage. The patient is appropriate to be roomed immediately.     Porter Gallo MD      Note created by Porter Gallo MD on 2/25/2020 at 3:28 PM       Porter Gallo MD  02/25/20 [id number]

## 2020-02-25 NOTE — ED AVS SNAPSHOT
[facility], [place], Emergency Department  [address] [place] [id number]  Phone:  [phone]                                    Azra Tom   MRN: [id number]    Department:  [facility], Emergency Department   Date of Visit:  2/25/2020           After Visit Summary Signature Page    I have received my discharge instructions, and my questions have been answered. I have discussed any challenges I see with this plan with the nurse or doctor.    ..........................................................................................................................................  Patient/Patient Representative Signature      ..........................................................................................................................................  Patient Representative Print Name and Relationship to Patient    ..................................................               ................................................  Date                                   Time    ..........................................................................................................................................  Reviewed by Signature/Title    ...................................................              ..............................................  Date                                               Time          22EPIC Rev 08/18

## 2020-02-25 NOTE — ED PROVIDER NOTES
[place] EMERGENCY DEPARTMENT ([facility])  2/25/20  History     Chief Complaint   Patient presents with     Chest Pain     The history is provided by the patient and medical records.     Azra Tom is a 34 year old female with a past medical history of cardiogenic shock, and acute combined systolic and diastolic heart failure who presents to the Emergency Department for evaluation of chest pain.  Per chart review, patient was admitted from 1/28/2020-2/11/2020 for evaluation of her newly diagnosed acute combined systolic and diastolic heart failure.  Patient initially presented in shock, requiring vasopressors in the ER.  TTE ED on admission with LVEF of 25-30%, global hypokinesis.  Cardiac MRI with anterior collateral septal pericardial enhancement consistent with myocarditis.  Endocardial biopsy with findings of focal mild chronic inflammation with myocytolysis, consistent with myocarditis.  Influenza swab was positive, s/p oseltamivir (1/29-2/2).  Required [facility] ECMO 1/29-2/3.  Postoperative vaso-plegia and anemia that necessitated pressors for 1 day following ECMO decannulation.  Viral myocarditis thought secondary to influenza.  Patient's last echo (2/2/2020) showed an improved LVEF of 65-70%.    Patient presents to the ED today with chest tightness.  Patient states she was up moving, washing dishes when she noticed this onset of chest pain.  Patient states this has not gone away.  Patient tried to lay down, to relieve his chest pain but she felt fairly tachycardic.  Patient states this is a left-sided chest pain.  Patient also has a dry cough, but states this may be due to her previous flu diagnosis.  Patient denies any leg swelling, shortness of breath, or dysuria.  Patient also endorses some abdominal bloating last night, with abdominal pain today.  Patient states she has been drinking enough fluids, and does make clear urine.  Patient states that deep breaths do make this chest tightness  better.    Past Medical History:   Diagnosis Date     Atypical chest pain     history of atypical chest pain in 2017     Bipolar disorder (H)     diagnosed in [place], [place] in 2017     Dyslipidemia      Tobacco use      Uncomplicated asthma        Past Surgical History:   Procedure Laterality Date     CV EXTRACORPERAL MEMBRANE OXYGENATION N/A 1/29/2020    Procedure: ECMO, NATIVE HEART BIOPSY;  Surgeon: Richard Montana MD;  Location:  HEART CARDIAC CATH LAB     CV HEART BIOPSY N/A 1/29/2020    Procedure: Heart Biopsy;  Surgeon: Richard Montana MD;  Location:  HEART CARDIAC CATH LAB     REMOVE EXTRACORPORAL MEMBRANE OXYGENATOR ADULT Left 2/3/2020    Procedure: REMOVAL Extra Corporeal Membrain Oxygenator, Inta operative transesophageal echocardiogram, Repair of femoral vessel;  Surgeon: Vishnu Marx MD;  Location:  OR       History reviewed. No pertinent family history.    Social History     Tobacco Use     Smoking status: Former Smoker     Smokeless tobacco: Never Used   Substance Use Topics     Alcohol use: Not Currently       No current facility-administered medications for this encounter.      Current Outpatient Medications   Medication     albuterol (PROAIR HFA/PROVENTIL HFA/VENTOLIN HFA) 108 (90 Base) MCG/ACT inhaler     carvedilol (COREG) 6.25 MG tablet     ferrous sulfate (FEROSUL) 325 (65 Fe) MG tablet     lisinopril (ZESTRIL) 5 MG tablet     norethindrone (MICRONOR) 0.35 MG tablet     acetaminophen (TYLENOL) 325 MG tablet      No Known Allergies    I have reviewed the Medications, Allergies, Past Medical and Surgical History, and Social History in the Epic system.    Review of Systems   Constitutional: Negative for fever.   HENT: Negative for congestion.    Eyes: Negative for redness.   Respiratory: Positive for cough. Negative for shortness of breath.    Cardiovascular: Positive for chest pain and palpitations. Negative for leg swelling.   Gastrointestinal: Positive for abdominal pain.  Negative for abdominal distention.   Genitourinary: Negative for difficulty urinating.   Musculoskeletal: Negative for arthralgias and neck stiffness.   Skin: Negative for color change.   Neurological: Negative for headaches.   Psychiatric/Behavioral: Negative for confusion.       Physical Exam   BP: 111/60  Pulse: 82  Heart Rate: 75  Temp: 98.4  F (36.9  C)  Resp: 16  Weight: 82.2 kg (181 lb 3.2 oz)  SpO2: 100 %      Physical Exam  Vitals signs and nursing note reviewed.   Constitutional:       General: She is not in acute distress.     Appearance: She is not diaphoretic.   HENT:      Head: Atraumatic.      Mouth/Throat:      Pharynx: No oropharyngeal exudate.   Eyes:      General: No scleral icterus.     Pupils: Pupils are equal, round, and reactive to light.   Cardiovascular:      Rate and Rhythm: Normal rate.      Heart sounds: Normal heart sounds.   Pulmonary:      Effort: No respiratory distress.      Breath sounds: Normal breath sounds.   Abdominal:      General: Bowel sounds are normal.      Palpations: Abdomen is soft.      Tenderness: There is no abdominal tenderness.   Musculoskeletal:         General: No tenderness.   Skin:     General: Skin is warm.      Findings: No rash.         ED Course   4:45 PM  The patient was seen and examined by Dale Cox DO in Room ED[facility].    Medications - No data to display        Procedures  Results for orders placed during the hospital encounter of 02/25/20   POC US ECHO LIMITED    [facility] Procedure Note      Limited Bedside ED Cardiac Ultrasound:    PROCEDURE: PERFORMED BY: Dr. Dale Cox MD  INDICATIONS/SYMPTOM:  Chest Pain  PROBE: Cardiac phased array probe  BODY LOCATION: Chest  FINDINGS:   The ultrasound was performed utilizing the subcostal, parasternal long axis, parasternal short axis and apical 4 chamber views.  Cardiac contractility:  Present  Gross estimation of cardiac kinesis: normal  Pericardial Effusion:  None  RV:LV ratio: LV >  RV  INTERPRETATION:    Chamber size and motion were grossly normal with LV > RV, normal cardiac kinesis.  No pericardial effusion was found.  IVC visualized and findings indicate normovolemia.  IMAGE DOCUMENTATION: Images were archived to PACs system.                EKG Interpretation:      Interpreted by Dale Cox MD  Time reviewed:   Symptoms at time of EKG: chest pain   Rhythm: normal sinus   Rate: normal  Axis: normal  Ectopy: none  Conduction: normal  ST Segments/ T Waves: No ST-T wave changes  Q Waves: none  Comparison to prior: Unchanged    Clinical Impression: normal EKG                        Labs Ordered and Resulted from Time of ED Arrival Up to the Time of Departure from the ED   CBC WITH PLATELETS DIFFERENTIAL - Abnormal; Notable for the following components:       Result Value    Hemoglobin 11.6 (*)     MCHC 31.1 (*)     All other components within normal limits   BASIC METABOLIC PANEL   TROPONIN I   NT PROBNP INPATIENT   TROPONIN I     No results found for this or any previous visit (from the past 24 hour(s)).         Assessments & Plan (with Medical Decision Making)     34-year-old female here with chest discomfort.  Patient concerned because she had a recent significant admission where she was on ECMO.  Patient's labs unremarkable, 2 set negative, bedside ultrasound without any acute abnormalities, I suspect this is just musculoskeletal patient is stable for discharge.  Strict return precautions provided.    I have reviewed the nursing notes.    I have reviewed the findings, diagnosis, plan and need for follow up with the patient.    Discharge Medication List as of 2/25/2020  6:53 PM          Final diagnoses:   Intercostal pain   Papo CONNELL, katie serving as a trained medical scribe to document services personally performed by Maik Cox DO, based on the provider's statements to me.      IMaik DO, was physically present and have reviewed and verified the accuracy of this  note documented by Papo Skelton.     2/25/2020   [facility], [place], EMERGENCY DEPARTMENT     Dale Cox MD  03/01/20 1927

## 2020-02-25 NOTE — TELEPHONE ENCOUNTER
"Patient calling. States she is having \"pressure in my chest.\"    Patient was recently treated for cardiogenic shock. States she had a rehab appointment yesterday and this morning she has \"pressure in her chest.\"    Denies pain, difficulty breathing, swelling in hands or feet.    Does not have any difficulty breathing but if she takes a deep breath she feels the pressure increases.    Will go to [facility] for evaluation of chest pressure.    Protocol and care advice reviewed  Caller states understanding of the recommended disposition    Advised to call back if further questions or concerns      Reason for Disposition    Major surgery in the past month    Additional Information    Negative: SEVERE chest pain    Negative: Pain also present in shoulder(s) or arm(s) or jaw    Negative: Difficulty breathing    Negative: Cocaine use within last 3 days    Negative: History of prior 'blood clot' in leg or lungs (i.e., deep vein thrombosis, pulmonary embolism)    Negative: Recent illness requiring prolonged bed rest (i.e., immobilization)    Negative: Hip or leg fracture in past 2 months (e.g, or had cast on leg or ankle)    Protocols used: CHEST PAIN-A-OH      "

## 2020-02-26 ENCOUNTER — OFFICE VISIT (OUTPATIENT)
Dept: CARDIOLOGY | Facility: CLINIC | Age: 35
End: 2020-02-26
Attending: INTERNAL MEDICINE
Payer: COMMERCIAL

## 2020-02-26 VITALS
OXYGEN SATURATION: 100 % | BODY MASS INDEX: 29.22 KG/M2 | WEIGHT: 181.8 LBS | HEIGHT: 66 IN | HEART RATE: 76 BPM | SYSTOLIC BLOOD PRESSURE: 107 MMHG | DIASTOLIC BLOOD PRESSURE: 69 MMHG

## 2020-02-26 DIAGNOSIS — I50.22 CHRONIC SYSTOLIC HEART FAILURE (H): Primary | ICD-10-CM

## 2020-02-26 DIAGNOSIS — I10 BENIGN ESSENTIAL HYPERTENSION: ICD-10-CM

## 2020-02-26 DIAGNOSIS — I50.41 ACUTE COMBINED SYSTOLIC AND DIASTOLIC HEART FAILURE (H): ICD-10-CM

## 2020-02-26 DIAGNOSIS — I42.8 NONISCHEMIC CARDIOMYOPATHY (H): ICD-10-CM

## 2020-02-26 LAB
ALBUMIN SERPL-MCNC: 3.9 G/DL (ref 3.4–5)
ALP SERPL-CCNC: 93 U/L (ref 40–150)
ALT SERPL W P-5'-P-CCNC: 15 U/L (ref 0–50)
ANION GAP SERPL CALCULATED.3IONS-SCNC: 5 MMOL/L (ref 3–14)
AST SERPL W P-5'-P-CCNC: 10 U/L (ref 0–45)
BILIRUB SERPL-MCNC: 0.4 MG/DL (ref 0.2–1.3)
BUN SERPL-MCNC: 9 MG/DL (ref 7–30)
CALCIUM SERPL-MCNC: 9.4 MG/DL (ref 8.5–10.1)
CHLORIDE SERPL-SCNC: 104 MMOL/L (ref 94–109)
CO2 SERPL-SCNC: 27 MMOL/L (ref 20–32)
CREAT SERPL-MCNC: 0.6 MG/DL (ref 0.52–1.04)
ERYTHROCYTE [DISTWIDTH] IN BLOOD BY AUTOMATED COUNT: 13.5 % (ref 10–15)
GFR SERPL CREATININE-BSD FRML MDRD: >90 ML/MIN/{1.73_M2}
GLUCOSE SERPL-MCNC: 75 MG/DL (ref 70–99)
HCT VFR BLD AUTO: 39.5 % (ref 35–47)
HGB BLD-MCNC: 12.4 G/DL (ref 11.7–15.7)
INTERPRETATION ECG - MUSE: NORMAL
MCH RBC QN AUTO: 28.8 PG (ref 26.5–33)
MCHC RBC AUTO-ENTMCNC: 31.4 G/DL (ref 31.5–36.5)
MCV RBC AUTO: 92 FL (ref 78–100)
NT-PROBNP SERPL-MCNC: 39 PG/ML (ref 0–125)
PLATELET # BLD AUTO: 186 10E9/L (ref 150–450)
POTASSIUM SERPL-SCNC: 4.5 MMOL/L (ref 3.4–5.3)
PROT SERPL-MCNC: 8 G/DL (ref 6.8–8.8)
RBC # BLD AUTO: 4.31 10E12/L (ref 3.8–5.2)
SODIUM SERPL-SCNC: 135 MMOL/L (ref 133–144)
WBC # BLD AUTO: 5.3 10E9/L (ref 4–11)

## 2020-02-26 PROCEDURE — 80053 COMPREHEN METABOLIC PANEL: CPT | Performed by: INTERNAL MEDICINE

## 2020-02-26 PROCEDURE — 36415 COLL VENOUS BLD VENIPUNCTURE: CPT | Performed by: INTERNAL MEDICINE

## 2020-02-26 PROCEDURE — G0463 HOSPITAL OUTPT CLINIC VISIT: HCPCS | Mod: ZF

## 2020-02-26 PROCEDURE — 99213 OFFICE O/P EST LOW 20 MIN: CPT | Mod: GC | Performed by: INTERNAL MEDICINE

## 2020-02-26 PROCEDURE — 83880 ASSAY OF NATRIURETIC PEPTIDE: CPT | Performed by: INTERNAL MEDICINE

## 2020-02-26 PROCEDURE — 85027 COMPLETE CBC AUTOMATED: CPT | Performed by: INTERNAL MEDICINE

## 2020-02-26 ASSESSMENT — PAIN SCALES - GENERAL: PAINLEVEL: NO PAIN (0)

## 2020-02-26 ASSESSMENT — MIFFLIN-ST. JEOR: SCORE: 1543.45

## 2020-02-26 NOTE — PROGRESS NOTES
February 26, 2020      I had the pleasure of seeing Azra Tom  in the [facility]. 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 She was hospitalized from 1/28-2/11. She was treated with oseltamivir, and underwent endomyocardial biopsy. Her ECMO was complicated by left femoral pseudoaneurysm with infection requiring wound vac placement. She presents today to establish care.     She has been tolerating carvedilol with lisinopril at modest doses without significant hypotension. She had one episode of low BP earlier this week before cardiac rehab with BP 80-90 mmHg systolic, but with exercise and water intake it increased to normal. She has not passed out. She had some chest tightness yesterday that she feels is related to her shoulder. She went to the ED where POC US of heart was normal (visualized by us), and labs were unremarkable, including negative troponin and ECG. She has no further chest tightness today. No LE edema, no chest pain during rehab, no PND, no orthopnea no syncope. She says her left groin has no pain, and she is able to ambulate fairly normally without issues.       PAST MEDICAL HISTORY:  Past Medical History:   Diagnosis Date     Atypical chest pain     history of atypical chest pain in 2017     Bipolar disorder (H)     diagnosed in [place], [place] in 2017     Dyslipidemia      Tobacco use      Uncomplicated asthma      FAMILY HISTORY:  No relevant family issues     SOCIAL HISTORY:  Social History     Socioeconomic History     Marital status: Single     Spouse name: None     Number of children: None     Years of education: None     Highest education level: None   Occupational History     None   Social Needs     Financial resource strain: None     Food insecurity:     Worry: None     Inability: None     Transportation needs:     Medical: None      "Non-medical: None   Tobacco Use     Smoking status: Former Smoker     Smokeless tobacco: Never Used   Substance and Sexual Activity     Alcohol use: Not Currently     Drug use: Not Currently     Sexual activity: None   Lifestyle     Physical activity:     Days per week: None     Minutes per session: None     Stress: None   Relationships     Social connections:     Talks on phone: None     Gets together: None     Attends [employer] service: None     Active member of club or organization: None     Attends meetings of clubs or organizations: None     Relationship status: None     Intimate partner violence:     Fear of current or ex partner: None     Emotionally abused: None     Physically abused: None     Forced sexual activity: None   Other Topics Concern     None   Social History Narrative     None     CURRENT MEDICATIONS:  Current Outpatient Medications   Medication     acetaminophen (TYLENOL) 325 MG tablet     albuterol (PROAIR HFA/PROVENTIL HFA/VENTOLIN HFA) 108 (90 Base) MCG/ACT inhaler     carvedilol (COREG) 6.25 MG tablet     ferrous sulfate (FEROSUL) 325 (65 Fe) MG tablet     lisinopril (ZESTRIL) 5 MG tablet     norethindrone (MICRONOR) 0.35 MG tablet     No current facility-administered medications for this visit.      ROS:   Constitutional: No fever, chills, or sweats. Weight is 181 lbs 12.8 oz  ENT: No visual disturbance, ear ache, epistaxis, sore throat.   Allergies/Immunologic: Negative.   Respiratory: No cough, hemoptysis.   Cardiovascular: As per HPI.   GI: No nausea, vomiting, hematemesis, melena, or hematochezia.   : No urinary frequency, dysuria, or hematuria.   Integument: Negative.   Psychiatric: Pleasant, no major depression noted  Neuro: No focal neurological deficits noted  Endocrinology: Negative.   Musculoskeletal: As per HPI.      EXAM:  /69 (BP Location: Right arm, Patient Position: Chair, Cuff Size: Adult Regular)   Pulse 76   Ht 1.664 m (5' 5.5\")   Wt 82.5 kg (181 lb 12.8 oz)   " SpO2 100%   BMI 29.79 kg/m    General: appears comfortable, alert and oriented  Head: normocephalic, atraumatic  Eyes: anicteric sclera, EOMI , PERRL  Neck: no adenopathy  Orophyarynx: moist mucosa, no lesions noted  Heart: regular, S1/S2, no murmurs, rubs or gallop. Estimated JVP at 5 cmH2O  Lungs: CTAB, No wheezing.   Abdomen: soft, non-tender, bowel sounds present, no hepatosplenomegaly  Extremities: No LE edema today, pulses to LE are normal bilaterally  Skin: no open lesions noted  Neuro: grossly non-focal     Labs:  Lab Results   Component Value Date    WBC 5.3 02/26/2020    HGB 12.4 02/26/2020    HCT 39.5 02/26/2020     02/26/2020     02/25/2020    POTASSIUM 3.9 02/25/2020    CHLORIDE 104 02/25/2020    CO2 28 02/25/2020    BUN 10 02/25/2020    CR 0.65 02/25/2020    GLC 92 02/25/2020    SED 78 (H) 02/03/2020    DD 10.1 (H) 02/03/2020    NTBNPI 47 02/25/2020    NTBNP 178 (H) 02/18/2020    TROPI <0.015 02/25/2020    AST 43 02/04/2020    ALT 35 02/04/2020    ALKPHOS 89 02/04/2020    BILITOTAL 1.3 02/04/2020    INR 1.20 (H) 02/08/2020     Echocardiograms reviewed: EF has recovered     ASSESSMENT AND PLAN:  In summary, patient is a 34 year old with 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     She is tolerating cardiac rehabilitation with no current heart failure symptoms. Would prefer to obtain a cardiac MR to evaluate for burden of fibrosis. Will continue neurohormonal blockade as ordered. Clinically her left femoral pseudoaneurysm has improved, wound still open and has been taking abx. Will continue to monitor for any signs of infection, will need to be aggressive with management. No further work-up of this is necessary at this time.    Follow up: 3 months    Seen and discussed with Dr. Raymundo.    Max Martinez  Cardiology Fellow    I appreciate the opportunity to participate in the care of Azra Tom  . Please do not hesitate to contact me with any further questions.    I have seen and evaluated the patient and agree with the assessment and plan as above.  Sincerely,      Audie Raymundo MD     [facility] Division of Cardiology

## 2020-02-26 NOTE — NURSING NOTE
Chief Complaint   Patient presents with     New Patient     ump new heart failure      Vitals were taken and medications were reconciled.     Becky Xiong  8:05 AM

## 2020-02-26 NOTE — PATIENT INSTRUCTIONS
Thank you for your visit today.  Please call me with any questions or concerns.   Tyrell Montero RN  Cardiology Care Coordinator  [phone], press option 1 then option 4

## 2020-02-26 NOTE — LETTER
2/26/2020      RE: Azra Tom  [address]  [place] [address]       Dear Colleague,    Thank you for the opportunity to participate in the care of your patient, Azra Tom, at the [facility] HEART [facility] at [facility]. Please see a copy of my visit note below.    February 26, 2020      I had the pleasure of seeing Azra Tom  in the [facility]. 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 She was hospitalized from 1/28-2/11. She was treated with oseltamivir, and underwent endomyocardial biopsy. Her ECMO was complicated by left femoral pseudoaneurysm with infection requiring wound vac placement. She presents today to establish care.     She has been tolerating carvedilol with lisinopril at modest doses without significant hypotension. She had one episode of low BP earlier this week before cardiac rehab with BP 80-90 mmHg systolic, but with exercise and water intake it increased to normal. She has not passed out. She had some chest tightness yesterday that she feels is related to her shoulder. She went to the ED where POC US of heart was normal (visualized by us), and labs were unremarkable, including negative troponin and ECG. She has no further chest tightness today. No LE edema, no chest pain during rehab, no PND, no orthopnea no syncope. She says her left groin has no pain, and she is able to ambulate fairly normally without issues.       PAST MEDICAL HISTORY:  Past Medical History:   Diagnosis Date     Atypical chest pain     history of atypical chest pain in 2017     Bipolar disorder (H)     diagnosed in [place], [place] in 2017     Dyslipidemia      Tobacco use      Uncomplicated asthma      FAMILY HISTORY:  No relevant family issues     SOCIAL HISTORY:  Social History     Socioeconomic History     Marital  status: Single     Spouse name: None     Number of children: None     Years of education: None     Highest education level: None   Occupational History     None   Social Needs     Financial resource strain: None     Food insecurity:     Worry: None     Inability: None     Transportation needs:     Medical: None     Non-medical: None   Tobacco Use     Smoking status: Former Smoker     Smokeless tobacco: Never Used   Substance and Sexual Activity     Alcohol use: Not Currently     Drug use: Not Currently     Sexual activity: None   Lifestyle     Physical activity:     Days per week: None     Minutes per session: None     Stress: None   Relationships     Social connections:     Talks on phone: None     Gets together: None     Attends [employer] service: None     Active member of club or organization: None     Attends meetings of clubs or organizations: None     Relationship status: None     Intimate partner violence:     Fear of current or ex partner: None     Emotionally abused: None     Physically abused: None     Forced sexual activity: None   Other Topics Concern     None   Social History Narrative     None     CURRENT MEDICATIONS:  Current Outpatient Medications   Medication     acetaminophen (TYLENOL) 325 MG tablet     albuterol (PROAIR HFA/PROVENTIL HFA/VENTOLIN HFA) 108 (90 Base) MCG/ACT inhaler     carvedilol (COREG) 6.25 MG tablet     ferrous sulfate (FEROSUL) 325 (65 Fe) MG tablet     lisinopril (ZESTRIL) 5 MG tablet     norethindrone (MICRONOR) 0.35 MG tablet     No current facility-administered medications for this visit.      ROS:   Constitutional: No fever, chills, or sweats. Weight is 181 lbs 12.8 oz  ENT: No visual disturbance, ear ache, epistaxis, sore throat.   Allergies/Immunologic: Negative.   Respiratory: No cough, hemoptysis.   Cardiovascular: As per HPI.   GI: No nausea, vomiting, hematemesis, melena, or hematochezia.   : No urinary frequency, dysuria, or hematuria.   Integument: Negative.  "  Psychiatric: Pleasant, no major depression noted  Neuro: No focal neurological deficits noted  Endocrinology: Negative.   Musculoskeletal: As per HPI.      EXAM:  /69 (BP Location: Right arm, Patient Position: Chair, Cuff Size: Adult Regular)   Pulse 76   Ht 1.664 m (5' 5.5\")   Wt 82.5 kg (181 lb 12.8 oz)   SpO2 100%   BMI 29.79 kg/m     General: appears comfortable, alert and oriented  Head: normocephalic, atraumatic  Eyes: anicteric sclera, EOMI , PERRL  Neck: no adenopathy  Orophyarynx: moist mucosa, no lesions noted  Heart: regular, S1/S2, no murmurs, rubs or gallop. Estimated JVP at 5 cmH2O  Lungs: CTAB, No wheezing.   Abdomen: soft, non-tender, bowel sounds present, no hepatosplenomegaly  Extremities: No LE edema today, pulses to LE are normal bilaterally  Skin: no open lesions noted  Neuro: grossly non-focal     Labs:  Lab Results   Component Value Date    WBC 5.3 02/26/2020    HGB 12.4 02/26/2020    HCT 39.5 02/26/2020     02/26/2020     02/25/2020    POTASSIUM 3.9 02/25/2020    CHLORIDE 104 02/25/2020    CO2 28 02/25/2020    BUN 10 02/25/2020    CR 0.65 02/25/2020    GLC 92 02/25/2020    SED 78 (H) 02/03/2020    DD 10.1 (H) 02/03/2020    NTBNPI 47 02/25/2020    NTBNP 178 (H) 02/18/2020    TROPI <0.015 02/25/2020    AST 43 02/04/2020    ALT 35 02/04/2020    ALKPHOS 89 02/04/2020    BILITOTAL 1.3 02/04/2020    INR 1.20 (H) 02/08/2020     Echocardiograms reviewed: EF has recovered     ASSESSMENT AND PLAN:  In summary, patient is a 34 year old with 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     She is tolerating cardiac rehabilitation with no current heart failure symptoms. Would prefer to obtain a cardiac MR to evaluate for burden of fibrosis. Will continue neurohormonal blockade as ordered. Clinically her left femoral pseudoaneurysm has improved, wound still open and has been taking abx. " Will continue to monitor for any signs of infection, will need to be aggressive with management. No further work-up of this is necessary at this time.    Follow up: 3 months    Seen and discussed with Dr. Raymundo.    Max Martinez  Cardiology Fellow    I appreciate the opportunity to participate in the care of Azra Tom . Please do not hesitate to contact me with any further questions.    I have seen and evaluated the patient and agree with the assessment and plan as above.  Sincerely,      Audie Raymundo MD     [facility] Division of Cardiology

## 2020-02-28 ENCOUNTER — HOSPITAL ENCOUNTER (OUTPATIENT)
Dept: CARDIAC REHAB | Facility: CLINIC | Age: 35
End: 2020-02-28
Attending: STUDENT IN AN ORGANIZED HEALTH CARE EDUCATION/TRAINING PROGRAM
Payer: COMMERCIAL

## 2020-02-28 PROCEDURE — 40000116 ZZH STATISTIC OP CR VISIT

## 2020-02-28 PROCEDURE — 93798 PHYS/QHP OP CAR RHAB W/ECG: CPT

## 2020-03-06 LAB
FUNGUS SPEC CULT: NORMAL
Lab: NORMAL
SPECIMEN SOURCE: NORMAL

## 2020-03-11 ENCOUNTER — HEALTH MAINTENANCE LETTER (OUTPATIENT)
Age: 35
End: 2020-03-11

## 2021-01-04 ENCOUNTER — HEALTH MAINTENANCE LETTER (OUTPATIENT)
Age: 36
End: 2021-01-04

## 2021-03-05 ENCOUNTER — VIRTUAL VISIT (OUTPATIENT)
Dept: CARDIOLOGY | Facility: CLINIC | Age: 36
End: 2021-03-05
Attending: INTERNAL MEDICINE
Payer: MEDICAID

## 2021-03-05 DIAGNOSIS — I50.20 HEART FAILURE WITH REDUCED EJECTION FRACTION, NYHA CLASS III (H): Primary | ICD-10-CM

## 2021-03-05 DIAGNOSIS — I42.8 NONISCHEMIC CARDIOMYOPATHY (H): ICD-10-CM

## 2021-03-05 PROCEDURE — 99214 OFFICE O/P EST MOD 30 MIN: CPT | Mod: 95 | Performed by: INTERNAL MEDICINE

## 2021-03-05 RX ORDER — ASPIRIN 81 MG/1
81 TABLET, CHEWABLE ORAL
COMMUNITY
End: 2021-06-01

## 2021-03-05 RX ORDER — MULTIVIT WITH MINERALS/LUTEIN
1000 TABLET ORAL DAILY
COMMUNITY
End: 2024-04-08

## 2021-03-05 NOTE — PROGRESS NOTES
March 5, 2021     I had the pleasure of seeing Azra Tom  in the [facility]. 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 She was hospitalized from 1/28-2/11. She was treated with oseltamivir, and underwent endomyocardial biopsy. Her ECMO was complicated by left femoral pseudoaneurysm with infection requiring wound vac placement. She is here for follow up.  I have not seen her for a year because she moved to [place] temporarily for about 8 months however she did not like it that she would return back to [place].  She did see cardiology down there and no further medication adjustments were made at that time.  She notes that she is doing relatively well with no new issues.  Takes her medications as prescribed.  She denies any lower extremity edema dizziness lightheadedness and no further episodes of chest pain.  She does have some shortness of breath however which does not seem has worsened significantly but is noticeable.  No lower extremity edema PND or orthopnea associated.  No other issues.    PAST MEDICAL HISTORY:  Past Medical History:   Diagnosis Date     Atypical chest pain     history of atypical chest pain in 2017     Bipolar disorder (H)     diagnosed in [place], [place] in 2017     Dyslipidemia      Tobacco use      Uncomplicated asthma      FAMILY HISTORY:  -Sister did have recent Covid and with the Covid associated myocarditis.  No need for ECMO per patient.    SOCIAL HISTORY:  Social History     Socioeconomic History     Marital status: Single     Spouse name: Not on file     Number of children: Not on file     Years of education: Not on file     Highest education level: Not on file   Occupational History     Not on file   Social Needs     Financial resource strain: Not on file     Food insecurity     Worry: Not on file     Inability: Not on file      Transportation needs     Medical: Not on file     Non-medical: Not on file   Tobacco Use     Smoking status: Former Smoker     Smokeless tobacco: Never Used   Substance and Sexual Activity     Alcohol use: Not Currently     Drug use: Not Currently     Sexual activity: Not on file   Lifestyle     Physical activity     Days per week: Not on file     Minutes per session: Not on file     Stress: Not on file   Relationships     Social connections     Talks on phone: Not on file     Gets together: Not on file     Attends Yarsanism service: Not on file     Active member of club or organization: Not on file     Attends meetings of clubs or organizations: Not on file     Relationship status: Not on file     Intimate partner violence     Fear of current or ex partner: Not on file     Emotionally abused: Not on file     Physically abused: Not on file     Forced sexual activity: Not on file   Other Topics Concern     Not on file   Social History Narrative     Not on file     CURRENT MEDICATIONS:  Current Outpatient Medications   Medication     acetaminophen (TYLENOL) 325 MG tablet     albuterol (PROAIR HFA/PROVENTIL HFA/VENTOLIN HFA) 108 (90 Base) MCG/ACT inhaler     carvedilol (COREG) 6.25 MG tablet     ferrous sulfate (FEROSUL) 325 (65 Fe) MG tablet     lisinopril (ZESTRIL) 5 MG tablet     norethindrone (MICRONOR) 0.35 MG tablet     No current facility-administered medications for this visit.      ROS:   Constitutional: No fever, chills, or sweats. Weight is 0 lbs 0 oz  ENT: No visual disturbance, ear ache, epistaxis, sore throat.   Allergies/Immunologic: Negative.   Respiratory: No cough, hemoptysis.   Cardiovascular: As per HPI.   GI: No nausea, vomiting, hematemesis, melena, or hematochezia.   : No urinary frequency, dysuria, or hematuria.   Integument: Negative.   Psychiatric: Pleasant, no major depression noted  Neuro: No focal neurological deficits noted  Endocrinology: Negative.   Musculoskeletal: As per HPI.       EXAM:  General: appears comfortable, alert and oriented  Head: normocephalic, atraumatic  Eyes: anicteric sclera, EOMI , PERRL  Neck: no adenopathy  Orophyarynx: moist mucosa, no lesions noted  Heart: regular, S1/S2, no murmurs, rubs or gallop. Estimated JVP at 5 cmH2O  Lungs: CTAB, No wheezing.   Abdomen: soft, non-tender, bowel sounds present, no hepatosplenomegaly  Extremities: No LE edema today  Skin: no open lesions noted  Neuro: grossly non-focal     Labs:  Lab Results   Component Value Date    WBC 5.3 02/26/2020    HGB 12.4 02/26/2020    HCT 39.5 02/26/2020     02/26/2020     02/26/2020    POTASSIUM 4.5 02/26/2020    CHLORIDE 104 02/26/2020    CO2 27 02/26/2020    BUN 9 02/26/2020    CR 0.60 02/26/2020    GLC 75 02/26/2020    SED 78 (H) 02/03/2020    DD 10.1 (H) 02/03/2020    NTBNPI 47 02/25/2020    NTBNP 39 02/26/2020    TROPI <0.015 02/25/2020    AST 10 02/26/2020    ALT 15 02/26/2020    ALKPHOS 93 02/26/2020    BILITOTAL 0.4 02/26/2020    INR 1.20 (H) 02/08/2020     TTE 2/3/20:  Global and regional left ventricular function is normal with an EF of 55-60%.  Maximal thickness 1.3 cm in the basal anteroseptum. Normal RV size and function.  Trivial pericardial effusion.   This study was compared with the study from 1/31/2020 (TTE), 2/2/2020 ([facility] ECMO turndown): Compared with the 1/31/20 TTE, LVEF has improved markedly.  Compared with the [facility] turndown study, patient is no longer on ECMO.    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   Most recent ejection fraction is normal and she is on good medications at this time.  Again I have not seen her for a year because she temporarily moved to [place] but now she is back and would like to continue her care here.  At this time we discussed that we will repeat an echocardiogram to further  evaluate the possible reasons for her shortness of breath and we will also obtain lab work that has been out for about a year.  She will come and see me in person to review the results of these.  I will see in about 3 months or sooner.  She will let us know should there be any worsening issues.     Follow up: 3 months    I appreciate the opportunity to participate in the care of Azra Tom . Please do not hesitate to contact me with any further questions.     Sincerely,   Audie Raymundo MD  [facility] of Card[facility]

## 2021-03-05 NOTE — PATIENT INSTRUCTIONS
Dr. Raymundo recommends:    Follow up clinic visit with Dr. Raymundo with labs and an echocardiogram, same day, in 3-4 months.    Thank you for your visit today.  Please call me with any questions or concerns.   Tyrell Montero RN  Cardiology Care Coordinator  [phone], press option 1 then option 4

## 2021-03-05 NOTE — PROGRESS NOTES
"Azra is a 35 year old who is being evaluated via a billable video visit.      How would you like to obtain your AVS? Mail a copy  If the video visit is dropped, the invitation should be resent by: Text to cell phone: [phone]   Will anyone else be joining your video visit? No    Vitals - Patient Reported  Weight (Patient Reported): 96.2 kg (212 lb)  Height (Patient Reported): 163.8 cm (5' 4.5\")  BMI (Based on Pt Reported Ht/Wt): 35.83  Pain Score: No Pain (0)(no SOB)    Vitals were taken and medication reconciled.    DEEPA Weems  2:02 PM    Visit: [facility]  Start: 2:05pm  End: 2:31pm  "

## 2021-04-06 ENCOUNTER — TELEPHONE (OUTPATIENT)
Dept: CARDIOLOGY | Facility: CLINIC | Age: 36
End: 2021-04-06

## 2021-04-06 NOTE — TELEPHONE ENCOUNTER
Called and spoke with patient. She was in contact with her son who tested positive for Covid. Patient encouraged to self isolate for 10 days. She states she is being tested for   COVID today. Patient was also encouraged to get vaccine when available to her.

## 2021-04-06 NOTE — TELEPHONE ENCOUNTER
[facility] Call Center    Phone Message    May a detailed message be left on voicemail: no     Reason for Call: Other: Pt's son just tested positive for COVID19, she is reaching out for guidance in how to handle this situation with her cardiac condition. Please call Azra at [phone].     Action Taken: Message routed to:  [facility] ([facility]): Cardiology    Travel Screening: Not Applicable

## 2021-04-11 ENCOUNTER — APPOINTMENT (OUTPATIENT)
Dept: GENERAL RADIOLOGY | Facility: CLINIC | Age: 36
End: 2021-04-11
Attending: EMERGENCY MEDICINE
Payer: MEDICAID

## 2021-04-11 ENCOUNTER — NURSE TRIAGE (OUTPATIENT)
Dept: NURSING | Facility: CLINIC | Age: 36
End: 2021-04-11

## 2021-04-11 ENCOUNTER — HOSPITAL ENCOUNTER (EMERGENCY)
Facility: CLINIC | Age: 36
Discharge: HOME | End: 2021-04-11
Attending: EMERGENCY MEDICINE | Admitting: EMERGENCY MEDICINE
Payer: MEDICAID

## 2021-04-11 VITALS
OXYGEN SATURATION: 100 % | TEMPERATURE: 98.5 F | RESPIRATION RATE: 16 BRPM | SYSTOLIC BLOOD PRESSURE: 106 MMHG | HEART RATE: 64 BPM | DIASTOLIC BLOOD PRESSURE: 62 MMHG

## 2021-04-11 DIAGNOSIS — U07.1 2019 NOVEL CORONAVIRUS DISEASE (COVID-19): ICD-10-CM

## 2021-04-11 DIAGNOSIS — R07.89 CHEST PRESSURE: ICD-10-CM

## 2021-04-11 LAB
ALBUMIN SERPL-MCNC: 3.9 G/DL (ref 3.4–5)
ALP SERPL-CCNC: 88 U/L (ref 40–150)
ALT SERPL W P-5'-P-CCNC: 19 U/L (ref 0–50)
ANION GAP SERPL CALCULATED.3IONS-SCNC: 6 MMOL/L (ref 3–14)
AST SERPL W P-5'-P-CCNC: 15 U/L (ref 0–45)
BASOPHILS # BLD AUTO: 0 10E9/L (ref 0–0.2)
BASOPHILS NFR BLD AUTO: 0.2 %
BILIRUB SERPL-MCNC: 0.3 MG/DL (ref 0.2–1.3)
BUN SERPL-MCNC: 8 MG/DL (ref 7–30)
CALCIUM SERPL-MCNC: 8.8 MG/DL (ref 8.5–10.1)
CHLORIDE SERPL-SCNC: 104 MMOL/L (ref 94–109)
CO2 SERPL-SCNC: 26 MMOL/L (ref 20–32)
CREAT SERPL-MCNC: 0.7 MG/DL (ref 0.52–1.04)
DIFFERENTIAL METHOD BLD: NORMAL
EOSINOPHIL # BLD AUTO: 0 10E9/L (ref 0–0.7)
EOSINOPHIL NFR BLD AUTO: 0 %
ERYTHROCYTE [DISTWIDTH] IN BLOOD BY AUTOMATED COUNT: 13.6 % (ref 10–15)
GFR SERPL CREATININE-BSD FRML MDRD: >90 ML/MIN/{1.73_M2}
GLUCOSE SERPL-MCNC: 96 MG/DL (ref 70–99)
HCG SERPL QL: NEGATIVE
HCT VFR BLD AUTO: 45.9 % (ref 35–47)
HGB BLD-MCNC: 14.7 G/DL (ref 11.7–15.7)
IMM GRANULOCYTES # BLD: 0 10E9/L (ref 0–0.4)
IMM GRANULOCYTES NFR BLD: 0.4 %
LACTATE BLD-SCNC: 1.3 MMOL/L (ref 0.7–2)
LYMPHOCYTES # BLD AUTO: 1.6 10E9/L (ref 0.8–5.3)
LYMPHOCYTES NFR BLD AUTO: 31 %
MCH RBC QN AUTO: 29.1 PG (ref 26.5–33)
MCHC RBC AUTO-ENTMCNC: 32 G/DL (ref 31.5–36.5)
MCV RBC AUTO: 91 FL (ref 78–100)
MONOCYTES # BLD AUTO: 0.4 10E9/L (ref 0–1.3)
MONOCYTES NFR BLD AUTO: 8.1 %
NEUTROPHILS # BLD AUTO: 3.1 10E9/L (ref 1.6–8.3)
NEUTROPHILS NFR BLD AUTO: 60.3 %
NRBC # BLD AUTO: 0 10*3/UL
NRBC BLD AUTO-RTO: 0 /100
NT-PROBNP SERPL-MCNC: 77 PG/ML (ref 0–450)
PLATELET # BLD AUTO: 178 10E9/L (ref 150–450)
POTASSIUM SERPL-SCNC: 4 MMOL/L (ref 3.4–5.3)
PROT SERPL-MCNC: 7.7 G/DL (ref 6.8–8.8)
RBC # BLD AUTO: 5.05 10E12/L (ref 3.8–5.2)
SODIUM SERPL-SCNC: 135 MMOL/L (ref 133–144)
TROPONIN I SERPL-MCNC: <0.015 UG/L (ref 0–0.04)
WBC # BLD AUTO: 5.1 10E9/L (ref 4–11)

## 2021-04-11 PROCEDURE — 84703 CHORIONIC GONADOTROPIN ASSAY: CPT | Performed by: EMERGENCY MEDICINE

## 2021-04-11 PROCEDURE — 99285 EMERGENCY DEPT VISIT HI MDM: CPT | Mod: 25 | Performed by: EMERGENCY MEDICINE

## 2021-04-11 PROCEDURE — 96374 THER/PROPH/DIAG INJ IV PUSH: CPT | Performed by: EMERGENCY MEDICINE

## 2021-04-11 PROCEDURE — 96361 HYDRATE IV INFUSION ADD-ON: CPT | Performed by: EMERGENCY MEDICINE

## 2021-04-11 PROCEDURE — 80053 COMPREHEN METABOLIC PANEL: CPT | Performed by: EMERGENCY MEDICINE

## 2021-04-11 PROCEDURE — 258N000003 HC RX IP 258 OP 636: Performed by: EMERGENCY MEDICINE

## 2021-04-11 PROCEDURE — 85025 COMPLETE CBC W/AUTO DIFF WBC: CPT | Performed by: EMERGENCY MEDICINE

## 2021-04-11 PROCEDURE — 84484 ASSAY OF TROPONIN QUANT: CPT | Performed by: EMERGENCY MEDICINE

## 2021-04-11 PROCEDURE — 250N000011 HC RX IP 250 OP 636: Performed by: EMERGENCY MEDICINE

## 2021-04-11 PROCEDURE — 93005 ELECTROCARDIOGRAM TRACING: CPT | Performed by: EMERGENCY MEDICINE

## 2021-04-11 PROCEDURE — 96375 TX/PRO/DX INJ NEW DRUG ADDON: CPT | Performed by: EMERGENCY MEDICINE

## 2021-04-11 PROCEDURE — 71045 X-RAY EXAM CHEST 1 VIEW: CPT

## 2021-04-11 PROCEDURE — 93010 ELECTROCARDIOGRAM REPORT: CPT | Performed by: EMERGENCY MEDICINE

## 2021-04-11 PROCEDURE — 71045 X-RAY EXAM CHEST 1 VIEW: CPT | Mod: 26 | Performed by: RADIOLOGY

## 2021-04-11 PROCEDURE — 83605 ASSAY OF LACTIC ACID: CPT | Performed by: EMERGENCY MEDICINE

## 2021-04-11 PROCEDURE — 83880 ASSAY OF NATRIURETIC PEPTIDE: CPT | Performed by: EMERGENCY MEDICINE

## 2021-04-11 RX ORDER — CYCLOBENZAPRINE HCL 10 MG
10 TABLET ORAL 3 TIMES DAILY PRN
Qty: 20 TABLET | Refills: 0 | Status: SHIPPED | OUTPATIENT
Start: 2021-04-11 | End: 2021-04-17

## 2021-04-11 RX ORDER — IBUPROFEN 600 MG/1
600 TABLET, FILM COATED ORAL EVERY 6 HOURS PRN
Qty: 30 TABLET | Refills: 0 | Status: SHIPPED | OUTPATIENT
Start: 2021-04-11 | End: 2024-04-08

## 2021-04-11 RX ORDER — ONDANSETRON 2 MG/ML
4 INJECTION INTRAMUSCULAR; INTRAVENOUS EVERY 30 MIN PRN
Status: DISCONTINUED | OUTPATIENT
Start: 2021-04-11 | End: 2021-04-11 | Stop reason: HOSPADM

## 2021-04-11 RX ORDER — ACETAMINOPHEN 500 MG
500-1000 TABLET ORAL EVERY 6 HOURS PRN
Qty: 60 TABLET | Refills: 0 | Status: SHIPPED | OUTPATIENT
Start: 2021-04-11 | End: 2021-04-18

## 2021-04-11 RX ORDER — ONDANSETRON 4 MG/1
4 TABLET, FILM COATED ORAL EVERY 8 HOURS PRN
Qty: 15 TABLET | Refills: 0 | Status: SHIPPED | OUTPATIENT
Start: 2021-04-11 | End: 2021-10-24

## 2021-04-11 RX ORDER — SODIUM CHLORIDE 9 MG/ML
INJECTION, SOLUTION INTRAVENOUS CONTINUOUS
Status: DISCONTINUED | OUTPATIENT
Start: 2021-04-11 | End: 2021-04-11 | Stop reason: HOSPADM

## 2021-04-11 RX ORDER — KETOROLAC TROMETHAMINE 30 MG/ML
30 INJECTION, SOLUTION INTRAMUSCULAR; INTRAVENOUS ONCE
Status: COMPLETED | OUTPATIENT
Start: 2021-04-11 | End: 2021-04-11

## 2021-04-11 RX ADMIN — SODIUM CHLORIDE 1000 ML: 9 INJECTION, SOLUTION INTRAVENOUS at 20:03

## 2021-04-11 RX ADMIN — KETOROLAC TROMETHAMINE 30 MG: 30 INJECTION, SOLUTION INTRAMUSCULAR; INTRAVENOUS at 20:03

## 2021-04-11 RX ADMIN — ONDANSETRON 4 MG: 2 INJECTION INTRAMUSCULAR; INTRAVENOUS at 21:26

## 2021-04-11 NOTE — ED TRIAGE NOTES
Pt c/o feeling SOB after testing positive for COVID on 4/10. Symptoms started 4/7 with loss of taste and smell.

## 2021-04-11 NOTE — TELEPHONE ENCOUNTER
Caller has Covid onset 4 days ago diagnosed in an ED yesterday ; Told  O 2 sats were fine and had negative troponin and sent home   Today has  moderate constant chest pressure. History of  myocarditis an cardiomyopathy and under care of   cardiology; severe  illness on ECMO 2 years ago   Caller is frightened   Triage protocol reveiweid   Advised to proceed to ED at [facility]  Caller understands and will comply  Andreina Rojo RN  FNA       Reason for Disposition    Chest pain or pressure    Additional Information    Negative: SEVERE difficulty breathing (e.g., struggling for each breath, speaks in single words)    Negative: Difficult to awaken or acting confused (e.g., disoriented, slurred speech)    Negative: Bluish (or gray) lips or face now    Negative: Shock suspected (e.g., cold/pale/clammy skin, too weak to stand, low BP, rapid pulse)    Negative: Sounds like a life-threatening emergency to the triager    Negative: [1] COVID-19 exposure AND [2] no symptoms    Negative: [1] Lives with someone known to have influenza (flu test positive) AND [2] flu-like symptoms (e.g., cough, runny nose, sore throat, SOB; with or without fever)    Negative: [1] Adult with possible COVID-19 symptoms AND [2] triager concerned about severity of symptoms or other causes    Negative: COVID-19 vaccine reaction suspected (e.g., fever, headache, muscle aches) occurring during days 1-3 after getting vaccine    Negative: COVID-19 vaccine, questions about    Negative: COVID-19 and breastfeeding, questions about    Negative: SEVERE or constant chest pain or pressure (Exception: mild central chest pain, present only when coughing)    Negative: MODERATE difficulty breathing (e.g., speaks in phrases, SOB even at rest, pulse 100-120)    Negative: [1] Headache AND [2] stiff neck (can't touch chin to chest)    Protocols used: CORONAVIRUS (COVID-19) DIAGNOSED OR [id number]-L-VS 1.3

## 2021-04-11 NOTE — ED PROVIDER NOTES
History     Chief Complaint   Patient presents with     Shortness of Breath     HPI  Azra Tom is a 35 year old female with a past medical history of bipolar disorder, hyperlipidemia, asthma, heart failure who presents to the emergency department with a chief complaint of chest pressure and shortness of breath.  The symptoms started today.  She is very concerned because she states that they feels very similar to symptoms she had prior to being put on ECMO in January 2020.  The patient has also had associated symptoms of anosmia, ageusia, headache, diffuse myalgias, fatigue.  Shortness of breath worsens with exertion.  The patient states she has since been told she has cardiomyopathy, but has not had an echocardiogram as she was in [place] for a time due to the COVID-19 pandemic (she states she wanted to be closer to her mother).  She is scheduled to follow-up with cardiology here in June.  The patient is unclear about what led to her being placed on ECMO in the past.  Per chart review, it appears that a cardiac MRI was consistent with myocarditis.  An influenza swab was positive during that admission as well.  Since then, however the patient states she has felt very short of breath with exertion and has not been able to do her usual activities that she was able to do previously.    The patient states she was recently diagnosed with COVID-19.  Symptoms started on 4/7 with a loss of taste and smell after she was exposed to her son who tested positive for COVID-19 infection.  Since then she has had shortness of breath and cough.  She tested positive for Covid on a previous ER visit on 4/10.  The chest pain and shortness of breath started today.    I have reviewed the Medications, Allergies, Past Medical and Surgical History, and Social History in the [employer] system.    Past Medical History:   Diagnosis Date     Atypical chest pain     history of atypical chest pain in 2017     Bipolar disorder (H)     diagnosed in  [place], [place] in 2017     Dyslipidemia      Tobacco use      Uncomplicated asthma      Past Surgical History:   Procedure Laterality Date     CV EXTRACORPERAL MEMBRANE OXYGENATION N/A 1/29/2020    Procedure: ECMO, NATIVE HEART BIOPSY;  Surgeon: Richard Montana MD;  Location:  HEART CARDIAC CATH LAB     CV HEART BIOPSY N/A 1/29/2020    Procedure: Heart Biopsy;  Surgeon: Richard Montana MD;  Location:  HEART CARDIAC CATH LAB     REMOVE EXTRACORPORAL MEMBRANE OXYGENATOR ADULT Left 2/3/2020    Procedure: REMOVAL Extra Corporeal Membrain Oxygenator, Inta operative transesophageal echocardiogram, Repair of femoral vessel;  Surgeon: Vishnu Marx MD;  Location:  OR     No current facility-administered medications for this encounter.      Current Outpatient Medications   Medication     acetaminophen (TYLENOL) 325 MG tablet     albuterol (PROAIR HFA/PROVENTIL HFA/VENTOLIN HFA) 108 (90 Base) MCG/ACT inhaler     aspirin (ASA) 81 MG chewable tablet     carvedilol (COREG) 6.25 MG tablet     ferrous sulfate (FEROSUL) 325 (65 Fe) MG tablet     lisinopril (ZESTRIL) 5 MG tablet     norethindrone (MICRONOR) 0.35 MG tablet     vitamin C (ASCORBIC ACID) 1000 MG TABS     No Known Allergies  Past medical history, past surgical history, medications, and allergies were reviewed with the patient. Additional pertinent items: None    Social History     Socioeconomic History     Marital status: Single     Spouse name: Not on file     Number of children: Not on file     Years of education: Not on file     Highest education level: Not on file   Occupational History     Not on file   Social Needs     Financial resource strain: Not on file     Food insecurity     Worry: Not on file     Inability: Not on file     Transportation needs     Medical: Not on file     Non-medical: Not on file   Tobacco Use     Smoking status: Former Smoker     Smokeless tobacco: Never Used   Substance and Sexual Activity     Alcohol use: Not Currently      Drug use: Not Currently     Sexual activity: Not on file   Lifestyle     Physical activity     Days per week: Not on file     Minutes per session: Not on file     Stress: Not on file   Relationships     Social connections     Talks on phone: Not on file     Gets together: Not on file     Attends Confucianism service: Not on file     Active member of club or organization: Not on file     Attends meetings of clubs or organizations: Not on file     Relationship status: Not on file     Intimate partner violence     Fear of current or ex partner: Not on file     Emotionally abused: Not on file     Physically abused: Not on file     Forced sexual activity: Not on file   Other Topics Concern     Not on file   Social History Narrative     Not on file     Social history was reviewed with the patient. Additional pertinent items: None    Review of Systems  General: No fevers or chills, positive for fatigue  Skin: No rash or diaphoresis  Eyes: No eye redness or discharge  Ears/Nose/Throat: Positive for anosmia and ageusia  Respiratory: Positive for cough, positive for SOB  Cardiovascular: Positive for chest pain, no palpitations  Gastrointestinal: No nausea, vomiting, or diarrhea  Genitourinary: No urinary frequency, hematuria, or dysuria  Musculoskeletal: Positive for generalized myalgias  Neurologic: No numbness, positive for generalized weakness  Hematologic/Lymphatic/Immunologic: No leg swelling, no easy bruising/bleeding  Endocrine: No polyuria/polydypsia    A complete review of systems was performed with pertinent positives and negatives noted in the HPI, and all other systems negative.    Physical Exam   BP: 107/58  Pulse: 76  Resp: 20  SpO2: 99 %      General: Well nourished, well developed, NAD  HEENT: EOMI, anicteric. NCAT, MMM  Neck: no jugular venous distension, supple, nl ROM  Cardiac: Regular rate and rhythm. No murmurs, rubs, or gallops. Normal S1, S2.  Intact peripheral pulses  Pulm: CTAB, no stridor, wheezes,  rales, rhonchi.  Cough present  Abd: Soft, nontender, nondistended.  No masses palpated.    Skin: Warm and dry to the touch.  No rash  Extremities: No LE edema, no cyanosis, w/w/p  Neuro: A&Ox3, no gross focal deficits    ED Course     ED Course as of Apr 11 1927   Sun Apr 11, 2021 1925 XR Chest Port 1 View     Procedures             EKG Interpretation:      Interpreted by Nuvia Anguol MD  Time reviewed: 1819  Symptoms at time of EKG: chest pain, SOB   Rhythm: normal sinus   Rate: normal, 70 bpm  Axis: normal  Ectopy: none  Conduction: normal  ST Segments/ T Waves: No ST-T wave changes  Q Waves:anterior  Comparison to prior: compared to previous EKG, dated 2/25/20, there are no acute changes    Clinical Impression: abnormal EKG                        Labs Ordered and Resulted from Time of ED Arrival Up to the Time of Departure from the ED   CBC WITH PLATELETS DIFFERENTIAL   COMPREHENSIVE METABOLIC PANEL   TROPONIN I   NT PROBNP INPATIENT   HCG QUALITATIVE   LACTIC ACID WHOLE BLOOD            Results for orders placed or performed during the hospital encounter of 04/11/21 (from the past 24 hour(s))   CBC with platelets differential   Result Value Ref Range    WBC 5.1 4.0 - 11.0 10e9/L    RBC Count 5.05 3.8 - 5.2 10e12/L    Hemoglobin 14.7 11.7 - 15.7 g/dL    Hematocrit 45.9 35.0 - 47.0 %    MCV 91 78 - 100 fl    MCH 29.1 26.5 - 33.0 pg    MCHC 32.0 31.5 - 36.5 g/dL    RDW 13.6 10.0 - 15.0 %    Platelet Count 178 150 - 450 10e9/L    Diff Method Automated Method     % Neutrophils 60.3 %    % Lymphocytes 31.0 %    % Monocytes 8.1 %    % Eosinophils 0.0 %    % Basophils 0.2 %    % Immature Granulocytes 0.4 %    Nucleated RBCs 0 0 /100    Absolute Neutrophil 3.1 1.6 - 8.3 10e9/L    Absolute Lymphocytes 1.6 0.8 - 5.3 10e9/L    Absolute Monocytes 0.4 0.0 - 1.3 10e9/L    Absolute Eosinophils 0.0 0.0 - 0.7 10e9/L    Absolute Basophils 0.0 0.0 - 0.2 10e9/L    Abs Immature Granulocytes 0.0 0 - 0.4 10e9/L    Absolute  Nucleated RBC 0.0    Comprehensive metabolic panel   Result Value Ref Range    Sodium 135 133 - 144 mmol/L    Potassium 4.0 3.4 - 5.3 mmol/L    Chloride 104 94 - 109 mmol/L    Carbon Dioxide 26 20 - 32 mmol/L    Anion Gap 6 3 - 14 mmol/L    Glucose 96 70 - 99 mg/dL    Urea Nitrogen 8 7 - 30 mg/dL    Creatinine 0.70 0.52 - 1.04 mg/dL    GFR Estimate >90 >60 mL/min/[1.73_m2]    GFR Estimate If Black >90 >60 mL/min/[1.73_m2]    Calcium 8.8 8.5 - 10.1 mg/dL    Bilirubin Total 0.3 0.2 - 1.3 mg/dL    Albumin 3.9 3.4 - 5.0 g/dL    Protein Total 7.7 6.8 - 8.8 g/dL    Alkaline Phosphatase 88 40 - 150 U/L    ALT 19 0 - 50 U/L    AST 15 0 - 45 U/L   Troponin I   Result Value Ref Range    Troponin I ES <0.015 0.000 - 0.045 ug/L   Nt probnp inpatient (BNP)   Result Value Ref Range    N-Terminal Pro BNP Inpatient 77 0 - 450 pg/mL   HCG qualitative Blood   Result Value Ref Range    HCG Qualitative Serum Negative NEG^Negative   Lactic acid whole blood   Result Value Ref Range    Lactic Acid 1.3 0.7 - 2.0 mmol/L   EKG 12-lead, tracing only   Result Value Ref Range    Interpretation ECG Click View Image link to view waveform and result    XR Chest Port 1 View    Narrative    Exam: XR CHEST PORT 1 VW, 4/11/2021 6:16 PM    Comparison: Chest x-ray 2/25/2020    History: chest pain    Findings:  Single portable AP upright view of the chest obtained.    Trachea is midline. Mediastinum is within normal limits.  Cardiopulmonary silhouette is within normal limits. No acute airspace  disease. There is no pneumothorax or pleural effusion. The upper  abdomen is unremarkable.      Impression    Impression: No acute airspace disease.    I have personally reviewed the examination and initial interpretation  and I agree with the findings.    NATHALIE GARNER MD       Labs, vital signs, and imaging studies were reviewed by me.    Medications   0.9% sodium chloride BOLUS (1,000 mLs Intravenous New Bag 4/11/21 2003)     Followed by   sodium chloride  0.9% infusion (has no administration in time range)   ondansetron (ZOFRAN) injection 4 mg (has no administration in time range)   ketorolac (TORADOL) injection 30 mg (30 mg Intravenous Given 4/11/21 2003)       Assessments & Plan (with Medical Decision Making)   Azra Tom is a 35 year old female who presents with chest pain and shortness of breath.  Differential diagnosis includes her known COVID-19 infection, superimposed bacterial infection, acute exacerbation of heart failure, ACS.  Labs, EKG, chest x-ray ordered to further evaluate the patient.  Medications ordered for symptomatic relief in the emergency department.    EKG shows NSR, anterior Q waves, unchanged from prior    Chest x-ray shows no acute disease process.    Laboratory work-up is remarkable for troponin and BNP within normal limits.    1925 Patient was discussed with cardiology 1 service, they recommend STAT ECHO, patient OK to discharge home if this is normal    I have reviewed the nursing notes.    I have reviewed the findings, diagnosis, plan and need for follow up with the patient.    Patient was discussed with cardiology fellow, he has performed echocardiogram in the emergency department, global cardiac function appears normal.  Per cardiology recommendations, patient is stable to be discharged home from cardiac standpoint.    Patient's vital signs remain normal in the emergency department, blood pressure is stable, respiratory rate is within normal limits, SPO2 is 100% on room air, heart rate is not elevated.    Patient to be discharged home. Advised to follow up with PCP within 1 week.  Patient also advised to follow-up with cardiology as previously directed.  To return to ER immediately with any new/worsening symptoms. Plan of care discussed with patient who expresses understanding and agrees with plan of care.  Patient was provided with prescriptions for medications for symptomatic relief at home.  She was provided with instructions on  self quarantining during her Covid infection    New Prescriptions    ACETAMINOPHEN (TYLENOL) 500 MG TABLET    Take 1-2 tablets (500-1,000 mg) by mouth every 6 hours as needed for pain or fever    CYCLOBENZAPRINE (FLEXERIL) 10 MG TABLET    Take 1 tablet (10 mg) by mouth 3 times daily as needed for muscle spasms (myalgias)    IBUPROFEN (ADVIL/MOTRIN) 600 MG TABLET    Take 1 tablet (600 mg) by mouth every 6 hours as needed    ONDANSETRON (ZOFRAN) 4 MG TABLET    Take 1 tablet (4 mg) by mouth every 8 hours as needed       Final diagnoses:   2019 novel coronavirus disease (COVID-19)     Nuvia Angulo MD  4/11/2021   [facility] EMERGENCY DEPARTMENT     Nuvia Angulo MD  04/11/21 1415

## 2021-04-12 ENCOUNTER — HOSPITAL ENCOUNTER (OUTPATIENT)
Dept: CARDIOLOGY | Facility: CLINIC | Age: 36
Discharge: HOME | End: 2021-04-12
Attending: EMERGENCY MEDICINE | Admitting: EMERGENCY MEDICINE
Payer: MEDICAID

## 2021-04-12 LAB — INTERPRETATION ECG - MUSE: NORMAL

## 2021-04-12 PROCEDURE — 93308 TTE F-UP OR LMTD: CPT

## 2021-04-12 PROCEDURE — 93308 TTE F-UP OR LMTD: CPT | Mod: 26 | Performed by: INTERNAL MEDICINE

## 2021-04-12 NOTE — DISCHARGE INSTRUCTIONS
TODAY'S VISIT:  You were seen today for covid 19 infection, chest pressure  -   - If you had any labs or imaging/radiology tests performed today, you should also discuss these tests with your usual provider.     FOLLOW-UP:  Please make an appointment to follow up with:  - Your Primary Care Provider. If you do not have a PCP, please call the Primary Care Center (phone: [phone] for an appointment  - Cardiology Clinic (phone: [phone]) as directed    - Have your provider review the results from today's visit with you again to make sure no further follow-up or additional testing is needed based on those results.     PRESCRIPTIONS / MEDICATIONS:  - Tylenol and/or ibuprofen as needed for pain or fever    RETURN TO THE EMERGENCY DEPARTMENT  Return to the Emergency Department at any time for any new or worsening symptoms or any concerns.

## 2021-04-14 ENCOUNTER — MYC MEDICAL ADVICE (OUTPATIENT)
Dept: CARDIOLOGY | Facility: CLINIC | Age: 36
End: 2021-04-14

## 2021-04-14 DIAGNOSIS — U07.1 LAB TEST POSITIVE FOR DETECTION OF COVID-19 VIRUS: ICD-10-CM

## 2021-04-14 DIAGNOSIS — I50.41 ACUTE COMBINED SYSTOLIC AND DIASTOLIC HEART FAILURE (H): Primary | ICD-10-CM

## 2021-04-15 NOTE — TELEPHONE ENCOUNTER
Reviewed Azra's [employer] messages with Dr. Raymundo and called Azra back to discuss a plan.     Azra received a Covid positive result on 4/10.  With minimal household activity her heart rates elevated to 120-140's, takes 1-2 minutes of laying down for the HF to return below 100. She states her legs feel like noodles and her face feels somewhat tight and swollen (although labs on 4/11 WNL).    Per Dr. Raymundo:  If Azra doesn't begin to improve or her symptoms worsen within the next day or two - then she should go back into [facility] ED for labs and another echocardiogram.  She notes that last time her EF had dramatically dropped quickly.  If her symptoms slowly improve, we will schedule an outpatient echo within 10-14 days.

## 2021-04-25 ENCOUNTER — HEALTH MAINTENANCE LETTER (OUTPATIENT)
Age: 36
End: 2021-04-25

## 2021-06-01 ENCOUNTER — OFFICE VISIT (OUTPATIENT)
Dept: CARDIOLOGY | Facility: CLINIC | Age: 36
End: 2021-06-01
Attending: INTERNAL MEDICINE
Payer: COMMERCIAL

## 2021-06-01 VITALS
HEIGHT: 65 IN | DIASTOLIC BLOOD PRESSURE: 67 MMHG | WEIGHT: 209.3 LBS | BODY MASS INDEX: 34.87 KG/M2 | OXYGEN SATURATION: 100 % | HEART RATE: 69 BPM | SYSTOLIC BLOOD PRESSURE: 106 MMHG

## 2021-06-01 DIAGNOSIS — I50.20 HEART FAILURE WITH REDUCED EJECTION FRACTION, NYHA CLASS III (H): ICD-10-CM

## 2021-06-01 DIAGNOSIS — I10 BENIGN ESSENTIAL HYPERTENSION: ICD-10-CM

## 2021-06-01 DIAGNOSIS — I42.8 NONISCHEMIC CARDIOMYOPATHY (H): ICD-10-CM

## 2021-06-01 DIAGNOSIS — I50.41 ACUTE COMBINED SYSTOLIC AND DIASTOLIC HEART FAILURE (H): ICD-10-CM

## 2021-06-01 DIAGNOSIS — I50.20 HEART FAILURE WITH REDUCED EJECTION FRACTION, NYHA CLASS III (H): Primary | ICD-10-CM

## 2021-06-01 DIAGNOSIS — I50.22 CHRONIC SYSTOLIC HEART FAILURE (H): ICD-10-CM

## 2021-06-01 DIAGNOSIS — J10.1 INFLUENZA DUE TO INFLUENZA VIRUS, TYPE A, HUMAN: ICD-10-CM

## 2021-06-01 LAB
ALBUMIN SERPL-MCNC: 4 G/DL (ref 3.4–5)
ALP SERPL-CCNC: 87 U/L (ref 40–150)
ALT SERPL W P-5'-P-CCNC: 21 U/L (ref 0–50)
ANION GAP SERPL CALCULATED.3IONS-SCNC: 6 MMOL/L (ref 3–14)
AST SERPL W P-5'-P-CCNC: 16 U/L (ref 0–45)
BILIRUB SERPL-MCNC: 0.4 MG/DL (ref 0.2–1.3)
BUN SERPL-MCNC: 8 MG/DL (ref 7–30)
CALCIUM SERPL-MCNC: 9.1 MG/DL (ref 8.5–10.1)
CHLORIDE SERPL-SCNC: 106 MMOL/L (ref 94–109)
CO2 SERPL-SCNC: 26 MMOL/L (ref 20–32)
CREAT SERPL-MCNC: 0.71 MG/DL (ref 0.52–1.04)
ERYTHROCYTE [DISTWIDTH] IN BLOOD BY AUTOMATED COUNT: 13.4 % (ref 10–15)
GFR SERPL CREATININE-BSD FRML MDRD: >90 ML/MIN/{1.73_M2}
GLUCOSE SERPL-MCNC: 82 MG/DL (ref 70–99)
HCT VFR BLD AUTO: 39.1 % (ref 35–47)
HGB BLD-MCNC: 12.8 G/DL (ref 11.7–15.7)
MCH RBC QN AUTO: 30 PG (ref 26.5–33)
MCHC RBC AUTO-ENTMCNC: 32.7 G/DL (ref 31.5–36.5)
MCV RBC AUTO: 92 FL (ref 78–100)
NT-PROBNP SERPL-MCNC: 21 PG/ML (ref 0–125)
PLATELET # BLD AUTO: 198 10E9/L (ref 150–450)
POTASSIUM SERPL-SCNC: 4 MMOL/L (ref 3.4–5.3)
PROT SERPL-MCNC: 7.6 G/DL (ref 6.8–8.8)
RBC # BLD AUTO: 4.27 10E12/L (ref 3.8–5.2)
SODIUM SERPL-SCNC: 139 MMOL/L (ref 133–144)
WBC # BLD AUTO: 9.4 10E9/L (ref 4–11)

## 2021-06-01 PROCEDURE — 36415 COLL VENOUS BLD VENIPUNCTURE: CPT | Performed by: PATHOLOGY

## 2021-06-01 PROCEDURE — G0463 HOSPITAL OUTPT CLINIC VISIT: HCPCS

## 2021-06-01 PROCEDURE — 85027 COMPLETE CBC AUTOMATED: CPT | Performed by: PATHOLOGY

## 2021-06-01 PROCEDURE — 93308 TTE F-UP OR LMTD: CPT | Performed by: STUDENT IN AN ORGANIZED HEALTH CARE EDUCATION/TRAINING PROGRAM

## 2021-06-01 PROCEDURE — 80053 COMPREHEN METABOLIC PANEL: CPT | Performed by: PATHOLOGY

## 2021-06-01 PROCEDURE — 93321 DOPPLER ECHO F-UP/LMTD STD: CPT | Performed by: STUDENT IN AN ORGANIZED HEALTH CARE EDUCATION/TRAINING PROGRAM

## 2021-06-01 PROCEDURE — 93325 DOPPLER ECHO COLOR FLOW MAPG: CPT | Performed by: STUDENT IN AN ORGANIZED HEALTH CARE EDUCATION/TRAINING PROGRAM

## 2021-06-01 PROCEDURE — 83880 ASSAY OF NATRIURETIC PEPTIDE: CPT | Performed by: PATHOLOGY

## 2021-06-01 PROCEDURE — 99214 OFFICE O/P EST MOD 30 MIN: CPT | Mod: 25 | Performed by: INTERNAL MEDICINE

## 2021-06-01 RX ORDER — LISINOPRIL 5 MG/1
5 TABLET ORAL DAILY
Qty: 90 TABLET | Refills: 3 | Status: SHIPPED | OUTPATIENT
Start: 2021-06-01 | End: 2023-03-08

## 2021-06-01 RX ORDER — MULTIPLE VITAMINS W/ MINERALS TAB 9MG-400MCG
1 TAB ORAL DAILY
COMMUNITY
End: 2024-04-08

## 2021-06-01 RX ORDER — CARVEDILOL 6.25 MG/1
6.25 TABLET ORAL 2 TIMES DAILY WITH MEALS
Qty: 180 TABLET | Refills: 3 | Status: SHIPPED | OUTPATIENT
Start: 2021-06-01 | End: 2023-03-08

## 2021-06-01 RX ORDER — ASPIRIN 81 MG/1
81 TABLET, CHEWABLE ORAL DAILY
Qty: 90 TABLET | Refills: 3 | Status: SHIPPED | OUTPATIENT
Start: 2021-06-01 | End: 2023-03-08

## 2021-06-01 ASSESSMENT — PAIN SCALES - GENERAL: PAINLEVEL: NO PAIN (0)

## 2021-06-01 ASSESSMENT — MIFFLIN-ST. JEOR: SCORE: 1647.32

## 2021-06-01 NOTE — LETTER
6/1/2021      RE: Azra Tom  [address]  [place] [address]       Dear Colleague,    Thank you for the opportunity to participate in the care of your patient, Azra Tom, at the [facility] [place] [facility]. Please see a copy of my visit note below.    June 1, 2021  I had the pleasure of seeing Azra Tom  in the [facility]. 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 She was hospitalized from 1/28-2/11/19. She was treated with oseltamivir, and underwent endomyocardial biopsy. Her ECMO was complicated by left femoral pseudoaneurysm with infection requiring wound vac placement. She had recovered her EF completely and then moved to [place] temporarily then returned when I have talked to her and was doing well. She was then diagnosed with COVID-19 and symptoms of shortness of breath in 4/2021. TTE at the time showed normal EF and was discharged from the ER appropriately. Today I am seeing her in follow-up.  Overall she is doing very well without any complaints.  She did have a cold which is very hard on her however she recovered relatively well now.  She did have several episodes over the past couple of weeks and all of these showed preserved ejection fraction.  Takes her medications as prescribed denies any issues.  No lower extremity edema no PND orthopnea.  No other complaints.     PAST MEDICAL HISTORY:  Past Medical History:   Diagnosis Date     Atypical chest pain     history of atypical chest pain in 2017     Bipolar disorder (H)     diagnosed in [place], [place] in 2017     Dyslipidemia      Tobacco use      Uncomplicated asthma      FAMILY HISTORY:  No family history on file.  SOCIAL HISTORY:  Social History     Socioeconomic History     Marital status: Single      Spouse name: Not on file     Number of children: Not on file     Years of education: Not on file     Highest education level: Not on file   Occupational History     Not on file   Social Needs     Financial resource strain: Not on file     Food insecurity     Worry: Not on file     Inability: Not on file     Transportation needs     Medical: Not on file     Non-medical: Not on file   Tobacco Use     Smoking status: Former Smoker     Smokeless tobacco: Never Used   Substance and Sexual Activity     Alcohol use: Not Currently     Drug use: Not Currently     Sexual activity: Not on file   Lifestyle     Physical activity     Days per week: Not on file     Minutes per session: Not on file     Stress: Not on file   Relationships     Social connections     Talks on phone: Not on file     Gets together: Not on file     Attends Islam service: Not on file     Active member of club or organization: Not on file     Attends meetings of clubs or organizations: Not on file     Relationship status: Not on file     Intimate partner violence     Fear of current or ex partner: Not on file     Emotionally abused: Not on file     Physically abused: Not on file     Forced sexual activity: Not on file   Other Topics Concern     Not on file   Social History Narrative     Not on file     CURRENT MEDICATIONS:  Current Outpatient Medications   Medication     albuterol (PROAIR HFA/PROVENTIL HFA/VENTOLIN HFA) 108 (90 Base) MCG/ACT inhaler     aspirin (ASA) 81 MG chewable tablet     carvedilol (COREG) 6.25 MG tablet     ferrous sulfate (FEROSUL) 325 (65 Fe) MG tablet     ibuprofen (ADVIL/MOTRIN) 600 MG tablet     lisinopril (ZESTRIL) 5 MG tablet     norethindrone (MICRONOR) 0.35 MG tablet     ondansetron (ZOFRAN) 4 MG tablet     vitamin C (ASCORBIC ACID) 1000 MG TABS     No current facility-administered medications for this visit.      ROS:   Constitutional: No fever, chills, or sweats.   ENT: No visual disturbance, ear ache, epistaxis, sore  "throat.   Allergies/Immunologic: Negative.   Respiratory: No cough, hemoptysis.   Cardiovascular: As per HPI.   GI: No nausea, vomiting, hematemesis, melena, or hematochezia.   : No urinary frequency, dysuria, or hematuria.   Integument: Negative.   Psychiatric: Pleasant, no major depression noted  Neuro: No focal neurological deficits noted  Endocrinology: Negative.   Musculoskeletal: As per HPI.      EXAM:  /67 (BP Location: Right arm, Patient Position: Chair, Cuff Size: Adult Large)   Pulse 69   Ht 1.638 m (5' 4.5\")   Wt 94.9 kg (209 lb 4.8 oz)   SpO2 100%   BMI 35.37 kg/m    General: appears comfortable, alert and oriented  Head: normocephalic, atraumatic  Eyes: anicteric sclera, EOMI , PERRL  Neck: no adenopathy  Orophyarynx: moist mucosa, no lesions noted  Heart: regular, S1/S2, no murmurs, rubs or gallop. Estimated JVP at 5 cmH2O  Lungs: CTAB, No wheezing.   Abdomen: soft, non-tender, bowel sounds present, no hepatosplenomegaly  Extremities: No LE edema today  Skin: no open lesions noted  Neuro: grossly non-focal     Labs:  Lab Results   Component Value Date    WBC 5.1 04/11/2021    HGB 14.7 04/11/2021    HCT 45.9 04/11/2021     04/11/2021     04/11/2021    POTASSIUM 4.0 04/11/2021    CHLORIDE 104 04/11/2021    CO2 26 04/11/2021    BUN 8 04/11/2021    CR 0.70 04/11/2021    GLC 96 04/11/2021    SED 78 (H) 02/03/2020    DD 10.1 (H) 02/03/2020    NTBNPI 77 04/11/2021    NTBNP 39 02/26/2020    TROPI <0.015 04/11/2021    AST 15 04/11/2021    ALT 19 04/11/2021    ALKPHOS 88 04/11/2021    BILITOTAL 0.3 04/11/2021    INR 1.20 (H) 02/08/2020     TTE 2/3/20:  Global and regional left ventricular function is normal with an EF of 55-60%.  Maximal thickness 1.3 cm in the basal anteroseptum. Normal RV size and function.  Trivial pericardial effusion.   This study was compared with the study from 1/31/2020 (TTE), 2/2/2020 ([facility] ECMO turndown): Compared with the 1/31/20 TTE, LVEF has improved " markedly.  Compared with the [facility] turndown study, patient is no longer on ECMO.    TTE 4/12/21:  Global and regional left ventricular function is normal with an EF of 55-60%.  Right ventricular function, chamber size, wall motion, and thickness are normal.  No pericardial effusion is present.    TTE 6/1/21:  Limited TTE to re-evaluate ventricular function. Global and regional left ventricular function is normal with an EF of 55-60%. Left ventricular diastolic function is normal.  Global right ventricular function is normal. The right ventricle is normal size.  No significant valvular abnormalities.  The estimated PA systolic pressure is 26 mmHg. IVC diameter <2.1 cm collapsing >50% with sniff suggests a normal RA pressure of 3 mmHg.  This study was compared with the study from 04/12/2021. No significant changes noted.     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   She did have Covid infection recently which was very hard on her body however she did not have reduce ejection fraction.  Or any other complications.  No dizziness lightheadedness she appears euvolemic today and labs cooperate for this.  There is some unavailable to change her medications.  All imaging studies including multiple echoes and labs reviewed with patient in person.  I spent 30 minutes with patient on the day of the visit to review and discuss the results.  I will see her back in 6 months or sooner as needed.  No medication changes today.  Multiple medications renewed.     I appreciate the opportunity to participate in the care of Azra Tom . Please do not hesitate to contact me with any further questions.     Sincerely,   Audie Raymundo MD  [facility] Division of Cardiology

## 2021-06-01 NOTE — PATIENT INSTRUCTIONS
Dr. Raymundo recommends:    Follow up clinic visit with Dr. Raymundo in 6 months with labs same day.       Thank you for your visit today.  Please call me with any questions or concerns.   Tyrell Montero RN  Cardiology Care Coordinator  [phone], press option 1 then option 4

## 2021-06-01 NOTE — NURSING NOTE
Chief Complaint   Patient presents with     Follow Up      Hx of myocarditis and cardiomyopathy.      Vitals were taken and medication reconciled.    DEEPA Weems  2:19 PM

## 2021-06-01 NOTE — PROGRESS NOTES
June 1, 2021  I had the pleasure of seeing Azra Tom  in the [facility] Advanced [facility] Failure Clinic. 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 She was hospitalized from 1/28-2/11/19. She was treated with oseltamivir, and underwent endomyocardial biopsy. Her ECMO was complicated by left femoral pseudoaneurysm with infection requiring wound vac placement. She had recovered her EF completely and then moved to [place] temporarily then returned when I have talked to her and was doing well. She was then diagnosed with COVID-19 and symptoms of shortness of breath in 4/2021. TTE at the time showed normal EF and was discharged from the ER appropriately. Today I am seeing her in follow-up.  Overall she is doing very well without any complaints.  She did have a cold which is very hard on her however she recovered relatively well now.  She did have several episodes over the past couple of weeks and all of these showed preserved ejection fraction.  Takes her medications as prescribed denies any issues.  No lower extremity edema no PND orthopnea.  No other complaints.     PAST MEDICAL HISTORY:  Past Medical History:   Diagnosis Date     Atypical chest pain     history of atypical chest pain in 2017     Bipolar disorder (H)     diagnosed in [place] in 2017     Dyslipidemia      Tobacco use      Uncomplicated asthma      FAMILY HISTORY:  No family history on file.  SOCIAL HISTORY:  Social History     Socioeconomic History     Marital status: Single     Spouse name: Not on file     Number of children: Not on file     Years of education: Not on file     Highest education level: Not on file   Occupational History     Not on file   Social Needs     Financial resource strain: Not on file     Food insecurity     Worry: Not on file     Inability: Not on file     Transportation needs     Medical: Not  on file     Non-medical: Not on file   Tobacco Use     Smoking status: Former Smoker     Smokeless tobacco: Never Used   Substance and Sexual Activity     Alcohol use: Not Currently     Drug use: Not Currently     Sexual activity: Not on file   Lifestyle     Physical activity     Days per week: Not on file     Minutes per session: Not on file     Stress: Not on file   Relationships     Social connections     Talks on phone: Not on file     Gets together: Not on file     Attends Episcopalian service: Not on file     Active member of club or organization: Not on file     Attends meetings of clubs or organizations: Not on file     Relationship status: Not on file     Intimate partner violence     Fear of current or ex partner: Not on file     Emotionally abused: Not on file     Physically abused: Not on file     Forced sexual activity: Not on file   Other Topics Concern     Not on file   Social History Narrative     Not on file     CURRENT MEDICATIONS:  Current Outpatient Medications   Medication     albuterol (PROAIR HFA/PROVENTIL HFA/VENTOLIN HFA) 108 (90 Base) MCG/ACT inhaler     aspirin (ASA) 81 MG chewable tablet     carvedilol (COREG) 6.25 MG tablet     ferrous sulfate (FEROSUL) 325 (65 Fe) MG tablet     ibuprofen (ADVIL/MOTRIN) 600 MG tablet     lisinopril (ZESTRIL) 5 MG tablet     norethindrone (MICRONOR) 0.35 MG tablet     ondansetron (ZOFRAN) 4 MG tablet     vitamin C (ASCORBIC ACID) 1000 MG TABS     No current facility-administered medications for this visit.      ROS:   Constitutional: No fever, chills, or sweats.   ENT: No visual disturbance, ear ache, epistaxis, sore throat.   Allergies/Immunologic: Negative.   Respiratory: No cough, hemoptysis.   Cardiovascular: As per HPI.   GI: No nausea, vomiting, hematemesis, melena, or hematochezia.   : No urinary frequency, dysuria, or hematuria.   Integument: Negative.   Psychiatric: Pleasant, no major depression noted  Neuro: No focal neurological deficits  "noted  Endocrinology: Negative.   Musculoskeletal: As per HPI.      EXAM:  /67 (BP Location: Right arm, Patient Position: Chair, Cuff Size: Adult Large)   Pulse 69   Ht 1.638 m (5' 4.5\")   Wt 94.9 kg (209 lb 4.8 oz)   SpO2 100%   BMI 35.37 kg/m    General: appears comfortable, alert and oriented  Head: normocephalic, atraumatic  Eyes: anicteric sclera, EOMI , PERRL  Neck: no adenopathy  Orophyarynx: moist mucosa, no lesions noted  Heart: regular, S1/S2, no murmurs, rubs or gallop. Estimated JVP at 5 cmH2O  Lungs: CTAB, No wheezing.   Abdomen: soft, non-tender, bowel sounds present, no hepatosplenomegaly  Extremities: No LE edema today  Skin: no open lesions noted  Neuro: grossly non-focal     Labs:  Lab Results   Component Value Date    WBC 5.1 04/11/2021    HGB 14.7 04/11/2021    HCT 45.9 04/11/2021     04/11/2021     04/11/2021    POTASSIUM 4.0 04/11/2021    CHLORIDE 104 04/11/2021    CO2 26 04/11/2021    BUN 8 04/11/2021    CR 0.70 04/11/2021    GLC 96 04/11/2021    SED 78 (H) 02/03/2020    DD 10.1 (H) 02/03/2020    NTBNPI 77 04/11/2021    NTBNP 39 02/26/2020    TROPI <0.015 04/11/2021    AST 15 04/11/2021    ALT 19 04/11/2021    ALKPHOS 88 04/11/2021    BILITOTAL 0.3 04/11/2021    INR 1.20 (H) 02/08/2020     TTE 2/3/20:  Global and regional left ventricular function is normal with an EF of 55-60%.  Maximal thickness 1.3 cm in the basal anteroseptum. Normal RV size and function.  Trivial pericardial effusion.   This study was compared with the study from 1/31/2020 (TTE), 2/2/2020 ([facility] ECMO turndown): Compared with the 1/31/20 TTE, LVEF has improved markedly.  Compared with the [facility] turndown study, patient is no longer on ECMO.    TTE 4/12/21:  Global and regional left ventricular function is normal with an EF of 55-60%.  Right ventricular function, chamber size, wall motion, and thickness are normal.  No pericardial effusion is present.    TTE 6/1/21:  Limited TTE to re-evaluate ventricular " function. Global and regional left ventricular function is normal with an EF of 55-60%. Left ventricular diastolic function is normal.  Global right ventricular function is normal. The right ventricle is normal size.  No significant valvular abnormalities.  The estimated PA systolic pressure is 26 mmHg. IVC diameter <2.1 cm collapsing >50% with sniff suggests a normal RA pressure of 3 mmHg.  This study was compared with the study from 04/12/2021. No significant changes noted.     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   She did have Covid infection recently which was very hard on her body however she did not have reduce ejection fraction.  Or any other complications.  No dizziness lightheadedness she appears euvolemic today and labs cooperate for this.  There is some unavailable to change her medications.  All imaging studies including multiple echoes and labs reviewed with patient in person.  I spent 30 minutes with patient on the day of the visit to review and discuss the results.  I will see her back in 6 months or sooner as needed.  No medication changes today.  Multiple medications renewed.     I appreciate the opportunity to participate in the care of Azra Tom . Please do not hesitate to contact me with any further questions.     Sincerely,   Audie Raymundo MD  [facility] Division of Cardiology

## 2021-07-23 DIAGNOSIS — I50.41 ACUTE COMBINED SYSTOLIC AND DIASTOLIC HEART FAILURE (H): ICD-10-CM

## 2021-07-23 DIAGNOSIS — K02.9 DENTAL CARIES: Primary | ICD-10-CM

## 2021-07-23 NOTE — PROGRESS NOTES
Date: 7/23/2021  Time of Call: 2:30 PM  Diagnosis:  HF   [facility] - Ordering provider: Dr. Raymundo  Order: Dental referral  Order received by: Deepti Hernandez RN   Follow-up/additional notes: Azra Holland messaged in regarding a Dental referral, discussed wit Dr. Raymundo and enter referral.

## 2021-08-23 ENCOUNTER — NURSE TRIAGE (OUTPATIENT)
Dept: NURSING | Facility: CLINIC | Age: 36
End: 2021-08-23

## 2021-08-23 NOTE — TELEPHONE ENCOUNTER
"\"I have a hx of dental issues and infections. I also have a hx of heart issues, see epic, especially when I get an infection. I have been sitting at [facility] ER for the past few hours with dental pain,swelling and I have a knot on the left side of my face. I also have drainage going down the back of my throat. I had an EKG done here that says\"abnormal\". But they told me \"it's not life threatening.\"   I am still waiting to actually see a doctor. I am wondering if I should stay here and wait or [facility]?\"  Patient rates chest \"tightness\" a 5 or 6/10 and states \"it's constant.\"   Advised she could wait there for MD, or go to [facility] , but she needs to be seen in ER by an MD per protocol.  Yessi Snyder RN [facility]        Reason for Disposition    SEVERE chest pain    Additional Information    Negative: Chest pain lasts > 5 minutes (Exceptions: chest pain occurring > 3 days ago and now asymptomatic; same as previously diagnosed heartburn and has accompanying sour taste in mouth)    Protocols used: CHEST PAIN-A-AH      "

## 2021-10-10 ENCOUNTER — HEALTH MAINTENANCE LETTER (OUTPATIENT)
Age: 36
End: 2021-10-10

## 2021-10-12 ENCOUNTER — TELEPHONE (OUTPATIENT)
Dept: CARDIOLOGY | Facility: CLINIC | Age: 36
End: 2021-10-12

## 2021-10-12 NOTE — TELEPHONE ENCOUNTER
[facility] Call Center    Phone Message    May a detailed message be left on voicemail: yes     Reason for Call: Other: Pt would like a call back to discuss being seen sooner as her primary care wants her to get in asap for ankle swelling and trouble breathing     Action Taken: Message routed to:  [facility] ([facility]): Cardio    Travel Screening: Not Applicable

## 2021-10-24 ENCOUNTER — APPOINTMENT (OUTPATIENT)
Dept: CT IMAGING | Facility: CLINIC | Age: 36
End: 2021-10-24
Attending: EMERGENCY MEDICINE
Payer: COMMERCIAL

## 2021-10-24 ENCOUNTER — NURSE TRIAGE (OUTPATIENT)
Dept: NURSING | Facility: CLINIC | Age: 36
End: 2021-10-24

## 2021-10-24 ENCOUNTER — HOSPITAL ENCOUNTER (EMERGENCY)
Facility: CLINIC | Age: 36
Discharge: HOME | End: 2021-10-24
Attending: EMERGENCY MEDICINE | Admitting: EMERGENCY MEDICINE
Payer: COMMERCIAL

## 2021-10-24 VITALS
OXYGEN SATURATION: 100 % | BODY MASS INDEX: 35 KG/M2 | HEART RATE: 66 BPM | HEIGHT: 64 IN | SYSTOLIC BLOOD PRESSURE: 108 MMHG | TEMPERATURE: 98.6 F | RESPIRATION RATE: 16 BRPM | WEIGHT: 205 LBS | DIASTOLIC BLOOD PRESSURE: 67 MMHG

## 2021-10-24 DIAGNOSIS — K08.89 PAIN, DENTAL: ICD-10-CM

## 2021-10-24 LAB
ALBUMIN SERPL-MCNC: 3.8 G/DL (ref 3.4–5)
ALP SERPL-CCNC: 77 U/L (ref 40–150)
ALT SERPL W P-5'-P-CCNC: 20 U/L (ref 0–50)
ANION GAP SERPL CALCULATED.3IONS-SCNC: 4 MMOL/L (ref 3–14)
APAP SERPL-MCNC: <2 MG/L (ref 10–30)
AST SERPL W P-5'-P-CCNC: 14 U/L (ref 0–45)
BASOPHILS # BLD AUTO: 0 10E3/UL (ref 0–0.2)
BASOPHILS NFR BLD AUTO: 0 %
BILIRUB SERPL-MCNC: 0.2 MG/DL (ref 0.2–1.3)
BUN SERPL-MCNC: 10 MG/DL (ref 7–30)
CALCIUM SERPL-MCNC: 8.8 MG/DL (ref 8.5–10.1)
CHLORIDE BLD-SCNC: 107 MMOL/L (ref 94–109)
CO2 SERPL-SCNC: 29 MMOL/L (ref 20–32)
CREAT SERPL-MCNC: 0.95 MG/DL (ref 0.52–1.04)
CRP SERPL-MCNC: 6.9 MG/L (ref 0–8)
EOSINOPHIL # BLD AUTO: 0.1 10E3/UL (ref 0–0.7)
EOSINOPHIL NFR BLD AUTO: 1 %
ERYTHROCYTE [DISTWIDTH] IN BLOOD BY AUTOMATED COUNT: 13.2 % (ref 10–15)
ERYTHROCYTE [SEDIMENTATION RATE] IN BLOOD BY WESTERGREN METHOD: 26 MM/HR (ref 0–20)
GFR SERPL CREATININE-BSD FRML MDRD: 78 ML/MIN/1.73M2
GLUCOSE BLD-MCNC: 78 MG/DL (ref 70–99)
HCG SERPL QL: NEGATIVE
HCT VFR BLD AUTO: 38.2 % (ref 35–47)
HGB BLD-MCNC: 12.6 G/DL (ref 11.7–15.7)
IMM GRANULOCYTES # BLD: 0 10E3/UL
IMM GRANULOCYTES NFR BLD: 0 %
LYMPHOCYTES # BLD AUTO: 2.5 10E3/UL (ref 0.8–5.3)
LYMPHOCYTES NFR BLD AUTO: 40 %
MCH RBC QN AUTO: 29.7 PG (ref 26.5–33)
MCHC RBC AUTO-ENTMCNC: 33 G/DL (ref 31.5–36.5)
MCV RBC AUTO: 90 FL (ref 78–100)
MONOCYTES # BLD AUTO: 0.5 10E3/UL (ref 0–1.3)
MONOCYTES NFR BLD AUTO: 9 %
NEUTROPHILS # BLD AUTO: 3.1 10E3/UL (ref 1.6–8.3)
NEUTROPHILS NFR BLD AUTO: 50 %
NRBC # BLD AUTO: 0 10E3/UL
NRBC BLD AUTO-RTO: 0 /100
NT-PROBNP SERPL-MCNC: 18 PG/ML (ref 0–450)
PLATELET # BLD AUTO: 179 10E3/UL (ref 150–450)
POTASSIUM BLD-SCNC: 3.9 MMOL/L (ref 3.4–5.3)
PROT SERPL-MCNC: 7.4 G/DL (ref 6.8–8.8)
RBC # BLD AUTO: 4.24 10E6/UL (ref 3.8–5.2)
SALICYLATES SERPL-MCNC: 3 MG/DL
SODIUM SERPL-SCNC: 140 MMOL/L (ref 133–144)
TROPONIN I SERPL-MCNC: <0.015 UG/L (ref 0–0.04)
WBC # BLD AUTO: 6.2 10E3/UL (ref 4–11)

## 2021-10-24 PROCEDURE — 250N000013 HC RX MED GY IP 250 OP 250 PS 637: Performed by: EMERGENCY MEDICINE

## 2021-10-24 PROCEDURE — 36415 COLL VENOUS BLD VENIPUNCTURE: CPT | Performed by: EMERGENCY MEDICINE

## 2021-10-24 PROCEDURE — 80053 COMPREHEN METABOLIC PANEL: CPT | Performed by: EMERGENCY MEDICINE

## 2021-10-24 PROCEDURE — 86140 C-REACTIVE PROTEIN: CPT | Performed by: EMERGENCY MEDICINE

## 2021-10-24 PROCEDURE — 85652 RBC SED RATE AUTOMATED: CPT | Performed by: EMERGENCY MEDICINE

## 2021-10-24 PROCEDURE — 80179 DRUG ASSAY SALICYLATE: CPT | Performed by: EMERGENCY MEDICINE

## 2021-10-24 PROCEDURE — 84703 CHORIONIC GONADOTROPIN ASSAY: CPT | Performed by: EMERGENCY MEDICINE

## 2021-10-24 PROCEDURE — 80143 DRUG ASSAY ACETAMINOPHEN: CPT | Performed by: EMERGENCY MEDICINE

## 2021-10-24 PROCEDURE — 99285 EMERGENCY DEPT VISIT HI MDM: CPT | Mod: 25

## 2021-10-24 PROCEDURE — 93005 ELECTROCARDIOGRAM TRACING: CPT

## 2021-10-24 PROCEDURE — 84484 ASSAY OF TROPONIN QUANT: CPT | Performed by: EMERGENCY MEDICINE

## 2021-10-24 PROCEDURE — 70486 CT MAXILLOFACIAL W/O DYE: CPT

## 2021-10-24 PROCEDURE — 85025 COMPLETE CBC W/AUTO DIFF WBC: CPT | Performed by: EMERGENCY MEDICINE

## 2021-10-24 PROCEDURE — 70486 CT MAXILLOFACIAL W/O DYE: CPT | Mod: 26 | Performed by: RADIOLOGY

## 2021-10-24 PROCEDURE — 83880 ASSAY OF NATRIURETIC PEPTIDE: CPT | Performed by: EMERGENCY MEDICINE

## 2021-10-24 RX ORDER — HYDROCODONE BITARTRATE AND ACETAMINOPHEN 5; 325 MG/1; MG/1
1 TABLET ORAL EVERY 6 HOURS PRN
Qty: 10 TABLET | Refills: 0 | Status: SHIPPED | OUTPATIENT
Start: 2021-10-24 | End: 2021-10-27

## 2021-10-24 RX ORDER — PENICILLIN V POTASSIUM 500 MG/1
500 TABLET, FILM COATED ORAL 4 TIMES DAILY
Qty: 28 TABLET | Refills: 0 | Status: SHIPPED | OUTPATIENT
Start: 2021-10-24 | End: 2021-10-31

## 2021-10-24 RX ORDER — HYDROCODONE BITARTRATE AND ACETAMINOPHEN 5; 325 MG/1; MG/1
1 TABLET ORAL ONCE
Status: COMPLETED | OUTPATIENT
Start: 2021-10-24 | End: 2021-10-24

## 2021-10-24 RX ADMIN — HYDROCODONE BITARTRATE AND ACETAMINOPHEN 1 TABLET: 5; 325 TABLET ORAL at 21:13

## 2021-10-24 ASSESSMENT — MIFFLIN-ST. JEOR: SCORE: 1614.87

## 2021-10-25 LAB
ATRIAL RATE - MUSE: 73 BPM
DIASTOLIC BLOOD PRESSURE - MUSE: NORMAL MMHG
INTERPRETATION ECG - MUSE: NORMAL
P AXIS - MUSE: 47 DEGREES
PR INTERVAL - MUSE: 136 MS
QRS DURATION - MUSE: 80 MS
QT - MUSE: 380 MS
QTC - MUSE: 418 MS
R AXIS - MUSE: 23 DEGREES
SYSTOLIC BLOOD PRESSURE - MUSE: NORMAL MMHG
T AXIS - MUSE: 44 DEGREES
VENTRICULAR RATE- MUSE: 73 BPM

## 2021-10-25 NOTE — ED TRIAGE NOTES
Coming in with upper dental pain after having them removed. Has been taking Tylenol  And Motrin for pain without relief. Left side of face swollen.

## 2021-10-25 NOTE — TELEPHONE ENCOUNTER
"Patient reports she got all her top teeth extracted and says pain is \"unbearable.\" Patient says she has been taking   2 tylenol - every 4 hrs and 3 ibuprofen every 4 hrs since the 20th and says feels dizzy and woozy. Patient is concerned she may have taken too much unintentionally.    Advised she should call Poison Control. Patient says she is in a lot of pain and wants to be prescribed something for the pain. Advised she would have to be seen in the ED then. Advised to have someone else drive her. Patient says she will go to the ED.         Reason for Disposition    [1] DOUBLE DOSE (an extra dose or lesser amount) of prescription drug AND [2] any symptoms (e.g., dizziness, nausea, pain, sleepiness)    Additional Information    Negative: [1] DOUBLE DOSE (an extra dose or lesser amount) of over-the-counter (OTC) drug AND [2] any symptoms (e.g., dizziness, nausea, pain, sleepiness)    Negative: MORE THAN A DOUBLE DOSE of a prescription or over-the-counter (OTC) drug    Protocols used: MEDICATION QUESTION CALL-A-AH      "

## 2021-10-25 NOTE — ED PROVIDER NOTES
"  History     Chief Complaint   Patient presents with     Dental Pain     HPI  Azra Tom is a 36 year old female with a past medical history of heart failure, bipolar disorder, dyslipidemia, asthma who presents to the emergency department with a chief complaint of dental pain.  The patient states that she recently had all of her top teeth extracted and has significant pain to the upper part of her mouth since that time.  She also notes associated swelling on the left side of her face.  The pain is severe, to the point of being \"unbearable\" per the patient.  The patient is also concerned as she has been taking more Tylenol and ibuprofen then prescribed since 10/20 and the pain has still not been controlled.  She reports that she has been taking 2 Tylenol every 4 hours and 3 ibuprofen every 4 hours since 10/20.  She reports associated symptoms of feeling dizzy and woozy.  The patient called the nurse triage line requesting to be prescribed something stronger for pain control and was advised to call poison control given her inadvertent overdosing of over-the-counter pain medications and come to the emergency department for further evaluation of her pain.  The patient states she was particularly concerned as she was told that after a dental extraction, her pain should be getting considerably better after 2 days, but it has now been 4 days and she still has considerable pain.  The patient does note that at least one of her teeth on the left upper jaw was particularly difficult for her dental team to extract, requiring cutting into the bone on that side, drilling, and a lot of pulling/tugging.    I have reviewed the Medications, Allergies, Past Medical and Surgical History, and Social History in the [facility] system.    Past Medical History:   Diagnosis Date     Atypical chest pain     history of atypical chest pain in 2017     Bipolar disorder (H)     diagnosed in [place], [place] in 2017     Dyslipidemia      Tobacco use      " Uncomplicated asthma      Past Surgical History:   Procedure Laterality Date     CV EXTRACORPERAL MEMBRANE OXYGENATION N/A 1/29/2020    Procedure: ECMO, NATIVE HEART BIOPSY;  Surgeon: Richard Montana MD;  Location:  HEART CARDIAC CATH LAB     CV HEART BIOPSY N/A 1/29/2020    Procedure: Heart Biopsy;  Surgeon: Richard Montana MD;  Location:  HEART CARDIAC CATH LAB     REMOVE EXTRACORPORAL MEMBRANE OXYGENATOR ADULT Left 2/3/2020    Procedure: REMOVAL Extra Corporeal Membrain Oxygenator, Inta operative transesophageal echocardiogram, Repair of femoral vessel;  Surgeon: Vishnu Marx MD;  Location:  OR     No current facility-administered medications for this encounter.     Current Outpatient Medications   Medication     albuterol (PROAIR HFA/PROVENTIL HFA/VENTOLIN HFA) 108 (90 Base) MCG/ACT inhaler     aspirin (ASA) 81 MG chewable tablet     carvedilol (COREG) 6.25 MG tablet     ibuprofen (ADVIL/MOTRIN) 600 MG tablet     lisinopril (ZESTRIL) 5 MG tablet     multivitamin w/minerals (MULTI-VITAMIN) tablet     vitamin C (ASCORBIC ACID) 1000 MG TABS     UNABLE TO FIND     No Known Allergies  Past medical history, past surgical history, medications, and allergies were reviewed with the patient. Additional pertinent items: None    Social History     Socioeconomic History     Marital status: Single     Spouse name: Not on file     Number of children: Not on file     Years of education: Not on file     Highest education level: Not on file   Occupational History     Not on file   Tobacco Use     Smoking status: Former Smoker     Smokeless tobacco: Never Used   Substance and Sexual Activity     Alcohol use: Not Currently     Drug use: Not Currently     Sexual activity: Not on file   Other Topics Concern     Not on file   Social History Narrative     Not on file     Social Determinants of Health     Financial Resource Strain:      Difficulty of Paying Living Expenses:    Food Insecurity:      Worried About  "Running Out of Food in the Last Year:      Ran Out of Food in the Last Year:    Transportation Needs:      Lack of Transportation (Medical):      Lack of Transportation (Non-Medical):    Physical Activity:      Days of Exercise per Week:      Minutes of Exercise per Session:    Stress:      Feeling of Stress :    Social Connections:      Frequency of Communication with Friends and Family:      Frequency of Social Gatherings with Friends and Family:      Attends [employer] Services:      Active Member of Clubs or Organizations:      Attends Club or Organization Meetings:      Marital Status:    Intimate Partner Violence:      Fear of Current or Ex-Partner:      Emotionally Abused:      Physically Abused:      Sexually Abused:      Social history was reviewed with the patient. Additional pertinent items: None    Review of Systems  General: No fevers or chills  Skin: No rash or diaphoresis  Eyes: No eye redness or discharge  Ears/Nose/Throat: No rhinorrhea or nasal congestion  Respiratory: No cough or SOB  Cardiovascular: No chest pain or palpitations  Gastrointestinal: No nausea, vomiting, or diarrhea  Genitourinary: No urinary frequency, hematuria, or dysuria  Musculoskeletal: No arthralgias or myalgias  Neurologic: No numbness or weakness  Psychiatric: No depression or SI  Hematologic/Lymphatic/Immunologic: No leg swelling, no easy bruising/bleeding  Endocrine: No polyuria/polydypsia    A complete review of systems was performed with pertinent positives and negatives noted in the HPI, and all other systems negative.    Physical Exam   BP: 119/60  Pulse: 71  Temp: 98.6  F (37  C)  Resp: 14  Height: 162.6 cm (5' 4\")  Weight: 93 kg (205 lb)  SpO2: 100 %      General: Well nourished, well developed, NAD  HEENT: EOMI, anicteric.  MMM, edentulous maxilla with extraction sites/sutures to gums visible without any active bleeding or drainage, there is tenderness to palpation over the left upper gums.  Patient also has " swelling over the left cheek area with mild erythema/warmth, no induration or fluctuance, no nasal drainage  Neck: no jugular venous distension, supple, nl ROM  Cardiac: Regular rate, extremities well-perfused, normal capillary refill  Pulm: Airway patent, nonlabored breathing  Skin: Warm and dry to the touch.  No rash  Extremities: No LE edema, no cyanosis, w/w/p  Neuro: A&Ox3, no gross focal deficits    ED Course        Procedures               EKG Interpretation:      Interpreted by Nuvia Angulo MD  Time reviewed:2035   Symptoms at time of EKG: dizziness   Rhythm: normal sinus   Rate: normal, 73 bpm   Axis: NORMAL  Ectopy: none  Conduction: normal  ST Segments/ T Waves: No ST-T wave changes  Q Waves: none  Comparison to prior: Unchanged from 4/11/21    Clinical Impression: normal EKG                  Labs Ordered and Resulted from Time of ED Arrival Up to the Time of Departure from the ED   ERYTHROCYTE SEDIMENTATION RATE AUTO - Abnormal; Notable for the following components:       Result Value    Erythrocyte Sedimentation Rate 26 (*)     All other components within normal limits   ACETAMINOPHEN LEVEL - Abnormal; Notable for the following components:    Acetaminophen <2 (*)     All other components within normal limits   COMPREHENSIVE METABOLIC PANEL - Normal   CRP INFLAMMATION - Normal   SALICYLATE LEVEL - Normal   HCG QUALITATIVE PREGNANCY - Normal   TROPONIN I - Normal   NT PROBNP INPATIENT - Normal   CBC WITH PLATELETS AND DIFFERENTIAL   CBC WITH PLATELETS & DIFFERENTIAL    Narrative:     The following orders were created for panel order CBC with platelets differential.  Procedure                               Abnormality         Status                     ---------                               -----------         ------                     CBC with platelets and d...[[id number]]                      Final result                 Please view results for these tests on the individual orders.             Results for orders placed or performed during the hospital encounter of 10/24/21 (from the past 24 hour(s))   EKG 12-lead, tracing only   Result Value Ref Range    Systolic Blood Pressure  mmHg    Diastolic Blood Pressure  mmHg    Ventricular Rate 73 BPM    Atrial Rate 73 BPM    OH Interval 136 ms    QRS Duration 80 ms     ms    QTc 418 ms    P Axis 47 degrees    R AXIS 23 degrees    T Axis 44 degrees    Interpretation ECG Sinus rhythm  Normal ECG      CBC with platelets differential    Narrative    The following orders were created for panel order CBC with platelets differential.  Procedure                               Abnormality         Status                     ---------                               -----------         ------                     CBC with platelets and d...[[id number]]                      Final result                 Please view results for these tests on the individual orders.   Comprehensive metabolic panel   Result Value Ref Range    Sodium 140 133 - 144 mmol/L    Potassium 3.9 3.4 - 5.3 mmol/L    Chloride 107 94 - 109 mmol/L    Carbon Dioxide (CO2) 29 20 - 32 mmol/L    Anion Gap 4 3 - 14 mmol/L    Urea Nitrogen 10 7 - 30 mg/dL    Creatinine 0.95 0.52 - 1.04 mg/dL    Calcium 8.8 8.5 - 10.1 mg/dL    Glucose 78 70 - 99 mg/dL    Alkaline Phosphatase 77 40 - 150 U/L    AST 14 0 - 45 U/L    ALT 20 0 - 50 U/L    Protein Total 7.4 6.8 - 8.8 g/dL    Albumin 3.8 3.4 - 5.0 g/dL    Bilirubin Total 0.2 0.2 - 1.3 mg/dL    GFR Estimate 78 >60 mL/min/1.73m2   CRP inflammation   Result Value Ref Range    CRP Inflammation 6.9 0.0 - 8.0 mg/L   Erythrocyte sedimentation rate auto   Result Value Ref Range    Erythrocyte Sedimentation Rate 26 (H) 0 - 20 mm/hr   Acetaminophen level   Result Value Ref Range    Acetaminophen <2 (L) 10 - 30 mg/L   Salicylate level   Result Value Ref Range    Salicylate 3 <20 mg/dL   HCG qualitative Blood   Result Value Ref Range    hCG Serum Qualitative Negative Negative    Troponin I   Result Value Ref Range    Troponin I <0.015 0.000 - 0.045 ug/L   Nt probnp inpatient (BNP)   Result Value Ref Range    N terminal Pro BNP Inpatient 18 0 - 450 pg/mL   CBC with platelets and differential   Result Value Ref Range    WBC Count 6.2 4.0 - 11.0 10e3/uL    RBC Count 4.24 3.80 - 5.20 10e6/uL    Hemoglobin 12.6 11.7 - 15.7 g/dL    Hematocrit 38.2 35.0 - 47.0 %    MCV 90 78 - 100 fL    MCH 29.7 26.5 - 33.0 pg    MCHC 33.0 31.5 - 36.5 g/dL    RDW 13.2 10.0 - 15.0 %    Platelet Count 179 150 - 450 10e3/uL    % Neutrophils 50 %    % Lymphocytes 40 %    % Monocytes 9 %    % Eosinophils 1 %    % Basophils 0 %    % Immature Granulocytes 0 %    NRBCs per 100 WBC 0 <1 /100    Absolute Neutrophils 3.1 1.6 - 8.3 10e3/uL    Absolute Lymphocytes 2.5 0.8 - 5.3 10e3/uL    Absolute Monocytes 0.5 0.0 - 1.3 10e3/uL    Absolute Eosinophils 0.1 0.0 - 0.7 10e3/uL    Absolute Basophils 0.0 0.0 - 0.2 10e3/uL    Absolute Immature Granulocytes 0.0 <=0.0 10e3/uL    Absolute NRBCs 0.0 10e3/uL   CT Facial Bones without Contrast    Narrative    EXAM: CT FACIAL BONES WITHOUT CONTRAST  LOCATION: [facility]  DATE/TIME: 10/24/2021 10:38 PM    INDICATION: Dental pain post extraction.  COMPARISON: None.  TECHNIQUE: Routine CT Maxillofacial without IV contrast. Multiplanar reformats. Dose reduction techniques were used.     FINDINGS:  OSSEOUS STRUCTURES/SOFT TISSUES: Recent extraction of the maxillary teeth. There are small tooth fragments remaining in the sockets of teeth numbers 8 and 7 in the anterior right maxilla. Lateral cortical erosion involving several teeth most pronounced   involving tooth sockets #9 and 11. No definite fracture. All of the mandibular teeth are also missing. There is mild soft tissue swelling slightly more pronounced in the left maxillary region though no evidence of abscess or fluid collection. No facial   bone fracture or malalignment.    ORBITAL  CONTENTS: No acute abnormality.    SINUSES: No paranasal sinus mucosal disease.    VISUALIZED INTRACRANIAL CONTENTS: No acute abnormality.       Impression    IMPRESSION:   1. Recent extraction of the maxillary teeth per report. No fracture or significant fluid collection. No obvious abscess.    2. Small tooth fragments are seen involving tooth numbers 8 and 7.       Labs, vital signs, and imaging studies were reviewed by me.    Medications   HYDROcodone-acetaminophen (NORCO) 5-325 MG per tablet 1 tablet (1 tablet Oral Given 10/24/21 2113)       Assessments & Plan (with Medical Decision Making)   Azra Tom is a 36 year old female who presents to the emergency department with dental pain.  Differential diagnosis includes secondary infection, dry socket, post extraction pain (which may have lasted longer than expected given reported difficulty of extraction of tooth to the left upper jaw, which corresponds to patient's area of most significant pain).  Medications ordered for symptomatic relief in the emergency department.  Labs and CT ordered to further evaluate the patient.  Will obtain Tylenol and salicylate levels as well as creatinine/LFTs to evaluate for adverse effects from inadvertent overdosing of Tylenol and ibuprofen.  Per patient's report, she has only been taking slightly more than recommended, so my concern for serious adverse effect is low, but patient is to be advised to use caution with these medications in the future.    EKG is normal    Labs remarkable for mildly elevated ESR (which may be consistent with infection or with patient's recent procedure-it is reassuring that patient's CRP and white blood cell count are within normal limits), salicylate/acetaminophen levels are not elevated.  LFTs and creatinine are within normal limits.    Patient was advised not to exceed recommended dosing on medications in the future (in particular, she should take no more than 4 g of Tylenol per 24-hour  period).    Patient's PDMP report was reviewed, patient has an overdose risk score of 200 (80 narcotic, 50 sedative) with 1 prescription for a controlled substance written in the past year, by her dentist, a Dr. Tk Padilla DDS.  Patient was given a 3-day supply of Tylenol 3 tablets (12 tablets) and this prescription was filled on 10/21/2021.  The patient states that she has not been taking this medication as the codeine makes her itchy and she does not like the side effects of narcotic pain medications as they make her feel high. However, given her considerable pain at the moment, she is willing to consider other pain medications for pain control. Norco given for pain control in the ER.     I have reviewed the nursing notes.    I have reviewed the findings, diagnosis, plan and need for follow up with the patient.    CT shows NAD    Patient to be discharged home, advised to follow up with PCP and her dentist. Patient to return to ER immediately with any new/worsening symptoms. Prescriptions for antibiotics and additional medications for pain control at home as listed below.  Patient was advised on recommended dosing of acetaminophen and ibuprofen and again cautioned not to exceed this due to risk of harming her liver/kidneys.  Plan of care discussed with patient who expresses understanding and agrees with plan of care.      New Prescriptions    BENZOCAINE (ANBESOL) 10 % GEL    Take by mouth 4 times daily as needed for moderate pain    HYDROCODONE-ACETAMINOPHEN (NORCO) 5-325 MG TABLET    Take 1 tablet by mouth every 6 hours as needed for severe pain    PENICILLIN V (VEETID) 500 MG TABLET    Take 1 tablet (500 mg) by mouth 4 times daily for 7 days       Final diagnoses:   Pain, dental     Nuvia Angulo MD  10/24/2021   [facility] EMERGENCY DEPARTMENT     Nuvia Angulo MD  10/24/21 [id number]       Nuvia Angulo MD  10/24/21 8945       Nuvia Angulo MD  10/24/21 0128

## 2021-10-25 NOTE — DISCHARGE INSTRUCTIONS
TODAY'S VISIT:  You were seen today for dental pain   -   - If you had any labs or imaging/radiology tests performed today, you should also discuss these tests with your usual provider.     FOLLOW-UP:  Please make an appointment to follow up with:  - Your Primary Care Provider. If you do not have a PCP, please call the Primary Care Center (phone: [phone] for an appointment  - your dentist    - Have your provider review the results from today's visit with you again to make sure no further follow-up or additional testing is needed based on those results.     RETURN TO THE EMERGENCY DEPARTMENT  Return to the Emergency Department at any time for any new or worsening symptoms or any concerns.
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
## 2021-12-14 ENCOUNTER — APPOINTMENT (OUTPATIENT)
Dept: GENERAL RADIOLOGY | Facility: CLINIC | Age: 36
End: 2021-12-14
Attending: EMERGENCY MEDICINE
Payer: COMMERCIAL

## 2021-12-14 ENCOUNTER — OFFICE VISIT (OUTPATIENT)
Dept: CARDIOLOGY | Facility: CLINIC | Age: 36
End: 2021-12-14
Attending: INTERNAL MEDICINE
Payer: COMMERCIAL

## 2021-12-14 ENCOUNTER — LAB (OUTPATIENT)
Dept: LAB | Facility: CLINIC | Age: 36
End: 2021-12-14
Payer: COMMERCIAL

## 2021-12-14 ENCOUNTER — HOSPITAL ENCOUNTER (EMERGENCY)
Facility: CLINIC | Age: 36
Discharge: HOME | End: 2021-12-14
Attending: EMERGENCY MEDICINE | Admitting: EMERGENCY MEDICINE
Payer: COMMERCIAL

## 2021-12-14 ENCOUNTER — APPOINTMENT (OUTPATIENT)
Dept: CT IMAGING | Facility: CLINIC | Age: 36
End: 2021-12-14
Attending: EMERGENCY MEDICINE
Payer: COMMERCIAL

## 2021-12-14 VITALS
RESPIRATION RATE: 16 BRPM | DIASTOLIC BLOOD PRESSURE: 63 MMHG | SYSTOLIC BLOOD PRESSURE: 117 MMHG | TEMPERATURE: 98.8 F | HEART RATE: 68 BPM | OXYGEN SATURATION: 99 %

## 2021-12-14 VITALS
OXYGEN SATURATION: 98 % | WEIGHT: 212.2 LBS | HEART RATE: 75 BPM | SYSTOLIC BLOOD PRESSURE: 107 MMHG | DIASTOLIC BLOOD PRESSURE: 68 MMHG | HEIGHT: 65 IN | BODY MASS INDEX: 35.35 KG/M2

## 2021-12-14 DIAGNOSIS — I42.8 NONISCHEMIC CARDIOMYOPATHY (H): ICD-10-CM
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
## 2021-12-14 DIAGNOSIS — I50.41 ACUTE COMBINED SYSTOLIC AND DIASTOLIC HEART FAILURE (H): ICD-10-CM

## 2021-12-14 DIAGNOSIS — I10 BENIGN ESSENTIAL HYPERTENSION: Primary | ICD-10-CM

## 2021-12-14 DIAGNOSIS — I50.20 HEART FAILURE WITH REDUCED EJECTION FRACTION, NYHA CLASS III (H): ICD-10-CM

## 2021-12-14 DIAGNOSIS — S63.502A WRIST SPRAIN, LEFT, INITIAL ENCOUNTER: ICD-10-CM

## 2021-12-14 LAB
ALBUMIN SERPL-MCNC: 3.7 G/DL (ref 3.4–5)
ALP SERPL-CCNC: 92 U/L (ref 40–150)
ALT SERPL W P-5'-P-CCNC: 30 U/L (ref 0–50)
ANION GAP SERPL CALCULATED.3IONS-SCNC: 5 MMOL/L (ref 3–14)
AST SERPL W P-5'-P-CCNC: 22 U/L (ref 0–45)
BILIRUB SERPL-MCNC: 0.4 MG/DL (ref 0.2–1.3)
BUN SERPL-MCNC: 7 MG/DL (ref 7–30)
CALCIUM SERPL-MCNC: 9 MG/DL (ref 8.5–10.1)
CHLORIDE BLD-SCNC: 107 MMOL/L (ref 94–109)
CO2 SERPL-SCNC: 28 MMOL/L (ref 20–32)
CREAT SERPL-MCNC: 0.63 MG/DL (ref 0.52–1.04)
ERYTHROCYTE [DISTWIDTH] IN BLOOD BY AUTOMATED COUNT: 13.2 % (ref 10–15)
GFR SERPL CREATININE-BSD FRML MDRD: >90 ML/MIN/1.73M2
GLUCOSE BLD-MCNC: 84 MG/DL (ref 70–99)
HCT VFR BLD AUTO: 36.7 % (ref 35–47)
HGB BLD-MCNC: 12 G/DL (ref 11.7–15.7)
MCH RBC QN AUTO: 29.5 PG (ref 26.5–33)
MCHC RBC AUTO-ENTMCNC: 32.7 G/DL (ref 31.5–36.5)
MCV RBC AUTO: 90 FL (ref 78–100)
NT-PROBNP SERPL-MCNC: 55 PG/ML (ref 0–125)
PLATELET # BLD AUTO: 196 10E3/UL (ref 150–450)
POTASSIUM BLD-SCNC: 4.1 MMOL/L (ref 3.4–5.3)
PROT SERPL-MCNC: 7.2 G/DL (ref 6.8–8.8)
RBC # BLD AUTO: 4.07 10E6/UL (ref 3.8–5.2)
SODIUM SERPL-SCNC: 140 MMOL/L (ref 133–144)
WBC # BLD AUTO: 7.6 10E3/UL (ref 4–11)

## 2021-12-14 PROCEDURE — 73110 X-RAY EXAM OF WRIST: CPT | Mod: LT

## 2021-12-14 PROCEDURE — 36415 COLL VENOUS BLD VENIPUNCTURE: CPT | Performed by: PATHOLOGY

## 2021-12-14 PROCEDURE — 70450 CT HEAD/BRAIN W/O DYE: CPT | Mod: 26 | Performed by: RADIOLOGY

## 2021-12-14 PROCEDURE — 71250 CT THORAX DX C-: CPT

## 2021-12-14 PROCEDURE — 73130 X-RAY EXAM OF HAND: CPT | Mod: LT

## 2021-12-14 PROCEDURE — 99284 EMERGENCY DEPT VISIT MOD MDM: CPT | Mod: 25 | Performed by: EMERGENCY MEDICINE

## 2021-12-14 PROCEDURE — 83880 ASSAY OF NATRIURETIC PEPTIDE: CPT | Performed by: PATHOLOGY

## 2021-12-14 PROCEDURE — 70450 CT HEAD/BRAIN W/O DYE: CPT

## 2021-12-14 PROCEDURE — 99215 OFFICE O/P EST HI 40 MIN: CPT | Mod: GC | Performed by: INTERNAL MEDICINE

## 2021-12-14 PROCEDURE — 80053 COMPREHEN METABOLIC PANEL: CPT | Performed by: PATHOLOGY

## 2021-12-14 PROCEDURE — 73130 X-RAY EXAM OF HAND: CPT | Mod: 26 | Performed by: RADIOLOGY

## 2021-12-14 PROCEDURE — G0463 HOSPITAL OUTPT CLINIC VISIT: HCPCS

## 2021-12-14 PROCEDURE — 73110 X-RAY EXAM OF WRIST: CPT | Mod: 26 | Performed by: RADIOLOGY

## 2021-12-14 PROCEDURE — 85027 COMPLETE CBC AUTOMATED: CPT | Performed by: PATHOLOGY

## 2021-12-14 PROCEDURE — 71250 CT THORAX DX C-: CPT | Mod: 26 | Performed by: RADIOLOGY

## 2021-12-14 PROCEDURE — 99284 EMERGENCY DEPT VISIT MOD MDM: CPT | Performed by: EMERGENCY MEDICINE

## 2021-12-14 ASSESSMENT — PAIN SCALES - GENERAL: PAINLEVEL: NO PAIN (0)

## 2021-12-14 ASSESSMENT — MIFFLIN-ST. JEOR: SCORE: 1655.47
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
## 2021-12-14 NOTE — ED NOTES
Bed: [facility]  Expected date:   Expected time:   Means of arrival:   Comments:  Acuity 4, 5 pt

## 2021-12-14 NOTE — Clinical Note
Azra Tom was seen and treated in our emergency department on 12/14/2021.  She may return to work on 12/14/2021.  Patient should refrain from using the left arm for any lifting over 5 lbs for at least one week or until cleared by a physician. Limit additional heavy lifting to less than 15 lbs for the next 2-3 days.     If you have any questions or concerns, please don't hesitate to call.      Adali Bender, DO

## 2021-12-14 NOTE — ED NOTES
Bed: [facility]  Expected date:   Expected time:   Means of arrival:   Comments:  Acuity 4, 5 pt
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
## 2021-12-14 NOTE — NURSING NOTE
Chief Complaint   Patient presents with     Follow Up     Hx of myocarditis and cardiomyopathy.      Vitals were taken and medications were reconciled.   Chris Rosado, EMT  11:22 AM
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
## 2021-12-15 ENCOUNTER — PATIENT OUTREACH (OUTPATIENT)
Dept: CARE COORDINATION | Facility: CLINIC | Age: 36
End: 2021-12-15
Payer: COMMERCIAL

## 2021-12-15 DIAGNOSIS — Z71.89 OTHER SPECIFIED COUNSELING: ICD-10-CM

## 2021-12-15 NOTE — PROGRESS NOTES
Clinic Care Coordination Contact  [facility]/Voicemail       Clinical Data: Care Coordinator Outreach    Outreach attempted x 1.  Left message on patient's voicemail with call back information and requested return call.    Plan:  Care Coordinator will try to reach patient again in 1-2 business days.    Lilly Umanzor, [facility]  [phone]  [facility]
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
## 2022-01-18 ENCOUNTER — APPOINTMENT (OUTPATIENT)
Dept: GENERAL RADIOLOGY | Facility: CLINIC | Age: 37
End: 2022-01-18
Payer: COMMERCIAL

## 2022-01-18 ENCOUNTER — APPOINTMENT (OUTPATIENT)
Dept: ULTRASOUND IMAGING | Facility: CLINIC | Age: 37
End: 2022-01-18
Payer: COMMERCIAL

## 2022-01-18 ENCOUNTER — HOSPITAL ENCOUNTER (EMERGENCY)
Facility: CLINIC | Age: 37
Discharge: HOME | End: 2022-01-18
Attending: EMERGENCY MEDICINE | Admitting: EMERGENCY MEDICINE
Payer: COMMERCIAL

## 2022-01-18 ENCOUNTER — APPOINTMENT (OUTPATIENT)
Dept: CARDIOLOGY | Facility: CLINIC | Age: 37
End: 2022-01-18
Payer: COMMERCIAL

## 2022-01-18 VITALS
OXYGEN SATURATION: 95 % | BODY MASS INDEX: 35.32 KG/M2 | DIASTOLIC BLOOD PRESSURE: 43 MMHG | WEIGHT: 212 LBS | HEART RATE: 65 BPM | HEIGHT: 65 IN | TEMPERATURE: 98.6 F | SYSTOLIC BLOOD PRESSURE: 103 MMHG | RESPIRATION RATE: 16 BRPM

## 2022-01-18 DIAGNOSIS — M79.662 PAIN IN BOTH LOWER LEGS: ICD-10-CM

## 2022-01-18 DIAGNOSIS — M79.89 LEG SWELLING: ICD-10-CM

## 2022-01-18 DIAGNOSIS — M79.661 PAIN IN BOTH LOWER LEGS: ICD-10-CM

## 2022-01-18 LAB
ALBUMIN SERPL-MCNC: 3.7 G/DL (ref 3.4–5)
ALBUMIN UR-MCNC: NEGATIVE MG/DL
ALP SERPL-CCNC: 82 U/L (ref 40–150)
ALT SERPL W P-5'-P-CCNC: 30 U/L (ref 0–50)
ANION GAP SERPL CALCULATED.3IONS-SCNC: 6 MMOL/L (ref 3–14)
APPEARANCE UR: CLEAR
AST SERPL W P-5'-P-CCNC: 21 U/L (ref 0–45)
ATRIAL RATE - MUSE: 61 BPM
BASOPHILS # BLD AUTO: 0 10E3/UL (ref 0–0.2)
BASOPHILS NFR BLD AUTO: 0 %
BILIRUB SERPL-MCNC: 0.3 MG/DL (ref 0.2–1.3)
BILIRUB UR QL STRIP: NEGATIVE
BUN SERPL-MCNC: 10 MG/DL (ref 7–30)
CALCIUM SERPL-MCNC: 9.1 MG/DL (ref 8.5–10.1)
CHLORIDE BLD-SCNC: 108 MMOL/L (ref 94–109)
CO2 SERPL-SCNC: 24 MMOL/L (ref 20–32)
COLOR UR AUTO: ABNORMAL
CREAT SERPL-MCNC: 0.61 MG/DL (ref 0.52–1.04)
DIASTOLIC BLOOD PRESSURE - MUSE: NORMAL MMHG
EOSINOPHIL # BLD AUTO: 0 10E3/UL (ref 0–0.7)
EOSINOPHIL NFR BLD AUTO: 1 %
ERYTHROCYTE [DISTWIDTH] IN BLOOD BY AUTOMATED COUNT: 13 % (ref 10–15)
FLUAV AG SPEC QL IA: NEGATIVE
FLUBV AG SPEC QL IA: NEGATIVE
GFR SERPL CREATININE-BSD FRML MDRD: >90 ML/MIN/1.73M2
GLUCOSE BLD-MCNC: 95 MG/DL (ref 70–99)
GLUCOSE UR STRIP-MCNC: NEGATIVE MG/DL
HCG UR QL: NEGATIVE
HCT VFR BLD AUTO: 37.9 % (ref 35–47)
HGB BLD-MCNC: 12 G/DL (ref 11.7–15.7)
HGB UR QL STRIP: NEGATIVE
HOLD SPECIMEN: NORMAL
HOLD SPECIMEN: NORMAL
IMM GRANULOCYTES # BLD: 0 10E3/UL
IMM GRANULOCYTES NFR BLD: 0 %
INTERPRETATION ECG - MUSE: NORMAL
KETONES UR STRIP-MCNC: NEGATIVE MG/DL
LEUKOCYTE ESTERASE UR QL STRIP: NEGATIVE
LVEF ECHO: NORMAL
LYMPHOCYTES # BLD AUTO: 2.2 10E3/UL (ref 0.8–5.3)
LYMPHOCYTES NFR BLD AUTO: 30 %
MAGNESIUM SERPL-MCNC: 2.1 MG/DL (ref 1.6–2.3)
MCH RBC QN AUTO: 29.1 PG (ref 26.5–33)
MCHC RBC AUTO-ENTMCNC: 31.7 G/DL (ref 31.5–36.5)
MCV RBC AUTO: 92 FL (ref 78–100)
MONOCYTES # BLD AUTO: 0.6 10E3/UL (ref 0–1.3)
MONOCYTES NFR BLD AUTO: 8 %
MUCOUS THREADS #/AREA URNS LPF: PRESENT /LPF
NEUTROPHILS # BLD AUTO: 4.6 10E3/UL (ref 1.6–8.3)
NEUTROPHILS NFR BLD AUTO: 61 %
NITRATE UR QL: NEGATIVE
NRBC # BLD AUTO: 0 10E3/UL
NRBC BLD AUTO-RTO: 0 /100
NT-PROBNP SERPL-MCNC: 20 PG/ML (ref 0–450)
P AXIS - MUSE: 28 DEGREES
PH UR STRIP: 7.5 [PH] (ref 5–7)
PLATELET # BLD AUTO: 161 10E3/UL (ref 150–450)
POTASSIUM BLD-SCNC: 4.1 MMOL/L (ref 3.4–5.3)
PR INTERVAL - MUSE: 176 MS
PROT SERPL-MCNC: 7.4 G/DL (ref 6.8–8.8)
QRS DURATION - MUSE: 86 MS
QT - MUSE: 416 MS
QTC - MUSE: 418 MS
R AXIS - MUSE: 8 DEGREES
RBC # BLD AUTO: 4.12 10E6/UL (ref 3.8–5.2)
RBC URINE: 4 /HPF
SARS-COV-2 RNA RESP QL NAA+PROBE: NEGATIVE
SODIUM SERPL-SCNC: 138 MMOL/L (ref 133–144)
SP GR UR STRIP: 1.01 (ref 1–1.03)
SQUAMOUS EPITHELIAL: 4 /HPF
SYSTOLIC BLOOD PRESSURE - MUSE: NORMAL MMHG
T AXIS - MUSE: 23 DEGREES
TRANSITIONAL EPI: <1 /HPF
TROPONIN I SERPL HS-MCNC: <3 NG/L
UROBILINOGEN UR STRIP-MCNC: NORMAL MG/DL
VENTRICULAR RATE- MUSE: 61 BPM
WBC # BLD AUTO: 7.4 10E3/UL (ref 4–11)
WBC URINE: 1 /HPF

## 2022-01-18 PROCEDURE — 36415 COLL VENOUS BLD VENIPUNCTURE: CPT | Performed by: EMERGENCY MEDICINE

## 2022-01-18 PROCEDURE — 93306 TTE W/DOPPLER COMPLETE: CPT

## 2022-01-18 PROCEDURE — 96360 HYDRATION IV INFUSION INIT: CPT | Mod: 59 | Performed by: EMERGENCY MEDICINE

## 2022-01-18 PROCEDURE — 71046 X-RAY EXAM CHEST 2 VIEWS: CPT

## 2022-01-18 PROCEDURE — 81025 URINE PREGNANCY TEST: CPT | Performed by: EMERGENCY MEDICINE

## 2022-01-18 PROCEDURE — C9803 HOPD COVID-19 SPEC COLLECT: HCPCS | Performed by: EMERGENCY MEDICINE

## 2022-01-18 PROCEDURE — 93306 TTE W/DOPPLER COMPLETE: CPT | Mod: 26 | Performed by: INTERNAL MEDICINE

## 2022-01-18 PROCEDURE — 83880 ASSAY OF NATRIURETIC PEPTIDE: CPT | Performed by: EMERGENCY MEDICINE

## 2022-01-18 PROCEDURE — 99285 EMERGENCY DEPT VISIT HI MDM: CPT | Mod: 25 | Performed by: EMERGENCY MEDICINE

## 2022-01-18 PROCEDURE — 93970 EXTREMITY STUDY: CPT | Mod: 26 | Performed by: RADIOLOGY

## 2022-01-18 PROCEDURE — 93005 ELECTROCARDIOGRAM TRACING: CPT | Performed by: EMERGENCY MEDICINE

## 2022-01-18 PROCEDURE — U0003 INFECTIOUS AGENT DETECTION BY NUCLEIC ACID (DNA OR RNA); SEVERE ACUTE RESPIRATORY SYNDROME CORONAVIRUS 2 (SARS-COV-2) (CORONAVIRUS DISEASE [COVID-19]), AMPLIFIED PROBE TECHNIQUE, MAKING USE OF HIGH THROUGHPUT TECHNOLOGIES AS DESCRIBED BY CMS-2020-01-R: HCPCS | Performed by: EMERGENCY MEDICINE

## 2022-01-18 PROCEDURE — 85025 COMPLETE CBC W/AUTO DIFF WBC: CPT | Performed by: EMERGENCY MEDICINE

## 2022-01-18 PROCEDURE — 84484 ASSAY OF TROPONIN QUANT: CPT | Performed by: EMERGENCY MEDICINE

## 2022-01-18 PROCEDURE — 73523 X-RAY EXAM HIPS BI 5/> VIEWS: CPT

## 2022-01-18 PROCEDURE — 73523 X-RAY EXAM HIPS BI 5/> VIEWS: CPT | Mod: 26 | Performed by: RADIOLOGY

## 2022-01-18 PROCEDURE — 71046 X-RAY EXAM CHEST 2 VIEWS: CPT | Mod: 26 | Performed by: RADIOLOGY

## 2022-01-18 PROCEDURE — 93970 EXTREMITY STUDY: CPT

## 2022-01-18 PROCEDURE — 80053 COMPREHEN METABOLIC PANEL: CPT | Performed by: EMERGENCY MEDICINE

## 2022-01-18 PROCEDURE — 83735 ASSAY OF MAGNESIUM: CPT | Performed by: EMERGENCY MEDICINE

## 2022-01-18 PROCEDURE — 81001 URINALYSIS AUTO W/SCOPE: CPT | Performed by: EMERGENCY MEDICINE

## 2022-01-18 PROCEDURE — 87804 INFLUENZA ASSAY W/OPTIC: CPT | Performed by: EMERGENCY MEDICINE

## 2022-01-18 PROCEDURE — 258N000003 HC RX IP 258 OP 636: Performed by: STUDENT IN AN ORGANIZED HEALTH CARE EDUCATION/TRAINING PROGRAM

## 2022-01-18 PROCEDURE — 93010 ELECTROCARDIOGRAM REPORT: CPT | Mod: GC | Performed by: EMERGENCY MEDICINE

## 2022-01-18 PROCEDURE — 250N000013 HC RX MED GY IP 250 OP 250 PS 637: Performed by: STUDENT IN AN ORGANIZED HEALTH CARE EDUCATION/TRAINING PROGRAM

## 2022-01-18 RX ORDER — CYCLOBENZAPRINE HCL 10 MG
10 TABLET ORAL 3 TIMES DAILY PRN
Qty: 42 TABLET | Refills: 0 | Status: SHIPPED | OUTPATIENT
Start: 2022-01-18 | End: 2022-01-18

## 2022-01-18 RX ORDER — CYCLOBENZAPRINE HCL 5 MG
10 TABLET ORAL ONCE
Status: COMPLETED | OUTPATIENT
Start: 2022-01-18 | End: 2022-01-18

## 2022-01-18 RX ORDER — CYCLOBENZAPRINE HCL 10 MG
10 TABLET ORAL 3 TIMES DAILY PRN
Qty: 42 TABLET | Refills: 0 | Status: SHIPPED | OUTPATIENT
Start: 2022-01-18 | End: 2024-04-08

## 2022-01-18 RX ADMIN — SODIUM CHLORIDE, POTASSIUM CHLORIDE, SODIUM LACTATE AND CALCIUM CHLORIDE 1000 ML: 600; 310; 30; 20 INJECTION, SOLUTION INTRAVENOUS at 12:34

## 2022-01-18 RX ADMIN — CYCLOBENZAPRINE HYDROCHLORIDE 10 MG: 5 TABLET, FILM COATED ORAL at 11:07

## 2022-01-18 ASSESSMENT — ENCOUNTER SYMPTOMS
NERVOUS/ANXIOUS: 0
FEVER: 0
PALPITATIONS: 0
WEAKNESS: 1
MYALGIAS: 0
SHORTNESS OF BREATH: 0
NAUSEA: 1
RECTAL PAIN: 0
NECK PAIN: 0
BLOOD IN STOOL: 0
APNEA: 0
FACIAL ASYMMETRY: 0
FACIAL SWELLING: 0
ABDOMINAL DISTENTION: 0
WHEEZING: 0
FATIGUE: 1
CONSTIPATION: 0
FREQUENCY: 0
COUGH: 1
DIARRHEA: 1
SORE THROAT: 0
NUMBNESS: 0
JOINT SWELLING: 0
CHEST TIGHTNESS: 0
CONFUSION: 0
DIFFICULTY URINATING: 0
VOMITING: 0
DYSURIA: 0
EYE PAIN: 0
ABDOMINAL PAIN: 0
HEMATURIA: 0
CHILLS: 0
ARTHRALGIAS: 0
COLOR CHANGE: 0

## 2022-01-18 ASSESSMENT — MIFFLIN-ST. JEOR: SCORE: 1654.57
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
## 2022-01-18 NOTE — ED TRIAGE NOTES
"Triage Assessment & Note:    /70   Pulse 64   Resp 16   Ht 1.638 m (5' 4.5\")   Wt 96.2 kg (212 lb)   SpO2 99%   BMI 35.83 kg/m        Patient presents with: Pt comes to triage with reports of bilateral leg swelling/ pain. Pt reports similar issue in the past when she had Influenza A. No reports of fever, SOB, CP, or travel.     Home Treatments/Remedies: home medications    Febrile / Afebrile: 98.6    Duration of C/o: 3 days    Hannah Knight RN  January 18, 2022        "

## 2022-01-19 ENCOUNTER — PATIENT OUTREACH (OUTPATIENT)
Dept: CARE COORDINATION | Facility: CLINIC | Age: 37
End: 2022-01-19
Payer: COMMERCIAL

## 2022-01-19 DIAGNOSIS — Z71.89 OTHER SPECIFIED COUNSELING: ICD-10-CM

## 2022-01-19 NOTE — PROGRESS NOTES
Clinic Care Coordination Contact  [facility]/Voicemail       Clinical Data: Care Coordinator Outreach  Outreach attempted x 1.  Left message on patient's voicemail with call back information and requested return call.  Plan:Care Coordinator will try to reach patient again in 1-2 business days.    Bibi Brar, [facility]  [phone]  [facility]

## 2022-01-20 NOTE — PROGRESS NOTES
Clinic Care Coordination Contact  [facility]/Voicemail       Clinical Data: Care Coordinator Outreach  Outreach attempted x 2.  Left message on patient's voicemail with call back information and requested return call.  Plan:  Care Coordinator will do no further outreaches at this time.    Genesis LAROSE Community Health Worker  Clinic Care Coordination  [facility]  Phone: [phone]

## 2022-05-22 ENCOUNTER — HEALTH MAINTENANCE LETTER (OUTPATIENT)
Age: 37
End: 2022-05-22

## 2022-07-19 NOTE — TELEPHONE ENCOUNTER
[facility]: Post-Discharge Note  SITUATION                                                      Admission:    Admission Date: 01/28/20   Reason for Admission: Acute combined systolic and diastolic heart failure  Discharge:   Discharge Date: 02/11/20  Discharge Diagnosis: Acute combined systolic and diastolic heart failure  Discharge Service: Cardiology     BACKGROUND                                                      Azra Ta is a 34 year old female with no significant PMH who presents with cough and shortness of breath.      Patient initially presented to [facility] on 1/27 for chest pain and shortness of breath for the past 3 days. She initially went to urgent care on 1.23 and was prescribed prednisone and albuterol with no noted relief. While at [facility], she was noted to have new acute systolic HF with LVEF 25-30%. She underwent RHC on 1/28 and noted to have significant drop in BP during the procedure. She was placed on IABP and was started on levophed. Cardiac MRI done which showed anterolateral subepicardial enhancement concerning for possible myocarditis. RHC numbers showed CO 2.2-2.4, RA 18, PCWP 18, CI 1.26. She was also noted to be flu positive and was started on tamiflu. She was transferred to [facility] for advanced HF management.      On arrival, noted to have augmented BP at high 60s while on levophed 0.1 and dobutamine 5. HR noted to be in 130s so dobutamine was discontinued. She reported fatigue but denied any chest pain.      ASSESSMENT      Discharge Assessment  Patient reports symptoms are: Improved  Does the patient have all of their medications?: Yes  Does patient know what their new medications are for?: Yes  Does patient have a follow-up appointment scheduled?: Yes  Does patient have any other questions or concerns?: No    Post-op  Did the patient have surgery or a procedure: Yes  Incision: healing  Drainage: No  Bleeding: none  Fever: No  Chills:  No  Redness: No  Warmth: No  Swelling: No  Incision site pain: No  Eating & Drinking: eating and drinking without complaints/concerns  PO Intake: regular diet  Bowel Function: normal  Urinary Status: voiding without complaint/concerns    PLAN                                                      Outpatient Plan:      Follow up with Dr. Raymundo or associated provider/YANELY, at [facility], within 1 month  for hospital follow- up. Dr Raymundo has requested a time on 2/26/2020. The following labs/tests are recommended: cardiac MRI with gadolinium.     Please follow up with primary care physician to examine wound healing progress within 1-2 weeks.    Future Appointments   Date Time Provider Department Center   2/26/2020  8:00 AM  LAB [facility]   2/26/2020  8:30 AM Audie Raymundo MD [facility]           [facility]                 05-Nov-2021

## 2022-09-13 ASSESSMENT — ENCOUNTER SYMPTOMS
LIGHT-HEADEDNESS: 0
EXERCISE INTOLERANCE: 1
PALPITATIONS: 0
HYPERTENSION: 0
ORTHOPNEA: 0
LEG PAIN: 1
SYNCOPE: 0
HYPOTENSION: 0
SLEEP DISTURBANCES DUE TO BREATHING: 0

## 2022-09-18 ENCOUNTER — HEALTH MAINTENANCE LETTER (OUTPATIENT)
Age: 37
End: 2022-09-18

## 2022-09-20 ENCOUNTER — OFFICE VISIT (OUTPATIENT)
Dept: CARDIOLOGY | Facility: CLINIC | Age: 37
End: 2022-09-20
Attending: INTERNAL MEDICINE
Payer: COMMERCIAL

## 2022-09-20 VITALS
OXYGEN SATURATION: 98 % | WEIGHT: 202.6 LBS | DIASTOLIC BLOOD PRESSURE: 69 MMHG | SYSTOLIC BLOOD PRESSURE: 110 MMHG | BODY MASS INDEX: 33.76 KG/M2 | HEIGHT: 65 IN | HEART RATE: 86 BPM

## 2022-09-20 DIAGNOSIS — I10 BENIGN ESSENTIAL HYPERTENSION: ICD-10-CM

## 2022-09-20 DIAGNOSIS — I50.20 HEART FAILURE WITH REDUCED EJECTION FRACTION, NYHA CLASS III (H): ICD-10-CM

## 2022-09-20 DIAGNOSIS — K21.00 GASTROESOPHAGEAL REFLUX DISEASE WITH ESOPHAGITIS WITHOUT HEMORRHAGE: Primary | ICD-10-CM

## 2022-09-20 DIAGNOSIS — I42.8 NONISCHEMIC CARDIOMYOPATHY (H): ICD-10-CM

## 2022-09-20 DIAGNOSIS — I50.41 ACUTE COMBINED SYSTOLIC AND DIASTOLIC HEART FAILURE (H): ICD-10-CM

## 2022-09-20 PROCEDURE — G0463 HOSPITAL OUTPT CLINIC VISIT: HCPCS

## 2022-09-20 PROCEDURE — 99214 OFFICE O/P EST MOD 30 MIN: CPT | Performed by: INTERNAL MEDICINE

## 2022-09-20 ASSESSMENT — PAIN SCALES - GENERAL: PAINLEVEL: NO PAIN (0)

## 2022-09-20 NOTE — NURSING NOTE
Chief Complaint   Patient presents with     Follow Up     Return Heart Failure- Hx of myocarditis and cardiomyopathy.      Vitals were taken and medications reconciled.    Imer Sales, ANTONIO  10:30 AM

## 2022-09-20 NOTE — PATIENT INSTRUCTIONS
Dr. Raymundo recommends:    Take Omeprazole 20 MG once daily.    Follow up clinic visit with Dr. Raymundo in one year with labs the same day.    Thank you for your visit today.  Please call me with any questions or concerns.   Tyrell Montero RN  Cardiology Care Coordinator  [phone]

## 2022-09-20 NOTE — LETTER
9/20/2022      RE: Azra Tom  [address]  [facility] [address]       Dear Colleague,    Thank you for the opportunity to participate in the care of your patient, Azra Tom, at the [facility] HEART [facility] at [facility]. Please see a copy of my visit note below.    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 She was hospitalized from 1/28-2/11/19. She was treated with oseltamivir, and underwent endomyocardial biopsy. Her ECMO was complicated by left femoral pseudoaneurysm with infection requiring wound vac placement. She had recovered her EF completely and then moved to [place] temporarily now returned.  Last seen on 1/28/2021 where she was overall doing well. She went to [facility] ER on 3/19/22 with intermittent episodes of chest pain, workup had been negative and was thought to have viral syndrome. Was dcd without any changes.  Denies any new issues at this time, she has been doing very well.  Continues to take medications without any problems.  She still she does have some lower extremity edema today however  She really did not have much.  No other issues overall.  She does feel that at times she does have some burning sensation in the chest, which is similar to what she was evaluated for in the ER.    PAST MEDICAL HISTORY:  Past Medical History:   Diagnosis Date     Atypical chest pain     history of atypical chest pain in 2017     Bipolar disorder (H)     diagnosed in [place], [place] in 2017     Dyslipidemia      Tobacco use      Uncomplicated asthma      FAMILY HISTORY:  No family history on file.     SOCIAL HISTORY:  Social History  "    Socioeconomic History     Marital status: Single   Tobacco Use     Smoking status: Former Smoker     Smokeless tobacco: Never Used   Substance and Sexual Activity     Alcohol use: Not Currently     Drug use: Not Currently     CURRENT MEDICATIONS:  Current Outpatient Medications   Medication     albuterol (PROAIR HFA/PROVENTIL HFA/VENTOLIN HFA) 108 (90 Base) MCG/ACT inhaler     aspirin (ASA) 81 MG chewable tablet     benzocaine (ANBESOL) 10 % gel     carvedilol (COREG) 6.25 MG tablet     cyclobenzaprine (FLEXERIL) 10 MG tablet     ibuprofen (ADVIL/MOTRIN) 600 MG tablet     lisinopril (ZESTRIL) 5 MG tablet     multivitamin w/minerals (MULTI-VITAMIN) tablet     UNABLE TO FIND     vitamin C (ASCORBIC ACID) 1000 MG TABS     No current facility-administered medications for this visit.     ROS:   Constitutional: No fever, chills, or sweats. Weight is 0 lbs 0 oz  ENT: No visual disturbance, ear ache, epistaxis, sore throat.   Allergies/Immunologic: Negative.   Respiratory: No cough, hemoptysis.   Cardiovascular: As per HPI.   GI: No nausea, vomiting, hematemesis, melena, or hematochezia.   : No urinary frequency, dysuria, or hematuria.   Integument: Negative.   Psychiatric: Pleasant, no major depression noted  Neuro: No focal neurological deficits noted  Endocrinology: Negative.   Musculoskeletal: As per HPI.      EXAM:  /69 (BP Location: Right arm, Patient Position: Chair, Cuff Size: Adult Large)   Pulse 86   Ht 1.645 m (5' 4.76\")   Wt 91.9 kg (202 lb 9.6 oz)   SpO2 98%   BMI 33.96 kg/m    General: appears comfortable, alert and oriented  Head: normocephalic, atraumatic  Eyes: anicteric sclera, EOMI , PERRL  Neck: no adenopathy  Orophyarynx: moist mucosa, no lesions noted  Heart: regular, S1/S2, no murmurs, rubs or gallop. Estimated JVP at 5 cmH2O  Lungs: CTAB, No wheezing.   Abdomen: soft, non-tender, bowel sounds present, no hepatosplenomegaly  Extremities: No LE edema today  Skin: no open lesions " noted  Neuro: grossly non-focal     Labs:  Lab Results   Component Value Date    WBC 7.4 01/18/2022    HGB 12.0 01/18/2022    HCT 37.9 01/18/2022     01/18/2022     01/18/2022    POTASSIUM 4.1 01/18/2022    CHLORIDE 108 01/18/2022    CO2 24 01/18/2022    BUN 10 01/18/2022    CR 0.61 01/18/2022    GLC 95 01/18/2022    SED 26 (H) 10/24/2021    DD 10.1 (H) 02/03/2020    NTBNPI 20 01/18/2022    NTBNP 55 12/14/2021    TROPONIN <0.015 10/24/2021    TROPI <0.015 04/11/2021    AST 21 01/18/2022    ALT 30 01/18/2022    ALKPHOS 82 01/18/2022    BILITOTAL 0.3 01/18/2022    INR 1.20 (H) 02/08/2020     TTE 2/3/20:  Global and regional left ventricular function is normal with an EF of 55-60%.  Maximal thickness 1.3 cm in the basal anteroseptum. Normal RV size and function.  Trivial pericardial effusion.   This study was compared with the study from 1/31/2020 (TTE), 2/2/2020 ([facility] ECMO turndown): Compared with the 1/31/20 TTE, LVEF has improved markedly.  Compared with the [facility] turndown study, patient is no longer on ECMO.     TTE 4/12/21:  Global and regional left ventricular function is normal with an EF of 55-60%.  Right ventricular function, chamber size, wall motion, and thickness are normal.  No pericardial effusion is present.     TTE 6/1/21:  Limited TTE to re-evaluate ventricular function. Global and regional left ventricular function is normal with an EF of 55-60%. Left ventricular diastolic function is normal.  Global right ventricular function is normal. The right ventricle is normal size.  No significant valvular abnormalities.  The estimated PA systolic pressure is 26 mmHg. IVC diameter <2.1 cm collapsing >50% with sniff suggests a normal RA pressure of 3 mmHg.  This study was compared with the study from 04/12/2021. No significant changes noted.     TTE 1/18/22:  Global and regional left ventricular function is normal with an EF of 60-65%.  Global right ventricular function is normal.  Mild tricuspid  insufficiency is present. Pulmonary artery systolic pressure is normal.  The inferior vena cava was normal in size with preserved respiratory variability.  No pericardial effusion is present.    ASSESSMENT AND PLAN:     37 year old female with PMHx of lymphcytic myocarditis necesstating [facility] on 1/29-2/3/19, now with complete recovery of her EF.  Overall she is doing very well without any issues.  She does have recovered ejection fraction and doing very well with this.  The episodes of chest discomfort that she describes might be related to acid reflux which she readily admits to having after eating certain foods or laying down immediately after.  Or drinking some alcohol.  As such we recommended that she try some omeprazole 20 mg daily for at least a month and see if these episodes recur.  If the omeprazole helps and I think it is reasonable for her to continue and lifestyle changes are needed.  No other issues continue medications.  See her back in a year    #Lympocytic myocarditis s/p recovery of EF  - ASA 81mg  - Coreg 6.25mg BID  - Lisinopril 5mg qday  - no other medication changes today    I appreciate the opportunity to participate in the care of Azra Tom . Please do not hesitate to contact me with any further questions.    Sincerely,   Audie Raymundo MD  [facility] Division of Cardiology

## 2022-09-20 NOTE — PROGRESS NOTES
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 She was hospitalized from 1/28-2/11/19. She was treated with oseltamivir, and underwent endomyocardial biopsy. Her ECMO was complicated by left femoral pseudoaneurysm with infection requiring wound vac placement. She had recovered her EF completely and then moved to [place] temporarily now returned.  Last seen on 1/28/2021 where she was overall doing well. She went to [facility] ER on 3/19/22 with intermittent episodes of chest pain, workup had been negative and was thought to have viral syndrome. Was dcd without any changes.  Denies any new issues at this time, she has been doing very well.  Continues to take medications without any problems.  She still she does have some lower extremity edema today however  She really did not have much.  No other issues overall.  She does feel that at times she does have some burning sensation in the chest, which is similar to what she was evaluated for in the ER.    PAST MEDICAL HISTORY:  Past Medical History:   Diagnosis Date     Atypical chest pain     history of atypical chest pain in 2017     Bipolar disorder (H)     diagnosed in [place], [place] in 2017     Dyslipidemia      Tobacco use      Uncomplicated asthma      FAMILY HISTORY:  No family history on file.     SOCIAL HISTORY:  Social History     Socioeconomic History     Marital status: Single   Tobacco Use     Smoking status: Former Smoker     Smokeless tobacco: Never Used   Substance and Sexual Activity     Alcohol use: Not Currently     Drug use: Not Currently     CURRENT MEDICATIONS:  Current Outpatient Medications   Medication     albuterol (PROAIR HFA/PROVENTIL  "HFA/VENTOLIN HFA) 108 (90 Base) MCG/ACT inhaler     aspirin (ASA) 81 MG chewable tablet     benzocaine (ANBESOL) 10 % gel     carvedilol (COREG) 6.25 MG tablet     cyclobenzaprine (FLEXERIL) 10 MG tablet     ibuprofen (ADVIL/MOTRIN) 600 MG tablet     lisinopril (ZESTRIL) 5 MG tablet     multivitamin w/minerals (MULTI-VITAMIN) tablet     UNABLE TO FIND     vitamin C (ASCORBIC ACID) 1000 MG TABS     No current facility-administered medications for this visit.     ROS:   Constitutional: No fever, chills, or sweats. Weight is 0 lbs 0 oz  ENT: No visual disturbance, ear ache, epistaxis, sore throat.   Allergies/Immunologic: Negative.   Respiratory: No cough, hemoptysis.   Cardiovascular: As per HPI.   GI: No nausea, vomiting, hematemesis, melena, or hematochezia.   : No urinary frequency, dysuria, or hematuria.   Integument: Negative.   Psychiatric: Pleasant, no major depression noted  Neuro: No focal neurological deficits noted  Endocrinology: Negative.   Musculoskeletal: As per HPI.      EXAM:  /69 (BP Location: Right arm, Patient Position: Chair, Cuff Size: Adult Large)   Pulse 86   Ht 1.645 m (5' 4.76\")   Wt 91.9 kg (202 lb 9.6 oz)   SpO2 98%   BMI 33.96 kg/m    General: appears comfortable, alert and oriented  Head: normocephalic, atraumatic  Eyes: anicteric sclera, EOMI , PERRL  Neck: no adenopathy  Orophyarynx: moist mucosa, no lesions noted  Heart: regular, S1/S2, no murmurs, rubs or gallop. Estimated JVP at 5 cmH2O  Lungs: CTAB, No wheezing.   Abdomen: soft, non-tender, bowel sounds present, no hepatosplenomegaly  Extremities: No LE edema today  Skin: no open lesions noted  Neuro: grossly non-focal     Labs:  Lab Results   Component Value Date    WBC 7.4 01/18/2022    HGB 12.0 01/18/2022    HCT 37.9 01/18/2022     01/18/2022     01/18/2022    POTASSIUM 4.1 01/18/2022    CHLORIDE 108 01/18/2022    CO2 24 01/18/2022    BUN 10 01/18/2022    CR 0.61 01/18/2022    GLC 95 01/18/2022    SED 26 " (H) 10/24/2021    DD 10.1 (H) 02/03/2020    NTBNPI 20 01/18/2022    NTBNP 55 12/14/2021    TROPONIN <0.015 10/24/2021    TROPI <0.015 04/11/2021    AST 21 01/18/2022    ALT 30 01/18/2022    ALKPHOS 82 01/18/2022    BILITOTAL 0.3 01/18/2022    INR 1.20 (H) 02/08/2020     TTE 2/3/20:  Global and regional left ventricular function is normal with an EF of 55-60%.  Maximal thickness 1.3 cm in the basal anteroseptum. Normal RV size and function.  Trivial pericardial effusion.   This study was compared with the study from 1/31/2020 (TTE), 2/2/2020 ([facility] ECMO turndown): Compared with the 1/31/20 TTE, LVEF has improved markedly.  Compared with the [facility] turndown study, patient is no longer on ECMO.     TTE 4/12/21:  Global and regional left ventricular function is normal with an EF of 55-60%.  Right ventricular function, chamber size, wall motion, and thickness are normal.  No pericardial effusion is present.     TTE 6/1/21:  Limited TTE to re-evaluate ventricular function. Global and regional left ventricular function is normal with an EF of 55-60%. Left ventricular diastolic function is normal.  Global right ventricular function is normal. The right ventricle is normal size.  No significant valvular abnormalities.  The estimated PA systolic pressure is 26 mmHg. IVC diameter <2.1 cm collapsing >50% with sniff suggests a normal RA pressure of 3 mmHg.  This study was compared with the study from 04/12/2021. No significant changes noted.     TTE 1/18/22:  Global and regional left ventricular function is normal with an EF of 60-65%.  Global right ventricular function is normal.  Mild tricuspid insufficiency is present. Pulmonary artery systolic pressure is normal.  The inferior vena cava was normal in size with preserved respiratory variability.  No pericardial effusion is present.    ASSESSMENT AND PLAN:     37 year old female with PMHx of lymphcytic myocarditis necesstating [facility] on 1/29-2/3/19, now with complete recovery of  her EF.  Overall she is doing very well without any issues.  She does have recovered ejection fraction and doing very well with this.  The episodes of chest discomfort that she describes might be related to acid reflux which she readily admits to having after eating certain foods or laying down immediately after.  Or drinking some alcohol.  As such we recommended that she try some omeprazole 20 mg daily for at least a month and see if these episodes recur.  If the omeprazole helps and I think it is reasonable for her to continue and lifestyle changes are needed.  No other issues continue medications.  See her back in a year    #Lympocytic myocarditis s/p recovery of EF  - ASA 81mg  - Coreg 6.25mg BID  - Lisinopril 5mg qday  - no other medication changes today    I appreciate the opportunity to participate in the care of Azra Tom . Please do not hesitate to contact me with any further questions.    Sincerely,   Audie Raymundo MD  [facility] Division of Cardiology

## 2023-01-05 ENCOUNTER — HOSPITAL ENCOUNTER (EMERGENCY)
Facility: CLINIC | Age: 38
Discharge: HOME | End: 2023-01-05
Attending: EMERGENCY MEDICINE | Admitting: EMERGENCY MEDICINE
Payer: COMMERCIAL

## 2023-01-05 ENCOUNTER — APPOINTMENT (OUTPATIENT)
Dept: GENERAL RADIOLOGY | Facility: CLINIC | Age: 38
End: 2023-01-05
Payer: COMMERCIAL

## 2023-01-05 ENCOUNTER — APPOINTMENT (OUTPATIENT)
Dept: ULTRASOUND IMAGING | Facility: CLINIC | Age: 38
End: 2023-01-05
Attending: EMERGENCY MEDICINE
Payer: COMMERCIAL

## 2023-01-05 VITALS
TEMPERATURE: 98 F | SYSTOLIC BLOOD PRESSURE: 121 MMHG | BODY MASS INDEX: 33.66 KG/M2 | DIASTOLIC BLOOD PRESSURE: 68 MMHG | RESPIRATION RATE: 17 BRPM | HEART RATE: 65 BPM | OXYGEN SATURATION: 99 % | WEIGHT: 202 LBS | HEIGHT: 65 IN

## 2023-01-05 DIAGNOSIS — M79.89 SWELLING OF BOTH LOWER EXTREMITIES: ICD-10-CM

## 2023-01-05 DIAGNOSIS — R31.29 MICROSCOPIC HEMATURIA: ICD-10-CM

## 2023-01-05 DIAGNOSIS — R07.89 OTHER CHEST PAIN: ICD-10-CM

## 2023-01-05 LAB
ALBUMIN UR-MCNC: 70 MG/DL
ANION GAP SERPL CALCULATED.3IONS-SCNC: 9 MMOL/L (ref 7–15)
APPEARANCE UR: ABNORMAL
BASOPHILS # BLD AUTO: 0 10E3/UL (ref 0–0.2)
BASOPHILS NFR BLD AUTO: 0 %
BILIRUB UR QL STRIP: NEGATIVE
BUN SERPL-MCNC: 6.7 MG/DL (ref 6–20)
CALCIUM SERPL-MCNC: 8.6 MG/DL (ref 8.6–10)
CHLORIDE SERPL-SCNC: 106 MMOL/L (ref 98–107)
COLOR UR AUTO: ABNORMAL
CREAT SERPL-MCNC: 0.62 MG/DL (ref 0.51–0.95)
DEPRECATED HCO3 PLAS-SCNC: 24 MMOL/L (ref 22–29)
EOSINOPHIL # BLD AUTO: 0.1 10E3/UL (ref 0–0.7)
EOSINOPHIL NFR BLD AUTO: 2 %
ERYTHROCYTE [DISTWIDTH] IN BLOOD BY AUTOMATED COUNT: 12.6 % (ref 10–15)
GFR SERPL CREATININE-BSD FRML MDRD: >90 ML/MIN/1.73M2
GLUCOSE SERPL-MCNC: 89 MG/DL (ref 70–99)
GLUCOSE UR STRIP-MCNC: NEGATIVE MG/DL
HCT VFR BLD AUTO: 33.3 % (ref 35–47)
HGB BLD-MCNC: 10.6 G/DL (ref 11.7–15.7)
HGB UR QL STRIP: ABNORMAL
IMM GRANULOCYTES # BLD: 0 10E3/UL
IMM GRANULOCYTES NFR BLD: 0 %
KETONES UR STRIP-MCNC: ABNORMAL MG/DL
LEUKOCYTE ESTERASE UR QL STRIP: NEGATIVE
LYMPHOCYTES # BLD AUTO: 1.8 10E3/UL (ref 0.8–5.3)
LYMPHOCYTES NFR BLD AUTO: 37 %
MAGNESIUM SERPL-MCNC: 1.9 MG/DL (ref 1.7–2.3)
MCH RBC QN AUTO: 28.6 PG (ref 26.5–33)
MCHC RBC AUTO-ENTMCNC: 31.8 G/DL (ref 31.5–36.5)
MCV RBC AUTO: 90 FL (ref 78–100)
MONOCYTES # BLD AUTO: 0.4 10E3/UL (ref 0–1.3)
MONOCYTES NFR BLD AUTO: 8 %
MUCOUS THREADS #/AREA URNS LPF: PRESENT /LPF
NEUTROPHILS # BLD AUTO: 2.6 10E3/UL (ref 1.6–8.3)
NEUTROPHILS NFR BLD AUTO: 53 %
NITRATE UR QL: NEGATIVE
NRBC # BLD AUTO: 0 10E3/UL
NRBC BLD AUTO-RTO: 0 /100
NT-PROBNP SERPL-MCNC: 76 PG/ML (ref 0–450)
PH UR STRIP: 6 [PH] (ref 5–7)
PLATELET # BLD AUTO: 184 10E3/UL (ref 150–450)
POTASSIUM SERPL-SCNC: 4.1 MMOL/L (ref 3.4–5.3)
RBC # BLD AUTO: 3.71 10E6/UL (ref 3.8–5.2)
RBC URINE: >182 /HPF
SODIUM SERPL-SCNC: 139 MMOL/L (ref 136–145)
SP GR UR STRIP: 1.03 (ref 1–1.03)
SQUAMOUS EPITHELIAL: 7 /HPF
TROPONIN T SERPL HS-MCNC: <6 NG/L
UROBILINOGEN UR STRIP-MCNC: 2 MG/DL
WBC # BLD AUTO: 4.9 10E3/UL (ref 4–11)
WBC URINE: 1 /HPF

## 2023-01-05 PROCEDURE — 93010 ELECTROCARDIOGRAM REPORT: CPT | Performed by: EMERGENCY MEDICINE

## 2023-01-05 PROCEDURE — 99285 EMERGENCY DEPT VISIT HI MDM: CPT | Mod: 25

## 2023-01-05 PROCEDURE — 93005 ELECTROCARDIOGRAM TRACING: CPT | Mod: 76

## 2023-01-05 PROCEDURE — 93970 EXTREMITY STUDY: CPT

## 2023-01-05 PROCEDURE — 80048 BASIC METABOLIC PNL TOTAL CA: CPT

## 2023-01-05 PROCEDURE — 71046 X-RAY EXAM CHEST 2 VIEWS: CPT

## 2023-01-05 PROCEDURE — 93010 ELECTROCARDIOGRAM REPORT: CPT | Mod: 76 | Performed by: EMERGENCY MEDICINE

## 2023-01-05 PROCEDURE — 85004 AUTOMATED DIFF WBC COUNT: CPT

## 2023-01-05 PROCEDURE — 84484 ASSAY OF TROPONIN QUANT: CPT

## 2023-01-05 PROCEDURE — 83880 ASSAY OF NATRIURETIC PEPTIDE: CPT

## 2023-01-05 PROCEDURE — 93970 EXTREMITY STUDY: CPT | Mod: 26 | Performed by: RADIOLOGY

## 2023-01-05 PROCEDURE — 71046 X-RAY EXAM CHEST 2 VIEWS: CPT | Mod: 26 | Performed by: RADIOLOGY

## 2023-01-05 PROCEDURE — 81001 URINALYSIS AUTO W/SCOPE: CPT

## 2023-01-05 PROCEDURE — 36415 COLL VENOUS BLD VENIPUNCTURE: CPT

## 2023-01-05 PROCEDURE — 83735 ASSAY OF MAGNESIUM: CPT

## 2023-01-05 PROCEDURE — 93005 ELECTROCARDIOGRAM TRACING: CPT

## 2023-01-05 PROCEDURE — 99285 EMERGENCY DEPT VISIT HI MDM: CPT | Mod: 25 | Performed by: EMERGENCY MEDICINE

## 2023-01-05 ASSESSMENT — ACTIVITIES OF DAILY LIVING (ADL)
ADLS_ACUITY_SCORE: 35
ADLS_ACUITY_SCORE: 35

## 2023-01-06 LAB
ATRIAL RATE - MUSE: 20 BPM
ATRIAL RATE - MUSE: 66 BPM
DIASTOLIC BLOOD PRESSURE - MUSE: NORMAL MMHG
DIASTOLIC BLOOD PRESSURE - MUSE: NORMAL MMHG
INTERPRETATION ECG - MUSE: NORMAL
INTERPRETATION ECG - MUSE: NORMAL
P AXIS - MUSE: 39 DEGREES
P AXIS - MUSE: NORMAL DEGREES
PR INTERVAL - MUSE: 158 MS
PR INTERVAL - MUSE: NORMAL MS
QRS DURATION - MUSE: 82 MS
QRS DURATION - MUSE: 86 MS
QT - MUSE: 406 MS
QT - MUSE: 418 MS
QTC - MUSE: 408 MS
QTC - MUSE: 438 MS
R AXIS - MUSE: -15 DEGREES
R AXIS - MUSE: 23 DEGREES
SYSTOLIC BLOOD PRESSURE - MUSE: NORMAL MMHG
SYSTOLIC BLOOD PRESSURE - MUSE: NORMAL MMHG
T AXIS - MUSE: -4 DEGREES
T AXIS - MUSE: 33 DEGREES
VENTRICULAR RATE- MUSE: 61 BPM
VENTRICULAR RATE- MUSE: 66 BPM

## 2023-01-06 NOTE — ED TRIAGE NOTES
Bilateral leg swelling with pain for last week.  Today, her arms started to hurt and chest was tightening.  Hx of viral myocarditis.

## 2023-01-06 NOTE — DISCHARGE INSTRUCTIONS
You were seen in the emergency department for pain and swelling of your legs. There was no evidence for blood clots in your legs or evidence for injury to your heart. Your urinalysis did show microscopic blood and protein in your urine, which could indicate an issue with your kidneys, however your renal function was normal. I recommend following up with your primary care physician in 1 week for repeat urinalysis. Your chest xray also showed possible evidence for reactive airway disease, which could explain the shortness of breath you've had for a long time. I recommend talking to your primary care physician at your upcoming appointment about this. If your leg swelling does not improve, please reach out to your cardiologist for further evaluation as well.

## 2023-01-06 NOTE — ED PROVIDER NOTES
ED Provider Note  [facility]      History     Chief Complaint   Patient presents with     Leg Swelling     The history is provided by the patient and medical records.     Azra Tom is a 37 year old female w/ PMH significant for influenza myocarditis in 2020 presenting for evaluation of lower extremity edema.     Reports 5 days of bilateral, symmetric lower extremity edema and pain. Attempted elevating her legs over the past 2 days with no relief. Sxs similar to when she was here in 2020 w/ heart failure 2/2 myocarditis (see below). Denies any chest pain but does have some intermittent chest tightness. Has shortness of breath but says this is chronic--unclear etiology, never been worked up for it. Denies any cough, fevers, chills, nasal congestion, sore throat, or recent illness. No erythema or wounds on her lower extremities. Pain located in the muscles of both legs and worsens with weight bearing. Improved w/ rest. No joint pains. No hx of DVT/PE. Not on birth control or hormones. No recent travel. No recent surgeries.     Per chart review, patient was admitted from 1/28/2020-2/11/2020 for evaluation of her newly diagnosed acute combined systolic and diastolic heart failure.  Patient initially presented in shock, requiring vasopressors in the ER.  TTE ED on admission with LVEF of 25-30%, global hypokinesis. Cardiac MRI with anterior collateral septal pericardial enhancement consistent with myocarditis.  Endocardial biopsy with findings of focal mild chronic inflammation with myocytolysis, consistent with myocarditis.  Influenza swab was positive, thought likely to be source of myocarditis.  Required [facility] ECMO 1/29-2/3.  Postoperative vaso-plegia and anemia that necessitated pressors for 1 day following ECMO decannulation. Also complicated by femoral artery pseudoaneurysm. Per 9/20/22 cardiology note, she has recovered full cardiac function. Patient's last echo (1/18/2022) showed an  improved LVEF of 60-65%. She is currently prescribed ASA 81mg, Carvedilol 6.25 BID, and lisinopril 5 mg daily. Not on diuretics per chart review.     Past Medical History  Past Medical History:   Diagnosis Date     Atypical chest pain     history of atypical chest pain in 2017     Bipolar disorder (H)     diagnosed in [place], [place] in 2017     Dyslipidemia      Tobacco use      Uncomplicated asthma      Past Surgical History:   Procedure Laterality Date     CV EXTRACORPERAL MEMBRANE OXYGENATION N/A 1/29/2020    Procedure: ECMO, NATIVE HEART BIOPSY;  Surgeon: Richard Montana MD;  Location:  HEART CARDIAC CATH LAB     CV HEART BIOPSY N/A 1/29/2020    Procedure: Heart Biopsy;  Surgeon: Richard Montana MD;  Location:  HEART CARDIAC CATH LAB     REMOVE EXTRACORPORAL MEMBRANE OXYGENATOR ADULT Left 2/3/2020    Procedure: REMOVAL Extra Corporeal Membrain Oxygenator, Inta operative transesophageal echocardiogram, Repair of femoral vessel;  Surgeon: Vishnu Marx MD;  Location:  OR     albuterol (PROAIR HFA/PROVENTIL HFA/VENTOLIN HFA) 108 (90 Base) MCG/ACT inhaler  aspirin (ASA) 81 MG chewable tablet  benzocaine (ANBESOL) 10 % gel  carvedilol (COREG) 6.25 MG tablet  cyclobenzaprine (FLEXERIL) 10 MG tablet  ibuprofen (ADVIL/MOTRIN) 600 MG tablet  lisinopril (ZESTRIL) 5 MG tablet  multivitamin w/minerals (THERA-VIT-M) tablet  omeprazole (PRILOSEC) 20 MG DR capsule  UNABLE TO FIND  vitamin C (ASCORBIC ACID) 1000 MG TABS      No Known Allergies  Family History  No family history on file.  Social History   Social History     Tobacco Use     Smoking status: Former     Smokeless tobacco: Never   Substance Use Topics     Alcohol use: Not Currently     Drug use: Not Currently         A medically appropriate review of systems was performed with pertinent positives and negatives noted in the HPI, and all other systems negative.    Physical Exam   BP: 113/60  Pulse: 74  Temp: 98  F (36.7  C)  Resp: 16  Height: 165.1 cm (5'  "5\")  Weight: 91.6 kg (202 lb)  SpO2: 99 %  Physical Exam  Vitals and nursing note reviewed.   Constitutional:       General: She is not in acute distress.  HENT:      Head: Normocephalic.   Eyes:      General: No scleral icterus.  Cardiovascular:      Rate and Rhythm: Normal rate and regular rhythm.      Heart sounds: Normal heart sounds. No murmur heard.  Pulmonary:      Effort: Pulmonary effort is normal. No respiratory distress.      Breath sounds: Normal breath sounds.   Abdominal:      General: Abdomen is flat.   Musculoskeletal:         General: Tenderness (on calf squeeze bilaterally) present. No deformity or signs of injury.      Right lower leg: Edema (diffuse, non pitting ) present.      Left lower leg: Edema (diffuse, non pitting) present.   Skin:     General: Skin is warm and dry.   Neurological:      Mental Status: She is alert.   Psychiatric:         Mood and Affect: Mood normal.         ED Course, Procedures, & Data     ED Course as of 01/05/23 2327   Thu Jan 05, 2023   2132 Initial ECG read by computer as A.fib but on my observation small, occasional p waves were present. Had amplitude increased on ECG machine and repeated ECG. Repeat showed normal sinus rhythm.    2208 Basic metabolic panel   2208 Magnesium: 1.9  Electrolytes grossly normal (Na, K, Ca, Mg)    2208 Troponin T, High Sensitivity: <6  Negative troponin    2208 WBC: 4.9  Normal white count   2210 Hemoglobin(!): 10.6  Normocytic anemia. Acute. Baseline Hgb ~12   2216 XR Chest 2 Views  Peribronchial cuffing, although nonspecific, may be seen in viral illness or reactive airway disease. No focal consolidation or abnormal airspace opacities to indicate volume overload or pneumonia. Unlikely viral given pt denies viral/flu like symptoms. May be secondary to an undiagnosed reactive airway disease in light of pt's reported chronic dyspnea.     2219 PERC negative, making PE very unlikely. No indication for further imaging at this time. Bilateral " venous dopplers of lower extremities pending.    2235 Blood Urine(!): Large   2241 Protein Albumin Urine(!): 70   2241 RBC Urine(!): >182  Possible nephritic syndrome in setting of bilateral lower extremity edema. Cr 0.62, however. Will need PCP follow up      Procedures       ED Course Selections:        EKG Interpretation:      Interpreted by Soraya Caban MD  Time reviewed: 21:24  Symptoms at time of EKG: chest tightness   Rhythm: normal sinus   Rate: Normal  Axis: Normal  Ectopy: none  Conduction: normal  ST Segments/ T Waves: No ST-T wave changes  Q Waves: none  Comparison to prior: Unchanged from 1/18/22    Clinical Impression: Normal ECG. Note: initial ECG obtained was read by computer as Afib. However, on my review some small p waves were present. Increased amplitude on ECG machine and repeated ECG which then confirmed normal sinus rhythm.            Results for orders placed or performed during the hospital encounter of 01/05/23   XR Chest 2 Views     Status: None    Narrative    EXAM: XR CHEST 2 VIEWS  1/5/2023 9:09 PM     HISTORY:  chest tightness, shortness of breath       COMPARISON:  Chest x-ray 1/18/2022. CT chest 12/14/2021.    FINDINGS:   PA and lateral views of the chest. Trachea is midline. Normal cardiac  silhouette. No significant pleural effusion. No appreciable  pneumothorax. Peribronchial cuffing. No focal consolidation or normal  airspace opacities. Unremarkable limited upper abdomen  radiographically. No acute osseous abnormality.      Impression    IMPRESSION:   Peribronchial cuffing, although nonspecific, may be seen in viral  illness or reactive airway disease. No focal consolidation or abnormal  airspace opacities.    I have personally reviewed the examination and initial interpretation  and I agree with the findings.    MAYI KEITA MD         SYSTEM ID:  [id number]    Lower Extremity Venous Duplex Bilateral     Status: None (Preliminary result)    Impression    RESIDENT  PRELIMINARY INTERPRETATION  IMPRESSION:  1.  No evidence of deep venous thrombosis in either lower extremity.   Basic metabolic panel     Status: Normal   Result Value Ref Range    Sodium 139 136 - 145 mmol/L    Potassium 4.1 3.4 - 5.3 mmol/L    Chloride 106 98 - 107 mmol/L    Carbon Dioxide (CO2) 24 22 - 29 mmol/L    Anion Gap 9 7 - 15 mmol/L    Urea Nitrogen 6.7 6.0 - 20.0 mg/dL    Creatinine 0.62 0.51 - 0.95 mg/dL    Calcium 8.6 8.6 - 10.0 mg/dL    Glucose 89 70 - 99 mg/dL    GFR Estimate >90 >60 mL/min/1.73m2   Troponin T, High Sensitivity     Status: Normal   Result Value Ref Range    Troponin T, High Sensitivity <6 <=14 ng/L   Magnesium     Status: Normal   Result Value Ref Range    Magnesium 1.9 1.7 - 2.3 mg/dL   Nt probnp inpatient (BNP)     Status: Normal   Result Value Ref Range    N terminal Pro BNP Inpatient 76 0 - 450 pg/mL   UA with Microscopic reflex to Culture     Status: Abnormal    Specimen: Urine, Midstream   Result Value Ref Range    Color Urine Orange (A) Colorless, Straw, Light Yellow, Yellow    Appearance Urine Slightly Cloudy (A) Clear    Glucose Urine Negative Negative mg/dL    Bilirubin Urine Negative Negative    Ketones Urine Trace (A) Negative mg/dL    Specific Gravity Urine 1.035 1.003 - 1.035    Blood Urine Large (A) Negative    pH Urine 6.0 5.0 - 7.0    Protein Albumin Urine 70 (A) Negative mg/dL    Urobilinogen Urine 2.0 Normal, 2.0 mg/dL    Nitrite Urine Negative Negative    Leukocyte Esterase Urine Negative Negative    Mucus Urine Present (A) None Seen /LPF    RBC Urine >182 (H) <=2 /HPF    WBC Urine 1 <=5 /HPF    Squamous Epithelials Urine 7 (H) <=1 /HPF    Narrative    Urine Culture not indicated   CBC with platelets and differential     Status: Abnormal   Result Value Ref Range    WBC Count 4.9 4.0 - 11.0 10e3/uL    RBC Count 3.71 (L) 3.80 - 5.20 10e6/uL    Hemoglobin 10.6 (L) 11.7 - 15.7 g/dL    Hematocrit 33.3 (L) 35.0 - 47.0 %    MCV 90 78 - 100 fL    MCH 28.6 26.5 - 33.0 pg     MCHC 31.8 31.5 - 36.5 g/dL    RDW 12.6 10.0 - 15.0 %    Platelet Count 184 150 - 450 10e3/uL    % Neutrophils 53 %    % Lymphocytes 37 %    % Monocytes 8 %    % Eosinophils 2 %    % Basophils 0 %    % Immature Granulocytes 0 %    NRBCs per 100 WBC 0 <1 /100    Absolute Neutrophils 2.6 1.6 - 8.3 10e3/uL    Absolute Lymphocytes 1.8 0.8 - 5.3 10e3/uL    Absolute Monocytes 0.4 0.0 - 1.3 10e3/uL    Absolute Eosinophils 0.1 0.0 - 0.7 10e3/uL    Absolute Basophils 0.0 0.0 - 0.2 10e3/uL    Absolute Immature Granulocytes 0.0 <=0.4 10e3/uL    Absolute NRBCs 0.0 10e3/uL   EKG 12-lead, tracing only     Status: None (Preliminary result)   Result Value Ref Range    Systolic Blood Pressure  mmHg    Diastolic Blood Pressure  mmHg    Ventricular Rate 61 BPM    Atrial Rate 20 BPM    NH Interval  ms    QRS Duration 82 ms     ms    QTc 408 ms    P Axis  degrees    R AXIS -15 degrees    T Axis -4 degrees    Interpretation ECG       Atrial fibrillation  Low voltage QRS  Lateral infarct , age undetermined  Abnormal ECG     EKG 12-lead, tracing only     Status: None (Preliminary result)   Result Value Ref Range    Systolic Blood Pressure  mmHg    Diastolic Blood Pressure  mmHg    Ventricular Rate 66 BPM    Atrial Rate 66 BPM    NH Interval 158 ms    QRS Duration 86 ms     ms    QTc 438 ms    P Axis 39 degrees    R AXIS 23 degrees    T Axis 33 degrees    Interpretation ECG Sinus rhythm with sinus arrhythmia  Normal ECG      CBC with platelets differential     Status: Abnormal    Narrative    The following orders were created for panel order CBC with platelets differential.  Procedure                               Abnormality         Status                     ---------                               -----------         ------                     CBC with platelets and d...[[id number]]  Abnormal            Final result                 Please view results for these tests on the individual orders.     Medications - No data to  display  Labs Ordered and Resulted from Time of ED Arrival to Time of ED Departure   ROUTINE UA WITH MICROSCOPIC REFLEX TO CULTURE - Abnormal       Result Value    Color Urine Orange (*)     Appearance Urine Slightly Cloudy (*)     Glucose Urine Negative      Bilirubin Urine Negative      Ketones Urine Trace (*)     Specific Gravity Urine 1.035      Blood Urine Large (*)     pH Urine 6.0      Protein Albumin Urine 70 (*)     Urobilinogen Urine 2.0      Nitrite Urine Negative      Leukocyte Esterase Urine Negative      Mucus Urine Present (*)     RBC Urine >182 (*)     WBC Urine 1      Squamous Epithelials Urine 7 (*)    CBC WITH PLATELETS AND DIFFERENTIAL - Abnormal    WBC Count 4.9      RBC Count 3.71 (*)     Hemoglobin 10.6 (*)     Hematocrit 33.3 (*)     MCV 90      MCH 28.6      MCHC 31.8      RDW 12.6      Platelet Count 184      % Neutrophils 53      % Lymphocytes 37      % Monocytes 8      % Eosinophils 2      % Basophils 0      % Immature Granulocytes 0      NRBCs per 100 WBC 0      Absolute Neutrophils 2.6      Absolute Lymphocytes 1.8      Absolute Monocytes 0.4      Absolute Eosinophils 0.1      Absolute Basophils 0.0      Absolute Immature Granulocytes 0.0      Absolute NRBCs 0.0     BASIC METABOLIC PANEL - Normal    Sodium 139      Potassium 4.1      Chloride 106      Carbon Dioxide (CO2) 24      Anion Gap 9      Urea Nitrogen 6.7      Creatinine 0.62      Calcium 8.6      Glucose 89      GFR Estimate >90     TROPONIN T, HIGH SENSITIVITY - Normal    Troponin T, High Sensitivity <6     MAGNESIUM - Normal    Magnesium 1.9     NT PROBNP INPATIENT - Normal    N terminal Pro BNP Inpatient 76       US Lower Extremity Venous Duplex Bilateral   Preliminary Result   RESIDENT PRELIMINARY INTERPRETATION   IMPRESSION:   1.  No evidence of deep venous thrombosis in either lower extremity.      XR Chest 2 Views   Final Result   IMPRESSION:    Peribronchial cuffing, although nonspecific, may be seen in viral   illness  or reactive airway disease. No focal consolidation or abnormal   airspace opacities.      I have personally reviewed the examination and initial interpretation   and I agree with the findings.      MAYI KEITA MD            SYSTEM ID:  [id number]             Medical Decision Making  The patient presented with a problem that is an acute health issue posing potential threat to life or bodily function.    The patient's evaluation involved:  review of 2 prior external note(s) (see separate area of note for details)  ordering and review of 3+ test(s) (see separate area of note for details)  strong consideration of a test (see separate area of note for details) that was ultimately deferred    The patient's management involved only simple and very low risk treatment.      Assessment & Plan    37 year old female w/ PMH significant for influenza myocarditis in 2020 presenting for 5 days of bilateral lower extremity edema and muscle cramps. No chest pain or new/worsening shortness of breath but does have chest tightness. Well appearing on arrival and vitally stable. Exam notable for non pitting, symmetric lower extremity edema and calf tenderness on squeeze but heart and lungs unremarkable to auscultation. Initial DDx included acute CHF w/ associated volume overload 2/2 recurrent myocarditis vs myopathy vs ACS, DVT, nephrotic disease, or venous stasis. Bilateral venous US w/ doppler negative for DVT. PERC negative so chest CT PE not indicated. Initial ECG read by computer as Afib but p waves were present. Repeat ECG confirmed normal sinus rhythm. Troponin negative, electrolytes wnl. BNP still pending, however pt does not appear floridly volume up on exam and there was no pleural effusion, opacity, or cardiomegaly on CXR, which is all reasurring against left sided cardiac dysfunction. At this point, unlikely pt's lower extremity edema is 2/2 CHF, ACS, or DVT/PE. UA did show 70 protein and > 182 microscopic hematuria which  may lend to a possible nephritic syndrome which could explain her lower extremity edema, however renal function and electrolytes wnl so further workup unnecessary at this time. Discussed findings w/ pt who agreed w/ the plan to discharge home w/ close follow up. Discharged home w/ PCP follow up in 1 week for symptom follow up, repeat UA, and discussion of pt's chronic shortness of breath (?reactive airway disease on CXR). To follow up with her cardiologist PRN as well if lower extremity edema does not improve.     I have reviewed the nursing notes. I have reviewed the findings, diagnosis, plan and need for follow up with the patient.    New Prescriptions    No medications on file       Final diagnoses:   Swelling of both lower extremities   Microscopic hematuria       Soraya Caban DO  Internal Medicine, PGY-1  [facility] EMERGENCY DEPARTMENT  1/5/2023     Soraya Caban MD  01/05/23 1108      --    ED Attending Physician Attestation    I Urmila Pulido MD, cared for this patient with the Resident. I have performed a history and physical examination of the patient and discussed management with the resident. I reviewed the resident's documentation above and agree with the documented findings and plan of care.    Summary of HPI, PE, ED Course   Patient is a 37 year old female evaluated in the emergency department for bilateral lower extremity, nonpitting edema for approximately a week. Exam and ED course notable for minimal swelling in bilateral lower extremities, nonpitting, without any signs of compartment syndrome or infectious process.. After the completion of care in the emergency department, the patient was discharged.    Critical Care & Procedures  Not applicable.    Medical Decision Making  The patient presented with a problem that is acute and uncomplicated.    The patient's evaluation involved:  review of 2 prior external note(s) (see separate area of note for details)  ordering and review of 3+  test(s) (see separate area of note for details)  review of 3+ test result(s) ordered prior to this encounter (see separate area of note for details)  independent interpretation of testing performed by another health professional (see separate area of note for details)    The patient's management involved a decision regarding hospitalization.          Urmila Pulido MD  Emergency Medicine          Urmila Pulido MD  01/05/23 4453

## 2023-01-19 NOTE — PHARMACY-VANCOMYCIN DOSING SERVICE
Pharmacy Vancomycin Initial Note  Date of Service 2020  Patient's  1985  34 year old, female    Indication: Skin and Soft Tissue Infection / possible MRSA    Current estimated CrCl = Estimated Creatinine Clearance: 178 mL/min (A) (based on SCr of 0.5 mg/dL (L)).    Creatinine for last 3 days  2020:  9:31 PM Creatinine 0.58 mg/dL  2020:  4:18 AM Creatinine 0.62 mg/dL; 12:38 PM Creatinine 0.65 mg/dL  2020:  9:58 AM Creatinine 0.52 mg/dL;  7:33 PM Creatinine 0.56 mg/dL  2020:  6:28 AM Creatinine 0.50 mg/dL    Recent Vancomycin Level(s) for last 3 days  No results found for requested labs within last 72 hours.      Vancomycin IV Administrations (past 72 hours)                   vancomycin (VANCOCIN) 1,750 mg in sodium chloride 0.9 % 250 mL intermittent infusion (mg) 1,750 mg New Bag 20 2337     1,750 mg New Bag  0917     1,750 mg New Bag 20                Nephrotoxins and other renal medications (From now, onward)    Start     Dose/Rate Route Frequency Ordered Stop    20 1445  vancomycin (VANCOCIN) 1,750 mg in sodium chloride 0.9 % 500 mL intermittent infusion      1,750 mg  over 2 Hours Intravenous EVERY 12 HOURS 20 1442            Contrast Orders - past 72 hours (72h ago, onward)    None                Plan:  1.  Start vancomycin  1750 mg IV q12h (20mg/kg using ABW = 88kg) .   2.  Goal Trough Level: 15-20 mg/L   3.  Pharmacy will check trough levels as appropriate in 1-3 Days.    4. Serum creatinine levels will be ordered daily for the first week of therapy and at least twice weekly for subsequent weeks.    5. [place] method utilized to dose vancomycin therapy: Method 2    Love Walker, JakiD    The patient has been examined and the H&P has been reviewed:    I concur with the findings and no changes have occurred since H&P was written.    Surgery risks, benefits and alternative options discussed and understood by patient/family.          There are no hospital problems to display for this patient.

## 2023-01-29 ENCOUNTER — HEALTH MAINTENANCE LETTER (OUTPATIENT)
Age: 38
End: 2023-01-29

## 2023-03-08 DIAGNOSIS — I42.8 NONISCHEMIC CARDIOMYOPATHY (H): ICD-10-CM

## 2023-03-08 DIAGNOSIS — I50.41 ACUTE COMBINED SYSTOLIC AND DIASTOLIC HEART FAILURE (H): ICD-10-CM

## 2023-03-08 DIAGNOSIS — I50.22 CHRONIC SYSTOLIC HEART FAILURE (H): ICD-10-CM

## 2023-03-08 RX ORDER — ASPIRIN 81 MG/1
81 TABLET, CHEWABLE ORAL DAILY
Qty: 90 TABLET | Refills: 3 | Status: SHIPPED | OUTPATIENT
Start: 2023-03-08

## 2023-03-08 RX ORDER — LISINOPRIL 5 MG/1
5 TABLET ORAL DAILY
Qty: 90 TABLET | Refills: 3 | Status: SHIPPED | OUTPATIENT
Start: 2023-03-08

## 2023-03-08 RX ORDER — CARVEDILOL 6.25 MG/1
6.25 TABLET ORAL 2 TIMES DAILY WITH MEALS
Qty: 180 TABLET | Refills: 3 | Status: SHIPPED | OUTPATIENT
Start: 2023-03-08 | End: 2023-03-24

## 2023-03-24 ENCOUNTER — LAB (OUTPATIENT)
Dept: LAB | Facility: CLINIC | Age: 38
End: 2023-03-24
Payer: COMMERCIAL

## 2023-03-24 ENCOUNTER — OFFICE VISIT (OUTPATIENT)
Dept: CARDIOLOGY | Facility: CLINIC | Age: 38
End: 2023-03-24
Attending: NURSE PRACTITIONER
Payer: COMMERCIAL

## 2023-03-24 VITALS
BODY MASS INDEX: 34.91 KG/M2 | HEART RATE: 80 BPM | SYSTOLIC BLOOD PRESSURE: 111 MMHG | DIASTOLIC BLOOD PRESSURE: 75 MMHG | HEIGHT: 65 IN | WEIGHT: 209.5 LBS | OXYGEN SATURATION: 99 %

## 2023-03-24 DIAGNOSIS — I10 BENIGN ESSENTIAL HYPERTENSION: ICD-10-CM

## 2023-03-24 DIAGNOSIS — I50.20 HEART FAILURE WITH REDUCED EJECTION FRACTION, NYHA CLASS III (H): Primary | ICD-10-CM

## 2023-03-24 DIAGNOSIS — I50.41 ACUTE COMBINED SYSTOLIC AND DIASTOLIC HEART FAILURE (H): ICD-10-CM

## 2023-03-24 DIAGNOSIS — I50.20 HEART FAILURE WITH REDUCED EJECTION FRACTION, NYHA CLASS III (H): ICD-10-CM

## 2023-03-24 DIAGNOSIS — I42.8 NONISCHEMIC CARDIOMYOPATHY (H): ICD-10-CM

## 2023-03-24 LAB
ALBUMIN SERPL BCG-MCNC: 4.2 G/DL (ref 3.5–5.2)
ALP SERPL-CCNC: 69 U/L (ref 35–104)
ALT SERPL W P-5'-P-CCNC: 13 U/L (ref 10–35)
ANION GAP SERPL CALCULATED.3IONS-SCNC: 7 MMOL/L (ref 7–15)
AST SERPL W P-5'-P-CCNC: 16 U/L (ref 10–35)
BILIRUB SERPL-MCNC: 0.3 MG/DL
BUN SERPL-MCNC: 10.1 MG/DL (ref 6–20)
CALCIUM SERPL-MCNC: 9.1 MG/DL (ref 8.6–10)
CHLORIDE SERPL-SCNC: 106 MMOL/L (ref 98–107)
CREAT SERPL-MCNC: 0.68 MG/DL (ref 0.51–0.95)
DEPRECATED HCO3 PLAS-SCNC: 27 MMOL/L (ref 22–29)
ERYTHROCYTE [DISTWIDTH] IN BLOOD BY AUTOMATED COUNT: 13.4 % (ref 10–15)
GFR SERPL CREATININE-BSD FRML MDRD: >90 ML/MIN/1.73M2
GLUCOSE SERPL-MCNC: 120 MG/DL (ref 70–99)
HCT VFR BLD AUTO: 35.1 % (ref 35–47)
HGB BLD-MCNC: 10.9 G/DL (ref 11.7–15.7)
MCH RBC QN AUTO: 27.4 PG (ref 26.5–33)
MCHC RBC AUTO-ENTMCNC: 31.1 G/DL (ref 31.5–36.5)
MCV RBC AUTO: 88 FL (ref 78–100)
NT-PROBNP SERPL-MCNC: <36 PG/ML (ref 0–450)
PLATELET # BLD AUTO: 206 10E3/UL (ref 150–450)
POTASSIUM SERPL-SCNC: 3.9 MMOL/L (ref 3.4–5.3)
PROT SERPL-MCNC: 6.7 G/DL (ref 6.4–8.3)
RBC # BLD AUTO: 3.98 10E6/UL (ref 3.8–5.2)
SODIUM SERPL-SCNC: 140 MMOL/L (ref 136–145)
WBC # BLD AUTO: 7.8 10E3/UL (ref 4–11)

## 2023-03-24 PROCEDURE — 99214 OFFICE O/P EST MOD 30 MIN: CPT | Performed by: NURSE PRACTITIONER

## 2023-03-24 PROCEDURE — 80053 COMPREHEN METABOLIC PANEL: CPT | Performed by: PATHOLOGY

## 2023-03-24 PROCEDURE — G0463 HOSPITAL OUTPT CLINIC VISIT: HCPCS | Performed by: NURSE PRACTITIONER

## 2023-03-24 PROCEDURE — 36415 COLL VENOUS BLD VENIPUNCTURE: CPT | Performed by: PATHOLOGY

## 2023-03-24 PROCEDURE — 83880 ASSAY OF NATRIURETIC PEPTIDE: CPT | Performed by: PATHOLOGY

## 2023-03-24 PROCEDURE — 85027 COMPLETE CBC AUTOMATED: CPT | Performed by: PATHOLOGY

## 2023-03-24 RX ORDER — CARVEDILOL 6.25 MG/1
TABLET ORAL
Qty: 270 TABLET | Refills: 3 | Status: SHIPPED | OUTPATIENT
Start: 2023-03-24 | End: 2024-04-08

## 2023-03-24 ASSESSMENT — PAIN SCALES - GENERAL: PAINLEVEL: SEVERE PAIN (6)

## 2023-03-24 NOTE — NURSING NOTE
Chief Complaint   Patient presents with     Follow Up     Return CORE           Vitals were taken and medications reconciled.     Lucho Coleman, EMT   7:43 AM

## 2023-03-24 NOTE — LETTER
3/24/2023      RE: Azra Tom  [address]  [facility] [address]       Dear Colleague,    Thank you for the opportunity to participate in the care of your patient, Azra Tom, at the [facility]. Please see a copy of my visit note below.    Advanced Heart Failure Clinic -- CORE clinic       HPI:   Ms. 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 She has not been into CORE clinic for over 3 years.      She is working full time and doing more.  She has been feeling more NAVA.  She says her heart rate got higher while she was working. She says she had anxiety.  She feels she might be feeling more chest issues with her menstrual cycle. She feels her excessive bleeding has been causing her symptoms.  She has some swelling in her ankles.   She is eating, with good appetite and no nausea, vomiting, or diarrhea.  She has been trying to limit her salt intake, and is not sure how much fluid she is drinking.   She otherwise denies chest pain, palpitations, dizziness, headaches, acute vision changes, fevers, chills, cough, sore throat, and signs of bleeding.     PAST MEDICAL HISTORY:  Past Medical History:   Diagnosis Date     Atypical chest pain     history of atypical chest pain in 2017     Bipolar disorder (H)     diagnosed in [place], [place] in 2017     Dyslipidemia      Tobacco use      Uncomplicated asthma        FAMILY HISTORY:  No family history on file.    SOCIAL HISTORY:  Social History     Socioeconomic History     Marital status: Single     Spouse name: None     Number of children: None     Years of education: None     Highest education level: None   Occupational History     None   Social Needs     Financial resource strain: None     Food insecurity:     Worry: None     Inability:  None     Transportation needs:     Medical: None     Non-medical: None   Tobacco Use     Smoking status: Former Smoker     Smokeless tobacco: Never Used   Substance and Sexual Activity     Alcohol use: Not Currently     Drug use: Not Currently     Sexual activity: None   Lifestyle     Physical activity:     Days per week: None     Minutes per session: None     Stress: None   Relationships     Social connections:     Talks on phone: None     Gets together: None     Attends Uats[employer] service: None     Active member of club or organization: None     Attends meetings of clubs or organizations: None     Relationship status: None     Intimate partner violence:     Fear of current or ex partner: None     Emotionally abused: None     Physically abused: None     Forced sexual activity: None   Other Topics Concern     None   Social History Narrative     None       CURRENT MEDICATIONS:  Current Outpatient Medications   Medication Sig Dispense Refill     aspirin (ASA) 81 MG chewable tablet Take 1 tablet (81 mg) by mouth daily 90 tablet 3     carvedilol (COREG) 6.25 MG tablet Take 1 tablet (6.25 mg) by mouth 2 times daily (with meals) 180 tablet 3     lisinopril (ZESTRIL) 5 MG tablet Take 1 tablet (5 mg) by mouth daily 90 tablet 3     multivitamin w/minerals (THERA-VIT-M) tablet Take 1 tablet by mouth daily Little Critters with immune and zinc       UNABLE TO FIND Natures Bounty Hair skin and nails       vitamin C (ASCORBIC ACID) 1000 MG TABS Take 1,000 mg by mouth daily       albuterol (PROAIR HFA/PROVENTIL HFA/VENTOLIN HFA) 108 (90 Base) MCG/ACT inhaler Inhale 2 puffs into the lungs every 4 hours as needed (Patient not taking: Reported on 3/24/2023)       benzocaine (ANBESOL) 10 % gel Take by mouth 4 times daily as needed for moderate pain (Patient not taking: Reported on 9/20/2022) 9 g 0     cyclobenzaprine (FLEXERIL) 10 MG tablet Take 1 tablet (10 mg) by mouth 3 times daily as needed for muscle spasms (Patient not taking:  "Reported on 9/20/2022) 42 tablet 0     ibuprofen (ADVIL/MOTRIN) 600 MG tablet Take 1 tablet (600 mg) by mouth every 6 hours as needed (Patient not taking: Reported on 9/20/2022) 30 tablet 0     omeprazole (PRILOSEC) 20 MG DR capsule Take 1 capsule (20 mg) by mouth daily (Patient not taking: Reported on 3/24/2023) 30 capsule 1       ROS:   Constitutional: No fever, chills, or sweats. Stable weight.   ENT: No visual disturbance, ear ache, epistaxis, sore throat.   Allergies/Immunologic: Negative.   Respiratory: No cough, hemoptysis.   Cardiovascular: As per HPI.   GI: No nausea, vomiting, hematemesis, melena, or hematochezia.   : No urinary frequency, dysuria, or hematuria.   Integument: Negative.   Psychiatric: Negative.   Neuro: Negative.   Endocrinology: Negative.   Musculoskeletal: Negative.    EXAM:  /75 (BP Location: Right arm, Patient Position: Sitting, Cuff Size: Adult Large)   Pulse 80   Ht 1.655 m (5' 5.16\")   Wt 95 kg (209 lb 8 oz)   SpO2 99%   BMI 34.69 kg/m    General: appears comfortable, alert and articulate  Head: normocephalic, atraumatic  Eyes: anicteric sclera, EOMI  Neck: no adenopathy  Orophyarynx: moist mucosa, no lesions, dentition intact  Heart: regular, S1/S2, no murmur, gallop, rub, JVD negative when sitting upright  Lungs: clear, no rales or wheezing  Abdomen: soft, non-tender, bowel sounds present, no hepatosplenomegaly  Extremities: no clubbing, cyanosis or LE edema. Some puffiness in the ankles.   Neurological: normal speech and affect, no gross motor deficits  Integument: no rashes or jaundice.  Left groin wound open, no s/s infection or tenderness    Labs:  CBC RESULTS:  Lab Results   Component Value Date    WBC 7.8 03/24/2023    WBC 9.4 06/01/2021    RBC 3.98 03/24/2023    RBC 4.27 06/01/2021    HGB 10.9 (L) 03/24/2023    HGB 12.8 06/01/2021    HCT 35.1 03/24/2023    HCT 39.1 06/01/2021    MCV 88 03/24/2023    MCV 92 06/01/2021    MCH 27.4 03/24/2023    MCH 30.0 06/01/2021 "    MCHC 31.1 (L) 03/24/2023    MCHC 32.7 06/01/2021    RDW 13.4 03/24/2023    RDW 13.4 06/01/2021     03/24/2023     06/01/2021       CMP RESULTS:  Lab Results   Component Value Date     03/24/2023     06/01/2021    POTASSIUM 3.9 03/24/2023    POTASSIUM 4.1 01/18/2022    POTASSIUM 4.0 06/01/2021    CHLORIDE 106 03/24/2023    CHLORIDE 108 01/18/2022    CHLORIDE 106 06/01/2021    CO2 27 03/24/2023    CO2 24 01/18/2022    CO2 26 06/01/2021    ANIONGAP 7 03/24/2023    ANIONGAP 6 01/18/2022    ANIONGAP 6 06/01/2021     (H) 03/24/2023    GLC 95 01/18/2022    GLC 82 06/01/2021    BUN 10.1 03/24/2023    BUN 10 01/18/2022    BUN 8 06/01/2021    CR 0.68 03/24/2023    CR 0.71 06/01/2021    GFRESTIMATED >90 03/24/2023    GFRESTIMATED >90 06/01/2021    GFRESTBLACK >90 06/01/2021    YIN 9.1 03/24/2023    YIN 9.1 06/01/2021    BILITOTAL 0.3 03/24/2023    BILITOTAL 0.4 06/01/2021    ALBUMIN 4.2 03/24/2023    ALBUMIN 3.7 01/18/2022    ALBUMIN 4.0 06/01/2021    ALKPHOS 69 03/24/2023    ALKPHOS 87 06/01/2021    ALT 13 03/24/2023    ALT 21 06/01/2021    AST 16 03/24/2023    AST 16 06/01/2021        INR RESULTS:  Lab Results   Component Value Date    INR 1.20 (H) 02/08/2020       Lab Results   Component Value Date    MAG 1.9 01/05/2023    MAG 2.4 (H) 02/05/2020     Lab Results   Component Value Date    NTBNPI 76 01/05/2023    NTBNPI 77 04/11/2021     Lab Results   Component Value Date    NTBNP 55 12/14/2021    NTBNP 21 06/01/2021       Assessment and Plan:   Ms. Azra Tom is a 37yr old female with recent diagnosis of systolic heart failure secondary to NICM (thought to be secondary to viral myocarditis) who presents to CORE Clinic after 3 years with symptoms. Glucose at 120 patient says she didn't have anything to eat of drink in the last 4 hours.  She will have to check in with her PCP. She also needs to check in with GYN - Hgb is down she has had heavy menstrual bleeding. We will increase the  carvedilol morning dose only.      Acute systolic heart failure secondary to NICM (thoguht to be secondary to viral myocarditis)  Cardiogenic shock, resolved  Stage C, NYHA Class II  - ACEi/ARB/ARNi:  Starting lisinopril 5mg daily today  - BB:  Carvedilol 6.25mg twice daily- increase as below  - Aldosterone antagonist:  N/a, LVEF was > 35% per TTE 2/3  - SCD ppx:  Insurance denied LifeVest.  But post-ECMO TTE showed LVEF 55-60%  - fluid status:  Euvolemic, not on diuretics    Left groin pseudoaneurysm  Needs to follow up with vascular.    Health maintenance  Advised that she follow up with her GYN re: her birth control and concerns for a yeast infection.      Plan:  GYN follow up ASAP - heavy menstrual cycle.   Increase Coreg 12.5 mg am/6.25 mg in pm  Dr. Raymundo - August/September   CORE in 6 months       Gonzales Malin, JATIN CNP

## 2023-03-24 NOTE — PATIENT INSTRUCTIONS
Take your medicines every day, as directed    Changes made today:    Increase morning dose of Coreg to 12.5 MG.  Keep taking evening dose of Coreg of 6.25 MG   Monitor Your Weight and Symptoms    Contact us if you:    Gain 2 pounds in one day or 5 pounds in one week  Feel more short of breath  Notice more leg swelling  Feel lightheadeded   Change your lifestyle    Limit Salt or Sodium:  2000 mg  Limit Fluids:  2000 mL or approximately 64 ounces  Eat a Heart Healthy Diet  Low in saturated fats  Stay Active:  Aim to move at least 150 minutes every  week         To Contact us    During Business Hours:  [phone], option # 1      After hours, weekends or holidays:   [phone], Option #4  Ask to speak to the On-Call Cardiologist. Inform them you are a CORE/heart failure patient at the [facility].     Use [employer]image allows you to communicate directly with your heart team through secure messaging.  SnapAppointments can be accessed any time on your phone, computer, or tablet.  If you need assistance, we'd be happy to help!         Keep your Heart Appointments:    Follow up clinic visit with Dr. Raymundo in August.   Follow up CORE appointment in November.     Please consider attending our virtual support group which is held monthly. Please reach out to Satya at [phone] for more information if you are interested in attending.       2023 dates:    Monday, April 3rd, 1-2pm (Topic: Remote monitoring)  Monday, May 1st, 1-2pm (Topic: CORE clinic)  Monday, June 5th, 1-2pm  Monday, July 3rd, 1-2pm  Monday, August 7th, 1-2pm  Monday, September 11th, 1-2pm  Monday, October 2nd, 1-2pm  Monday, November 6th, 1-2pm  Monday, December 4th, 1-2pm

## 2023-03-24 NOTE — PROGRESS NOTES
Advanced Heart Failure Clinic -- CORE clinic       HPI:   Ms. 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 She has not been into CORE clinic for over 3 years.      She is working full time and doing more.  She has been feeling more NAVA.  She says her heart rate got higher while she was working. She says she had anxiety.  She feels she might be feeling more chest issues with her menstrual cycle. She feels her excessive bleeding has been causing her symptoms.  She has some swelling in her ankles.   She is eating, with good appetite and no nausea, vomiting, or diarrhea.  She has been trying to limit her salt intake, and is not sure how much fluid she is drinking.   She otherwise denies chest pain, palpitations, dizziness, headaches, acute vision changes, fevers, chills, cough, sore throat, and signs of bleeding.     PAST MEDICAL HISTORY:  Past Medical History:   Diagnosis Date     Atypical chest pain     history of atypical chest pain in 2017     Bipolar disorder (H)     diagnosed in [place], [place] in 2017     Dyslipidemia      Tobacco use      Uncomplicated asthma        FAMILY HISTORY:  No family history on file.    SOCIAL HISTORY:  Social History     Socioeconomic History     Marital status: Single     Spouse name: None     Number of children: None     Years of education: None     Highest education level: None   Occupational History     None   Social Needs     Financial resource strain: None     Food insecurity:     Worry: None     Inability: None     Transportation needs:     Medical: None     Non-medical: None   Tobacco Use     Smoking status: Former Smoker     Smokeless tobacco: Never Used   Substance and Sexual Activity     Alcohol use: Not Currently     Drug use: Not Currently     Sexual activity: None   Lifestyle     Physical activity:     Days per week: None      Minutes per session: None     Stress: None   Relationships     Social connections:     Talks on phone: None     Gets together: None     Attends Yazdanism service: None     Active member of club or organization: None     Attends meetings of clubs or organizations: None     Relationship status: None     Intimate partner violence:     Fear of current or ex partner: None     Emotionally abused: None     Physically abused: None     Forced sexual activity: None   Other Topics Concern     None   Social History Narrative     None       CURRENT MEDICATIONS:  Current Outpatient Medications   Medication Sig Dispense Refill     aspirin (ASA) 81 MG chewable tablet Take 1 tablet (81 mg) by mouth daily 90 tablet 3     carvedilol (COREG) 6.25 MG tablet Take 1 tablet (6.25 mg) by mouth 2 times daily (with meals) 180 tablet 3     lisinopril (ZESTRIL) 5 MG tablet Take 1 tablet (5 mg) by mouth daily 90 tablet 3     multivitamin w/minerals (THERA-VIT-M) tablet Take 1 tablet by mouth daily Little Critters with immune and zinc       UNABLE TO FIND Natures Bounty Hair skin and nails       vitamin C (ASCORBIC ACID) 1000 MG TABS Take 1,000 mg by mouth daily       albuterol (PROAIR HFA/PROVENTIL HFA/VENTOLIN HFA) 108 (90 Base) MCG/ACT inhaler Inhale 2 puffs into the lungs every 4 hours as needed (Patient not taking: Reported on 3/24/2023)       benzocaine (ANBESOL) 10 % gel Take by mouth 4 times daily as needed for moderate pain (Patient not taking: Reported on 9/20/2022) 9 g 0     cyclobenzaprine (FLEXERIL) 10 MG tablet Take 1 tablet (10 mg) by mouth 3 times daily as needed for muscle spasms (Patient not taking: Reported on 9/20/2022) 42 tablet 0     ibuprofen (ADVIL/MOTRIN) 600 MG tablet Take 1 tablet (600 mg) by mouth every 6 hours as needed (Patient not taking: Reported on 9/20/2022) 30 tablet 0     omeprazole (PRILOSEC) 20 MG DR capsule Take 1 capsule (20 mg) by mouth daily (Patient not taking: Reported on 3/24/2023) 30 capsule 1  "      ROS:   Constitutional: No fever, chills, or sweats. Stable weight.   ENT: No visual disturbance, ear ache, epistaxis, sore throat.   Allergies/Immunologic: Negative.   Respiratory: No cough, hemoptysis.   Cardiovascular: As per HPI.   GI: No nausea, vomiting, hematemesis, melena, or hematochezia.   : No urinary frequency, dysuria, or hematuria.   Integument: Negative.   Psychiatric: Negative.   Neuro: Negative.   Endocrinology: Negative.   Musculoskeletal: Negative.    EXAM:  /75 (BP Location: Right arm, Patient Position: Sitting, Cuff Size: Adult Large)   Pulse 80   Ht 1.655 m (5' 5.16\")   Wt 95 kg (209 lb 8 oz)   SpO2 99%   BMI 34.69 kg/m    General: appears comfortable, alert and articulate  Head: normocephalic, atraumatic  Eyes: anicteric sclera, EOMI  Neck: no adenopathy  Orophyarynx: moist mucosa, no lesions, dentition intact  Heart: regular, S1/S2, no murmur, gallop, rub, JVD negative when sitting upright  Lungs: clear, no rales or wheezing  Abdomen: soft, non-tender, bowel sounds present, no hepatosplenomegaly  Extremities: no clubbing, cyanosis or LE edema. Some puffiness in the ankles.   Neurological: normal speech and affect, no gross motor deficits  Integument: no rashes or jaundice.  Left groin wound open, no s/s infection or tenderness    Labs:  CBC RESULTS:  Lab Results   Component Value Date    WBC 7.8 03/24/2023    WBC 9.4 06/01/2021    RBC 3.98 03/24/2023    RBC 4.27 06/01/2021    HGB 10.9 (L) 03/24/2023    HGB 12.8 06/01/2021    HCT 35.1 03/24/2023    HCT 39.1 06/01/2021    MCV 88 03/24/2023    MCV 92 06/01/2021    MCH 27.4 03/24/2023    MCH 30.0 06/01/2021    MCHC 31.1 (L) 03/24/2023    MCHC 32.7 06/01/2021    RDW 13.4 03/24/2023    RDW 13.4 06/01/2021     03/24/2023     06/01/2021       CMP RESULTS:  Lab Results   Component Value Date     03/24/2023     06/01/2021    POTASSIUM 3.9 03/24/2023    POTASSIUM 4.1 01/18/2022    POTASSIUM 4.0 06/01/2021    " CHLORIDE 106 03/24/2023    CHLORIDE 108 01/18/2022    CHLORIDE 106 06/01/2021    CO2 27 03/24/2023    CO2 24 01/18/2022    CO2 26 06/01/2021    ANIONGAP 7 03/24/2023    ANIONGAP 6 01/18/2022    ANIONGAP 6 06/01/2021     (H) 03/24/2023    GLC 95 01/18/2022    GLC 82 06/01/2021    BUN 10.1 03/24/2023    BUN 10 01/18/2022    BUN 8 06/01/2021    CR 0.68 03/24/2023    CR 0.71 06/01/2021    GFRESTIMATED >90 03/24/2023    GFRESTIMATED >90 06/01/2021    GFRESTBLACK >90 06/01/2021    YIN 9.1 03/24/2023    YIN 9.1 06/01/2021    BILITOTAL 0.3 03/24/2023    BILITOTAL 0.4 06/01/2021    ALBUMIN 4.2 03/24/2023    ALBUMIN 3.7 01/18/2022    ALBUMIN 4.0 06/01/2021    ALKPHOS 69 03/24/2023    ALKPHOS 87 06/01/2021    ALT 13 03/24/2023    ALT 21 06/01/2021    AST 16 03/24/2023    AST 16 06/01/2021        INR RESULTS:  Lab Results   Component Value Date    INR 1.20 (H) 02/08/2020       Lab Results   Component Value Date    MAG 1.9 01/05/2023    MAG 2.4 (H) 02/05/2020     Lab Results   Component Value Date    NTBNPI 76 01/05/2023    NTBNPI 77 04/11/2021     Lab Results   Component Value Date    NTBNP 55 12/14/2021    NTBNP 21 06/01/2021       Assessment and Plan:   Ms. Azra Tom is a 37yr old female with recent diagnosis of systolic heart failure secondary to NICM (thought to be secondary to viral myocarditis) who presents to CORE Clinic after 3 years with symptoms. Glucose at 120 patient says she didn't have anything to eat of drink in the last 4 hours.  She will have to check in with her PCP. She also needs to check in with GYN - Hgb is down she has had heavy menstrual bleeding. We will increase the carvedilol morning dose only.      Acute systolic heart failure secondary to NICM (thoguht to be secondary to viral myocarditis)  Cardiogenic shock, resolved  Stage C, NYHA Class II  - ACEi/ARB/ARNi:  Starting lisinopril 5mg daily today  - BB:  Carvedilol 6.25mg twice daily- increase as below  - Aldosterone antagonist:  N/a, LVEF  was > 35% per TTE 2/3  - SCD ppx:  Insurance denied LifeVest.  But post-ECMO TTE showed LVEF 55-60%  - fluid status:  Euvolemic, not on diuretics    Left groin pseudoaneurysm  Needs to follow up with vascular.    Health maintenance  Advised that she follow up with her GYN re: her birth control and concerns for a yeast infection.      Plan:  GYN follow up ASAP - heavy menstrual cycle.   Increase Coreg 12.5 mg am/6.25 mg in pm  Dr. Raymundo - August/September   CORE in 6 months

## 2023-06-15 ENCOUNTER — TELEPHONE (OUTPATIENT)
Dept: CARDIOLOGY | Facility: CLINIC | Age: 38
End: 2023-06-15
Payer: COMMERCIAL

## 2023-06-22 ENCOUNTER — TELEPHONE (OUTPATIENT)
Dept: CARDIOLOGY | Facility: CLINIC | Age: 38
End: 2023-06-22
Payer: COMMERCIAL

## 2023-08-07 NOTE — PROGRESS NOTES
August 8, 2023     Dear Colleagues,     I had the pleasure of seeing Azra Tom  in the [facility]. 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    In late January 2019, she developed acute lymphocytic myocarditis secondary to influenza requiring VA ECMO from 1/29-2/3/20, with subsequent complete cardiac functional recovery. She was hospitalized from 1/28-2/11/19. She was treated with oseltamivir, and underwent endomyocardial biopsy. Her ECMO was complicated by left femoral pseudoaneurysm with infection requiring wound vac placement. She had recovered her EF completely and then moved to [place] temporarily now returned. Last seen on 3/24/23; reported NYHA II symptoms at that time and started on lisinopril 5 mg daily and coreg increased to 12.5/6.25.     Today, the patient is here with her partner. She is doing well overall. She is active and exercises more. She feels that exertional capacity is often limited by bilateral calf pain. Endorses adequate hydration. Intermittent swelling bilaterally when on her feet for long periods. Also with intermittent palpitations. Last around a minute before resolving spontaneously. Some SOB. Some dizziness without LOC. No chest pain. These have not occurred with exertion, often occur at rest. Otherwise, no issues with BP or current cardiac medications. No chest pain ever. She and her partner were not able to identify any MARSHALL-like symptoms.     PAST MEDICAL HISTORY:  Past Medical History:   Diagnosis Date    Atypical chest pain     history of atypical chest pain in 2017    Bipolar disorder (H)     diagnosed in [place], [place] in 2017    Dyslipidemia     Tobacco use     Uncomplicated asthma      FAMILY HISTORY:  No family history on file.    SOCIAL HISTORY:  Social History     Socioeconomic History     Marital status: Single   Tobacco Use    Smoking status: Former    Smokeless tobacco: Never   Substance and Sexual Activity    Alcohol use: Not Currently    Drug use: Not Currently     CURRENT MEDICATIONS:  Current Outpatient Medications   Medication    albuterol (PROAIR HFA/PROVENTIL HFA/VENTOLIN HFA) 108 (90 Base) MCG/ACT inhaler    aspirin (ASA) 81 MG chewable tablet    benzocaine (ANBESOL) 10 % gel    carvedilol (COREG) 6.25 MG tablet    cyclobenzaprine (FLEXERIL) 10 MG tablet    ibuprofen (ADVIL/MOTRIN) 600 MG tablet    lisinopril (ZESTRIL) 5 MG tablet    multivitamin w/minerals (THERA-VIT-M) tablet    omeprazole (PRILOSEC) 20 MG DR capsule    UNABLE TO FIND    vitamin C (ASCORBIC ACID) 1000 MG TABS     No current facility-administered medications for this visit.     ROS:   10-point ROS negative except per HPI above.      EXAM:  General: appears comfortable, alert and oriented  Head: normocephalic, atraumatic  Eyes: anicteric sclera, EOMI , PERRL  Neck: no adenopathy  Orophyarynx: moist mucosa, no lesions noted  Heart: regular, S1/S2, no murmurs, rubs or gallop. Estimated JVP at 5 cmH2O  Lungs: CTAB, No wheezing.   Abdomen: soft, non-tender, bowel sounds present, no hepatosplenomegaly  Extremities: No LE edema  Skin: no open lesions noted  Neuro: grossly non-focal     Labs:  Lab Results   Component Value Date    WBC 6.9 08/08/2023    HGB 11.7 08/08/2023    HCT 36.8 08/08/2023     08/08/2023     03/24/2023    POTASSIUM 3.9 03/24/2023    CHLORIDE 106 03/24/2023    CO2 27 03/24/2023    BUN 10.1 03/24/2023    CR 0.68 03/24/2023     (H) 03/24/2023    SED 26 (H) 10/24/2021    DD 10.1 (H) 02/03/2020    NTBNPI 76 01/05/2023    NTBNP <36 03/24/2023    TROPONIN <0.015 10/24/2021    TROPI <0.015 04/11/2021    AST 16 03/24/2023    ALT 13 03/24/2023    ALKPHOS 69 03/24/2023    BILITOTAL 0.3 03/24/2023    INR 1.20 (H) 02/08/2020     Echocardiogram (1/18/22):  Global and regional left ventricular function  is normal with an EF of 60-65%.  Global right ventricular function is normal.  Mild tricuspid insufficiency is present.  Pulmonary artery systolic pressure is normal.  The inferior vena cava was normal in size with preserved respiratory  variability.  No pericardial effusion is present.    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    The patient is doing well from a cardiopulmonary standpoint. She is euvolemic and has good exercise tolerance. Her main limitation seems to be MSK complaints related to her calves. She is not volume overloaded. She has been well hydrated. Electrolytes are wnl today.    Unclear etiology of palpitations. She may be at higher risk for arrhythmia given her history of myocarditis. She had a prior EKG in 1/5/23 read as Afib, but this is unlikely because there are P waves present. Will plan to further evaluate her symptoms with a ziopatch.     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 Raymundo  - ACE-I/ARB/ARNi: Lisinopril 5 mg daily  - BB Coreg 12.5 mg qAM, 6.25 qPM  - Aldosterone antagonist NA; LVEF >35%  - SGLT2i: NA  - SCD prophylaxis does not meet criteria for implant LVEF>35%  - Fluid status Euvolemic  - Diuretic: NA  - Cardiac Rehab: Completed  - Sleep Apnea Evaluation: Not indicated  - Remote PA Pressure Monitoring (CardioMems): NA  - Follow-up 1 year    I appreciate the opportunity to participate in the care of Azra Tom . Please do not hesitate to contact me with any further questions.    Discussed with Dr. Raymundo.    Harpreet  Brayden, PGY-5  Cardiovascular Disease Fellow    I have seen and evaluated the patient and agree with the assessment and plan as above  Audie Raymundo MD

## 2023-08-08 ENCOUNTER — OFFICE VISIT (OUTPATIENT)
Dept: CARDIOLOGY | Facility: CLINIC | Age: 38
End: 2023-08-08
Attending: INTERNAL MEDICINE
Payer: COMMERCIAL

## 2023-08-08 ENCOUNTER — LAB (OUTPATIENT)
Dept: LAB | Facility: CLINIC | Age: 38
End: 2023-08-08
Payer: COMMERCIAL

## 2023-08-08 VITALS
BODY MASS INDEX: 34.99 KG/M2 | HEIGHT: 65 IN | HEART RATE: 72 BPM | SYSTOLIC BLOOD PRESSURE: 111 MMHG | WEIGHT: 210 LBS | OXYGEN SATURATION: 99 % | DIASTOLIC BLOOD PRESSURE: 77 MMHG

## 2023-08-08 DIAGNOSIS — R00.2 PALPITATIONS: Primary | ICD-10-CM

## 2023-08-08 DIAGNOSIS — I10 BENIGN ESSENTIAL HYPERTENSION: ICD-10-CM

## 2023-08-08 DIAGNOSIS — I50.20 HEART FAILURE WITH REDUCED EJECTION FRACTION, NYHA CLASS III (H): ICD-10-CM

## 2023-08-08 DIAGNOSIS — R00.2 PALPITATIONS: ICD-10-CM

## 2023-08-08 DIAGNOSIS — I42.8 NONISCHEMIC CARDIOMYOPATHY (H): ICD-10-CM

## 2023-08-08 LAB
ALBUMIN SERPL BCG-MCNC: 4.3 G/DL (ref 3.5–5.2)
ALP SERPL-CCNC: 78 U/L (ref 35–104)
ALT SERPL W P-5'-P-CCNC: 16 U/L (ref 0–50)
ANION GAP SERPL CALCULATED.3IONS-SCNC: 8 MMOL/L (ref 7–15)
AST SERPL W P-5'-P-CCNC: 18 U/L (ref 0–45)
BILIRUB SERPL-MCNC: 0.3 MG/DL
BUN SERPL-MCNC: 8.6 MG/DL (ref 6–20)
CALCIUM SERPL-MCNC: 9.2 MG/DL (ref 8.6–10)
CHLORIDE SERPL-SCNC: 105 MMOL/L (ref 98–107)
CREAT SERPL-MCNC: 0.66 MG/DL (ref 0.51–0.95)
DEPRECATED HCO3 PLAS-SCNC: 24 MMOL/L (ref 22–29)
ERYTHROCYTE [DISTWIDTH] IN BLOOD BY AUTOMATED COUNT: 15 % (ref 10–15)
GFR SERPL CREATININE-BSD FRML MDRD: >90 ML/MIN/1.73M2
GLUCOSE SERPL-MCNC: 106 MG/DL (ref 70–99)
HCT VFR BLD AUTO: 36.8 % (ref 35–47)
HGB BLD-MCNC: 11.7 G/DL (ref 11.7–15.7)
MCH RBC QN AUTO: 26.7 PG (ref 26.5–33)
MCHC RBC AUTO-ENTMCNC: 31.8 G/DL (ref 31.5–36.5)
MCV RBC AUTO: 84 FL (ref 78–100)
NT-PROBNP SERPL-MCNC: 40 PG/ML (ref 0–450)
PLATELET # BLD AUTO: 206 10E3/UL (ref 150–450)
POTASSIUM SERPL-SCNC: 4.4 MMOL/L (ref 3.4–5.3)
PROT SERPL-MCNC: 6.9 G/DL (ref 6.4–8.3)
RBC # BLD AUTO: 4.38 10E6/UL (ref 3.8–5.2)
SODIUM SERPL-SCNC: 137 MMOL/L (ref 136–145)
WBC # BLD AUTO: 6.9 10E3/UL (ref 4–11)

## 2023-08-08 PROCEDURE — 99214 OFFICE O/P EST MOD 30 MIN: CPT | Mod: 25 | Performed by: INTERNAL MEDICINE

## 2023-08-08 PROCEDURE — 93248 EXT ECG>7D<15D REV&INTERPJ: CPT | Performed by: INTERNAL MEDICINE

## 2023-08-08 PROCEDURE — 80053 COMPREHEN METABOLIC PANEL: CPT | Performed by: PATHOLOGY

## 2023-08-08 PROCEDURE — 83880 ASSAY OF NATRIURETIC PEPTIDE: CPT | Performed by: PATHOLOGY

## 2023-08-08 PROCEDURE — 85027 COMPLETE CBC AUTOMATED: CPT | Performed by: PATHOLOGY

## 2023-08-08 PROCEDURE — 93246 EXT ECG>7D<15D RECORDING: CPT

## 2023-08-08 PROCEDURE — 36415 COLL VENOUS BLD VENIPUNCTURE: CPT | Performed by: PATHOLOGY

## 2023-08-08 PROCEDURE — G0463 HOSPITAL OUTPT CLINIC VISIT: HCPCS | Performed by: INTERNAL MEDICINE

## 2023-08-08 RX ORDER — FLUTICASONE PROPIONATE 50 MCG
1-2 SPRAY, SUSPENSION (ML) NASAL DAILY PRN
COMMUNITY
Start: 2023-08-06

## 2023-08-08 ASSESSMENT — PAIN SCALES - GENERAL: PAINLEVEL: NO PAIN (0)

## 2023-08-08 NOTE — PROGRESS NOTES
Per Dr. Raymundo, patient to have 14 day zio monitor placed.  Diagnosis: palpitations  Monitor placed: Yes  Patient Instructed: Yes  Patient verbalized understanding: Yes  Holter # [id number]  Max Chase

## 2023-08-08 NOTE — LETTER
8/8/2023      RE: Azra Tom  [address]  [place] [address]       Dear Colleague,    Thank you for the opportunity to participate in the care of your patient, Azra Tom, at the [facility] [place] at [facility]. Please see a copy of my visit note below.    August 8, 2023     Dear Colleagues,     I had the pleasure of seeing Azra Tom  in the [facility]. 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    In late January 2019, she developed acute lymphocytic myocarditis secondary to influenza requiring VA ECMO from 1/29-2/3/20, with subsequent complete cardiac functional recovery. She was hospitalized from 1/28-2/11/19. She was treated with oseltamivir, and underwent endomyocardial biopsy. Her ECMO was complicated by left femoral pseudoaneurysm with infection requiring wound vac placement. She had recovered her EF completely and then moved to [place] temporarily now returned. Last seen on 3/24/23; reported NYHA II symptoms at that time and started on lisinopril 5 mg daily and coreg increased to 12.5/6.25.     Today, the patient is here with her partner. She is doing well overall. She is active and exercises more. She feels that exertional capacity is often limited by bilateral calf pain. Endorses adequate hydration. Intermittent swelling bilaterally when on her feet for long periods. Also with intermittent palpitations. Last around a minute before resolving spontaneously. Some SOB. Some dizziness without LOC. No chest pain. These have not occurred with exertion, often occur at rest. Otherwise, no issues with BP or current cardiac medications. No chest pain ever. She and her partner were not able to identify any MARSHALL-like symptoms.     PAST MEDICAL  HISTORY:  Past Medical History:   Diagnosis Date     Atypical chest pain     history of atypical chest pain in 2017     Bipolar disorder (H)     diagnosed in [place], [place] in 2017     Dyslipidemia      Tobacco use      Uncomplicated asthma      FAMILY HISTORY:  No family history on file.    SOCIAL HISTORY:  Social History     Socioeconomic History     Marital status: Single   Tobacco Use     Smoking status: Former     Smokeless tobacco: Never   Substance and Sexual Activity     Alcohol use: Not Currently     Drug use: Not Currently     CURRENT MEDICATIONS:  Current Outpatient Medications   Medication     albuterol (PROAIR HFA/PROVENTIL HFA/VENTOLIN HFA) 108 (90 Base) MCG/ACT inhaler     aspirin (ASA) 81 MG chewable tablet     benzocaine (ANBESOL) 10 % gel     carvedilol (COREG) 6.25 MG tablet     cyclobenzaprine (FLEXERIL) 10 MG tablet     ibuprofen (ADVIL/MOTRIN) 600 MG tablet     lisinopril (ZESTRIL) 5 MG tablet     multivitamin w/minerals (THERA-VIT-M) tablet     omeprazole (PRILOSEC) 20 MG DR capsule     UNABLE TO FIND     vitamin C (ASCORBIC ACID) 1000 MG TABS     No current facility-administered medications for this visit.     ROS:   10-point ROS negative except per HPI above.      EXAM:  General: appears comfortable, alert and oriented  Head: normocephalic, atraumatic  Eyes: anicteric sclera, EOMI , PERRL  Neck: no adenopathy  Orophyarynx: moist mucosa, no lesions noted  Heart: regular, S1/S2, no murmurs, rubs or gallop. Estimated JVP at 5 cmH2O  Lungs: CTAB, No wheezing.   Abdomen: soft, non-tender, bowel sounds present, no hepatosplenomegaly  Extremities: No LE edema  Skin: no open lesions noted  Neuro: grossly non-focal     Labs:  Lab Results   Component Value Date    WBC 6.9 08/08/2023    HGB 11.7 08/08/2023    HCT 36.8 08/08/2023     08/08/2023     03/24/2023    POTASSIUM 3.9 03/24/2023    CHLORIDE 106 03/24/2023    CO2 27 03/24/2023    BUN 10.1 03/24/2023    CR 0.68 03/24/2023     (H)  03/24/2023    SED 26 (H) 10/24/2021    DD 10.1 (H) 02/03/2020    NTBNPI 76 01/05/2023    NTBNP <36 03/24/2023    TROPONIN <0.015 10/24/2021    TROPI <0.015 04/11/2021    AST 16 03/24/2023    ALT 13 03/24/2023    ALKPHOS 69 03/24/2023    BILITOTAL 0.3 03/24/2023    INR 1.20 (H) 02/08/2020     Echocardiogram (1/18/22):  Global and regional left ventricular function is normal with an EF of 60-65%.  Global right ventricular function is normal.  Mild tricuspid insufficiency is present.  Pulmonary artery systolic pressure is normal.  The inferior vena cava was normal in size with preserved respiratory  variability.  No pericardial effusion is present.    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    The patient is doing well from a cardiopulmonary standpoint. She is euvolemic and has good exercise tolerance. Her main limitation seems to be MSK complaints related to her calves. She is not volume overloaded. She has been well hydrated. Electrolytes are wnl today.    Unclear etiology of palpitations. She may be at higher risk for arrhythmia given her history of myocarditis. She had a prior EKG in 1/5/23 read as Afib, but this is unlikely because there are P waves present. Will plan to further evaluate her symptoms with a ziopatch.     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 Raymundo  - ACE-I/ARB/ARNi: Lisinopril 5 mg daily  - BB Coreg 12.5 mg qAM, 6.25 qPM  - Aldosterone antagonist NA; LVEF >35%  - SGLT2i: NA  - SCD prophylaxis does not meet criteria for implant  LVEF>35%  - Fluid status Euvolemic  - Diuretic: NA  - Cardiac Rehab: Completed  - Sleep Apnea Evaluation: Not indicated  - Remote PA Pressure Monitoring (CardioMems): NA  - Follow-up 1 year    I appreciate the opportunity to participate in the care of Azra Tom . Please do not hesitate to contact me with any further questions.    Discussed with Dr. Raymundo.    Harpreet Owen, PGY-5  Cardiovascular Disease Fellow    I have seen and evaluated the patient and agree with the assessment and plan as above  Audie Raymundo MD

## 2023-08-08 NOTE — PATIENT INSTRUCTIONS
Dr. Raymundo recommends:    14 day zio patch heart monitor. (Placed in clinic)    Follow up clinic visit in one year with labs the same day.    Thank you for your visit today.  Please call me with any questions or concerns.   Tyrell Montero RN  Cardiology Care Coordinator  [phone]

## 2023-08-08 NOTE — NURSING NOTE
Chief Complaint   Patient presents with    Follow Up     Hx of myocarditis and cardiomyopathy.         Vitals were taken, medications reconciled.    Paige Thurner, Facilitator   7:55 AM

## 2023-11-08 ENCOUNTER — HOSPITAL ENCOUNTER (EMERGENCY)
Facility: CLINIC | Age: 38
Discharge: HOME | End: 2023-11-09
Attending: EMERGENCY MEDICINE | Admitting: EMERGENCY MEDICINE
Payer: COMMERCIAL

## 2023-11-08 ENCOUNTER — APPOINTMENT (OUTPATIENT)
Dept: CT IMAGING | Facility: CLINIC | Age: 38
End: 2023-11-08
Attending: EMERGENCY MEDICINE
Payer: COMMERCIAL

## 2023-11-08 DIAGNOSIS — J18.1 UNRESOLVED LOBAR PNEUMONIA (H): Primary | ICD-10-CM

## 2023-11-08 DIAGNOSIS — I25.5 ISCHEMIC CARDIOMYOPATHY: ICD-10-CM

## 2023-11-08 DIAGNOSIS — J18.9 PNEUMONIA OF LEFT LOWER LOBE DUE TO INFECTIOUS ORGANISM: ICD-10-CM

## 2023-11-08 DIAGNOSIS — Z87.891 PERSONAL HISTORY OF TOBACCO USE, PRESENTING HAZARDS TO HEALTH: ICD-10-CM

## 2023-11-08 LAB
ALBUMIN SERPL BCG-MCNC: 4 G/DL (ref 3.5–5.2)
ALBUMIN UR-MCNC: 20 MG/DL
ALP SERPL-CCNC: 74 U/L (ref 35–104)
ALT SERPL W P-5'-P-CCNC: 13 U/L (ref 0–50)
ANION GAP SERPL CALCULATED.3IONS-SCNC: 10 MMOL/L (ref 7–15)
APPEARANCE UR: CLEAR
AST SERPL W P-5'-P-CCNC: 22 U/L (ref 0–45)
BASOPHILS # BLD AUTO: 0 10E3/UL (ref 0–0.2)
BASOPHILS NFR BLD AUTO: 0 %
BILIRUB SERPL-MCNC: <0.2 MG/DL
BILIRUB UR QL STRIP: NEGATIVE
BUN SERPL-MCNC: 5.5 MG/DL (ref 6–20)
CALCIUM SERPL-MCNC: 8.6 MG/DL (ref 8.6–10)
CHLORIDE SERPL-SCNC: 107 MMOL/L (ref 98–107)
COLOR UR AUTO: YELLOW
CREAT SERPL-MCNC: 0.66 MG/DL (ref 0.51–0.95)
DEPRECATED HCO3 PLAS-SCNC: 22 MMOL/L (ref 22–29)
EGFRCR SERPLBLD CKD-EPI 2021: >90 ML/MIN/1.73M2
EOSINOPHIL # BLD AUTO: 0.1 10E3/UL (ref 0–0.7)
EOSINOPHIL NFR BLD AUTO: 2 %
ERYTHROCYTE [DISTWIDTH] IN BLOOD BY AUTOMATED COUNT: 14.5 % (ref 10–15)
GLUCOSE SERPL-MCNC: 89 MG/DL (ref 70–99)
GLUCOSE UR STRIP-MCNC: NEGATIVE MG/DL
HCG UR QL: NEGATIVE
HCT VFR BLD AUTO: 33.9 % (ref 35–47)
HGB BLD-MCNC: 10.8 G/DL (ref 11.7–15.7)
HGB UR QL STRIP: ABNORMAL
HYALINE CASTS: 1 /LPF
IMM GRANULOCYTES # BLD: 0 10E3/UL
IMM GRANULOCYTES NFR BLD: 0 %
INR PPP: 0.96 (ref 0.85–1.15)
INTERNAL QC OK POCT: NORMAL
KETONES UR STRIP-MCNC: NEGATIVE MG/DL
LACTATE SERPL-SCNC: 0.6 MMOL/L (ref 0.7–2)
LEUKOCYTE ESTERASE UR QL STRIP: NEGATIVE
LYMPHOCYTES # BLD AUTO: 1.7 10E3/UL (ref 0.8–5.3)
LYMPHOCYTES NFR BLD AUTO: 36 %
MCH RBC QN AUTO: 28.2 PG (ref 26.5–33)
MCHC RBC AUTO-ENTMCNC: 31.9 G/DL (ref 31.5–36.5)
MCV RBC AUTO: 89 FL (ref 78–100)
MONOCYTES # BLD AUTO: 0.4 10E3/UL (ref 0–1.3)
MONOCYTES NFR BLD AUTO: 8 %
MUCOUS THREADS #/AREA URNS LPF: PRESENT /LPF
NEUTROPHILS # BLD AUTO: 2.5 10E3/UL (ref 1.6–8.3)
NEUTROPHILS NFR BLD AUTO: 54 %
NITRATE UR QL: NEGATIVE
NRBC # BLD AUTO: 0 10E3/UL
NRBC BLD AUTO-RTO: 0 /100
PH UR STRIP: 6.5 [PH] (ref 5–7)
PLATELET # BLD AUTO: 168 10E3/UL (ref 150–450)
POCT KIT EXPIRATION DATE: NORMAL
POCT KIT LOT NUMBER: NORMAL
POTASSIUM SERPL-SCNC: 4.3 MMOL/L (ref 3.4–5.3)
PROCALCITONIN SERPL IA-MCNC: 0.03 NG/ML
PROT SERPL-MCNC: 6.4 G/DL (ref 6.4–8.3)
RBC # BLD AUTO: 3.83 10E6/UL (ref 3.8–5.2)
RBC URINE: 11 /HPF
SODIUM SERPL-SCNC: 139 MMOL/L (ref 135–145)
SP GR UR STRIP: 1.03 (ref 1–1.03)
SQUAMOUS EPITHELIAL: 1 /HPF
TROPONIN T SERPL HS-MCNC: <6 NG/L
UROBILINOGEN UR STRIP-MCNC: 2 MG/DL
WBC # BLD AUTO: 4.7 10E3/UL (ref 4–11)
WBC URINE: 0 /HPF

## 2023-11-08 PROCEDURE — 83605 ASSAY OF LACTIC ACID: CPT | Performed by: EMERGENCY MEDICINE

## 2023-11-08 PROCEDURE — 85025 COMPLETE CBC W/AUTO DIFF WBC: CPT | Performed by: EMERGENCY MEDICINE

## 2023-11-08 PROCEDURE — 85610 PROTHROMBIN TIME: CPT | Performed by: EMERGENCY MEDICINE

## 2023-11-08 PROCEDURE — 81001 URINALYSIS AUTO W/SCOPE: CPT | Performed by: EMERGENCY MEDICINE

## 2023-11-08 PROCEDURE — 99285 EMERGENCY DEPT VISIT HI MDM: CPT | Mod: 25 | Performed by: EMERGENCY MEDICINE

## 2023-11-08 PROCEDURE — 99285 EMERGENCY DEPT VISIT HI MDM: CPT | Mod: 25

## 2023-11-08 PROCEDURE — 84145 PROCALCITONIN (PCT): CPT | Performed by: EMERGENCY MEDICINE

## 2023-11-08 PROCEDURE — 81025 URINE PREGNANCY TEST: CPT | Performed by: EMERGENCY MEDICINE

## 2023-11-08 PROCEDURE — 93010 ELECTROCARDIOGRAM REPORT: CPT | Performed by: EMERGENCY MEDICINE

## 2023-11-08 PROCEDURE — 80053 COMPREHEN METABOLIC PANEL: CPT | Performed by: EMERGENCY MEDICINE

## 2023-11-08 PROCEDURE — 71275 CT ANGIOGRAPHY CHEST: CPT

## 2023-11-08 PROCEDURE — 36415 COLL VENOUS BLD VENIPUNCTURE: CPT | Performed by: EMERGENCY MEDICINE

## 2023-11-08 PROCEDURE — 71275 CT ANGIOGRAPHY CHEST: CPT | Mod: 26 | Performed by: RADIOLOGY

## 2023-11-08 PROCEDURE — 93005 ELECTROCARDIOGRAM TRACING: CPT

## 2023-11-08 PROCEDURE — 84484 ASSAY OF TROPONIN QUANT: CPT | Performed by: EMERGENCY MEDICINE

## 2023-11-08 RX ORDER — IOPAMIDOL 755 MG/ML
69 INJECTION, SOLUTION INTRAVASCULAR ONCE
Status: COMPLETED | OUTPATIENT
Start: 2023-11-08 | End: 2023-11-09

## 2023-11-08 ASSESSMENT — ACTIVITIES OF DAILY LIVING (ADL)
ADLS_ACUITY_SCORE: 35
ADLS_ACUITY_SCORE: 35

## 2023-11-09 VITALS
OXYGEN SATURATION: 99 % | HEIGHT: 64 IN | BODY MASS INDEX: 35.68 KG/M2 | DIASTOLIC BLOOD PRESSURE: 74 MMHG | RESPIRATION RATE: 18 BRPM | TEMPERATURE: 98.1 F | SYSTOLIC BLOOD PRESSURE: 123 MMHG | WEIGHT: 209 LBS | HEART RATE: 65 BPM

## 2023-11-09 LAB
ATRIAL RATE - MUSE: 63 BPM
DIASTOLIC BLOOD PRESSURE - MUSE: NORMAL MMHG
INTERPRETATION ECG - MUSE: NORMAL
P AXIS - MUSE: 31 DEGREES
PR INTERVAL - MUSE: 160 MS
QRS DURATION - MUSE: 82 MS
QT - MUSE: 400 MS
QTC - MUSE: 409 MS
R AXIS - MUSE: 42 DEGREES
SYSTOLIC BLOOD PRESSURE - MUSE: NORMAL MMHG
T AXIS - MUSE: 35 DEGREES
VENTRICULAR RATE- MUSE: 63 BPM

## 2023-11-09 PROCEDURE — 250N000011 HC RX IP 250 OP 636: Mod: JZ | Performed by: EMERGENCY MEDICINE

## 2023-11-09 PROCEDURE — 96374 THER/PROPH/DIAG INJ IV PUSH: CPT | Mod: 59

## 2023-11-09 PROCEDURE — 250N000011 HC RX IP 250 OP 636: Performed by: EMERGENCY MEDICINE

## 2023-11-09 PROCEDURE — 250N000013 HC RX MED GY IP 250 OP 250 PS 637: Performed by: EMERGENCY MEDICINE

## 2023-11-09 RX ORDER — AZITHROMYCIN 250 MG/1
500 TABLET, FILM COATED ORAL ONCE
Status: COMPLETED | OUTPATIENT
Start: 2023-11-09 | End: 2023-11-09

## 2023-11-09 RX ORDER — CEFTRIAXONE 1 G/1
1 INJECTION, POWDER, FOR SOLUTION INTRAMUSCULAR; INTRAVENOUS ONCE
Status: COMPLETED | OUTPATIENT
Start: 2023-11-09 | End: 2023-11-09

## 2023-11-09 RX ADMIN — AZITHROMYCIN 500 MG: 250 TABLET, FILM COATED ORAL at 00:38

## 2023-11-09 RX ADMIN — IOPAMIDOL 69 ML: 755 INJECTION, SOLUTION INTRAVENOUS at 00:11

## 2023-11-09 RX ADMIN — CEFTRIAXONE SODIUM 1 G: 1 INJECTION, POWDER, FOR SOLUTION INTRAMUSCULAR; INTRAVENOUS at 00:39

## 2023-11-09 ASSESSMENT — ACTIVITIES OF DAILY LIVING (ADL): ADLS_ACUITY_SCORE: 35

## 2023-11-09 NOTE — DISCHARGE INSTRUCTIONS
Please take the antibiotics as prescribed.     Your blood work is stable.     Your CT chest shows a pneumonia but otherwise no other problems.     Please make an appointment to follow up with Your Primary Care Provider in 3-5 days if you have any concerns.

## 2023-11-09 NOTE — ED TRIAGE NOTES
Pt arrives ambulatory to triage c/o SOB and hemoptysis  Was seen at  this morning and told she has pneumonia. Went home to sleep. Woke up at 1720 and began coughing up blood. Concerned and came to ED. Hasn't been able to  prescriptions yet.  Hx viral cardiomyopathy.        Triage Assessment (Adult)       Row Name 11/08/23 3265          Triage Assessment    Airway WDL WDL        Respiratory WDL    Respiratory WDL X;all     Rhythm/Pattern, Respiratory shortness of breath     Cough Frequency frequent        Cardiac WDL    Cardiac WDL X;all        Chest Pain Assessment    Character tightness        Peripheral/Neurovascular WDL    Peripheral Neurovascular WDL WDL        Cognitive/Neuro/Behavioral WDL    Cognitive/Neuro/Behavioral WDL WDL

## 2023-11-09 NOTE — ED PROVIDER NOTES
ED Provider Note  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 Pt was seen at  this morning and diagnosed with pneumonia, when she got home she took a nap, then around 1720 experienced hemoptysis prompting visit to ED. Pt reports coughing up chunks of blood. Pt reports midsternal burning chest pain that began 2 days ago and now radiates to her sides since today. When seen at  she was prescribed antibiotics, but has not started them yet. Of note, pt reports working more lately, she works at [employer]. No hx of blood clot. No recent fevers, but pt endorses chills. No chance of pregnancy. No N/V. No LE edema. No runny nose or sore throat.     XR (MDIP) CHEST PA & LAT 11/8/23  IMPRESSION:  There is a new parenchymal opacity near the posterior costophrenic angle on the lateral image. Diaphragm remains visible. Appearance would be consistent with pneumonia. No upper lung consolidation.     Stable prominence of the cardiac silhouette. Normal pulmonary vascularity. No visible pleural fluid or gas.     Past Medical History  Past Medical History:   Diagnosis Date    Atypical chest pain     history of atypical chest pain in 2017    Bipolar disorder (H)     diagnosed in [place], [place] in 2017    Dyslipidemia     Tobacco use     Uncomplicated asthma      Past Surgical History:   Procedure Laterality Date    CV EXTRACORPERAL MEMBRANE OXYGENATION N/A 1/29/2020    Procedure: ECMO, NATIVE HEART BIOPSY;  Surgeon: Richard Montana MD;  Location:  HEART CARDIAC CATH LAB    CV HEART BIOPSY N/A 1/29/2020    Procedure: Heart Biopsy;   "Surgeon: Richard Montana MD;  Location:  HEART CARDIAC CATH LAB    REMOVE EXTRACORPORAL MEMBRANE OXYGENATOR ADULT Left 2/3/2020    Procedure: REMOVAL Extra Corporeal Membrain Oxygenator, Inta operative transesophageal echocardiogram, Repair of femoral vessel;  Surgeon: Vishnu Marx MD;  Location:  OR     albuterol (PROAIR HFA/PROVENTIL HFA/VENTOLIN HFA) 108 (90 Base) MCG/ACT inhaler  aspirin (ASA) 81 MG chewable tablet  benzocaine (ANBESOL) 10 % gel  carvedilol (COREG) 6.25 MG tablet  cyclobenzaprine (FLEXERIL) 10 MG tablet  fluticasone (FLONASE) 50 MCG/ACT nasal spray  ibuprofen (ADVIL/MOTRIN) 600 MG tablet  lisinopril (ZESTRIL) 5 MG tablet  multivitamin w/minerals (THERA-VIT-M) tablet  omeprazole (PRILOSEC) 20 MG DR capsule  UNABLE TO FIND  vitamin C (ASCORBIC ACID) 1000 MG TABS      No Known Allergies  Family History  No family history on file.  Social History   Social History     Tobacco Use    Smoking status: Former    Smokeless tobacco: Never   Substance Use Topics    Alcohol use: Not Currently    Drug use: Not Currently      Past medical history, past surgical history, medications, allergies, family history, and social history were reviewed with the patient. No additional pertinent items.      A complete review of systems was performed with pertinent positives and negatives noted in the HPI, and all other systems negative.    Physical Exam   BP: 123/74  Pulse: 65  Temp: 98.1  F (36.7  C)  Resp: 16  Height: 162.6 cm (5' 4\")  Weight: 94.8 kg (209 lb)  SpO2: 99 %  Physical Exam  Constitutional:       General: She is not in acute distress.     Appearance: She is well-developed. She is ill-appearing. She is not toxic-appearing or diaphoretic.   HENT:      Head: Normocephalic and atraumatic.   Cardiovascular:      Rate and Rhythm: Normal rate and regular rhythm.      Heart sounds: Normal heart sounds.   Pulmonary:      Effort: Pulmonary effort is normal. No respiratory distress.      Breath sounds: " Normal breath sounds. No decreased breath sounds or wheezing.   Abdominal:      General: There is no distension.      Palpations: Abdomen is soft.      Tenderness: There is no abdominal tenderness. There is no rebound.   Musculoskeletal:         General: No tenderness.      Cervical back: Normal range of motion.   Skin:     General: Skin is warm and dry.   Neurological:      General: No focal deficit present.      Mental Status: She is alert and oriented to person, place, and time.   Psychiatric:         Behavior: Behavior normal.         Thought Content: Thought content normal.           ED Course, Procedures, & Data      Procedures            EKG Interpretation:      Interpreted by Carlee Rebolledo MD  Time reviewed:   Symptoms at time of EKG: none   Rhythm: normal sinus   Rate: normal  Axis: normal  Ectopy: none  Conduction: normal  ST Segments/ T Waves: No ST-T wave changes  Q Waves: none  Comparison to prior: Unchanged    Clinical Impression: normal EKG          [facility],[place]  Echocardiography Laboratory  [address] [place] [address]     Name: LUZ ELENA SHEPHERD  MRN: [id number]  : 1985  Study Date: 2022 10:46 AM  Age: 36 yrs  Gender: Female  Patient Location: [facility]  Reason For Study: Heart Failure  Ordering Physician: DELORES ZIMMERMAN  Performed By: Ilene Alford     BSA: 2.0 m2  Height: 65 in  Weight: 212 lb  ______________________________________________________________________________  Procedure  Complete Portable Echo Adult. Echocardiogram with two-dimensional, color and  spectral Doppler performed.  ______________________________________________________________________________  Interpretation Summary  Global and regional left ventricular function is normal with an EF of 60-65%.  Global right ventricular function is normal.  Mild tricuspid insufficiency is present.  Pulmonary artery systolic pressure is normal.  The inferior vena cava was normal in  size with preserved respiratory  variability.  No pericardial effusion is present.       No results found for any visits on 11/08/23.  Medications - No data to display  Labs Ordered and Resulted from Time of ED Arrival to Time of ED Departure - No data to display  No orders to display          Critical care was not performed.     Medical Decision Making  The patient's presentation was of high complexity (an acute health issue posing potential threat to life or bodily function).    The patient's evaluation involved:  review of external note(s) from 3+ sources (see separate area of note for details)  review of 3+ test result(s) ordered prior to this encounter (see separate area of note for details)  ordering and/or review of 3+ test(s) in this encounter (see separate area of note for details)    The patient's management necessitated moderate risk (prescription drug management including medications given in the ED).    Assessment & Plan    38-year-old female with a known history of nonischemic cardiomyopathy who follows twice a year with core clinic who presents to the ER due to chest pain bilateral rib pain and a diagnosis of pneumonia earlier today.  Patient says that since waking up this afternoon she has been having blood that she coughed up some chunks.  Patient here satting well on room air.  Patient initial EKG is normal.  Concern is for possible PE versus pneumonia.  Plan will be to check CT chest and labs.  Plan of care discussed with her and her .      Patient's blood work is stable.  Patient had a CT chest that shows left lower lobe pneumonia but otherwise no other other acute issues.  Patient was given a dose of IV ceftriaxone and azithromycin in the ER.  Patient's hemoptysis is likely from the pneumonia.  Recommend antibiotic treatment at home.  Patient return to the ER if symptoms worsen.  Patient to follow-up with PCP for further care.  I have reviewed the nursing notes. I have reviewed the  findings, diagnosis, plan and need for follow up with the patient.    New Prescriptions    No medications on file       Final diagnoses:   Pneumonia of left lower lobe due to infectious organism   IValeriano, am serving as a trained medical scribe to document services personally performed by Carlee Rebolledo MD based on the provider's statements to me on November 8, 2023.  This document has been checked and approved by the attending provider.    ICarlee MD, was physically present and have reviewed and verified the accuracy of this note documented by Valeriano Velázquez medical scribe.      Carlee Rebolledo MD  [facility] EMERGENCY DEPARTMENT  11/8/2023     Carlee Rebolledo MD  11/09/23 0240

## 2023-12-07 ENCOUNTER — MYC MEDICAL ADVICE (OUTPATIENT)
Dept: CARDIOLOGY | Facility: CLINIC | Age: 38
End: 2023-12-07
Payer: COMMERCIAL

## 2023-12-10 ENCOUNTER — HOSPITAL ENCOUNTER (EMERGENCY)
Facility: CLINIC | Age: 38
Discharge: HOME | End: 2023-12-10
Attending: FAMILY MEDICINE | Admitting: FAMILY MEDICINE
Payer: COMMERCIAL

## 2023-12-10 ENCOUNTER — APPOINTMENT (OUTPATIENT)
Dept: GENERAL RADIOLOGY | Facility: CLINIC | Age: 38
End: 2023-12-10
Attending: FAMILY MEDICINE
Payer: COMMERCIAL

## 2023-12-10 VITALS
OXYGEN SATURATION: 98 % | DIASTOLIC BLOOD PRESSURE: 72 MMHG | TEMPERATURE: 99.5 F | WEIGHT: 216.2 LBS | HEART RATE: 100 BPM | BODY MASS INDEX: 37.11 KG/M2 | RESPIRATION RATE: 18 BRPM | SYSTOLIC BLOOD PRESSURE: 124 MMHG

## 2023-12-10 DIAGNOSIS — U07.1 INFECTION DUE TO 2019 NOVEL CORONAVIRUS: ICD-10-CM

## 2023-12-10 DIAGNOSIS — U07.1 COVID-19 VIRUS INFECTION: ICD-10-CM

## 2023-12-10 LAB
ALBUMIN SERPL BCG-MCNC: 4.1 G/DL (ref 3.5–5.2)
ALP SERPL-CCNC: 73 U/L (ref 40–150)
ALT SERPL W P-5'-P-CCNC: 14 U/L (ref 0–50)
ANION GAP SERPL CALCULATED.3IONS-SCNC: 9 MMOL/L (ref 7–15)
AST SERPL W P-5'-P-CCNC: 16 U/L (ref 0–45)
ATRIAL RATE - MUSE: 94 BPM
BASOPHILS # BLD AUTO: 0 10E3/UL (ref 0–0.2)
BASOPHILS NFR BLD AUTO: 1 %
BILIRUB SERPL-MCNC: 0.2 MG/DL
BUN SERPL-MCNC: 8.4 MG/DL (ref 6–20)
CALCIUM SERPL-MCNC: 9 MG/DL (ref 8.6–10)
CHLORIDE SERPL-SCNC: 105 MMOL/L (ref 98–107)
CREAT SERPL-MCNC: 0.65 MG/DL (ref 0.51–0.95)
CRP SERPL-MCNC: 10.9 MG/L
DEPRECATED HCO3 PLAS-SCNC: 24 MMOL/L (ref 22–29)
DIASTOLIC BLOOD PRESSURE - MUSE: NORMAL MMHG
EGFRCR SERPLBLD CKD-EPI 2021: >90 ML/MIN/1.73M2
EOSINOPHIL # BLD AUTO: 0 10E3/UL (ref 0–0.7)
EOSINOPHIL NFR BLD AUTO: 1 %
ERYTHROCYTE [DISTWIDTH] IN BLOOD BY AUTOMATED COUNT: 13.5 % (ref 10–15)
FLUAV RNA SPEC QL NAA+PROBE: NEGATIVE
FLUBV RNA RESP QL NAA+PROBE: NEGATIVE
GLUCOSE SERPL-MCNC: 95 MG/DL (ref 70–99)
HCG SERPL QL: NEGATIVE
HCT VFR BLD AUTO: 33.9 % (ref 35–47)
HGB BLD-MCNC: 10.8 G/DL (ref 11.7–15.7)
IMM GRANULOCYTES # BLD: 0 10E3/UL
IMM GRANULOCYTES NFR BLD: 0 %
INTERPRETATION ECG - MUSE: NORMAL
LYMPHOCYTES # BLD AUTO: 0.3 10E3/UL (ref 0.8–5.3)
LYMPHOCYTES NFR BLD AUTO: 9 %
MCH RBC QN AUTO: 28.3 PG (ref 26.5–33)
MCHC RBC AUTO-ENTMCNC: 31.9 G/DL (ref 31.5–36.5)
MCV RBC AUTO: 89 FL (ref 78–100)
MONOCYTES # BLD AUTO: 0.5 10E3/UL (ref 0–1.3)
MONOCYTES NFR BLD AUTO: 13 %
NEUTROPHILS # BLD AUTO: 2.8 10E3/UL (ref 1.6–8.3)
NEUTROPHILS NFR BLD AUTO: 76 %
NRBC # BLD AUTO: 0 10E3/UL
NRBC BLD AUTO-RTO: 0 /100
NT-PROBNP SERPL-MCNC: <36 PG/ML (ref 0–450)
P AXIS - MUSE: 52 DEGREES
PLATELET # BLD AUTO: 143 10E3/UL (ref 150–450)
POTASSIUM SERPL-SCNC: 4.3 MMOL/L (ref 3.4–5.3)
PR INTERVAL - MUSE: 144 MS
PROT SERPL-MCNC: 6.7 G/DL (ref 6.4–8.3)
QRS DURATION - MUSE: 86 MS
QT - MUSE: 342 MS
QTC - MUSE: 427 MS
R AXIS - MUSE: 16 DEGREES
RBC # BLD AUTO: 3.82 10E6/UL (ref 3.8–5.2)
RSV RNA SPEC NAA+PROBE: NEGATIVE
SARS-COV-2 RNA RESP QL NAA+PROBE: POSITIVE
SODIUM SERPL-SCNC: 138 MMOL/L (ref 135–145)
SYSTOLIC BLOOD PRESSURE - MUSE: NORMAL MMHG
T AXIS - MUSE: 29 DEGREES
TROPONIN T SERPL HS-MCNC: <6 NG/L
VENTRICULAR RATE- MUSE: 94 BPM
WBC # BLD AUTO: 3.6 10E3/UL (ref 4–11)

## 2023-12-10 PROCEDURE — 84484 ASSAY OF TROPONIN QUANT: CPT | Performed by: FAMILY MEDICINE

## 2023-12-10 PROCEDURE — 93010 ELECTROCARDIOGRAM REPORT: CPT | Performed by: FAMILY MEDICINE

## 2023-12-10 PROCEDURE — 80053 COMPREHEN METABOLIC PANEL: CPT | Performed by: FAMILY MEDICINE

## 2023-12-10 PROCEDURE — 250N000013 HC RX MED GY IP 250 OP 250 PS 637: Performed by: FAMILY MEDICINE

## 2023-12-10 PROCEDURE — 93005 ELECTROCARDIOGRAM TRACING: CPT | Performed by: FAMILY MEDICINE

## 2023-12-10 PROCEDURE — 86140 C-REACTIVE PROTEIN: CPT | Performed by: FAMILY MEDICINE

## 2023-12-10 PROCEDURE — 71046 X-RAY EXAM CHEST 2 VIEWS: CPT

## 2023-12-10 PROCEDURE — 85025 COMPLETE CBC W/AUTO DIFF WBC: CPT | Performed by: FAMILY MEDICINE

## 2023-12-10 PROCEDURE — 84703 CHORIONIC GONADOTROPIN ASSAY: CPT | Performed by: FAMILY MEDICINE

## 2023-12-10 PROCEDURE — 99285 EMERGENCY DEPT VISIT HI MDM: CPT | Mod: 25 | Performed by: FAMILY MEDICINE

## 2023-12-10 PROCEDURE — 87637 SARSCOV2&INF A&B&RSV AMP PRB: CPT | Performed by: FAMILY MEDICINE

## 2023-12-10 PROCEDURE — 83880 ASSAY OF NATRIURETIC PEPTIDE: CPT | Performed by: FAMILY MEDICINE

## 2023-12-10 PROCEDURE — 36415 COLL VENOUS BLD VENIPUNCTURE: CPT | Performed by: FAMILY MEDICINE

## 2023-12-10 PROCEDURE — 99284 EMERGENCY DEPT VISIT MOD MDM: CPT | Mod: 25 | Performed by: FAMILY MEDICINE

## 2023-12-10 PROCEDURE — 71046 X-RAY EXAM CHEST 2 VIEWS: CPT | Mod: 26 | Performed by: RADIOLOGY

## 2023-12-10 RX ORDER — LIDOCAINE 40 MG/G
CREAM TOPICAL
Status: DISCONTINUED | OUTPATIENT
Start: 2023-12-10 | End: 2023-12-10 | Stop reason: HOSPADM

## 2023-12-10 RX ORDER — ACETAMINOPHEN 500 MG
1000 TABLET ORAL ONCE
Status: COMPLETED | OUTPATIENT
Start: 2023-12-10 | End: 2023-12-10

## 2023-12-10 RX ORDER — NIRMATRELVIR AND RITONAVIR 300-100 MG
3 KIT ORAL 2 TIMES DAILY
Qty: 30 EACH | Refills: 0 | Status: SHIPPED | OUTPATIENT
Start: 2023-12-10 | End: 2023-12-15

## 2023-12-10 RX ADMIN — ACETAMINOPHEN 1000 MG: 500 TABLET ORAL at 10:01

## 2023-12-10 ASSESSMENT — ACTIVITIES OF DAILY LIVING (ADL): ADLS_ACUITY_SCORE: 35

## 2023-12-10 NOTE — ED TRIAGE NOTES
Impression: Nonexudative macular degeneration, advanced atrophic with subfoveal involvement, left eye: H35.3124. Plan: Limited visual potential. Observe. Check AF imaging at next visit > benefit to [address]? Pt arrives ambulatory to triage w/ c/o generalized body aches, cough, mildly SOB, weakness. States she took a COVID test this morning, which was positive. Recently had PNA. States previous dx myocarditis and an ICU stay r/t influenza.

## 2023-12-10 NOTE — DISCHARGE INSTRUCTIONS
Home.  Your covid test was +  Xray and other labs look good.  Sent Prescription to [facility] and [facility] for Paxlovid to take for 5 days to shorten your symptoms of covid and keep you out of the hospital.  See MD for follow up  Return if any concerns at all.  Monitor oxygen levels at home.    Results for orders placed or performed during the hospital encounter of 12/10/23   XR Chest 2 Views     Status: None    Narrative    Exam: XR CHEST 2 VIEWS, 12/10/2023 9:48 AM    Indication: covid sx    Comparison: 11/8/2023 CT, 1/5/2023 chest radiographs    Findings:   The cardiomediastinal silhouette and pulmonary vasculature are within  normal limits. No pleural effusion or pneumothorax. No focal airspace  opacity.      Impression    Impression: No acute airspace disease.    IESHA SALINAS          SYSTEM ID:  [id number]   CRP inflammation     Status: Abnormal   Result Value Ref Range    CRP Inflammation 10.90 (H) <5.00 mg/L   Comprehensive metabolic panel     Status: Normal   Result Value Ref Range    Sodium 138 135 - 145 mmol/L    Potassium 4.3 3.4 - 5.3 mmol/L    Carbon Dioxide (CO2) 24 22 - 29 mmol/L    Anion Gap 9 7 - 15 mmol/L    Urea Nitrogen 8.4 6.0 - 20.0 mg/dL    Creatinine 0.65 0.51 - 0.95 mg/dL    GFR Estimate >90 >60 mL/min/1.73m2    Calcium 9.0 8.6 - 10.0 mg/dL    Chloride 105 98 - 107 mmol/L    Glucose 95 70 - 99 mg/dL    Alkaline Phosphatase 73 40 - 150 U/L    AST 16 0 - 45 U/L    ALT 14 0 - 50 U/L    Protein Total 6.7 6.4 - 8.3 g/dL    Albumin 4.1 3.5 - 5.2 g/dL    Bilirubin Total 0.2 <=1.2 mg/dL   Troponin T, High Sensitivity     Status: Normal   Result Value Ref Range    Troponin T, High Sensitivity <6 <=14 ng/L   Nt probnp inpatient (BNP)     Status: Normal   Result Value Ref Range    N terminal Pro BNP Inpatient <36 0 - 450 pg/mL   HCG qualitative Blood     Status: Normal   Result Value Ref Range    hCG Serum Qualitative Negative Negative   Symptomatic Influenza A/B, RSV, & SARS-CoV2 PCR (COVID-19)  Nasopharyngeal     Status: Abnormal    Specimen: Nasopharyngeal; Swab   Result Value Ref Range    Influenza A PCR Negative Negative    Influenza B PCR Negative Negative    RSV PCR Negative Negative    SARS CoV2 PCR Positive (A) Negative    Narrative    Testing was performed using the Xpert Xpress CoV2/Flu/RSV Assay on the [employer] GeneXpert Instrument. This test should be ordered for the detection of SARS-CoV-2, influenza, and RSV viruses in individuals who meet clinical and/or epidemiological criteria. Test performance is unknown in asymptomatic patients. This test is for in vitro diagnostic use under the FDA EUA for laboratories certified under CLIA to perform high or moderate complexity testing. This test has not been FDA cleared or approved. A negative result does not rule out the presence of PCR inhibitors in the specimen or target RNA in concentration below the limit of detection for the assay. If only one viral target is positive but coinfection with multiple targets is suspected, the sample should be re-tested with another FDA cleared, approved, or authorized test, if coinfection would change clinical management. This test was validated by the [facility]. These laboratories are certified under the Clinical Laboratory Improvement Amendments of 1988 (CLIA-88) as qualified to perform high complexity laboratory testing.   CBC with platelets and differential     Status: Abnormal   Result Value Ref Range    WBC Count 3.6 (L) 4.0 - 11.0 10e3/uL    RBC Count 3.82 3.80 - 5.20 10e6/uL    Hemoglobin 10.8 (L) 11.7 - 15.7 g/dL    Hematocrit 33.9 (L) 35.0 - 47.0 %    MCV 89 78 - 100 fL    MCH 28.3 26.5 - 33.0 pg    MCHC 31.9 31.5 - 36.5 g/dL    RDW 13.5 10.0 - 15.0 %    Platelet Count 143 (L) 150 - 450 10e3/uL    % Neutrophils 76 %    % Lymphocytes 9 %    % Monocytes 13 %    % Eosinophils 1 %    % Basophils 1 %    % Immature Granulocytes 0 %    NRBCs per 100 WBC 0 <1 /100    Absolute Neutrophils 2.8 1.6  - 8.3 10e3/uL    Absolute Lymphocytes 0.3 (L) 0.8 - 5.3 10e3/uL    Absolute Monocytes 0.5 0.0 - 1.3 10e3/uL    Absolute Eosinophils 0.0 0.0 - 0.7 10e3/uL    Absolute Basophils 0.0 0.0 - 0.2 10e3/uL    Absolute Immature Granulocytes 0.0 <=0.4 10e3/uL    Absolute NRBCs 0.0 10e3/uL   EKG 12-lead, tracing only     Status: None (Preliminary result)   Result Value Ref Range    Systolic Blood Pressure  mmHg    Diastolic Blood Pressure  mmHg    Ventricular Rate 94 BPM    Atrial Rate 94 BPM    RI Interval 144 ms    QRS Duration 86 ms     ms    QTc 427 ms    P Axis 52 degrees    R AXIS 16 degrees    T Axis 29 degrees    Interpretation ECG       Sinus rhythm  Low voltage QRS  Septal infarct , age undetermined  Abnormal ECG     CBC with platelets differential     Status: Abnormal    Narrative    The following orders were created for panel order CBC with platelets differential.  Procedure                               Abnormality         Status                     ---------                               -----------         ------                     CBC with platelets and d...[[id number]]  Abnormal            Final result                 Please view results for these tests on the individual orders.

## 2023-12-10 NOTE — ED PROVIDER NOTES
[place] EMERGENCY DEPARTMENT ([facility])    12/10/23       ED PROVIDER NOTE      History     Chief Complaint   Patient presents with    Generalized Body Aches    Cough     COVID +     HPI  Azra Tom is a 38 year old female with a history of recent LLL pneumonia, past medical history of asthma, atypical chest pain, combined systolic and diastolic CHF, cardiogenic shock, and tobacco use presents to the ED via private vehicle for evaluation of Covid + home test with cough, shortness of breath, body aches and weakness.  She states she was treated a few weeks ago for the influenza pneumonia improved was feeling better this morning woke up describes diffuse achiness throughout body slight cough noted also some sputum no mopped assist no true chest pain does not feel markedly wheezy describes migraine headache also some abdominal discomfort also no diarrhea reported.  No additional symptoms some chills noted.  Now presents here for evaluation feels like she is caught this early and wants to make sure she does not have recurrent pneumonia etc. she took a COVID test today that was positive also.  Note chest pain no hemoptysis.    EPIC records reviewed.  Echocardiogram 1/18/22:  Interpretation Summary  Global and regional left ventricular function is normal with an EF of 60-65%.  Global right ventricular function is normal.  Mild tricuspid insufficiency is present.  Pulmonary artery systolic pressure is normal.  The inferior vena cava was normal in size with preserved respiratory  variability.  No pericardial effusion is present.    Patient 2020 admitted to hospital for acute viral myocarditis with acute heart failure follow-up echo was reviewed recently with normal function now.    Past Medical History  Past Medical History:   Diagnosis Date    Atypical chest pain     history of atypical chest pain in 2017    Bipolar disorder (H)     diagnosed in [place], [place] in 2017    Dyslipidemia     Tobacco use      Uncomplicated asthma      Past Surgical History:   Procedure Laterality Date    CV EXTRACORPERAL MEMBRANE OXYGENATION N/A 1/29/2020    Procedure: ECMO, NATIVE HEART BIOPSY;  Surgeon: Richard Montana MD;  Location:  HEART CARDIAC CATH LAB    CV HEART BIOPSY N/A 1/29/2020    Procedure: Heart Biopsy;  Surgeon: Richard Montana MD;  Location:  HEART CARDIAC CATH LAB    REMOVE EXTRACORPORAL MEMBRANE OXYGENATOR ADULT Left 2/3/2020    Procedure: REMOVAL Extra Corporeal Membrain Oxygenator, Inta operative transesophageal echocardiogram, Repair of femoral vessel;  Surgeon: Vishnu Marx MD;  Location:  OR     nirmatrelvir and ritonavir (PAXLOVID, 300/100,) 300 mg/100 mg therapy pack  albuterol (PROAIR HFA/PROVENTIL HFA/VENTOLIN HFA) 108 (90 Base) MCG/ACT inhaler  aspirin (ASA) 81 MG chewable tablet  benzocaine (ANBESOL) 10 % gel  carvedilol (COREG) 6.25 MG tablet  cyclobenzaprine (FLEXERIL) 10 MG tablet  fluticasone (FLONASE) 50 MCG/ACT nasal spray  ibuprofen (ADVIL/MOTRIN) 600 MG tablet  lisinopril (ZESTRIL) 5 MG tablet  multivitamin w/minerals (THERA-VIT-M) tablet  omeprazole (PRILOSEC) 20 MG DR capsule  UNABLE TO FIND  vitamin C (ASCORBIC ACID) 1000 MG TABS      No Known Allergies  Family History  No family history on file.  Social History   Social History     Tobacco Use    Smoking status: Former    Smokeless tobacco: Never   Substance Use Topics    Alcohol use: Not Currently    Drug use: Not Currently         A medically appropriate review of systems was performed with pertinent positives and negatives noted in the HPI, and all other systems negative.    Physical Exam   BP: 124/72  Pulse: 100  Temp: 99.5  F (37.5  C)  Resp: 18  Weight: 98.1 kg (216 lb 3.2 oz)  SpO2: 98 %      Physical Exam  Vitals and nursing note reviewed.   Constitutional:       General: She is in acute distress.      Appearance: Normal appearance. She is not toxic-appearing or diaphoretic.      Comments: Patient is alert and oriented  vitally stable temp is 99.5.  Sats 98% room air.  No marked respiratory distress speaking full senses lying flat not orthopnea.   HENT:      Head: Atraumatic.      Nose: Congestion present.      Mouth/Throat:      Mouth: Mucous membranes are moist.      Pharynx: Oropharynx is clear.   Eyes:      General: No scleral icterus.     Extraocular Movements: Extraocular movements intact.      Conjunctiva/sclera: Conjunctivae normal.      Pupils: Pupils are equal, round, and reactive to light.   Cardiovascular:      Rate and Rhythm: Normal rate and regular rhythm.      Heart sounds: Normal heart sounds.   Pulmonary:      Effort: No respiratory distress.      Breath sounds: Normal breath sounds. No wheezing or rales.   Abdominal:      General: Abdomen is flat. There is no distension.      Palpations: Abdomen is soft.      Tenderness: There is abdominal tenderness. There is no guarding or rebound.   Musculoskeletal:         General: No swelling or tenderness.      Cervical back: Normal range of motion and neck supple. No rigidity.   Skin:     General: Skin is warm.      Capillary Refill: Capillary refill takes less than 2 seconds.      Coloration: Skin is not jaundiced.      Findings: No bruising or rash.   Neurological:      General: No focal deficit present.      Mental Status: She is alert and oriented to person, place, and time. Mental status is at baseline.   Psychiatric:      Comments: Appropriate here           ED Course, Procedures, & Data        Refer to HPI records reviewed in epic.  Patient here with lobar pneumonia diagnosed on 8 November approximately a month ago by Dr. Rebolledo.  Patient history of recurrent sinusitis in August of this year also.  Also 2020 patient viral myocarditis possibly influenza with acute heart failure that apparently has resolved now by last echo being normal.    In the ER patient valuated vitally stable get a chest x-ray will check laboratory testing on her cell.  The check COVID influenza  RSV Tylenol for symptoms discussed with the patient potentially is a candidate for Paxlovid if she does have COVID.    Patient chest x-ray done.  Lateral ports reviewed by myself reveals no acute infiltrates pneumothorax effusion etc.  Patient aware of this.    EKG sinus rhythm noted below without ischemic changes.    White count 3.6 hemoglobin 10.8.  CRP is 10.9.  Pregnancy test negative.  Sodium 138 potassium 4.3.  Bicarb 24 gap is 9 creatinine 0.65.  Glucose 95 LFTs within normal limits.  COVID-positive testing influenza RSV negative.    BNP negative troponin negative.    Patient given a gram of Tylenol send in the ER.  Patient otherwise stable discussed with patient after reviewing discussed with pharmacy also Paxlovid the patient's history etc. seems appropriate patient symptoms started today.  Renal functions normal other LFTs normal patient interactions were negative with Paxlovid.  At this point I did E prescribe it to the [facility] pharmacy on [address] in [facility].  Patient agrees with this sent out with an oximeter comfortable being discharged return if any concerns follow-up with MD.      Procedures            EKG Interpretation:      Interpreted by Natanael Mccormick MD  Time reviewed: 929  Symptoms at time of EKG: covid   Rhythm: normal sinus   Rate: normal  Axis: normal  Ectopy: none  Conduction: normal  ST Segments/ T Waves: No ST-T wave changes  Q Waves: none  Comparison to prior: No old EKG available    Clinical Impression: nsr                 Results for orders placed or performed during the hospital encounter of 12/10/23   XR Chest 2 Views     Status: None    Narrative    Exam: XR CHEST 2 VIEWS, 12/10/2023 9:48 AM    Indication: covid sx    Comparison: 11/8/2023 CT, 1/5/2023 chest radiographs    Findings:   The cardiomediastinal silhouette and pulmonary vasculature are within  normal limits. No pleural effusion or pneumothorax. No focal airspace  opacity.      Impression    Impression: No acute  airspace disease.    IESHA SALINAS,          SYSTEM ID:  [id number]   CRP inflammation     Status: Abnormal   Result Value Ref Range    CRP Inflammation 10.90 (H) <5.00 mg/L   Comprehensive metabolic panel     Status: Normal   Result Value Ref Range    Sodium 138 135 - 145 mmol/L    Potassium 4.3 3.4 - 5.3 mmol/L    Carbon Dioxide (CO2) 24 22 - 29 mmol/L    Anion Gap 9 7 - 15 mmol/L    Urea Nitrogen 8.4 6.0 - 20.0 mg/dL    Creatinine 0.65 0.51 - 0.95 mg/dL    GFR Estimate >90 >60 mL/min/1.73m2    Calcium 9.0 8.6 - 10.0 mg/dL    Chloride 105 98 - 107 mmol/L    Glucose 95 70 - 99 mg/dL    Alkaline Phosphatase 73 40 - 150 U/L    AST 16 0 - 45 U/L    ALT 14 0 - 50 U/L    Protein Total 6.7 6.4 - 8.3 g/dL    Albumin 4.1 3.5 - 5.2 g/dL    Bilirubin Total 0.2 <=1.2 mg/dL   Troponin T, High Sensitivity     Status: Normal   Result Value Ref Range    Troponin T, High Sensitivity <6 <=14 ng/L   Nt probnp inpatient (BNP)     Status: Normal   Result Value Ref Range    N terminal Pro BNP Inpatient <36 0 - 450 pg/mL   HCG qualitative Blood     Status: Normal   Result Value Ref Range    hCG Serum Qualitative Negative Negative   Symptomatic Influenza A/B, RSV, & SARS-CoV2 PCR (COVID-19) Nasopharyngeal     Status: Abnormal    Specimen: Nasopharyngeal; Swab   Result Value Ref Range    Influenza A PCR Negative Negative    Influenza B PCR Negative Negative    RSV PCR Negative Negative    SARS CoV2 PCR Positive (A) Negative    Narrative    Testing was performed using the Xpert Xpress CoV2/Flu/RSV Assay on the [employer] GeneXpert Instrument. This test should be ordered for the detection of SARS-CoV-2, influenza, and RSV viruses in individuals who meet clinical and/or epidemiological criteria. Test performance is unknown in asymptomatic patients. This test is for in vitro diagnostic use under the FDA EUA for laboratories certified under CLIA to perform high or moderate complexity testing. This test has not been FDA cleared or approved. A  negative result does not rule out the presence of PCR inhibitors in the specimen or target RNA in concentration below the limit of detection for the assay. If only one viral target is positive but coinfection with multiple targets is suspected, the sample should be re-tested with another FDA cleared, approved, or authorized test, if coinfection would change clinical management. This test was validated by the [facility]. These laboratories are certified under the Clinical Laboratory Improvement Amendments of 1988 (CLIA-88) as qualified to perform high complexity laboratory testing.   CBC with platelets and differential     Status: Abnormal   Result Value Ref Range    WBC Count 3.6 (L) 4.0 - 11.0 10e3/uL    RBC Count 3.82 3.80 - 5.20 10e6/uL    Hemoglobin 10.8 (L) 11.7 - 15.7 g/dL    Hematocrit 33.9 (L) 35.0 - 47.0 %    MCV 89 78 - 100 fL    MCH 28.3 26.5 - 33.0 pg    MCHC 31.9 31.5 - 36.5 g/dL    RDW 13.5 10.0 - 15.0 %    Platelet Count 143 (L) 150 - 450 10e3/uL    % Neutrophils 76 %    % Lymphocytes 9 %    % Monocytes 13 %    % Eosinophils 1 %    % Basophils 1 %    % Immature Granulocytes 0 %    NRBCs per 100 WBC 0 <1 /100    Absolute Neutrophils 2.8 1.6 - 8.3 10e3/uL    Absolute Lymphocytes 0.3 (L) 0.8 - 5.3 10e3/uL    Absolute Monocytes 0.5 0.0 - 1.3 10e3/uL    Absolute Eosinophils 0.0 0.0 - 0.7 10e3/uL    Absolute Basophils 0.0 0.0 - 0.2 10e3/uL    Absolute Immature Granulocytes 0.0 <=0.4 10e3/uL    Absolute NRBCs 0.0 10e3/uL   EKG 12-lead, tracing only     Status: None   Result Value Ref Range    Systolic Blood Pressure  mmHg    Diastolic Blood Pressure  mmHg    Ventricular Rate 94 BPM    Atrial Rate 94 BPM    MI Interval 144 ms    QRS Duration 86 ms     ms    QTc 427 ms    P Axis 52 degrees    R AXIS 16 degrees    T Axis 29 degrees    Interpretation ECG       Sinus rhythm  Low voltage QRS  Septal infarct , age undetermined  Abnormal ECG  Unconfirmed report - interpretation of this ECG  is computer generated - see medical record for final interpretation  Confirmed by - EMERGENCY ROOM, PHYSICIAN ([id number]),  SAGRARIO WALSH ([id number]) on 12/10/2023 6:47:15 PM     CBC with platelets differential     Status: Abnormal    Narrative    The following orders were created for panel order CBC with platelets differential.  Procedure                               Abnormality         Status                     ---------                               -----------         ------                     CBC with platelets and d...[[id number]]  Abnormal            Final result                 Please view results for these tests on the individual orders.     Medications   acetaminophen (TYLENOL) tablet 1,000 mg (1,000 mg Oral $Given 12/10/23 1001)     Labs Ordered and Resulted from Time of ED Arrival to Time of ED Departure   CRP INFLAMMATION - Abnormal       Result Value    CRP Inflammation 10.90 (*)    INFLUENZA A/B, RSV, & SARS-COV2 PCR - Abnormal    Influenza A PCR Negative      Influenza B PCR Negative      RSV PCR Negative      SARS CoV2 PCR Positive (*)    CBC WITH PLATELETS AND DIFFERENTIAL - Abnormal    WBC Count 3.6 (*)     RBC Count 3.82      Hemoglobin 10.8 (*)     Hematocrit 33.9 (*)     MCV 89      MCH 28.3      MCHC 31.9      RDW 13.5      Platelet Count 143 (*)     % Neutrophils 76      % Lymphocytes 9      % Monocytes 13      % Eosinophils 1      % Basophils 1      % Immature Granulocytes 0      NRBCs per 100 WBC 0      Absolute Neutrophils 2.8      Absolute Lymphocytes 0.3 (*)     Absolute Monocytes 0.5      Absolute Eosinophils 0.0      Absolute Basophils 0.0      Absolute Immature Granulocytes 0.0      Absolute NRBCs 0.0     COMPREHENSIVE METABOLIC PANEL - Normal    Sodium 138      Potassium 4.3      Carbon Dioxide (CO2) 24      Anion Gap 9      Urea Nitrogen 8.4      Creatinine 0.65      GFR Estimate >90      Calcium 9.0      Chloride 105      Glucose 95      Alkaline Phosphatase 73      AST 16       ALT 14      Protein Total 6.7      Albumin 4.1      Bilirubin Total 0.2     TROPONIN T, HIGH SENSITIVITY - Normal    Troponin T, High Sensitivity <6     NT PROBNP INPATIENT - Normal    N terminal Pro BNP Inpatient <36     HCG QUALITATIVE PREGNANCY - Normal    hCG Serum Qualitative Negative       XR Chest 2 Views   Final Result   Impression: No acute airspace disease.      IESHA SALINAS DO            SYSTEM ID:  [id number]             Critical care was not performed.     Medical Decision Making  The patient's presentation was of moderate complexity (an acute illness with systemic symptoms).    The patient's evaluation involved:  review of external note(s) from 3+ sources (see separate area of note for details)  review of 3+ test result(s) ordered prior to this encounter (see separate area of note for details)  ordering and/or review of 3+ test(s) in this encounter (see separate area of note for details)  discussion of management or test interpretation with another health professional (see separate area of note for details)    The patient's management necessitated moderate risk (prescription drug management including medications given in the ED).    Assessment & Plan   34-year-old female history of viral myocarditis with transient heart failure now resolved and February 2020.  Patient recent pneumonia was treated last month.  Presented the ER now with COVID-like symptoms starting today positive COVID noted other labs stable cardiac workup negative otherwise chest x-ray negative.  In the ER sats are stable otherwise.  Patient had given Tylenol is a candidate for Paxlovid reviewed with pharmacy reviewed medications labs etc. prescription that e-prescribed to the [facility] on [address] in [facility] with an oximeter to be sent home.  Patient agrees with plan stable we discharged symptomatic treatment and begin Paxlovid and return for any worsening symptoms.       I have reviewed the nursing notes. I have reviewed the  findings, diagnosis, plan and need for follow up with the patient.    Discharge Medication List as of 12/10/2023 12:31 PM        START taking these medications    Details   nirmatrelvir and ritonavir (PAXLOVID, 300/100,) 300 mg/100 mg therapy pack Take 3 tablets by mouth 2 times daily for 5 days, Disp-30 each, R-0, E-PrescribeDate of symptom onset: 12/10/23; Risk criteria met: Yes; Weight >40 kg Yes; Renal fxn: normal;  Drug-Drug interactions reviewed & addressed: Yes             Final diagnoses:   COVID-19 virus infection   Infection due to 2019 novel coronavirus       Natanael Mccormick  [facility] EMERGENCY DEPARTMENT  12/10/2023    This note was created at least in part by the use of dragon voice dictation system. Inadvertent typographical errors may still exist.  Natanael Mccormick MD.  Patient evaluated in the emergency department during the COVID-19 pandemic period. Careful attention to patients safety was addressed throughout the evaluation. Evaluation and treatment management was initiated with disposition made efficiently and appropriate as possible to minimize any risk of potential exposure to patient during this evaluation.       Natanael Mccormick MD  12/10/23 2280

## 2024-02-15 ENCOUNTER — APPOINTMENT (OUTPATIENT)
Dept: GENERAL RADIOLOGY | Facility: CLINIC | Age: 39
End: 2024-02-15
Attending: EMERGENCY MEDICINE
Payer: COMMERCIAL

## 2024-02-15 ENCOUNTER — HOSPITAL ENCOUNTER (EMERGENCY)
Facility: CLINIC | Age: 39
Discharge: HOME | End: 2024-02-15
Attending: EMERGENCY MEDICINE | Admitting: EMERGENCY MEDICINE
Payer: COMMERCIAL

## 2024-02-15 VITALS
HEIGHT: 65 IN | TEMPERATURE: 97.4 F | RESPIRATION RATE: 18 BRPM | SYSTOLIC BLOOD PRESSURE: 114 MMHG | DIASTOLIC BLOOD PRESSURE: 64 MMHG | HEART RATE: 73 BPM | OXYGEN SATURATION: 95 % | BODY MASS INDEX: 34.82 KG/M2 | WEIGHT: 209 LBS

## 2024-02-15 DIAGNOSIS — S61.011A LACERATION OF RIGHT THUMB WITHOUT DAMAGE TO NAIL, FOREIGN BODY PRESENCE UNSPECIFIED, INITIAL ENCOUNTER: ICD-10-CM

## 2024-02-15 PROCEDURE — 12002 RPR S/N/AX/GEN/TRNK2.6-7.5CM: CPT | Performed by: EMERGENCY MEDICINE

## 2024-02-15 PROCEDURE — 73140 X-RAY EXAM OF FINGER(S): CPT | Mod: 26 | Performed by: RADIOLOGY

## 2024-02-15 PROCEDURE — 99283 EMERGENCY DEPT VISIT LOW MDM: CPT | Performed by: EMERGENCY MEDICINE

## 2024-02-15 PROCEDURE — 250N000013 HC RX MED GY IP 250 OP 250 PS 637: Performed by: EMERGENCY MEDICINE

## 2024-02-15 PROCEDURE — 99283 EMERGENCY DEPT VISIT LOW MDM: CPT | Mod: 25 | Performed by: EMERGENCY MEDICINE

## 2024-02-15 PROCEDURE — 73140 X-RAY EXAM OF FINGER(S): CPT | Mod: RT

## 2024-02-15 RX ORDER — OXYCODONE HYDROCHLORIDE 5 MG/1
5 TABLET ORAL ONCE
Status: COMPLETED | OUTPATIENT
Start: 2024-02-15 | End: 2024-02-15

## 2024-02-15 RX ORDER — LIDOCAINE HYDROCHLORIDE 10 MG/ML
10 INJECTION, SOLUTION EPIDURAL; INFILTRATION; INTRACAUDAL; PERINEURAL ONCE
Status: DISCONTINUED | OUTPATIENT
Start: 2024-02-15 | End: 2024-02-16 | Stop reason: HOSPADM

## 2024-02-15 RX ORDER — ACETAMINOPHEN AND CODEINE PHOSPHATE 300; 30 MG/1; MG/1
1 TABLET ORAL EVERY 6 HOURS PRN
Qty: 8 TABLET | Refills: 0 | Status: SHIPPED | OUTPATIENT
Start: 2024-02-15 | End: 2024-04-08

## 2024-02-15 RX ORDER — ACETAMINOPHEN 500 MG
1000 TABLET ORAL ONCE
Status: COMPLETED | OUTPATIENT
Start: 2024-02-15 | End: 2024-02-15

## 2024-02-15 RX ADMIN — OXYCODONE HYDROCHLORIDE 5 MG: 5 TABLET ORAL at 20:48

## 2024-02-15 RX ADMIN — ACETAMINOPHEN 1000 MG: 500 TABLET ORAL at 22:36

## 2024-02-15 RX ADMIN — AMOXICILLIN AND CLAVULANATE POTASSIUM 1 TABLET: 875; 125 TABLET, FILM COATED ORAL at 23:42

## 2024-02-15 ASSESSMENT — ACTIVITIES OF DAILY LIVING (ADL)
ADLS_ACUITY_SCORE: 37
ADLS_ACUITY_SCORE: 35

## 2024-02-15 NOTE — Clinical Note
Azra Tom was seen and treated in our emergency department on 2/15/2024.  She may return to work on 02/19/2024.       If you have any questions or concerns, please don't hesitate to call.      Alexis Luong MD

## 2024-02-16 NOTE — ED TRIAGE NOTES
Pt arrived via triage. Pt was working on fixing wiper and right thumb got caught and now has flap on palm side of thumb.      Triage Assessment (Adult)       Row Name 02/15/24 1824          Triage Assessment    Airway WDL WDL        Respiratory WDL    Respiratory WDL WDL        Skin Circulation/Temperature WDL    Skin Circulation/Temperature WDL WDL        Cardiac WDL    Cardiac WDL WDL        Peripheral/Neurovascular WDL    Peripheral Neurovascular WDL WDL        Cognitive/Neuro/Behavioral WDL    Cognitive/Neuro/Behavioral WDL WDL

## 2024-02-16 NOTE — ED PROVIDER NOTES
ED Provider Note  [facility]      History     Chief Complaint   Patient presents with    Laceration     HPI  Azra Tom is a 38 year old female who presents for a finger injury. The patient states that she was working on a window wiper blade for her car and her thumb got caught in the motor part. This occured a couple hours ago. She states she sustained a laceration to the front and back of the thumb. She states any movement of the thumb causes the pain to be worse. She did wash the thumb off afterward. She states she is UTD on tetanus. She scrubbed the wound with soap following the injury then presented here. No numbness.    Past Medical History  Past Medical History:   Diagnosis Date    Atypical chest pain     history of atypical chest pain in 2017    Bipolar disorder (H)     diagnosed in [place], [place] in 2017    Dyslipidemia     Tobacco use     Uncomplicated asthma      Past Surgical History:   Procedure Laterality Date    CV EXTRACORPERAL MEMBRANE OXYGENATION N/A 1/29/2020    Procedure: ECMO, NATIVE HEART BIOPSY;  Surgeon: Richard Montana MD;  Location:  HEART CARDIAC CATH LAB    CV HEART BIOPSY N/A 1/29/2020    Procedure: Heart Biopsy;  Surgeon: Richard Montana MD;  Location:  HEART CARDIAC CATH LAB    REMOVE EXTRACORPORAL MEMBRANE OXYGENATOR ADULT Left 2/3/2020    Procedure: REMOVAL Extra Corporeal Membrain Oxygenator, Inta operative transesophageal echocardiogram, Repair of femoral vessel;  Surgeon: Vishnu Marx MD;  Location:  OR     acetaminophen-codeine (TYLENOL #3) 300-30 MG per tablet  amoxicillin-clavulanate (AUGMENTIN) 875-125 MG tablet  albuterol (PROAIR HFA/PROVENTIL HFA/VENTOLIN HFA) 108 (90 Base) MCG/ACT inhaler  aspirin (ASA) 81 MG chewable tablet  benzocaine (ANBESOL) 10 % gel  carvedilol (COREG) 6.25 MG tablet  cyclobenzaprine (FLEXERIL) 10 MG tablet  fluticasone (FLONASE) 50 MCG/ACT nasal spray  ibuprofen (ADVIL/MOTRIN) 600 MG tablet  lisinopril  "(ZESTRIL) 5 MG tablet  multivitamin w/minerals (THERA-VIT-M) tablet  omeprazole (PRILOSEC) 20 MG DR capsule  UNABLE TO FIND  vitamin C (ASCORBIC ACID) 1000 MG TABS      No Known Allergies  Family History  No family history on file.  Social History   Social History     Tobacco Use    Smoking status: Former    Smokeless tobacco: Never   Substance Use Topics    Alcohol use: Not Currently    Drug use: Not Currently         A medically appropriate review of systems was performed with pertinent positives and negatives noted in the HPI, and all other systems negative.    Physical Exam   BP: 138/75  Pulse: 66  Temp: 98.3  F (36.8  C)  Resp: 18  Height: 163.8 cm (5' 4.5\")  Weight: 94.8 kg (209 lb)  SpO2: 100 %  Physical Exam  Constitutional:       General: She is not in acute distress.     Appearance: She is not toxic-appearing.   HENT:      Head: Normocephalic and atraumatic.      Nose: Nose normal. No congestion.   Eyes:      Conjunctiva/sclera: Conjunctivae normal.      Pupils: Pupils are equal, round, and reactive to light.   Cardiovascular:      Rate and Rhythm: Normal rate.   Pulmonary:      Effort: Pulmonary effort is normal. No respiratory distress.      Breath sounds: No stridor. No wheezing.   Musculoskeletal:         General: Normal range of motion.      Cervical back: Normal range of motion.      Comments: Limited flexion of the right thumb. Sensation intact   Skin:     General: Skin is warm and dry.      Comments: There is a U shaped avulsion laceration to the palmar aspect of the right thumb. The leading edge is more pale than the surrounding skin.  On the posterior aspect of the thumb there is a punctate wound. Cap refill <2sec   Neurological:      General: No focal deficit present.      Mental Status: She is alert and oriented to person, place, and time.           ED Course, Procedures, & Data      [facility]    -Laceration Repair    Date/Time: 2/15/2024 11:39 " PM    Performed by: Alexis Luong MD  Authorized by: Alexis Luong MD    Risks, benefits and alternatives discussed.      ANESTHESIA (see MAR for exact dosages):     Anesthesia method:  Local infiltration    Local anesthetic:  Lidocaine 1% w/o epi  LACERATION DETAILS     Location:  Hand    Hand location: right thumb.    Length (cm):  3    REPAIR TYPE:     Repair type:  Intermediate    EXPLORATION:     Hemostasis achieved with:  Direct pressure    Wound exploration: wound explored through full range of motion      Contaminated: yes      TREATMENT:     Area cleansed with:  Saline    Amount of cleaning:  Extensive    Irrigation solution:  Sterile saline    Irrigation method:  Syringe    Visualized foreign bodies/material removed: no      SKIN REPAIR     Repair method:  Sutures    Suture size:  6-0    Suture material:  Nylon    Suture technique:  Simple interrupted    Number of sutures:  2    APPROXIMATION     Approximation:  Loose    POST-PROCEDURE DETAILS     Dressing:  Non-adherent dressing and splint for protection      PROCEDURE    Patient Tolerance:  Patient tolerated the procedure well with no immediate complications                        Results for orders placed or performed during the hospital encounter of 02/15/24   XR Finger Right G/E 2 Views     Status: None    Narrative    EXAM: XR FINGER RIGHT G/E 2 VIEWS  LOCATION: [facility]  DATE: 2/15/2024    INDICATION: thumb caught in windshield wiper blade causing lacerations and movement causes pain to increase  COMPARISON: None.      Impression    IMPRESSION: Normal joint spaces and alignment. No fracture. No foreign body.       Medications   lidocaine (PF) (XYLOCAINE) 1 % injection 10 mL (has no administration in time range)   oxyCODONE (ROXICODONE) tablet 5 mg (5 mg Oral $Given 2/15/24 2048)   acetaminophen (TYLENOL) tablet 1,000 mg (1,000 mg Oral $Given 2/15/24 2236)   amoxicillin-clavulanate (AUGMENTIN)  875-125 MG per tablet 1 tablet (1 tablet Oral $Given 2/15/24 0125)     Labs Ordered and Resulted from Time of ED Arrival to Time of ED Departure - No data to display  XR Finger Right G/E 2 Views   Final Result   IMPRESSION: Normal joint spaces and alignment. No fracture. No foreign body.                Critical care was not performed.     Medical Decision Making  The patient's presentation was of moderate complexity (an acute complicated injury).    The patient's evaluation involved:  ordering and/or review of 1 test(s) in this encounter (see separate area of note for details)    The patient's management necessitated moderate risk (prescription drug management including medications given in the ED) and moderate risk (a decision regarding minor procedure (laceration repair) with risk factors of cardiac disease).    Assessment & Plan    This is a 38-year-old female presenting for a wound.  She was neurovascular intact.  There was a wound to the anterior and posterior aspect of the right thumb.  Given mechanism I was concern for possible fracture with this injury.  I obtained an x-ray which showed no bony injury.     The wound was contaminated with some residual oil from the car the patient was working on.  The wound was extensively cleaned and some was successfully removed but I believed more extensive cleaning/scrubbing and or wound debridement would damage the viability of the tissue. The wound was loosely approximated and I will prescribed her antibiotics prophylactically. Following repair the thumb was placed in a splint as well. She had limited flexion of the thumb concerning for possible tendon involvement as well.      I have reviewed the nursing notes. I have reviewed the findings, diagnosis, plan and need for follow up with the patient.    New Prescriptions    ACETAMINOPHEN-CODEINE (TYLENOL #3) 300-30 MG PER TABLET    Take 1 tablet by mouth every 6 hours as needed for severe pain    AMOXICILLIN-CLAVULANATE  (AUGMENTIN) 875-125 MG TABLET    Take 1 tablet by mouth 2 times daily for 10 days       Final diagnoses:   Laceration of right thumb without damage to nail, foreign body presence unspecified, initial encounter       Alexis FERRARO [facility] EMERGENCY DEPARTMENT  2/15/2024     Alexis Luong MD  02/15/24 [id number]

## 2024-02-16 NOTE — DISCHARGE INSTRUCTIONS
Keep the wound clean and dry. I have prescribed you antibiotics to help prevent infection. I have also placed a referral to hand surgery for you to follow up. Your sutures are not absorbable and so you will need to follow up in about 10-14 days to have them removed.    If you develop any wound redness, wound drainage, fevers, or other signs concerning for infection you should be reevaluated emergency room.

## 2024-02-25 ENCOUNTER — HEALTH MAINTENANCE LETTER (OUTPATIENT)
Age: 39
End: 2024-02-25

## 2024-04-07 ENCOUNTER — HOSPITAL ENCOUNTER (OUTPATIENT)
Facility: HOSPITAL | Age: 39
Setting detail: OBSERVATION
Discharge: HOME | End: 2024-04-08
Attending: STUDENT IN AN ORGANIZED HEALTH CARE EDUCATION/TRAINING PROGRAM | Admitting: STUDENT IN AN ORGANIZED HEALTH CARE EDUCATION/TRAINING PROGRAM
Payer: COMMERCIAL

## 2024-04-07 DIAGNOSIS — R00.2 PALPITATIONS: ICD-10-CM

## 2024-04-07 PROCEDURE — 93005 ELECTROCARDIOGRAM TRACING: CPT | Performed by: STUDENT IN AN ORGANIZED HEALTH CARE EDUCATION/TRAINING PROGRAM

## 2024-04-07 PROCEDURE — 99285 EMERGENCY DEPT VISIT HI MDM: CPT | Mod: 25

## 2024-04-07 ASSESSMENT — COLUMBIA-SUICIDE SEVERITY RATING SCALE - C-SSRS
6. HAVE YOU EVER DONE ANYTHING, STARTED TO DO ANYTHING, OR PREPARED TO DO ANYTHING TO END YOUR LIFE?: NO
2. HAVE YOU ACTUALLY HAD ANY THOUGHTS OF KILLING YOURSELF IN THE PAST MONTH?: NO
1. IN THE PAST MONTH, HAVE YOU WISHED YOU WERE DEAD OR WISHED YOU COULD GO TO SLEEP AND NOT WAKE UP?: NO

## 2024-04-08 ENCOUNTER — APPOINTMENT (OUTPATIENT)
Dept: RADIOLOGY | Facility: HOSPITAL | Age: 39
End: 2024-04-08
Attending: STUDENT IN AN ORGANIZED HEALTH CARE EDUCATION/TRAINING PROGRAM
Payer: COMMERCIAL

## 2024-04-08 ENCOUNTER — APPOINTMENT (OUTPATIENT)
Dept: CARDIOLOGY | Facility: HOSPITAL | Age: 39
End: 2024-04-08
Attending: INTERNAL MEDICINE
Payer: COMMERCIAL

## 2024-04-08 VITALS
DIASTOLIC BLOOD PRESSURE: 55 MMHG | TEMPERATURE: 97.9 F | HEART RATE: 66 BPM | RESPIRATION RATE: 18 BRPM | BODY MASS INDEX: 37.64 KG/M2 | OXYGEN SATURATION: 97 % | SYSTOLIC BLOOD PRESSURE: 108 MMHG | HEIGHT: 65 IN | WEIGHT: 225.9 LBS

## 2024-04-08 PROBLEM — R00.2 PALPITATIONS: Status: ACTIVE | Noted: 2024-04-08

## 2024-04-08 LAB
ALBUMIN SERPL BCG-MCNC: 3.9 G/DL (ref 3.5–5.2)
ALP SERPL-CCNC: 69 U/L (ref 40–150)
ALT SERPL W P-5'-P-CCNC: 21 U/L (ref 0–50)
ANION GAP SERPL CALCULATED.3IONS-SCNC: 7 MMOL/L (ref 7–15)
AST SERPL W P-5'-P-CCNC: 19 U/L (ref 0–45)
BASOPHILS # BLD AUTO: 0 10E3/UL (ref 0–0.2)
BASOPHILS NFR BLD AUTO: 0 %
BILIRUB SERPL-MCNC: <0.2 MG/DL
BUN SERPL-MCNC: 7.1 MG/DL (ref 6–20)
CALCIUM SERPL-MCNC: 8.9 MG/DL (ref 8.6–10)
CHLORIDE SERPL-SCNC: 104 MMOL/L (ref 98–107)
CREAT SERPL-MCNC: 0.74 MG/DL (ref 0.51–0.95)
D DIMER PPP FEU-MCNC: 0.32 UG/ML FEU (ref 0–0.5)
DEPRECATED HCO3 PLAS-SCNC: 26 MMOL/L (ref 22–29)
EGFRCR SERPLBLD CKD-EPI 2021: >90 ML/MIN/1.73M2
EOSINOPHIL # BLD AUTO: 0.1 10E3/UL (ref 0–0.7)
EOSINOPHIL NFR BLD AUTO: 1 %
ERYTHROCYTE [DISTWIDTH] IN BLOOD BY AUTOMATED COUNT: 13.4 % (ref 10–15)
GLUCOSE SERPL-MCNC: 117 MG/DL (ref 70–99)
HCT VFR BLD AUTO: 34.6 % (ref 35–47)
HGB BLD-MCNC: 10.9 G/DL (ref 11.7–15.7)
IMM GRANULOCYTES # BLD: 0 10E3/UL
IMM GRANULOCYTES NFR BLD: 0 %
LVEF ECHO: NORMAL
LYMPHOCYTES # BLD AUTO: 1.7 10E3/UL (ref 0.8–5.3)
LYMPHOCYTES NFR BLD AUTO: 31 %
MAGNESIUM SERPL-MCNC: 2 MG/DL (ref 1.7–2.3)
MCH RBC QN AUTO: 27 PG (ref 26.5–33)
MCHC RBC AUTO-ENTMCNC: 31.5 G/DL (ref 31.5–36.5)
MCV RBC AUTO: 86 FL (ref 78–100)
MONOCYTES # BLD AUTO: 0.4 10E3/UL (ref 0–1.3)
MONOCYTES NFR BLD AUTO: 6 %
NEUTROPHILS # BLD AUTO: 3.4 10E3/UL (ref 1.6–8.3)
NEUTROPHILS NFR BLD AUTO: 62 %
NRBC # BLD AUTO: 0 10E3/UL
NRBC BLD AUTO-RTO: 0 /100
NT-PROBNP SERPL-MCNC: 51 PG/ML (ref 0–450)
PLATELET # BLD AUTO: 190 10E3/UL (ref 150–450)
POTASSIUM SERPL-SCNC: 4.7 MMOL/L (ref 3.4–5.3)
PROT SERPL-MCNC: 6.4 G/DL (ref 6.4–8.3)
RBC # BLD AUTO: 4.04 10E6/UL (ref 3.8–5.2)
SODIUM SERPL-SCNC: 137 MMOL/L (ref 135–145)
TROPONIN T SERPL HS-MCNC: 6 NG/L
TROPONIN T SERPL HS-MCNC: <6 NG/L
TROPONIN T SERPL HS-MCNC: <6 NG/L
WBC # BLD AUTO: 5.6 10E3/UL (ref 4–11)

## 2024-04-08 PROCEDURE — 250N000013 HC RX MED GY IP 250 OP 250 PS 637: Performed by: INTERNAL MEDICINE

## 2024-04-08 PROCEDURE — G0378 HOSPITAL OBSERVATION PER HR: HCPCS

## 2024-04-08 PROCEDURE — 250N000011 HC RX IP 250 OP 636: Performed by: INTERNAL MEDICINE

## 2024-04-08 PROCEDURE — 93306 TTE W/DOPPLER COMPLETE: CPT | Mod: 26 | Performed by: INTERNAL MEDICINE

## 2024-04-08 PROCEDURE — 84484 ASSAY OF TROPONIN QUANT: CPT | Performed by: STUDENT IN AN ORGANIZED HEALTH CARE EDUCATION/TRAINING PROGRAM

## 2024-04-08 PROCEDURE — 93306 TTE W/DOPPLER COMPLETE: CPT

## 2024-04-08 PROCEDURE — 96372 THER/PROPH/DIAG INJ SC/IM: CPT | Performed by: INTERNAL MEDICINE

## 2024-04-08 PROCEDURE — 36415 COLL VENOUS BLD VENIPUNCTURE: CPT | Performed by: STUDENT IN AN ORGANIZED HEALTH CARE EDUCATION/TRAINING PROGRAM

## 2024-04-08 PROCEDURE — 83880 ASSAY OF NATRIURETIC PEPTIDE: CPT | Performed by: STUDENT IN AN ORGANIZED HEALTH CARE EDUCATION/TRAINING PROGRAM

## 2024-04-08 PROCEDURE — 80053 COMPREHEN METABOLIC PANEL: CPT | Performed by: STUDENT IN AN ORGANIZED HEALTH CARE EDUCATION/TRAINING PROGRAM

## 2024-04-08 PROCEDURE — 85379 FIBRIN DEGRADATION QUANT: CPT | Performed by: STUDENT IN AN ORGANIZED HEALTH CARE EDUCATION/TRAINING PROGRAM

## 2024-04-08 PROCEDURE — 36415 COLL VENOUS BLD VENIPUNCTURE: CPT | Performed by: INTERNAL MEDICINE

## 2024-04-08 PROCEDURE — 96374 THER/PROPH/DIAG INJ IV PUSH: CPT

## 2024-04-08 PROCEDURE — 71046 X-RAY EXAM CHEST 2 VIEWS: CPT

## 2024-04-08 PROCEDURE — 85025 COMPLETE CBC W/AUTO DIFF WBC: CPT | Performed by: STUDENT IN AN ORGANIZED HEALTH CARE EDUCATION/TRAINING PROGRAM

## 2024-04-08 PROCEDURE — 99222 1ST HOSP IP/OBS MODERATE 55: CPT | Performed by: INTERNAL MEDICINE

## 2024-04-08 PROCEDURE — 99222 1ST HOSP IP/OBS MODERATE 55: CPT | Performed by: GENERAL ACUTE CARE HOSPITAL

## 2024-04-08 PROCEDURE — 83735 ASSAY OF MAGNESIUM: CPT | Performed by: STUDENT IN AN ORGANIZED HEALTH CARE EDUCATION/TRAINING PROGRAM

## 2024-04-08 PROCEDURE — 84484 ASSAY OF TROPONIN QUANT: CPT | Mod: 91 | Performed by: INTERNAL MEDICINE

## 2024-04-08 RX ORDER — NALOXONE HYDROCHLORIDE 0.4 MG/ML
0.4 INJECTION, SOLUTION INTRAMUSCULAR; INTRAVENOUS; SUBCUTANEOUS
Status: DISCONTINUED | OUTPATIENT
Start: 2024-04-08 | End: 2024-04-08 | Stop reason: HOSPADM

## 2024-04-08 RX ORDER — MORPHINE SULFATE 2 MG/ML
2 INJECTION, SOLUTION INTRAMUSCULAR; INTRAVENOUS
Status: DISCONTINUED | OUTPATIENT
Start: 2024-04-08 | End: 2024-04-08 | Stop reason: HOSPADM

## 2024-04-08 RX ORDER — MAGNESIUM HYDROXIDE/ALUMINUM HYDROXICE/SIMETHICONE 120; 1200; 1200 MG/30ML; MG/30ML; MG/30ML
30 SUSPENSION ORAL EVERY 4 HOURS PRN
Status: DISCONTINUED | OUTPATIENT
Start: 2024-04-08 | End: 2024-04-08 | Stop reason: HOSPADM

## 2024-04-08 RX ORDER — ACETAMINOPHEN 650 MG/1
650 SUPPOSITORY RECTAL EVERY 4 HOURS PRN
Status: DISCONTINUED | OUTPATIENT
Start: 2024-04-08 | End: 2024-04-08 | Stop reason: HOSPADM

## 2024-04-08 RX ORDER — CARVEDILOL 6.25 MG/1
6.25 TABLET ORAL EVERY EVENING
COMMUNITY
End: 2024-09-30

## 2024-04-08 RX ORDER — NALOXONE HYDROCHLORIDE 0.4 MG/ML
0.2 INJECTION, SOLUTION INTRAMUSCULAR; INTRAVENOUS; SUBCUTANEOUS
Status: DISCONTINUED | OUTPATIENT
Start: 2024-04-08 | End: 2024-04-08 | Stop reason: HOSPADM

## 2024-04-08 RX ORDER — VIT C/ASCORB SOD/MULTIVIT-MIN 500 MG
1 TABLET,CHEWABLE ORAL DAILY PRN
COMMUNITY

## 2024-04-08 RX ORDER — MORPHINE SULFATE 2 MG/ML
2 INJECTION, SOLUTION INTRAMUSCULAR; INTRAVENOUS ONCE
Status: COMPLETED | OUTPATIENT
Start: 2024-04-08 | End: 2024-04-08

## 2024-04-08 RX ORDER — ACETAMINOPHEN 325 MG/1
650 TABLET ORAL EVERY 4 HOURS PRN
Status: DISCONTINUED | OUTPATIENT
Start: 2024-04-08 | End: 2024-04-08 | Stop reason: HOSPADM

## 2024-04-08 RX ORDER — ASPIRIN 81 MG/1
81 TABLET ORAL DAILY
Status: DISCONTINUED | OUTPATIENT
Start: 2024-04-09 | End: 2024-04-08 | Stop reason: HOSPADM

## 2024-04-08 RX ORDER — ENOXAPARIN SODIUM 100 MG/ML
40 INJECTION SUBCUTANEOUS EVERY 24 HOURS
Status: DISCONTINUED | OUTPATIENT
Start: 2024-04-08 | End: 2024-04-08 | Stop reason: HOSPADM

## 2024-04-08 RX ORDER — CARVEDILOL 6.25 MG/1
12.5 TABLET ORAL EVERY MORNING
COMMUNITY
End: 2024-09-30

## 2024-04-08 RX ORDER — ASPIRIN 81 MG/1
162 TABLET, CHEWABLE ORAL ONCE
Status: COMPLETED | OUTPATIENT
Start: 2024-04-08 | End: 2024-04-08

## 2024-04-08 RX ORDER — NITROGLYCERIN 0.4 MG/1
0.4 TABLET SUBLINGUAL EVERY 5 MIN PRN
Status: DISCONTINUED | OUTPATIENT
Start: 2024-04-08 | End: 2024-04-08 | Stop reason: HOSPADM

## 2024-04-08 RX ORDER — ACETAMINOPHEN 500 MG
500-1000 TABLET ORAL EVERY 6 HOURS PRN
COMMUNITY

## 2024-04-08 RX ADMIN — ASPIRIN 81 MG CHEWABLE TABLET 162 MG: 81 TABLET CHEWABLE at 02:05

## 2024-04-08 RX ADMIN — MORPHINE SULFATE 2 MG: 2 INJECTION, SOLUTION INTRAMUSCULAR; INTRAVENOUS at 02:06

## 2024-04-08 RX ADMIN — ENOXAPARIN SODIUM 40 MG: 40 INJECTION SUBCUTANEOUS at 07:52

## 2024-04-08 RX ADMIN — ACETAMINOPHEN 650 MG: 325 TABLET ORAL at 07:51

## 2024-04-08 ASSESSMENT — ACTIVITIES OF DAILY LIVING (ADL)
ADLS_ACUITY_SCORE: 37

## 2024-04-08 NOTE — PROGRESS NOTES
"PRIMARY DIAGNOSIS: \"GENERIC\" NURSING  OUTPATIENT/OBSERVATION GOALS TO BE MET BEFORE DISCHARGE:  ADLs back to baseline: No    Activity and level of assistance: in bed complaining of headache this am    Pain status: improving with oral tylenol.  Frontal headache    Return to near baseline physical activity: No     Discharge Planner Nurse   Safe discharge environment identified: No  Barriers to discharge: Yes       Entered by: Tanisha Ventura RN 04/08/2024 1:19 PM     Please review provider order for any additional goals.   Nurse to notify provider when observation goals have been met and patient is ready for discharge.  "

## 2024-04-08 NOTE — PHARMACY-ADMISSION MEDICATION HISTORY
Pharmacist Admission Medication History    Admission medication history is complete. The information provided in this note is only as accurate as the sources available at the time of the update.    Information Source(s): Patient via in-person    Pertinent Information: Coreg and lisinopril fill hx last from 2023.    Changes made to PTA medication list:  Added: sea dunbar, emergen C, apap  Deleted: Tylenol 3, ibu  Changed: None    Allergies reviewed with patient and updates made in EHR: yes    Medication History Completed By: Ermelinda Hebert RPH 4/8/2024 7:58 AM    PTA Med List   Medication Sig Note Last Dose    acetaminophen (TYLENOL) 500 MG tablet Take 500-1,000 mg by mouth every 6 hours as needed for mild pain or fever  Unknown at prn    albuterol (PROAIR HFA/PROVENTIL HFA/VENTOLIN HFA) 108 (90 Base) MCG/ACT inhaler Inhale 2 puffs into the lungs every 4 hours as needed  Unknown at prn    aspirin (ASA) 81 MG chewable tablet Take 1 tablet (81 mg) by mouth daily  4/7/2024 at am    carvedilol (COREG) 6.25 MG tablet Take 12.5 mg by mouth every morning 4/8/2024: Last filled in 2023 per fill hx - supply should have been gone for months  4/7/2024 at am    carvedilol (COREG) 6.25 MG tablet Take 6.25 mg by mouth every evening  4/7/2024 at pm    fluticasone (FLONASE) 50 MCG/ACT nasal spray Spray 1-2 sprays into both nostrils daily as needed for allergies or rhinitis  Unknown at prn    lisinopril (ZESTRIL) 5 MG tablet Take 1 tablet (5 mg) by mouth daily 4/8/2024: Fill hx from 2023, should be out but reporting currently taking. 4/7/2024 at am    Multiple Vitamins-Minerals (EMERGEN-C VITAMIN C) CHEW Take 1 each by mouth daily as needed (Using Sept to March for supplementation)  Past Month at prn    NONFORMULARY daily Sea Dunbar Gel natural nutrient  4/7/2024 at am    omeprazole (PRILOSEC) 20 MG DR capsule Take 20 mg by mouth daily as needed  More than a month at prn

## 2024-04-08 NOTE — PROGRESS NOTES
"PRIMARY DIAGNOSIS: \"GENERIC\" NURSING  OUTPATIENT/OBSERVATION GOALS TO BE MET BEFORE DISCHARGE:  ADLs back to baseline: No    Activity and level of assistance: Up with standby assistance.    Pain status: Pain free.    Return to near baseline physical activity: No     Discharge Planner Nurse   Safe discharge environment identified:  PT is recommending TCU placement  Barriers to discharge: Yes       Entered by: Tanisha Ventura RN 04/08/2024 1:15 PM     Please review provider order for any additional goals.   Nurse to notify provider when observation goals have been met and patient is ready for discharge.  " Urine and NP swab sent to lab.

## 2024-04-08 NOTE — ED TRIAGE NOTES
Patient had a sudden onset of midsternal chest pain one hour ago. Pain has improved slightly but patient still feels pressure. Reports that the pain increases with exertion.      Triage Assessment (Adult)       Row Name 04/07/24 0636          Triage Assessment    Airway WDL WDL        Respiratory WDL    Respiratory WDL WDL        Skin Circulation/Temperature WDL    Skin Circulation/Temperature WDL WDL        Cardiac WDL    Cardiac WDL chest pain        Chest Pain Assessment    Chest Pain Location midsternal        Peripheral/Neurovascular WDL    Peripheral Neurovascular WDL WDL        Cognitive/Neuro/Behavioral WDL    Cognitive/Neuro/Behavioral WDL WDL

## 2024-04-08 NOTE — H&P
"[facility]    History and Physical - Hospitalist Service       Date of Admission:  4/7/2024    Assessment & Plan      Azra Tom is a 38 year old female admitted on 4/7/2024. She presented to the emergency room with palpitations.  She is admitted with syncopal episode with concern for some arrhythmia either an SVT or V. tach.    Arrhythmia/palpitation-simple SVT versus V. 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  Diet: Combination Diet Low Saturated Fat Na <2400mg Diet, No Caffeine Diet  DVT Prophylaxis: Enoxaparin (Lovenox) SQ  Valerio Catheter: Not present  Lines: None     Cardiac Monitoring: ACTIVE order. Indication: Tachyarrhythmias, acute (48 hours)  Code Status: Full Code    Clinically Significant Risk Factors Present on Admission                # Drug Induced Platelet Defect: home medication list includes an antiplatelet medication   # Hypertension: Home medication list includes antihypertensive(s)      # Obesity: Estimated body mass index is 38.18 kg/m  as calculated from the following:    Height as of this encounter: 1.638 m (5' 4.5\").    Weight as of this encounter: 102.5 kg (225 lb 14.4 oz).              Disposition Plan      Expected Discharge Date: 04/09/2024              "     Ana Cason MD  Hospitalist Service  [facility]  Securely message with [employer] (more info)  Text page via [employer] Paging/Directory     ______________________________________________________________________    Chief Complaint   Palpitation    History is obtained from the patient    History of Present Illness   Azra Tom is a 38 year old female who presented to the ER with complaints of palpitation at work.  She also had noted chest pressure, diaphoresis and nausea.  Endorses feeling near syncopal like she was going to pass out this lasted a few minutes and stopped suddenly as it started.  At the time of interview, still complained of chest pressure localized to the left anterior chest wall.  Not currently radiating however when she had the episode of palpitation she endorses radiation to her jaw/her jaw was feeling very tight  SHe endorses multiple episodes like this and hence hospitalist is consulted for admission for cardiology input and for a possible Holter monitor      Past Medical History    Past Medical History:   Diagnosis Date    Atypical chest pain     history of atypical chest pain in 2017    Bipolar disorder (H)     diagnosed in [place], [place] in 2017    Dyslipidemia     Tobacco use     Uncomplicated asthma        Past Surgical History   Past Surgical History:   Procedure Laterality Date    CV EXTRACORPERAL MEMBRANE OXYGENATION N/A 1/29/2020    Procedure: ECMO, NATIVE HEART BIOPSY;  Surgeon: Richard Montana MD;  Location:  HEART CARDIAC CATH LAB    CV HEART BIOPSY N/A 1/29/2020    Procedure: Heart Biopsy;  Surgeon: Richard Montana MD;  Location:  HEART CARDIAC CATH LAB    REMOVE EXTRACORPORAL MEMBRANE OXYGENATOR ADULT Left 2/3/2020    Procedure: REMOVAL Extra Corporeal Membrain Oxygenator, Inta operative transesophageal echocardiogram, Repair of femoral vessel;  Surgeon: Vishnu Marx MD;  Location:  OR       Prior to Admission Medications   Prior to  Admission Medications   Prescriptions Last Dose Informant Patient Reported? Taking?   UNABLE TO FIND   Yes No   Sig: [employer] Hair skin and nails   Patient not taking: Reported on 8/8/2023   acetaminophen-codeine (TYLENOL #3) 300-30 MG per tablet   No No   Sig: Take 1 tablet by mouth every 6 hours as needed for severe pain   albuterol (PROAIR HFA/PROVENTIL HFA/VENTOLIN HFA) 108 (90 Base) MCG/ACT inhaler   Yes No   Sig: Inhale 2 puffs into the lungs every 4 hours as needed   aspirin (ASA) 81 MG chewable tablet   No No   Sig: Take 1 tablet (81 mg) by mouth daily   benzocaine (ANBESOL) 10 % gel   No No   Sig: Take by mouth 4 times daily as needed for moderate pain   Patient not taking: Reported on 9/20/2022   carvedilol (COREG) 6.25 MG tablet   No No   Sig: Take 12.5 MG every Am and 6.25 MG every PM   cyclobenzaprine (FLEXERIL) 10 MG tablet   No No   Sig: Take 1 tablet (10 mg) by mouth 3 times daily as needed for muscle spasms   Patient not taking: Reported on 9/20/2022   fluticasone (FLONASE) 50 MCG/ACT nasal spray   Yes No   Sig: Spray 2 sprays in nostril   ibuprofen (ADVIL/MOTRIN) 600 MG tablet   No No   Sig: Take 1 tablet (600 mg) by mouth every 6 hours as needed   Patient not taking: Reported on 9/20/2022   lisinopril (ZESTRIL) 5 MG tablet   No No   Sig: Take 1 tablet (5 mg) by mouth daily   multivitamin w/minerals (THERA-VIT-M) tablet   Yes No   Sig: Take 1 tablet by mouth daily [place] with immune and zinc   omeprazole (PRILOSEC) 20 MG DR capsule   No No   Sig: Take 1 capsule (20 mg) by mouth daily   Patient not taking: Reported on 3/24/2023   vitamin C (ASCORBIC ACID) 1000 MG TABS   Yes No   Sig: Take 1,000 mg by mouth daily      Facility-Administered Medications: None        Social History   I have reviewed this patient's social history and updated it with pertinent information if needed.  Social History     Tobacco Use    Smoking status: Former    Smokeless tobacco: Never   Substance Use Topics     Alcohol use: Not Currently    Drug use: Not Currently         Family History     Denies any      Allergies   No Known Allergies     Physical Exam   Vital Signs: Temp: 97.8  F (36.6  C) Temp src: Oral BP: 121/60 Pulse: 68   Resp: 12 SpO2: 100 % O2 Device: None (Room air)    Weight: 225 lbs 14.4 oz      General: Awake and alert,   Eyes: Pupils reactive to light  HENT: Atraumatic, oral mucosa moist  Neck: No masses, or swelling  Pulmonary: Good air entry, clear to auscultation  CVS: Heart sounds 1 and 2 present, regular rhythm, rate, no murmur  GI/ Abdomen: Soft , not tender , not distended, bowel sounds ++  : No martínez   JEANNETTE: Normal inspection, no muscle spasm  Skin: No rash, skin intact  Extremities: Edema none  Neuro: Alert and oriented, follows command, speech normal, no focal weakness  Psych: Normal mood and affect      Medical Decision Making       65 MINUTES SPENT BY ME on the date of service doing chart review, history, exam, documentation & further activities per the note.      Data     I have personally reviewed the following data over the past 24 hrs:    5.6  \   10.9 (L)   / 190     137 104 7.1 /  117 (H)   4.7 26 0.74 \     ALT: 21 AST: 19 AP: 69 TBILI: <0.2   ALB: 3.9 TOT PROTEIN: 6.4 LIPASE: N/A     Trop: 6 BNP: 51     INR:  N/A PTT:  N/A   D-dimer:  0.32 Fibrinogen:  N/A       Imaging results reviewed over the past 24 hrs:   No results found for this or any previous visit (from the past 24 hour(s)).

## 2024-04-08 NOTE — PROGRESS NOTES
"PRIMARY DIAGNOSIS: \"GENERIC\" NURSING  OUTPATIENT/OBSERVATION GOALS TO BE MET BEFORE DISCHARGE:  ADLs back to baseline: No    Activity and level of assistance: patient in bed c/o a headache    Pain status: c/o a headache 10/10 given tylenol    Return to near baseline physical activity: No     Discharge Planner Nurse   Safe discharge environment identified: No  Barriers to discharge: Yes       Entered by: Tanisha Ventura RN 04/08/2024 8:03 AM     Please review provider order for any additional goals.   Nurse to notify provider when observation goals have been met and patient is ready for discharge.  "

## 2024-04-08 NOTE — PROGRESS NOTES
Care Management Discharge Note    Discharge Date: 04/08/2024       Discharge Disposition:  Home      Additional Information:  Pt discharging home.    ROSA Vela

## 2024-04-08 NOTE — ED PROVIDER NOTES
Emergency Department Encounter         FINAL IMPRESSION:  Pre syncope         ED COURSE AND MEDICAL DECISION MAKING       ED Course as of 04/08/24 0011   Mon Apr 08, 2024   0005 Patient is a 38-year-old female with a history of cardiogenic shock from a viral myocarditis requiring ECMO and a prolonged hospitalization with multiple complications, here with an episode that lasted 5 to 10 minutes while at work of rapid heartbeat, diaphoresis, near syncope and chest discomfort as well as shortness of breath.  Patient states she has been compliant with her medications otherwise.  Has been feeling well this week.  States this episode happened previously a few months ago.  States today it felt worse.  Otherwise this week has been feeling fine with no fevers, chills, nausea or vomiting.  No chest pain, dyspnea on exertion or leg swelling.  Tolerating her medications without difficulty.  Patient states once this episode stopped, she felt better however still now has some chest tightness and soreness.  Examination otherwise unremarkable including heart and lungs as well as no leg swelling.  Plan for cardiopulmonary evaluation reevaluate   0009 EKG sinus 73 no signs acute ischemia no inversions no depressions no STEMI QTc 420, unchanged from December of last year.       Additional ED Course Timeline:  11:46 PM I met with the patient, obtained history, performed an initial exam, and discussed options and plan for diagnostics and treatment here in the ED.   1:13 AM I Spoke with Dr. Cason, Hospitalist. We further discussed the patient's case, reviewed ED work-up so far, and agreed to admit the patient.                       Medical Decision Making  Obtained supplemental history:Supplemental history obtained?: Documented in chart and Family Member/Significant Other  Reviewed external records: External records reviewed?: Documented in chart and Outpatient Record: Office Visit at [facility] on  08/08/2023  Care impacted by chronic illness:Chronic Lung Disease, Heart Disease, and Smoking / Nicotine Use  Care significantly affected by social determinants of health:Access to Medical Care  Did you consider but not order tests?: Work up considered but not performed and documented in chart, if applicable  Did you interpret images independently?: Independent interpretation of ECG and images noted in documentation, when applicable.  Consultation discussion with other provider:Did you involve another provider (consultant, , pharmacy, etc.)?: I discussed the care with another health care provider, see documentation for details.  Admit.                Critical Care     Performed by: Sher Hillman or    Authorized by: Sher Hillman  Total critical care time:  minutes  Critical care was necessary to treat or prevent imminent or life-threatening deterioration of the following conditions:   Critical care was time spent personally by me on the following activities: development of treatment plan with patient or surrogate, discussions with consultants, examination of patient, evaluation of patient's response to treatment, obtaining history from patient or surrogate, ordering and performing treatments and interventions, ordering and review of laboratory studies, ordering and review of radiographic studies, re-evaluation of patient's condition and monitoring for potential decompensation.  Critical care time was exclusive of separately billable procedures and treating other patients.'    At the conclusion of the encounter I discussed the results of all the tests and the disposition. The questions were answered. The patient or family acknowledged understanding and was agreeable with the care plan.                  MEDICATIONS GIVEN IN THE EMERGENCY DEPARTMENT:  Medications - No data to display    NEW PRESCRIPTIONS STARTED AT TODAY'S ED VISIT:  New Prescriptions    No medications on file       HPI     Patient information obtained from:  The patient    Use of : N/A     Azra Tom is a 38 year old female with a pertinent history of tobacco use, heart failure, who presents to this ED via walk-in for evaluation of chest pain.      The patient reports she was at work tonight and felt a sudden onset of heart racing palpitations while walking for a short duration. She performed some breathing techniques and felt her palpitations kick back into normal rhythm. She associated feeling sweaty and clammy with her palpitations. She still has some chest tightness/soreness and fatigue at this time. The patient felt fine earlier in the day. She has not had any upper respiratory symptoms as of recent.    The patient reports she had 3 previous episodes that was similar to tonight's episode. She does have a history of heart failure and has been compliant with her Lisinopril, Carveidolol, and other cardiac medications. Her last echocardiogram was done around a year ago. She currently follows with Dr. Zackary Bronson, cardiology at [facility].     Otherwise, the patient denied any other medical complaints or concerns at this time.      Per chart review, the patient had an office visit with [facility] on 08/08/2023. 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 The patient was doing better overall. She is complaining of intermittent palpitations that lasts around a minute before resolving spontaneously. She has no issues with BP or current cardiac medications. Patient seems to be doing swell from a cardiopulmonary standpoint. Unclear etiology of palpitations. She had a prior EKG in 01/05/203 read is Afib, but this was unlikely given there are P waves present. Will plan to further evaluated her symptoms with a 14 day zio patch heart monitor.      REVIEW OF SYSTEMS:  Review of Systems   Constitutional: Negative for fever, malaise. Positive for  fatigue.  HEENT: Negative runny nose, sore throat, ear pain, neck pain  Respiratory: Negative for shortness of breath, cough, congestion  Cardiovascular: Negative for chest pain, leg edema. Positive for chest tightness.  Gastrointestinal: Negative for abdominal distention, abdominal pain, constipation, vomiting, nausea, diarrhea  Genitourinary: Negative for dysuria and hematuria.   Integument: Negative for rash, skin breakdown  Neurological: Negative for paresthesias, weakness, headache.  Musculoskeletal: Negative for joint pain, joint swelling      All other systems reviewed and are negative.          MEDICAL HISTORY     Past Medical History:   Diagnosis Date    Atypical chest pain     Bipolar disorder (H)     Dyslipidemia     Tobacco use     Uncomplicated asthma        Past Surgical History:   Procedure Laterality Date    CV EXTRACORPERAL MEMBRANE OXYGENATION N/A 1/29/2020    Procedure: ECMO, NATIVE HEART BIOPSY;  Surgeon: Richard Montana MD;  Location:  HEART CARDIAC CATH LAB    CV HEART BIOPSY N/A 1/29/2020    Procedure: Heart Biopsy;  Surgeon: Richard Montana MD;  Location:  HEART CARDIAC CATH LAB    REMOVE EXTRACORPORAL MEMBRANE OXYGENATOR ADULT Left 2/3/2020    Procedure: REMOVAL Extra Corporeal Membrain Oxygenator, Inta operative transesophageal echocardiogram, Repair of femoral vessel;  Surgeon: Vishnu Marx MD;  Location:  OR       Social History     Tobacco Use    Smoking status: Former    Smokeless tobacco: Never   Substance Use Topics    Alcohol use: Not Currently    Drug use: Not Currently       acetaminophen-codeine (TYLENOL #3) 300-30 MG per tablet  albuterol (PROAIR HFA/PROVENTIL HFA/VENTOLIN HFA) 108 (90 Base) MCG/ACT inhaler  aspirin (ASA) 81 MG chewable tablet  benzocaine (ANBESOL) 10 % gel  carvedilol (COREG) 6.25 MG tablet  cyclobenzaprine (FLEXERIL) 10 MG tablet  fluticasone (FLONASE) 50 MCG/ACT nasal spray  ibuprofen (ADVIL/MOTRIN) 600 MG tablet  lisinopril (ZESTRIL) 5 MG  "tablet  multivitamin w/minerals (THERA-VIT-M) tablet  omeprazole (PRILOSEC) 20 MG DR capsule  UNABLE TO FIND  vitamin C (ASCORBIC ACID) 1000 MG TABS            PHYSICAL EXAM     /69   Pulse 74   Temp 97.8  F (36.6  C) (Oral)   Resp 18   Ht 1.638 m (5' 4.5\")   Wt 102.5 kg (225 lb 14.4 oz)   SpO2 100%   BMI 38.18 kg/m        PHYSICAL EXAM:     General: Patient appears well, nontoxic, comfortable  HEENT: Moist mucous membranes,  No head trauma.    Cardiovascular: Normal rate, normal rhythm, no extremity edema.  No appreciable murmur.  Respiratory: No signs of respiratory distress, lungs are clear to auscultation bilaterally with no wheezes rhonchi or rales.  Abdominal: Soft, nontender, nondistended, no palpable masses, no guarding, no rebound  Musculoskeletal: Full range of motion of joints, no deformities appreciated.  Neurological: Alert and oriented, grossly neurologically intact.  Psychological: Normal affect and mood.  Integument: No rashes appreciated          RESULTS       Labs Ordered and Resulted from Time of ED Arrival to Time of ED Departure - No data to display    No orders to display                     PROCEDURES:  Procedures:  Procedures       IMaciej am serving as a scribe to document services personally performed by Sher Hillman DO, based on my observations and the provider's statements to me.  ISher DO, attest that Maciej Reynoso is acting in a scribe capacity, has observed my performance of the services and has documented them in accordance with my direction.    Sher Hillman DO  Emergency Medicine  [facility] EMERGENCY DEPARTMENT       Sher Hillman DO  04/08/24 0349    "

## 2024-04-08 NOTE — CONSULTS
"     We have been asked to see Azra Tom at [facility] by Dr. Elia Benavidez for evaluation of chest pain.  Impression and Plan     Assessment:  Palpitations and lightheadedness possibly due to an arrhythmia.  History of congestive heart failure with reduced left ventricular ejection fraction of 25-30% noted 1/27/2020 due to to lymphocytic myocarditis in the setting of influenza A infection, complicated by cardiogenic shock requiring veno-arterial extracorporeal membrane oxygenation with subsequent recovery of cardiac function.  Paroxysmal supraventricular tachycardia noted on ambulatory cardiac monitoring 8/8/2023 - 8/22/2023. These were brief and not associated with symptoms at the time.  Possible obstructive sleep apnea.  Obesity with body mass index 38.18 kg/m .    Plan:  Okay to discharge home from a cardiac perspective  Continue outpatient carvedilol, lisinopril and aspirin  Given the infrequent nature of her symptoms, I recommended she purchase a smart watch with electrocardiographic capability to help capture the possible arrhythmia  Implantable loop recorder may also be an option.  Recommend outpatient sleep medicine consultation  Routine follow-up with her primary cardiologist Dr. Raymundo    Primary Cardiologist: Dr. Audie Raymundo at [facility]    Clinically Significant Risk Factors Present on Admission     # Drug Induced Platelet Defect: home medication list includes an antiplatelet medication   # Hypertension: Home medication list includes antihypertensive(s)      # Obesity: Estimated body mass index is 38.18 kg/m  as calculated from the following:    Height as of this encounter: 1.638 m (5' 4.5\").    Weight as of this encounter: 102.5 kg (225 lb 14.4 oz).             History of Present Illness      Ms. Azra Tom is a 38 year old female with a history of viral myocarditis causing cardiogenic shock requiring VA-ECMO in 2020 and subsequent recovery of LVEF admitted " yesterday with palpitations and lightheadedness.    She was walking at work at [employer] when she developed the sudden onset of heart racing associated with lightheadedness and some chest tightness. She tried doing breathing exercises to relieve the symptoms.but this did not help. The episode lasted for 5-7 minutes then she felt a single strong thump in her chest which resolved the symptoms.    Vital signs since admission have been stable. ECG showed NSR and no arrhythmias have been seen on telemetry. Labs were unremarkable including undetectable hs-troponins and normal NT-proBNP 51.    She has had milder episodes of palpitations in the past. She wore a 14-day Zio patch last year which was notable for only 3 very brief episodes of SVT and these did not correlate with symptoms.    Since being on ECMO she has not felt the same. She has low energy and fatigues easily. She sleeps poorly. She gets out of breath walking up 1 flight of stairs. No exertional chest pain/pressure/tightness, shortness of breath at rest, pre-syncope, syncope, lower extremity swelling, paroxysmal nocturnal dyspnea (PND), or orthopnea.    Review of Systems:  Further review of systems is otherwise negative/noncontributory (based on review of medical record (admission H&P) and 13 point review of systems reviewed. Pertinent positives noted).    Cardiac Diagnostics     ECG 4/7/2024 (personally reviewed and interpreted): SR, normal EC    Telemetry (personally reviewed): SR, no arrhythmias    Zio patch 8/8/2023 (report reviewed):  Patient monitored from 8/8/2023 - 8/22/2023 for 13 days 8 hours analyzable data.  Patient had a min HR of 52 bpm, max HR of 166 bpm, and avg HR of 84 bpm.   Predominant underlying rhythm was sinus rhythm.  3 supraventricular tachycardia runs occurred, the run with the fastest interval lasting 5 beats with a max rate of 126 bpm. The run with the fastest interval was also the longest.  Isolated SVEs, couplets and triplets were  all rare (<1.0%).  Isolated VEs were rare (<1.0%) and no couplets or triplets were present.  Reported symptoms corresponded to normal sinus rhythm.    Echocardiogram 4/8/2024 (report reviewed):  1. The left ventricle is normal in size. Left ventricular function is normal.The ejection fraction is 60-65%.No regional wall motion abnormalities noted. Left ventricular diastolic function is normal.  2. Normal right ventricle size and systolic function.  3. No hemodynamically significant valvular abnormalities on 2D or color flow imaging.   4. Compared to prior study, there is no significant change.    Echocardiogram 1/18/2022 (report reviewed):  Global and regional left ventricular function is normal with an EF of 60-65%.  Global right ventricular function is normal.  Mild tricuspid insufficiency is present.  Pulmonary artery systolic pressure is normal.  The inferior vena cava was normal in size with preserved respiratory variability.  No pericardial effusion is present.    Echocardiogram 1/27/2020 (results reviewed):   * The left ventricle is normal size.   * The left ventricular systolic function is severely decreased, estimated LVEF 25-30%.   * Left ventricular wall motion is abnormal with severe global hypokinesis.   * There is no hemodynamically significant valvular heart disease.   * No pulmonary hypertension, estimated right ventricular systolic pressure is 28 mmHg.   * Compared to prior study dated 08/8/2017 (direct [id number] comparison of images) the EF has decreased significantly.     Cardiac MRI 1/28/2020 (report reviewed):  1. Cardiac MRI findings of severely reduced LV systolic function with severe global hypokinesis with evidence of anterolateral subepicardial enhancement may be seen in myocarditis. Additionally, gadolinium enhancement was noted in the inferoseptum as well as basal segments which may represent infiltrative cardiomyopathy versus artifact given difficulty in tissue nulling. In the setting  of moderate pericardial effusion, low voltage EKG, and LVH further workup for amyloidosis should be pursued. Findings reviewed with the inpatient ordering cardiologist.   2. Severely reduced left ventricular systolic function, ejection fraction 29%.   3. Moderate circumferential pericardial effusion.   4. Prominent SVC and jugular vein. Prominence/distended sigmoid colon. Clinical correlation advised..     Cardiac Cath 1/28/2020 (results reviewed):   Severely reduced CO/CI.   Normal/High NL PCWP/PA/RV pressures.  Elevated RA pressures.  Successful placement of 7.5 Fr 40cc IABP.     Medical History  Surgical History Family History Social History   Past Medical History:   Diagnosis Date    Atypical chest pain     history of atypical chest pain in 2017    Bipolar disorder (H)     diagnosed in [place], [place] in 2017    Dyslipidemia     Tobacco use     Uncomplicated asthma      Past Surgical History:   Procedure Laterality Date    CV EXTRACORPERAL MEMBRANE OXYGENATION N/A 1/29/2020    Procedure: ECMO, NATIVE HEART BIOPSY;  Surgeon: Richard Montana MD;  Location:  HEART CARDIAC CATH LAB    CV HEART BIOPSY N/A 1/29/2020    Procedure: Heart Biopsy;  Surgeon: Richard Montana MD;  Location:  HEART CARDIAC CATH LAB    REMOVE EXTRACORPORAL MEMBRANE OXYGENATOR ADULT Left 2/3/2020    Procedure: REMOVAL Extra Corporeal Membrain Oxygenator, Inta operative transesophageal echocardiogram, Repair of femoral vessel;  Surgeon: Vishnu Marx MD;  Location:  OR     Family History  Medical History Relation Name Comments   Amblyopia/Strabismus Negative Family History       Blindness Negative Family History       Cataract Negative Family History       Diabetes Negative Family History       Glaucoma Negative Family History       Macular Degeneration Negative Family History       Retinal Detachment Negative Family History            Social History     Socioeconomic History    Marital status: Single     Spouse name: Not on file     "Number of children: Not on file    Years of education: Not on file    Highest education level: Not on file   Occupational History    Not on file   Tobacco Use    Smoking status: Former    Smokeless tobacco: Never   Substance and Sexual Activity    Alcohol use: Not Currently    Drug use: Not Currently    Sexual activity: Not on file   Other Topics Concern    Not on file   Social History Narrative    Not on file     Social Determinants of Health     Financial Resource Strain: Not on file   Food Insecurity: Not on file   Transportation Needs: Not on file   Physical Activity: Not on file   Stress: Not on file   Social Connections: Not on file   Interpersonal Safety: Not on file   Housing Stability: Not on file             Physical Examination   VITALS: /58 (BP Location: Left arm, Patient Position: Semi-Coffey's, Cuff Size: Adult Regular)   Pulse 69   Temp 97.9  F (36.6  C) (Oral)   Resp 18   Ht 1.638 m (5' 4.5\")   Wt 102.5 kg (225 lb 14.4 oz)   SpO2 99%   BMI 38.18 kg/m    BMI: Body mass index is 38.18 kg/m .  Wt Readings from Last 3 Encounters:   04/07/24 102.5 kg (225 lb 14.4 oz)   02/15/24 94.8 kg (209 lb)   12/10/23 98.1 kg (216 lb 3.2 oz)       General Appearance:  Alert, cooperative, no distress, appears stated age    Head:  Normocephalic, without obvious abnormality, atraumatic   Eyes:  PERRL, conjunctiva/corneas clear, EOM's intact   Ears:  Normal external ear canals bilaterally   Nose: Nares normal, septum midline, no drainage   Throat: Lips, mucosa, and tongue normal; teeth and gums normal   Neck: Supple, symmetrical, trachea midline, no adenopathy, no carotid bruit or JVD    Back:   Symmetric, no abnormal curvature, ROM normal   Lungs:   Clear to auscultation bilaterally, respirations unlabored   Chest Wall:  No tenderness or deformity   Heart:  Regular rate and rhythm , S1, S2 normal,no murmur, rub or gallop   Abdomen:   Soft, non-tender, bowel sounds active all four quadrants,  no masses, no " organomegaly   Extremities: Extremities normal, atraumatic, no cyanosis or edema   Skin: Skin color, texture, turgor normal, no rashes or lesions   Psychiatric: Normal affect, pleasant and cooperative    Neurologic: Alert and oriented X 3, Moves all 4 extremities            Imaging      CXR 4/8/2024 (report reviewed):  Negative chest.     CTA chest 11/8/2023 (report reviewed):  1.  No pulmonary embolism.  2.  Left lower lobe pneumonia.  3.  No coronary artery calcification.     Lab Results    Chemistry/lipid CBC Cardiac Enzymes/BNP/TSH/INR     Recent Labs   Lab Test 04/08/24 0021      POTASSIUM 4.7   CHLORIDE 104   CO2 26   *   BUN 7.1   CR 0.74   GFRESTIMATED >90   YIN 8.9     Recent Labs   Lab Test 04/08/24  0021 12/10/23  0957 11/08/23 2030   CR 0.74 0.65 0.66     Recent Labs   Lab Test 01/29/20  1720   A1C 5.1          Recent Labs   Lab Test 04/08/24 0021   WBC 5.6   HGB 10.9*   HCT 34.6*   MCV 86        Recent Labs   Lab Test 04/08/24  0021 12/10/23  0957 11/08/23 2030   HGB 10.9* 10.8* 10.8*      Recent Labs   Lab Test 04/08/24  0021 12/10/23  0957 08/08/23  0742 03/24/23  0728 01/05/23 2043 01/18/22  0843 12/14/21  1104   NTBNPI 51 <36  --   --  76   < >  --    NTBNP  --   --  40 <36  --   --  55    < > = values in this interval not displayed.     Recent Labs   Lab Test 01/29/20  0922   TSH 2.11     Recent Labs   Lab Test 11/08/23 2030 02/08/20  0610 02/07/20  0628   INR 0.96 1.20* 1.66*           Current Inpatient Scheduled Medications   Scheduled Meds:  Current Facility-Administered Medications   Medication Dose Route Frequency Provider Last Rate Last Admin    [START ON 4/9/2024] aspirin EC tablet 81 mg  81 mg Oral Daily Ana Cason MD        enoxaparin ANTICOAGULANT (LOVENOX) injection 40 mg  40 mg Subcutaneous Q24H Ana Cason MD   40 mg at 04/08/24 0752     Continuous Infusions:  Current Facility-Administered Medications   Medication Dose Route Frequency Provider  Last Rate Last Admin     Current Outpatient Medications   Medication Sig Dispense Refill    acetaminophen (TYLENOL) 500 MG tablet Take 500-1,000 mg by mouth every 6 hours as needed for mild pain or fever      albuterol (PROAIR HFA/PROVENTIL HFA/VENTOLIN HFA) 108 (90 Base) MCG/ACT inhaler Inhale 2 puffs into the lungs every 4 hours as needed      aspirin (ASA) 81 MG chewable tablet Take 1 tablet (81 mg) by mouth daily 90 tablet 3    carvedilol (COREG) 6.25 MG tablet Take 12.5 mg by mouth every morning      carvedilol (COREG) 6.25 MG tablet Take 6.25 mg by mouth every evening      fluticasone (FLONASE) 50 MCG/ACT nasal spray Spray 1-2 sprays into both nostrils daily as needed for allergies or rhinitis      lisinopril (ZESTRIL) 5 MG tablet Take 1 tablet (5 mg) by mouth daily 90 tablet 3    Multiple Vitamins-Minerals (EMERGEN-C VITAMIN C) CHEW Take 1 each by mouth daily as needed (Using Sept to March for supplementation)      NONFORMULARY daily Sea Berumen Gel natural nutrient      omeprazole (PRILOSEC) 20 MG DR capsule Take 20 mg by mouth daily as needed            Medications Prior to Admission   Prior to Admission medications    Medication Sig Start Date End Date Taking? Authorizing Provider   acetaminophen (TYLENOL) 500 MG tablet Take 500-1,000 mg by mouth every 6 hours as needed for mild pain or fever   Yes Unknown, Entered By History   albuterol (PROAIR HFA/PROVENTIL HFA/VENTOLIN HFA) 108 (90 Base) MCG/ACT inhaler Inhale 2 puffs into the lungs every 4 hours as needed   Yes Unknown, Entered By History   aspirin (ASA) 81 MG chewable tablet Take 1 tablet (81 mg) by mouth daily 3/8/23  Yes Audie Raymundo MD   carvedilol (COREG) 6.25 MG tablet Take 12.5 mg by mouth every morning   Yes Unknown, Entered By History   carvedilol (COREG) 6.25 MG tablet Take 6.25 mg by mouth every evening   Yes Unknown, Entered By History   fluticasone (FLONASE) 50 MCG/ACT nasal spray Spray 1-2 sprays into both nostrils daily as needed for  allergies or rhinitis 8/6/23  Yes Reported, Patient   lisinopril (ZESTRIL) 5 MG tablet Take 1 tablet (5 mg) by mouth daily 3/8/23  Yes Audie Raymundo MD   Multiple Vitamins-Minerals (EMERGEN-C VITAMIN C) CHEW Take 1 each by mouth daily as needed (Using Sept to March for supplementation)   Yes Unknown, Entered By History   NONFORMULARY daily Sea Berumen Gel natural nutrient   Yes Unknown, Entered By History   omeprazole (PRILOSEC) 20 MG DR capsule Take 20 mg by mouth daily as needed   Yes Unknown, Entered By History          Mark Anderson MD [facility]  Non-Invasive Cardiologist  [facility]  Pager [phone]

## 2024-04-09 ENCOUNTER — PATIENT OUTREACH (OUTPATIENT)
Dept: CARE COORDINATION | Facility: CLINIC | Age: 39
End: 2024-04-09
Payer: COMMERCIAL

## 2024-04-09 NOTE — PROGRESS NOTES
Clinic Care Coordination Contact  [facility]: Post-Discharge Note  SITUATION                                                      Admission:    Admission Date: 04/07/24   Reason for Admission: Chest pain  Discharge:   Discharge Date: 04/08/24  Discharge Diagnosis: Palpitations    BACKGROUND                                                      Per hospital discharge summary and inpatient provider notes:    Azra Tom is a 38 year old female with a pertinent history of tobacco use, heart failure, who presents to this ED via walk-in for evaluation of chest pain.     The patient reports she was at work tonight and felt a sudden onset of heart racing palpitations while walking for a short duration. She performed some breathing techniques and felt her palpitations kick back into normal rhythm. She associated feeling sweaty and clammy with her palpitations. She still has some chest tightness/soreness and fatigue at this time. The patient felt fine earlier in the day. She has not had any upper respiratory symptoms as of recent.     The patient reports she had 3 previous episodes that was similar to tonight's episode. She does have a history of heart failure and has been compliant with her Lisinopril, Carveidolol, and other cardiac medications. Her last echocardiogram was done around a year ago. She currently follows with Dr. Zackary Bronson, cardiology at [facility].      Otherwise, the patient denied any other medical complaints or concerns at this time.    Per chart review, the patient had an office visit with [facility] on 08/08/2023. 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 The patient was doing better overall. She is complaining of intermittent palpitations that lasts around a minute before resolving spontaneously. She has no issues with BP or current cardiac medications. Patient seems to be doing  "alisa from a cardiopulmonary standpoint. Unclear etiology of palpitations. She had a prior EKG in 01/05/203 read is Afib, but this was unlikely given there are P waves present. Will plan to further evaluated her symptoms with a 14 day zio patch heart monitor.     ASSESSMENT      Discharge Assessment  How are you doing now that you are home?: \"I'm doing good.\"  How are your symptoms? (Red Flag symptoms escalate to triage hotline per guidelines): Improved  Do you feel your condition is stable enough to be safe at home until your provider visit?: Yes  Does the patient have their discharge instructions? : Yes  Does the patient have questions regarding their discharge instructions? : No  Were you started on any new medications or were there changes to any of your previous medications? : No  Does the patient have all of their medications?: Yes  Do you have questions regarding any of your medications? : No  Do you have all of your needed medical supplies or equipment (DME)?  (i.e. oxygen tank, CPAP, cane, etc.): Yes  Discharge follow-up appointment scheduled within 14 calendar days? : No  Is patient agreeable to assistance with scheduling? : Yes    Post-op ([facility] Only)  If the patient had a surgery or procedure, do they have any questions for a nurse?: No    PLAN                                                      Outpatient Plan:      Follow-up and recommended labs and tests  Follow up with primary care provider, Andra Oliveros, within 7 days for hospital follow- up. No follow up labs or test are needed.  Given the infrequent nature of her symptoms, I recommended she purchase a smart watch with electrocardiographic  capability to help capture the possible arrhythmia    Implantable loop recorder may also be an option.    Recommend outpatient sleep medicine consultation    Routine follow-up with her primary cardiologist Dr. Raymundo    No future appointments.      For any urgent concerns, please contact our 24 hour nurse " triage line: [phone] ([phone])         SUDHIR Solo  [phone]  [facility]

## 2024-04-15 LAB
ATRIAL RATE - MUSE: 73 BPM
DIASTOLIC BLOOD PRESSURE - MUSE: NORMAL MMHG
INTERPRETATION ECG - MUSE: NORMAL
P AXIS - MUSE: 41 DEGREES
PR INTERVAL - MUSE: 146 MS
QRS DURATION - MUSE: 82 MS
QT - MUSE: 382 MS
QTC - MUSE: 420 MS
R AXIS - MUSE: 58 DEGREES
SYSTOLIC BLOOD PRESSURE - MUSE: NORMAL MMHG
T AXIS - MUSE: 52 DEGREES
VENTRICULAR RATE- MUSE: 73 BPM

## 2024-04-27 ENCOUNTER — APPOINTMENT (OUTPATIENT)
Dept: CT IMAGING | Facility: CLINIC | Age: 39
End: 2024-04-27
Attending: EMERGENCY MEDICINE
Payer: COMMERCIAL

## 2024-04-27 ENCOUNTER — APPOINTMENT (OUTPATIENT)
Dept: GENERAL RADIOLOGY | Facility: CLINIC | Age: 39
End: 2024-04-27
Attending: EMERGENCY MEDICINE
Payer: COMMERCIAL

## 2024-04-27 ENCOUNTER — HOSPITAL ENCOUNTER (EMERGENCY)
Facility: CLINIC | Age: 39
Discharge: HOME | End: 2024-04-27
Attending: EMERGENCY MEDICINE | Admitting: EMERGENCY MEDICINE
Payer: COMMERCIAL

## 2024-04-27 VITALS
SYSTOLIC BLOOD PRESSURE: 111 MMHG | HEART RATE: 61 BPM | RESPIRATION RATE: 18 BRPM | OXYGEN SATURATION: 99 % | TEMPERATURE: 98 F | DIASTOLIC BLOOD PRESSURE: 74 MMHG

## 2024-04-27 DIAGNOSIS — R42 DIZZINESS: ICD-10-CM

## 2024-04-27 LAB
ALBUMIN SERPL BCG-MCNC: 4.3 G/DL (ref 3.5–5.2)
ALP SERPL-CCNC: 80 U/L (ref 40–150)
ALT SERPL W P-5'-P-CCNC: 16 U/L (ref 0–50)
ANION GAP SERPL CALCULATED.3IONS-SCNC: 10 MMOL/L (ref 7–15)
AST SERPL W P-5'-P-CCNC: 24 U/L (ref 0–45)
ATRIAL RATE - MUSE: 72 BPM
BASOPHILS # BLD AUTO: 0 10E3/UL (ref 0–0.2)
BASOPHILS NFR BLD AUTO: 0 %
BILIRUB SERPL-MCNC: 0.2 MG/DL
BUN SERPL-MCNC: 13.1 MG/DL (ref 6–20)
CALCIUM SERPL-MCNC: 9.1 MG/DL (ref 8.6–10)
CHLORIDE SERPL-SCNC: 103 MMOL/L (ref 98–107)
CREAT SERPL-MCNC: 0.84 MG/DL (ref 0.51–0.95)
DEPRECATED HCO3 PLAS-SCNC: 23 MMOL/L (ref 22–29)
DIASTOLIC BLOOD PRESSURE - MUSE: NORMAL MMHG
EGFRCR SERPLBLD CKD-EPI 2021: >90 ML/MIN/1.73M2
EOSINOPHIL # BLD AUTO: 0.1 10E3/UL (ref 0–0.7)
EOSINOPHIL NFR BLD AUTO: 2 %
ERYTHROCYTE [DISTWIDTH] IN BLOOD BY AUTOMATED COUNT: 13.4 % (ref 10–15)
GLUCOSE SERPL-MCNC: 96 MG/DL (ref 70–99)
HCT VFR BLD AUTO: 35.3 % (ref 35–47)
HGB BLD-MCNC: 11.5 G/DL (ref 11.7–15.7)
IMM GRANULOCYTES # BLD: 0 10E3/UL
IMM GRANULOCYTES NFR BLD: 0 %
INTERPRETATION ECG - MUSE: NORMAL
LYMPHOCYTES # BLD AUTO: 2.2 10E3/UL (ref 0.8–5.3)
LYMPHOCYTES NFR BLD AUTO: 36 %
MCH RBC QN AUTO: 28 PG (ref 26.5–33)
MCHC RBC AUTO-ENTMCNC: 32.6 G/DL (ref 31.5–36.5)
MCV RBC AUTO: 86 FL (ref 78–100)
MONOCYTES # BLD AUTO: 0.4 10E3/UL (ref 0–1.3)
MONOCYTES NFR BLD AUTO: 7 %
NEUTROPHILS # BLD AUTO: 3.2 10E3/UL (ref 1.6–8.3)
NEUTROPHILS NFR BLD AUTO: 55 %
NRBC # BLD AUTO: 0 10E3/UL
NRBC BLD AUTO-RTO: 0 /100
P AXIS - MUSE: 38 DEGREES
PLATELET # BLD AUTO: 200 10E3/UL (ref 150–450)
POTASSIUM SERPL-SCNC: 5.1 MMOL/L (ref 3.4–5.3)
PR INTERVAL - MUSE: 130 MS
PROT SERPL-MCNC: 7 G/DL (ref 6.4–8.3)
QRS DURATION - MUSE: 86 MS
QT - MUSE: 384 MS
QTC - MUSE: 420 MS
R AXIS - MUSE: 26 DEGREES
RBC # BLD AUTO: 4.1 10E6/UL (ref 3.8–5.2)
SODIUM SERPL-SCNC: 136 MMOL/L (ref 135–145)
SYSTOLIC BLOOD PRESSURE - MUSE: NORMAL MMHG
T AXIS - MUSE: 39 DEGREES
TROPONIN T SERPL HS-MCNC: <6 NG/L
TSH SERPL DL<=0.005 MIU/L-ACNC: 2.4 UIU/ML (ref 0.3–4.2)
VENTRICULAR RATE- MUSE: 72 BPM
WBC # BLD AUTO: 6 10E3/UL (ref 4–11)

## 2024-04-27 PROCEDURE — 70450 CT HEAD/BRAIN W/O DYE: CPT | Mod: 26 | Performed by: RADIOLOGY

## 2024-04-27 PROCEDURE — 99284 EMERGENCY DEPT VISIT MOD MDM: CPT | Performed by: EMERGENCY MEDICINE

## 2024-04-27 PROCEDURE — 93010 ELECTROCARDIOGRAM REPORT: CPT | Performed by: EMERGENCY MEDICINE

## 2024-04-27 PROCEDURE — 84443 ASSAY THYROID STIM HORMONE: CPT | Performed by: EMERGENCY MEDICINE

## 2024-04-27 PROCEDURE — 96361 HYDRATE IV INFUSION ADD-ON: CPT | Performed by: EMERGENCY MEDICINE

## 2024-04-27 PROCEDURE — 84484 ASSAY OF TROPONIN QUANT: CPT | Performed by: EMERGENCY MEDICINE

## 2024-04-27 PROCEDURE — 96360 HYDRATION IV INFUSION INIT: CPT | Performed by: EMERGENCY MEDICINE

## 2024-04-27 PROCEDURE — 250N000013 HC RX MED GY IP 250 OP 250 PS 637: Performed by: EMERGENCY MEDICINE

## 2024-04-27 PROCEDURE — 93005 ELECTROCARDIOGRAM TRACING: CPT | Performed by: EMERGENCY MEDICINE

## 2024-04-27 PROCEDURE — 258N000003 HC RX IP 258 OP 636: Performed by: EMERGENCY MEDICINE

## 2024-04-27 PROCEDURE — 36415 COLL VENOUS BLD VENIPUNCTURE: CPT | Performed by: EMERGENCY MEDICINE

## 2024-04-27 PROCEDURE — 85025 COMPLETE CBC W/AUTO DIFF WBC: CPT | Performed by: EMERGENCY MEDICINE

## 2024-04-27 PROCEDURE — 71046 X-RAY EXAM CHEST 2 VIEWS: CPT | Mod: 26 | Performed by: RADIOLOGY

## 2024-04-27 PROCEDURE — 70450 CT HEAD/BRAIN W/O DYE: CPT

## 2024-04-27 PROCEDURE — 250N000011 HC RX IP 250 OP 636: Performed by: EMERGENCY MEDICINE

## 2024-04-27 PROCEDURE — 80053 COMPREHEN METABOLIC PANEL: CPT | Performed by: EMERGENCY MEDICINE

## 2024-04-27 PROCEDURE — 71046 X-RAY EXAM CHEST 2 VIEWS: CPT

## 2024-04-27 PROCEDURE — 999N000248 HC STATISTIC IV INSERT WITH US BY RN

## 2024-04-27 PROCEDURE — 99285 EMERGENCY DEPT VISIT HI MDM: CPT | Mod: 25 | Performed by: EMERGENCY MEDICINE

## 2024-04-27 RX ORDER — ACETAMINOPHEN 325 MG/1
650 TABLET ORAL ONCE
Status: COMPLETED | OUTPATIENT
Start: 2024-04-27 | End: 2024-04-27

## 2024-04-27 RX ORDER — ONDANSETRON 2 MG/ML
4 INJECTION INTRAMUSCULAR; INTRAVENOUS EVERY 30 MIN PRN
Status: DISCONTINUED | OUTPATIENT
Start: 2024-04-27 | End: 2024-04-27 | Stop reason: HOSPADM

## 2024-04-27 RX ORDER — ONDANSETRON 4 MG/1
4 TABLET, ORALLY DISINTEGRATING ORAL ONCE
Status: COMPLETED | OUTPATIENT
Start: 2024-04-27 | End: 2024-04-27

## 2024-04-27 RX ADMIN — ONDANSETRON 4 MG: 4 TABLET, ORALLY DISINTEGRATING ORAL at 11:39

## 2024-04-27 RX ADMIN — ACETAMINOPHEN 650 MG: 325 TABLET, FILM COATED ORAL at 11:38

## 2024-04-27 RX ADMIN — SODIUM CHLORIDE 250 ML: 9 INJECTION, SOLUTION INTRAVENOUS at 12:03

## 2024-04-27 ASSESSMENT — ACTIVITIES OF DAILY LIVING (ADL)
ADLS_ACUITY_SCORE: 37

## 2024-04-27 NOTE — ED TRIAGE NOTES
PT says she left work at 0300 due to being dizzy. She still feels dizzy right now. She also feels chest tightness and nausea- no vomiting reported. Pt is alert, oriented, ambulatory

## 2024-04-27 NOTE — ED PROVIDER NOTES
[place] EMERGENCY DEPARTMENT ([facility])    4/27/24       ED PROVIDER NOTE   Vertical Triage A   History     Chief Complaint   Patient presents with    Dizziness    Chest Pain     Chest tightness       The history is provided by the patient and medical records.     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  She walked to the break area and rested on the couch but her dizziness persisted.  She was sent home early and tried to sleep to see if this would help her.  She thought that her symptoms were due to not getting enough rest or water and so she slept all day yesterday. Patient came to the Emergency Department today because she continues to feel lightheaded and dizzy, had difficulty getting up on her own due to dizziness. Denies vertiginous symptoms or any sensation of room spinning, just doesn't feel right. Notes feeling extremely dizzy during her prior hospitalization for viral myocarditis. She feels dizzy with a headache, dizziness worsens when she turns her head too fast. She notes her vision was a little blurry with this. She states that her chest feels very tight but it gets this every now and then. Has been having nausea but no vomiting. She notes she hasn't eaten in 24 hours.  Feels very gassy, passing small stools though denies feeling constipated. She has had 3 pregnancies and 2 children. Denies pregnancy. No leg pain.     Patient notes that she was seen at [facility] Emergency Department on 4/7/24 for near-syncope. She  was kept to see if symptoms would recur. She wasn't having dizziness at that time. Had echocardiogram which was normal (pasted below). As for prior surgeries she notes history of hand surgery (when  some digits got severed), toe surgery, tonsillar adenoidectomy as well as biopsy.   On carvedilol, aspirin.    Epic records reviewed. Patient had prior significant hospitalization for viral myocarditis from 1/28-2/11/20. At that time she had presented to outside ER complaining of chest pain. She was found to have acute combined systolic and diastolic heart failure.     ECHOCARDIOGRAM COMPLETE 4/8/2024   Interpretation Summary     1. The left ventricle is normal in size. Left ventricular function is  normal.The ejection fraction is 60-65%.No regional wall motion abnormalities  noted. Left ventricular diastolic function is normal.  2. Normal right ventricle size and systolic function.  3. No hemodynamically significant valvular abnormalities on 2D or color flow  imaging. Compared to prior study, there is no significant change.      Past Medical History  Past Medical History:   Diagnosis Date    Atypical chest pain     history of atypical chest pain in 2017    Bipolar disorder (H)     diagnosed in [place], [place] in 2017    Dyslipidemia     Tobacco use     Uncomplicated asthma      Past Surgical History:   Procedure Laterality Date    CV EXTRACORPERAL MEMBRANE OXYGENATION N/A 1/29/2020    Procedure: ECMO, NATIVE HEART BIOPSY;  Surgeon: Richard Montana MD;  Location:  HEART CARDIAC CATH LAB    CV HEART BIOPSY N/A 1/29/2020    Procedure: Heart Biopsy;  Surgeon: Richard Montana MD;  Location:  HEART CARDIAC CATH LAB    REMOVE EXTRACORPORAL MEMBRANE OXYGENATOR ADULT Left 2/3/2020    Procedure: REMOVAL Extra Corporeal Membrain Oxygenator, Inta operative transesophageal echocardiogram, Repair of femoral vessel;  Surgeon: Vishnu Marx MD;  Location:  OR     acetaminophen (TYLENOL) 500 MG tablet  albuterol (PROAIR HFA/PROVENTIL HFA/VENTOLIN HFA) 108 (90 Base) MCG/ACT inhaler  aspirin (ASA) 81 MG chewable tablet  carvedilol (COREG) 6.25 MG tablet  carvedilol (COREG) 6.25 MG tablet  fluticasone (FLONASE) 50 MCG/ACT  nasal spray  lisinopril (ZESTRIL) 5 MG tablet  Multiple Vitamins-Minerals (EMERGEN-C VITAMIN C) CHEW  NONFORMULARY  omeprazole (PRILOSEC) 20 MG DR capsule      No Known Allergies  Family History  No family history on file.  Social History   Social History     Tobacco Use    Smoking status: Former    Smokeless tobacco: Never   Substance Use Topics    Alcohol use: Not Currently    Drug use: Not Currently      Past medical history, past surgical history, medications, allergies, family history, and social history were reviewed with the patient. No additional pertinent items.      A complete review of systems was performed with pertinent positives and negatives noted in the HPI, and all other systems negative.    Physical Exam      Physical Exam  Constitutional:       General: She is not in acute distress.     Appearance: She is well-developed. She is not ill-appearing, toxic-appearing or diaphoretic.   HENT:      Head: Normocephalic and atraumatic.   Eyes:      Extraocular Movements: Extraocular movements intact.      Pupils: Pupils are equal, round, and reactive to light.      Comments: No nystagmus   Cardiovascular:      Rate and Rhythm: Normal rate and regular rhythm.      Heart sounds: Normal heart sounds.   Pulmonary:      Effort: Pulmonary effort is normal. No respiratory distress.      Breath sounds: Normal breath sounds.   Chest:      Chest wall: No mass, deformity or tenderness.   Abdominal:      General: There is no distension or abdominal bruit.      Palpations: Abdomen is soft.      Tenderness: There is no abdominal tenderness. There is no rebound.   Musculoskeletal:         General: No tenderness. Normal range of motion.      Cervical back: Normal range of motion.   Skin:     General: Skin is warm and dry.   Neurological:      General: No focal deficit present.      Mental Status: She is alert and oriented to person, place, and time.   Psychiatric:         Behavior: Behavior normal.         Thought Content:  Thought content normal.           ED Course, Procedures, & Data      Procedures            EKG Interpretation:      Interpreted by Carlee Rebolledo MD  Time reviewed:1036   Symptoms at time of EKG: none   Rhythm: normal sinus   Rate: normal  Axis: NORMAL  Ectopy: none  Conduction: normal  ST Segments/ T Waves: No ST-T wave changes  Q Waves: none  Comparison to prior: Unchanged    Clinical Impression: normal EKG        Results for orders placed or performed during the hospital encounter of 04/27/24   XR Chest 2 Views     Status: None    Narrative    Exam: XR CHEST 2 VIEWS, 4/27/2024 11:19 AM    Indication: chest pain    Comparison: 4/8/2024, 12/10/2023    Findings:   The cardiomediastinal silhouette and pulmonary vasculature are within  normal limits. No pleural effusion or pneumothorax. No focal airspace  opacity.      Impression    Impression: No acute cardiopulmonary abnormality.    IESHA SALINAS DO         SYSTEM ID:  [id number]   CT Head w/o Contrast     Status: None    Narrative    EXAM: CT HEAD W/O CONTRAST  4/27/2024 11:38 AM     HISTORY:  dizziness       COMPARISON:  12/14/2021    TECHNIQUE: Using multidetector thin collimation helical acquisition  technique, axial, coronal and sagittal CT images from the skull base  to the vertex were obtained without intravenous contrast.   (topogram) image(s) also obtained and reviewed.    FINDINGS:  No acute intracranial hemorrhage, mass effect, or midline shift. No  acute loss of gray-white matter differentiation in the cerebral  hemispheres. Ventricles are proportionate to the cerebral sulci. Basal  cisterns are clear.    The bony calvaria and the bones of the skull base are normal. The  visualized portions of the paranasal sinuses and mastoid air cells are  clear. Grossly normal orbits.       Impression    IMPRESSION: No acute intracranial abnormality.    I have personally reviewed the examination and initial interpretation  and I agree with the  findings.    EMILY MACARIO MD         SYSTEM ID:  [id number]   Comprehensive metabolic panel     Status: Normal   Result Value Ref Range    Sodium 136 135 - 145 mmol/L    Potassium 5.1 3.4 - 5.3 mmol/L    Carbon Dioxide (CO2) 23 22 - 29 mmol/L    Anion Gap 10 7 - 15 mmol/L    Urea Nitrogen 13.1 6.0 - 20.0 mg/dL    Creatinine 0.84 0.51 - 0.95 mg/dL    GFR Estimate >90 >60 mL/min/1.73m2    Calcium 9.1 8.6 - 10.0 mg/dL    Chloride 103 98 - 107 mmol/L    Glucose 96 70 - 99 mg/dL    Alkaline Phosphatase 80 40 - 150 U/L    AST 24 0 - 45 U/L    ALT 16 0 - 50 U/L    Protein Total 7.0 6.4 - 8.3 g/dL    Albumin 4.3 3.5 - 5.2 g/dL    Bilirubin Total 0.2 <=1.2 mg/dL   TSH with free T4 reflex     Status: Normal   Result Value Ref Range    TSH 2.40 0.30 - 4.20 uIU/mL   Troponin T, High Sensitivity     Status: Normal   Result Value Ref Range    Troponin T, High Sensitivity <6 <=14 ng/L   CBC with platelets and differential     Status: Abnormal   Result Value Ref Range    WBC Count 6.0 4.0 - 11.0 10e3/uL    RBC Count 4.10 3.80 - 5.20 10e6/uL    Hemoglobin 11.5 (L) 11.7 - 15.7 g/dL    Hematocrit 35.3 35.0 - 47.0 %    MCV 86 78 - 100 fL    MCH 28.0 26.5 - 33.0 pg    MCHC 32.6 31.5 - 36.5 g/dL    RDW 13.4 10.0 - 15.0 %    Platelet Count 200 150 - 450 10e3/uL    % Neutrophils 55 %    % Lymphocytes 36 %    % Monocytes 7 %    % Eosinophils 2 %    % Basophils 0 %    % Immature Granulocytes 0 %    NRBCs per 100 WBC 0 <1 /100    Absolute Neutrophils 3.2 1.6 - 8.3 10e3/uL    Absolute Lymphocytes 2.2 0.8 - 5.3 10e3/uL    Absolute Monocytes 0.4 0.0 - 1.3 10e3/uL    Absolute Eosinophils 0.1 0.0 - 0.7 10e3/uL    Absolute Basophils 0.0 0.0 - 0.2 10e3/uL    Absolute Immature Granulocytes 0.0 <=0.4 10e3/uL    Absolute NRBCs 0.0 10e3/uL   EKG 12 lead     Status: None (Preliminary result)   Result Value Ref Range    Systolic Blood Pressure  mmHg    Diastolic Blood Pressure  mmHg    Ventricular Rate 72 BPM    Atrial Rate 72 BPM    TX Interval 130 ms     QRS Duration 86 ms     ms    QTc 420 ms    P Axis 38 degrees    R AXIS 26 degrees    T Axis 39 degrees    Interpretation ECG Sinus rhythm  Normal ECG      CBC with platelets differential     Status: Abnormal    Narrative    The following orders were created for panel order CBC with platelets differential.  Procedure                               Abnormality         Status                     ---------                               -----------         ------                     CBC with platelets and d...[[id number]]  Abnormal            Final result                 Please view results for these tests on the individual orders.     Medications   ondansetron (ZOFRAN) injection 4 mg (has no administration in time range)   sodium chloride 0.9% BOLUS 250 mL (0 mLs Intravenous Stopped 4/27/24 1427)   acetaminophen (TYLENOL) tablet 650 mg (650 mg Oral $Given 4/27/24 1138)   ondansetron (ZOFRAN ODT) ODT tab 4 mg (4 mg Oral $Given 4/27/24 1139)           No results found for any visits on 04/27/24.  Medications - No data to display  Labs Ordered and Resulted from Time of ED Arrival to Time of ED Departure - No data to display  No orders to display          Critical care was not performed.     Medical Decision Making  The patient's presentation was of moderate complexity (an undiagnosed new problem with uncertain diagnosis).    The patient's evaluation involved:  review of external note(s) from 3+ sources (see separate area of note for details)  review of 3+ test result(s) ordered prior to this encounter (see separate area of note for details)  ordering and/or review of 3+ test(s) in this encounter (see separate area of note for details)  discussion of management or test interpretation with another health professional (see separate area of note for details)      The patient's management necessitated moderate risk (prescription drug management including medications given in the ED).    Assessment & Plan    Patient is a  38-year-old female with a known history of prior viral myocarditis who presented to the ER due to episode of dizziness.  Patient received IV fluids here in the ER and ate and now she is feeling better.  Patient is a CT head which was normal.  Patient's labs are normal.  Feel that patient's symptoms are noncardiac in nature.  Do not feel she is having symptoms consistent with a posterior circulation stroke.  Feel like she was likely more dehydrated or sleep deprived which caused her dizziness.  Since patient is feeling better will discharge home.  Patient to follow-up with her PCP for further care.    I have reviewed the nursing notes. I have reviewed the findings, diagnosis, plan and need for follow up with the patient.    New Prescriptions    No medications on file       Final diagnoses:   Dizziness     I, Clarice William, am serving as a trained medical scribe to document services personally performed by Carlee Rebolledo MD based on the provider's statements to me on April 27, 2024.  This document has been checked and approved by the attending provider.    I, Carlee Rebolledo MD, was physically present and have reviewed and verified the accuracy of this note documented by Clarice William, medical scribe.      Carlee Rebolledo MD   [facility] EMERGENCY DEPARTMENT  4/27/2024     Carlee Rebolledo MD  04/27/24 [id number]

## 2024-04-27 NOTE — DISCHARGE INSTRUCTIONS
Your CT head is normal.     Your EKG and blood work is normal.     Please follow-up with your primary care doctor.     Return to the ER if any other problems/concerns.

## 2024-06-17 ENCOUNTER — HOSPITAL ENCOUNTER (EMERGENCY)
Facility: HOSPITAL | Age: 39
Discharge: HOME | End: 2024-06-18
Attending: STUDENT IN AN ORGANIZED HEALTH CARE EDUCATION/TRAINING PROGRAM | Admitting: STUDENT IN AN ORGANIZED HEALTH CARE EDUCATION/TRAINING PROGRAM
Payer: COMMERCIAL

## 2024-06-17 ENCOUNTER — APPOINTMENT (OUTPATIENT)
Dept: RADIOLOGY | Facility: HOSPITAL | Age: 39
End: 2024-06-17
Attending: STUDENT IN AN ORGANIZED HEALTH CARE EDUCATION/TRAINING PROGRAM
Payer: COMMERCIAL

## 2024-06-17 DIAGNOSIS — M79.89 LEG SWELLING: ICD-10-CM

## 2024-06-17 PROBLEM — I10 ESSENTIAL HYPERTENSION: Status: ACTIVE | Noted: 2020-07-17

## 2024-06-17 LAB
ACANTHOCYTES BLD QL SMEAR: NORMAL
ALBUMIN SERPL BCG-MCNC: 3.8 G/DL (ref 3.5–5.2)
ALP SERPL-CCNC: 81 U/L (ref 40–150)
ALT SERPL W P-5'-P-CCNC: 14 U/L (ref 0–50)
ANION GAP SERPL CALCULATED.3IONS-SCNC: 8 MMOL/L (ref 7–15)
AST SERPL W P-5'-P-CCNC: 20 U/L (ref 0–45)
AUER BODIES BLD QL SMEAR: NORMAL
BASO STIPL BLD QL SMEAR: NORMAL
BASOPHILS # BLD AUTO: 0 10E3/UL (ref 0–0.2)
BASOPHILS NFR BLD AUTO: 0 %
BILIRUB SERPL-MCNC: 0.3 MG/DL
BITE CELLS BLD QL SMEAR: NORMAL
BLISTER CELLS BLD QL SMEAR: NORMAL
BUN SERPL-MCNC: 8.5 MG/DL (ref 6–20)
BURR CELLS BLD QL SMEAR: NORMAL
CALCIUM SERPL-MCNC: 8.5 MG/DL (ref 8.6–10)
CHLORIDE SERPL-SCNC: 104 MMOL/L (ref 98–107)
CREAT SERPL-MCNC: 0.67 MG/DL (ref 0.51–0.95)
DACRYOCYTES BLD QL SMEAR: NORMAL
DEPRECATED HCO3 PLAS-SCNC: 27 MMOL/L (ref 22–29)
EGFRCR SERPLBLD CKD-EPI 2021: >90 ML/MIN/1.73M2
ELLIPTOCYTES BLD QL SMEAR: NORMAL
EOSINOPHIL # BLD AUTO: 0.1 10E3/UL (ref 0–0.7)
EOSINOPHIL NFR BLD AUTO: 1 %
ERYTHROCYTE [DISTWIDTH] IN BLOOD BY AUTOMATED COUNT: 13.4 % (ref 10–15)
FRAGMENTS BLD QL SMEAR: NORMAL
GIANT PLATELETS BLD QL SMEAR: NORMAL
GLUCOSE SERPL-MCNC: 123 MG/DL (ref 70–99)
HCT VFR BLD AUTO: 33.2 % (ref 35–47)
HGB BLD-MCNC: 10.6 G/DL (ref 11.7–15.7)
HGB C CRYSTALS: NORMAL
HOLD SPECIMEN: NORMAL
HOLD SPECIMEN: NORMAL
HOWELL-JOLLY BOD BLD QL SMEAR: NORMAL
IMM GRANULOCYTES # BLD: 0 10E3/UL
IMM GRANULOCYTES NFR BLD: 0 %
LYMPHOCYTES # BLD AUTO: 1.9 10E3/UL (ref 0.8–5.3)
LYMPHOCYTES NFR BLD AUTO: 38 %
MCH RBC QN AUTO: 27.7 PG (ref 26.5–33)
MCHC RBC AUTO-ENTMCNC: 31.9 G/DL (ref 31.5–36.5)
MCV RBC AUTO: 87 FL (ref 78–100)
MONOCYTES # BLD AUTO: 0.4 10E3/UL (ref 0–1.3)
MONOCYTES NFR BLD AUTO: 7 %
NEUTROPHILS # BLD AUTO: 2.8 10E3/UL (ref 1.6–8.3)
NEUTROPHILS NFR BLD AUTO: 53 %
NEUTS HYPERSEG BLD QL SMEAR: NORMAL
NRBC # BLD AUTO: 0 10E3/UL
NRBC BLD AUTO-RTO: 0 /100
NT-PROBNP SERPL-MCNC: <36 PG/ML (ref 0–450)
PATH REV: NORMAL
PLAT MORPH BLD: NORMAL
PLATELET # BLD AUTO: 163 10E3/UL (ref 150–450)
POLYCHROMASIA BLD QL SMEAR: NORMAL
POTASSIUM SERPL-SCNC: 4.2 MMOL/L (ref 3.4–5.3)
PROT SERPL-MCNC: 6.4 G/DL (ref 6.4–8.3)
RBC # BLD AUTO: 3.82 10E6/UL (ref 3.8–5.2)
RBC AGGLUT BLD QL: NORMAL
RBC MORPH BLD: NORMAL
ROULEAUX BLD QL SMEAR: NORMAL
SICKLE CELLS BLD QL SMEAR: NORMAL
SMUDGE CELLS BLD QL SMEAR: NORMAL
SODIUM SERPL-SCNC: 139 MMOL/L (ref 135–145)
SPHEROCYTES BLD QL SMEAR: NORMAL
STOMATOCYTES BLD QL SMEAR: NORMAL
TARGETS BLD QL SMEAR: NORMAL
TOXIC GRANULES BLD QL SMEAR: NORMAL
TROPONIN T SERPL HS-MCNC: <6 NG/L
VARIANT LYMPHS BLD QL SMEAR: NORMAL
WBC # BLD AUTO: 5.2 10E3/UL (ref 4–11)

## 2024-06-17 PROCEDURE — 80053 COMPREHEN METABOLIC PANEL: CPT | Performed by: STUDENT IN AN ORGANIZED HEALTH CARE EDUCATION/TRAINING PROGRAM

## 2024-06-17 PROCEDURE — 93005 ELECTROCARDIOGRAM TRACING: CPT | Performed by: STUDENT IN AN ORGANIZED HEALTH CARE EDUCATION/TRAINING PROGRAM

## 2024-06-17 PROCEDURE — 99285 EMERGENCY DEPT VISIT HI MDM: CPT | Mod: 25

## 2024-06-17 PROCEDURE — 85025 COMPLETE CBC W/AUTO DIFF WBC: CPT | Performed by: STUDENT IN AN ORGANIZED HEALTH CARE EDUCATION/TRAINING PROGRAM

## 2024-06-17 PROCEDURE — 36415 COLL VENOUS BLD VENIPUNCTURE: CPT | Performed by: STUDENT IN AN ORGANIZED HEALTH CARE EDUCATION/TRAINING PROGRAM

## 2024-06-17 PROCEDURE — 83880 ASSAY OF NATRIURETIC PEPTIDE: CPT | Performed by: STUDENT IN AN ORGANIZED HEALTH CARE EDUCATION/TRAINING PROGRAM

## 2024-06-17 PROCEDURE — 84484 ASSAY OF TROPONIN QUANT: CPT | Performed by: STUDENT IN AN ORGANIZED HEALTH CARE EDUCATION/TRAINING PROGRAM

## 2024-06-17 PROCEDURE — 250N000013 HC RX MED GY IP 250 OP 250 PS 637: Performed by: STUDENT IN AN ORGANIZED HEALTH CARE EDUCATION/TRAINING PROGRAM

## 2024-06-17 PROCEDURE — 71046 X-RAY EXAM CHEST 2 VIEWS: CPT

## 2024-06-17 RX ORDER — ACETAMINOPHEN 325 MG/1
975 TABLET ORAL ONCE
Status: COMPLETED | OUTPATIENT
Start: 2024-06-18 | End: 2024-06-17

## 2024-06-17 RX ADMIN — ACETAMINOPHEN 975 MG: 325 TABLET ORAL at 23:36

## 2024-06-17 ASSESSMENT — ACTIVITIES OF DAILY LIVING (ADL): ADLS_ACUITY_SCORE: 37

## 2024-06-17 NOTE — Clinical Note
Azra Tom was seen and treated in our emergency department on 6/17/2024.  She may return to work on 06/20/2024.       If you have any questions or concerns, please don't hesitate to call.      Bay Del Cid MD

## 2024-06-18 ENCOUNTER — APPOINTMENT (OUTPATIENT)
Dept: ULTRASOUND IMAGING | Facility: HOSPITAL | Age: 39
End: 2024-06-18
Attending: STUDENT IN AN ORGANIZED HEALTH CARE EDUCATION/TRAINING PROGRAM
Payer: COMMERCIAL

## 2024-06-18 VITALS
OXYGEN SATURATION: 98 % | BODY MASS INDEX: 39.25 KG/M2 | TEMPERATURE: 98.8 F | SYSTOLIC BLOOD PRESSURE: 107 MMHG | HEART RATE: 76 BPM | RESPIRATION RATE: 22 BRPM | WEIGHT: 235.6 LBS | DIASTOLIC BLOOD PRESSURE: 54 MMHG | HEIGHT: 65 IN

## 2024-06-18 LAB
ATRIAL RATE - MUSE: 69 BPM
DIASTOLIC BLOOD PRESSURE - MUSE: NORMAL MMHG
INTERPRETATION ECG - MUSE: NORMAL
P AXIS - MUSE: 34 DEGREES
PR INTERVAL - MUSE: 144 MS
QRS DURATION - MUSE: 86 MS
QT - MUSE: 406 MS
QTC - MUSE: 435 MS
R AXIS - MUSE: 27 DEGREES
SYSTOLIC BLOOD PRESSURE - MUSE: NORMAL MMHG
T AXIS - MUSE: 22 DEGREES
VENTRICULAR RATE- MUSE: 69 BPM

## 2024-06-18 PROCEDURE — 93970 EXTREMITY STUDY: CPT

## 2024-06-18 ASSESSMENT — ACTIVITIES OF DAILY LIVING (ADL): ADLS_ACUITY_SCORE: 37

## 2024-06-18 NOTE — DISCHARGE INSTRUCTIONS
Your workup was reassuring today and did not show evidence of heart failure, kidney failure, liver failure or blood clots.  These are the usual sources we evaluated in the emergency department to evaluate for leg swelling.  Follow-up with your primary care provider regarding this weight gain and leg swelling.    Please return to the emergency department if your symptoms worsen, if you develop difficulty breathing, chest pain.

## 2024-06-18 NOTE — ED TRIAGE NOTES
Patient has a hx of heart failure with echmo.  Was at a MRI for her feet and was instructed to come in for increased swelling in her legs for the last couple days. 10 lb increase over the past week.          Triage Assessment (Adult)       Row Name 06/17/24 [id number]          Triage Assessment    Airway WDL WDL        Respiratory WDL    Respiratory WDL WDL        Skin Circulation/Temperature WDL    Skin Circulation/Temperature WDL WDL        Cardiac WDL    Cardiac WDL WDL        Peripheral/Neurovascular WDL    Peripheral Neurovascular WDL WDL        Cognitive/Neuro/Behavioral WDL    Cognitive/Neuro/Behavioral WDL WDL

## 2024-06-18 NOTE — ED PROVIDER NOTES
EMERGENCY DEPARTMENT ENCOUNTER      NAME: Azra Tom  AGE: 38 year old female  YOB: 1985  MRN: [id number]  EVALUATION DATE & TIME: 6/17/2024 10:25 PM    PCP: Andra Oliveros    ED PROVIDER: Bay Del Cid MD      Chief Complaint   Patient presents with    Foot Swelling         FINAL IMPRESSION:  1. Leg swelling          ED COURSE & MEDICAL DECISION MAKING:    Pertinent Labs & Imaging studies reviewed. 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  On exam she is well-appearing, no crackles on lung auscultation, and although her legs are swollen, they are not edematous and do not pit.  Normal distal pulses.  Will evaluate with BNP, chest x-ray, troponin, EKG for potential heart failure, but I have a lower suspicion of this.   2313 EKG is sinus rhythm with sinus arrhythmia at a rate of 69, no ST segment changes indicative of emergent ischemia.   2314 Troponin undetectable and given duration of symptoms does not warrant repeating.  No electrolyte abnormalities or kidney injury.  No transaminitis.  No leukocytosis.  Baseline anemia at 10.6.   2317 BNP is undetectable.   2357 Chest x-ray does not show any acute cardiopulmonary process.  Will obtain bilateral lower extremity ultrasound to rule out DVTs.   Tue Jun 18, 2024   0113 No DVT of bilateral lower extremities on ultrasound.  With her workup being overall reassuring, no evidence of pitting edema on exam, patient can be safely discharged home and follow-up with her PCP regarding her weight gain, has no emergent  diagnoses to explain her symptoms have been found.  Return precautions provided.       Medical Decision Making  Obtained supplemental history:Supplemental history obtained?: Family Member/Significant Other  Reviewed external records: External records reviewed?: No  Care impacted by chronic illness:Chronic Lung Disease, Heart Disease, and Hypertension  Care significantly affected by social determinants of health:N/A  Did you consider but not order tests?: Work up considered but not performed and documented in chart, if applicable  Did you interpret images independently?: Independent interpretation of ECG and images noted in documentation, when applicable.  Consultation discussion with other provider:Did you involve another provider (consultant, , pharmacy, etc.)?: No  Discharge. No recommendations on prescription strength medication(s). See documentation for any additional details.        At the conclusion of the encounter I discussed the results of all of the tests and the disposition. The questions were answered. The patient or family acknowledged understanding and was agreeable with the care plan.     0 minutes of critical care time     MEDICATIONS GIVEN IN THE EMERGENCY:  Medications   acetaminophen (TYLENOL) tablet 975 mg (975 mg Oral $Given 6/17/24 5745)       NEW PRESCRIPTIONS STARTED AT TODAY'S ER VISIT  New Prescriptions    No medications on file          =================================================================    HPI    Patient information was obtained from: The patient and her      Use of : N/A         Azra Tom is a 38 year old female with a pertinent history of HTN, heart failure, and asthma who presents to this ED by private vehicle for evaluation of foot swelling.     The patient had an MRI today for a soft tissue mass on the bottom of her right foot and a lump on the back of her ankle. They noticed that her bilateral lower legs were swollen and suggested she come in  for evaluation. She has noticed a 10 pound weight gain in the last month. She notes that when she has had leg swelling in the past, but the swelling has resolved with elevation. Today her leg swelling has been present since 7 AM. She also endorses a headache. She denies any shortness of breath, cough or other swelling. She has no recent med changes. She had an Echo done a few months ago.       PAST MEDICAL HISTORY:  Past Medical History:   Diagnosis Date    Atypical chest pain     history of atypical chest pain in 2017    Bipolar disorder (H)     diagnosed in [place], [place] in 2017    Dyslipidemia     Tobacco use     Uncomplicated asthma        PAST SURGICAL HISTORY:  Past Surgical History:   Procedure Laterality Date    CV EXTRACORPERAL MEMBRANE OXYGENATION N/A 1/29/2020    Procedure: ECMO, NATIVE HEART BIOPSY;  Surgeon: Richard Montana MD;  Location:  HEART CARDIAC CATH LAB    CV HEART BIOPSY N/A 1/29/2020    Procedure: Heart Biopsy;  Surgeon: Richard Montana MD;  Location:  HEART CARDIAC CATH LAB    REMOVE EXTRACORPORAL MEMBRANE OXYGENATOR ADULT Left 2/3/2020    Procedure: REMOVAL Extra Corporeal Membrain Oxygenator, Inta operative transesophageal echocardiogram, Repair of femoral vessel;  Surgeon: Vishnu Marx MD;  Location:  OR           CURRENT MEDICATIONS:    acetaminophen (TYLENOL) 500 MG tablet  albuterol (PROAIR HFA/PROVENTIL HFA/VENTOLIN HFA) 108 (90 Base) MCG/ACT inhaler  aspirin (ASA) 81 MG chewable tablet  carvedilol (COREG) 6.25 MG tablet  carvedilol (COREG) 6.25 MG tablet  fluticasone (FLONASE) 50 MCG/ACT nasal spray  lisinopril (ZESTRIL) 5 MG tablet  Multiple Vitamins-Minerals (EMERGEN-C VITAMIN C) CHEW  NONFORMULARY  omeprazole (PRILOSEC) 20 MG DR capsule        ALLERGIES:  No Known Allergies    FAMILY HISTORY:  No family history on file.    SOCIAL HISTORY:   Social History     Socioeconomic History    Marital status: Single   Tobacco Use    Smoking status: Former    Smokeless  "tobacco: Never   Substance and Sexual Activity    Alcohol use: Not Currently    Drug use: Not Currently       VITALS:  /61   Pulse 66   Temp 98.8  F (37.1  C)   Resp 22   Ht 1.651 m (5' 5\")   Wt 106.9 kg (235 lb 9.6 oz)   SpO2 98%   BMI 39.21 kg/m      PHYSICAL EXAM    Physical Exam  Vitals and nursing note reviewed.   Constitutional:       General: She is not in acute distress.     Appearance: Normal appearance. She is normal weight. She is not ill-appearing.   HENT:      Head: Normocephalic and atraumatic.      Nose: Nose normal.      Mouth/Throat:      Mouth: Mucous membranes are moist.      Pharynx: Oropharynx is clear.   Eyes:      Extraocular Movements: Extraocular movements intact.      Conjunctiva/sclera: Conjunctivae normal.      Pupils: Pupils are equal, round, and reactive to light.   Cardiovascular:      Rate and Rhythm: Normal rate and regular rhythm.      Pulses: Normal pulses.      Heart sounds: Normal heart sounds. No murmur heard.  Pulmonary:      Effort: Pulmonary effort is normal. No respiratory distress.      Breath sounds: Normal breath sounds.   Abdominal:      General: Abdomen is flat. There is no distension.      Palpations: Abdomen is soft.      Tenderness: There is no abdominal tenderness.   Musculoskeletal:         General: Normal range of motion.      Cervical back: Normal range of motion.      Right lower leg: No edema.      Left lower leg: No edema.   Skin:     General: Skin is warm and dry.      Capillary Refill: Capillary refill takes less than 2 seconds.   Neurological:      General: No focal deficit present.      Mental Status: She is alert and oriented to person, place, and time. Mental status is at baseline.   Psychiatric:         Mood and Affect: Mood normal.         Behavior: Behavior normal.         Thought Content: Thought content normal.         Judgment: Judgment normal.            LAB:  All pertinent labs reviewed and interpreted.  Results for orders placed or " performed during the hospital encounter of 06/17/24   Chest XR,  PA & LAT    Impression    IMPRESSION: Negative chest.   US Lower Extremity Venous Duplex Bilateral    Impression    IMPRESSION:  1.  No deep venous thrombosis in the bilateral lower extremities.   Troponin T, High Sensitivity (now)   Result Value Ref Range    Troponin T, High Sensitivity <6 <=14 ng/L   Nt probnp inpatient   Result Value Ref Range    N terminal Pro BNP Inpatient <36 0 - 450 pg/mL   Comprehensive metabolic panel   Result Value Ref Range    Sodium 139 135 - 145 mmol/L    Potassium 4.2 3.4 - 5.3 mmol/L    Carbon Dioxide (CO2) 27 22 - 29 mmol/L    Anion Gap 8 7 - 15 mmol/L    Urea Nitrogen 8.5 6.0 - 20.0 mg/dL    Creatinine 0.67 0.51 - 0.95 mg/dL    GFR Estimate >90 >60 mL/min/1.73m2    Calcium 8.5 (L) 8.6 - 10.0 mg/dL    Chloride 104 98 - 107 mmol/L    Glucose 123 (H) 70 - 99 mg/dL    Alkaline Phosphatase 81 40 - 150 U/L    AST 20 0 - 45 U/L    ALT 14 0 - 50 U/L    Protein Total 6.4 6.4 - 8.3 g/dL    Albumin 3.8 3.5 - 5.2 g/dL    Bilirubin Total 0.3 <=1.2 mg/dL   CBC with platelets and differential   Result Value Ref Range    WBC Count 5.2 4.0 - 11.0 10e3/uL    RBC Count 3.82 3.80 - 5.20 10e6/uL    Hemoglobin 10.6 (L) 11.7 - 15.7 g/dL    Hematocrit 33.2 (L) 35.0 - 47.0 %    MCV 87 78 - 100 fL    MCH 27.7 26.5 - 33.0 pg    MCHC 31.9 31.5 - 36.5 g/dL    RDW 13.4 10.0 - 15.0 %    Platelet Count 163 150 - 450 10e3/uL    % Neutrophils 53 %    % Lymphocytes 38 %    % Monocytes 7 %    % Eosinophils 1 %    % Basophils 0 %    % Immature Granulocytes 0 %    NRBCs per 100 WBC 0 <1 /100    Absolute Neutrophils 2.8 1.6 - 8.3 10e3/uL    Absolute Lymphocytes 1.9 0.8 - 5.3 10e3/uL    Absolute Monocytes 0.4 0.0 - 1.3 10e3/uL    Absolute Eosinophils 0.1 0.0 - 0.7 10e3/uL    Absolute Basophils 0.0 0.0 - 0.2 10e3/uL    Absolute Immature Granulocytes 0.0 <=0.4 10e3/uL    Absolute NRBCs 0.0 10e3/uL   Extra Blue Top Tube   Result Value Ref Range    Hold Specimen  JIC    Extra Red Top Tube   Result Value Ref Range    Hold Specimen JIC    RBC and Platelet Morphology   Result Value Ref Range    RBC Morphology Confirmed RBC Indices     Platelet Assessment  Automated Count Confirmed. Platelet morphology is normal.     Automated Count Confirmed. Platelet morphology is normal.    Giant Platelets      Acanthocytes      Pranav Rods      Basophilic Stippling      Bite Cells      Blister Cells      Willa Cells      Elliptocytes      Hgb C Crystals      Toro-Jolly Bodies      Hypersegmented Neutrophils      Polychromasia      RBC agglutination      RBC Fragments      Reactive Lymphocytes      Rouleaux      Sickle Cells      Smudge Cells      Spherocytes      Stomatocytes      Target Cells      Teardrop Cells      Toxic Neutrophils      Pathologist Review Comments (Blood)         RADIOLOGY:  Reviewed all pertinent imaging. Please see official radiology report.  US Lower Extremity Venous Duplex Bilateral   Final Result   IMPRESSION:   1.  No deep venous thrombosis in the bilateral lower extremities.      Chest XR,  PA & LAT   Final Result   IMPRESSION: Negative chest.          PROCEDURES:   None      [facility] Documentation:   CMS Diagnoses:               I, Diamante Fam, am serving as a scribe to document services personally performed by Bay Del Cid MD based on my observation and the provider's statements to me. I, Bay Del Cid MD, attest that Diamante Fam is acting in a scribe capacity, has observed my performance of the services and has documented them in accordance with my direction.    Bay Del Cid MD  [facility] EMERGENCY DEPARTMENT  [facility] [facility] [id number] [phone]       Bay Del Cid MD  06/18/24 0134

## 2024-09-24 ENCOUNTER — MYC MEDICAL ADVICE (OUTPATIENT)
Dept: CARDIOLOGY | Facility: CLINIC | Age: 39
End: 2024-09-24
Payer: COMMERCIAL

## 2024-09-24 DIAGNOSIS — I10 BENIGN ESSENTIAL HYPERTENSION: Primary | ICD-10-CM

## 2024-09-30 RX ORDER — CARVEDILOL 6.25 MG/1
TABLET ORAL
Qty: 270 TABLET | Refills: 1 | Status: SHIPPED | OUTPATIENT
Start: 2024-09-30

## 2025-03-15 ENCOUNTER — HEALTH MAINTENANCE LETTER (OUTPATIENT)
Age: 40
End: 2025-03-15

## (undated) DEVICE — PACK HEART RIGHT CUSTOM SAN32RHF18

## (undated) DEVICE — WIRE GUIDE 0.035"X145CM AMPLATZ XSTIFF J THSCF-35-145-3-AES

## (undated) DEVICE — INTRO SHEATH 6FRX25CM PINNACLE RSS606

## (undated) DEVICE — FORCEP ENDOMYOCARDIAL BIOPSY STRAIGHT 6FRX50CM 190060

## (undated) DEVICE — DRAPE TIBURON CARDIOVASCULAR PERI-GROIN LF 9154

## (undated) DEVICE — TOURNIQUET VASCULAR KIT 7 1/2" 79012

## (undated) DEVICE — INTRO SHEATH 7FRX10CM PINNACLE RSS702

## (undated) DEVICE — CLIP HORIZON MED BLUE 002200

## (undated) DEVICE — INTRODUCER SHEATH FAST-CATH 7FRX40CM GSPC CVD 406772

## (undated) DEVICE — ESU ELEC BLADE 2.75" COATED/INSULATED E1455

## (undated) DEVICE — DRSG ADAPTIC 3X16"  6114

## (undated) DEVICE — VESSEL LOOPS YELLOW MAXI 31145694

## (undated) DEVICE — SU VICRYL 3-0 FS-1 27" J442H

## (undated) DEVICE — INTRO SHEATH MICRO PLATINUM TIP 4FRX40CM 7274

## (undated) DEVICE — DRAPE IOBAN INCISE 23X17" 6650EZ

## (undated) DEVICE — CANISTER WOUND VAC W/GEL 1000ML M8275093/5

## (undated) DEVICE — CANNULA VENOUS 25FR LONG

## (undated) DEVICE — SU ETHIBOND 0 CT-1 CR 8X18" CX21D

## (undated) DEVICE — SUTURE BOOTS 051003PBX

## (undated) DEVICE — SU PROLENE 4-0 SHDA 36" 8521H

## (undated) DEVICE — SOL ADH LIQUID BENZOIN SWAB 0.6ML C1544

## (undated) DEVICE — GUIDEWIRE L80CM OD.035IN 3 CM J-TIP V

## (undated) DEVICE — SU VICRYL 0 CTX 36" J370H

## (undated) DEVICE — Device

## (undated) DEVICE — SU ETHILON 3-0 PS-1 18" 1663H

## (undated) DEVICE — DRAPE SHEET REV FOLD 3/4 9349

## (undated) DEVICE — INTRODUCER SHEATH 4FRX40CM MICROPUNC PED G47946

## (undated) DEVICE — SU PROLENE 4-0 RB-1DA 36" 8557H

## (undated) DEVICE — CANNULA ART HLS 17FR ECMO BIOL

## (undated) DEVICE — SU PROLENE 5-0 RB-2DA 30" 8710H

## (undated) DEVICE — KIT ARTERIAL LINE 2GA 12CM INTRO NDL ASK-04510-HF

## (undated) DEVICE — CLIP HORIZON SM RED WIDE SLOT 001201

## (undated) RX ORDER — FENTANYL CITRATE 50 UG/ML
INJECTION, SOLUTION INTRAMUSCULAR; INTRAVENOUS
Status: DISPENSED
Start: 2020-01-29

## (undated) RX ORDER — LIDOCAINE HYDROCHLORIDE 10 MG/ML
INJECTION, SOLUTION EPIDURAL; INFILTRATION; INTRACAUDAL; PERINEURAL
Status: DISPENSED
Start: 2020-01-31

## (undated) RX ORDER — LIDOCAINE HYDROCHLORIDE 20 MG/ML
INJECTION, SOLUTION EPIDURAL; INFILTRATION; INTRACAUDAL; PERINEURAL
Status: DISPENSED
Start: 2020-02-03

## (undated) RX ORDER — PHENYLEPHRINE HCL IN 0.9% NACL 1 MG/10 ML
SYRINGE (ML) INTRAVENOUS
Status: DISPENSED
Start: 2020-01-29

## (undated) RX ORDER — CEFAZOLIN SODIUM 1 G/3ML
INJECTION, POWDER, FOR SOLUTION INTRAMUSCULAR; INTRAVENOUS
Status: DISPENSED
Start: 2020-02-03

## (undated) RX ORDER — LIDOCAINE HYDROCHLORIDE 10 MG/ML
INJECTION, SOLUTION EPIDURAL; INFILTRATION; INTRACAUDAL; PERINEURAL
Status: DISPENSED
Start: 2020-01-30

## (undated) RX ORDER — FENTANYL CITRATE 50 UG/ML
INJECTION, SOLUTION INTRAMUSCULAR; INTRAVENOUS
Status: DISPENSED
Start: 2020-02-03

## (undated) RX ORDER — HEPARIN SODIUM 1000 [USP'U]/ML
INJECTION, SOLUTION INTRAVENOUS; SUBCUTANEOUS
Status: DISPENSED
Start: 2020-01-29

## (undated) RX ORDER — PROPOFOL 10 MG/ML
INJECTION, EMULSION INTRAVENOUS
Status: DISPENSED
Start: 2020-02-03

## (undated) RX ORDER — PROTAMINE SULFATE 10 MG/ML
INJECTION, SOLUTION INTRAVENOUS
Status: DISPENSED
Start: 2020-02-03